# Patient Record
Sex: MALE | Race: WHITE | NOT HISPANIC OR LATINO | Employment: FULL TIME | URBAN - METROPOLITAN AREA
[De-identification: names, ages, dates, MRNs, and addresses within clinical notes are randomized per-mention and may not be internally consistent; named-entity substitution may affect disease eponyms.]

---

## 2017-01-17 ENCOUNTER — OFFICE VISIT (OUTPATIENT)
Dept: ENDOCRINOLOGY | Facility: CLINIC | Age: 37
End: 2017-01-17

## 2017-01-17 VITALS
SYSTOLIC BLOOD PRESSURE: 154 MMHG | HEIGHT: 76 IN | DIASTOLIC BLOOD PRESSURE: 79 MMHG | HEART RATE: 73 BPM | WEIGHT: 188 LBS | BODY MASS INDEX: 22.89 KG/M2

## 2017-01-17 DIAGNOSIS — E10.9 TYPE 1 DIABETES MELLITUS WITHOUT COMPLICATION (H): Primary | ICD-10-CM

## 2017-01-17 DIAGNOSIS — E10.9 TYPE 1 DIABETES MELLITUS WITHOUT COMPLICATION (H): ICD-10-CM

## 2017-01-17 LAB — HBA1C MFR BLD: 8 % (ref 4.3–6)

## 2017-01-17 ASSESSMENT — PAIN SCALES - GENERAL: PAINLEVEL: NO PAIN (0)

## 2017-01-17 NOTE — PATIENT INSTRUCTIONS
Reduce carb coverage on working days from 1 unit/6 g to 1 unit per 8 g.  Continue same correction dose of 1 unit/40 mg/dL above 130.  Continue Tresiba at 24 units      To expedite your medication refill(s), please contact your pharmacy and have them fax a refill request to: [phone].  *Please allow 3 business days for routine medication refills.  *Please allow 5 business days for controlled substance medication refills.  --------------------  For scheduling appointments (including lab work), please request an appointment through [facility], or call: [phone].    For questions for your provider or the endocrine nurse, please send a [facility] message.  For after-hours urgent issues, please dial [phone], and ask to speak with the Endocrinologist On-Call.  --------------------  Please Note: If you are active on [facility], all future test results will be sent by [facility] message only and will no longer be sent by mail. You may also receive communication directly from your physician.

## 2017-01-17 NOTE — Clinical Note
1/17/2017       RE: Agustín Gallegos  [address]   [place] [address]     Dear Colleague,    Thank you for referring your patient, Agustín Gallegos, to the [facility] ENDOCRINOLOGY at [facility]. Please see a copy of my visit note below.    Endocrinology Clinic Visit    Chief Complaint: RECHECK     Information obtained from:Patient    Subjective:         HPI: Christiano is a 36 year old year old male with history of type 1 diabetes who is seen in follow-up for the same.    Briefly, Christiano has had diabetes for 22 years, and was diagnosed at age 14.  He is originally from the [facility], was living in [place], and moved to [place] in July 2014. He was first seen by me in September 2014.     Agustín reported his A1c usually ranged between 7 and 7.5%, sometimes up to 8%, but BG has been hard to control since he moved to [facility].  He relates these to his irregular work schedule.  He works 5 days a week but in a variable schedule.  He applied for new jobs, including here at [employer], but has not been successful.    He is currently on Tresiba 24 units at bedtime (previously on Lantus 28 units) and Novolog 1 unit per 6 g with breakfast, lunch and dinner.   He is also on a correction sliding scale with 1 unit for every 40 mg/dl over 130 mg/dl during the day and 150 mg/dl during at bedtime.    He continues working at [employer].  He works 8 hours shift, 5 days a week, but his schedule is variable as discussed above, some days he works from 8:15 AM to 4:15 PM, other days from 4:15 PM to 12:15 AM.  During work hours, since he is quite active at work, he takes 1/10 g CHO.   He was running during summer, but is no longer doing that.    He is not feeling his best today.  His girlfriend is sick and he is afraid she has influenza.  He did get a influenza shot this season.  He denies fever, and respiratory or GI symptoms at this time.    Download from her meter shows that he is checking his blood  glucose an average of 3.5 times per day:  Average BG is 161 +/- 76 mg/dl  Lowest recorded blood glucose value was 43, highest is 393.  He continues having some hypoglycemic episodes, usually after meals, while he is at work.   A1c is 8.0% today, down from 8.4% previously.    He used a diagnostic CGMs as in the past, in April 2016, which was very helpful.      Agustín does not have chronic complications of diabetes.  Last eye exam was normal. Microalbuminuria was negative.  He was placed on losartan as a renal protective agent around 2008.  He had no history of microalbuminuria or hypertension and after a detailed discussion, we decided to discontinue Losartan in September 2014.  Blood pressure levels remained within the normal range.    He is also on vitamin D 1000 units per day.       No Known Allergies    Current Outpatient Prescriptions   Medication Sig Dispense Refill     blood glucose monitoring (CANDE CONTOUR NEXT) test strip Use to test blood sugar 8 times daily or as directed. 800 each 2     blood glucose monitoring (NO BRAND SPECIFIED) meter device kit Use to test blood sugar 5 times daily or as directed. 1 kit 0     insulin degludec (TRESIBA FLEXTOUCH) 200 UNIT/ML pen Inject 24 Units Subcutaneous daily 15 mL 3     NOVOLOG FLEXPEN 100 UNIT/ML soln Inject 30-40 units per day as carb coverage 1 unit per 7 g of CHO. 45 mL 3     cholecalciferol (VITAMIN D) 1000 UNIT tablet Take 1 tablet (1,000 Units) by mouth daily 90 tablet 3     blood glucose monitoring (CANDE MICROLET) lancets Use to test blood sugar 8 times daily or as directed. 6 Box 5     Review of Systems     11 point review system (Constitutional, HENT, Eyes, Respiratory, Cardiovascular, Gastrointestinal, Genitourinary, Musculoskeletal,Neurological, Psychiatric/Behavioral, Endocrine) is as detailed in the HPI or negative    Past Medical History   Diagnosis Date     Type 1 diabetes (H)      age of onset 14 years       No past surgical history on  "file.    Family History   Problem Relation Age of Onset     DIABETES Brother      CEREBROVASCULAR DISEASE Paternal Grandmother      Arthritis Mother      Arthritis Maternal Grandmother        History     Social History     Marital Status: Single     Spouse Name: N/A     Number of Children: N/A     Years of Education: N/A     Social History Main Topics     Smoking status: Never Smoker      Smokeless tobacco: Never Used     Alcohol Use: No     Drug Use: No     Sexual Activity:     Partners: Female     Other Topics Concern     None     Social History Narrative   Moved from [place] to [facility], currently working at [employer].  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  Has a girlfriend  Lives alone  No smoking. No alcohol, no illicit drugs    Family history:  Father: 71 yrs old, retired nephrologist in the [place] area, healthy  Mother: 69 yrs old, knee replacement, otherwise healthy  1 older brother, 43 yrs old, T1D since age 5, no complications; one older sister age 44 healthy.  No children    Objective:   /79 mmHg  Pulse 73  Ht 1.93 m (6' 4\")  Wt 85.276 kg (188 lb)  BMI 22.89 kg/m2  Constitutional: Appears well-developed and well-nourished. Active.   EYES: anicteric, normal extra-ocular movements, no lid lag or retraction   HEENT: Mouth/Throat: Mucous membrane is moist. Oropharynx is clear. No adenopathy.   Thyroid: Thyroid is firm, only upper poles are palpable. No discrete nodules are palpable.  Cardiovascular: RRR, S1, S2 normal. No m/g/r   Pulmonary/Chest: CTAB. No wheezing or rales   Abdominal: +BS. Non tender to palpation. No organomegaly present.  Neurological: Alert. Normal affect. CNII-XII intact. Muscle strength 5/5. Sensory is intact.  Extremities: No clubbing, cyanosis or inflammation. No tremor of the outstretched hands.   Skin: normal texture, color and temperature  Feet: No lesions or ulcers. Pedal pulse is palpable ++/+++.  Vibratory sensation and pinprick are " normal.  Lymphatic: no cervical lymphadenopathy.  Psychological: appropriate mood and affect     In House Labs:   ENDO DIABETES Latest Ref Rng 9/16/2016   CHOLESTEROL <200 mg/dL 146   LDL CHOLESTEROL, CALCULATED <100 mg/dL 96   HDL CHOLESTEROL >39 mg/dL 43   VLDL-CHOLESTEROL 0 - 30 mg/dL    NON HDL CHOLESTEROL <130 mg/dL 103   TRIGLYCERIDES <150 mg/dL 37   ALBUMIN URINE MG/L  13   ALBUMIN URINE MG/G CR 0 - 17 mg/g Cr 5.84   CREATININE 0.66 - 1.25 mg/dL 0.72   POTASSIUM 3.4 - 5.3 mmol/L 4.1   TSH 0.40 - 4.00 mU/L 0.97     ENDO DIABETES Latest Ref Rng 6/24/2015   MICROALBUMIN URINE  20   MICROALBUMIN MG/G CR 0 - 17 mg/g Cr 8.71   TSH 0.40 - 4.00 mU/L 0.78   T4 FREE 0.76 - 1.46 ng/dL 1.02   TRIIODOTHYRONINE(T3) 60 - 181 ng/dL 113     ENDO DIABETES Latest Ref Rng 3/5/2015   CREATININE 0.66 - 1.25 mg/dL 0.70   ALT 0 - 70 U/L 20   AST 0 - 45 U/L 16   WBC 4.0 - 11.0 10e9/L 4.9   RBC 4.4 - 5.9 10e12/L 5.00   HGB 13.3 - 17.7 g/dL 14.5   HCT 40.0 - 53.0 % 41.9   MCV 78 - 100 fl 84   MCH 26.5 - 33.0 pg 29.0   MCHC 31.5 - 36.5 g/dL 34.6   RDW 10.0 - 15.0 % 12.9    - 450 10e9/L 293     ENDO CALCIUM LABS-UMP Latest Ref Rng 12/4/2014   25 OH VIT D2  <5   25 OH VIT D3  18   25 OH VIT D TOTAL 30 - 75 ug/L <23  Season, race, dietary intake, and treatment affect the concentration of   25-hydroxy-Vitamin D. Values may decrease during winter months and increase   during summer months. Values less than 30 ug/L may indicate Vitamin D   deficiency. (L)     ENDO DIABETES Latest Ref Rng 12/4/2014 8/13/2014   HEMOGLOBIN A1C 4.3 - 6.0 %  7.9 (H)   HEMOGLOBIN A1C, POC 4.3 - 6.0 %     CHOLESTEROL <200 mg/dL  162   LDL CHOLESTEROL, CALCULATED 0 - 129 mg/dL  102   HDL CHOLESTEROL >40 mg/dL  49   VLDL-CHOLESTEROL 0 - 30 mg/dL  11   TRIGLYCERIDES 0 - 150 mg/dL  56   MICROALBUMIN URINE  6    MICROALBUMIN MG/G CR 0 - 17 mg/g Cr 5.36    TSH 0.40 - 4.00 mU/L 1.09    THYROGLOBULIN ANTIBODY <40 IU/mL <20    THYR PEROXIDASE BUFFY <35 IU/mL 26         Assessment/Treatment Plan:      Agustín Gallegos is a 36 year old year old male with type 1 diabetes for the past 22 years.    1. Type 1 Diabetes:  Diabetes control has somewhat improved and A1c is 8.0% today; goal is <7.0%.  We have again discussed in detail the short and long-term goals of diabetes management, as well as A1c goals.T he patient continues to report his working hours make it difficult for him to control his blood glucose. He understands an insulin pump would give him more flexibility, but he does not have the financial resources available at this time.  We have also discussed using a CGMS, and he will think about that.  He is doing well on Tresiba, but is having some post-prandial hypoglycemia.  Agustín will reduce his carb coverage from 1/6 g to 1/7 g during work hours, and if hypoglycemia persists he will decrease this further to 1/8 g.  We will not make other changes to his insulin regimen at this time.       2. Chronic diabetic complications: There is no evidence of chronic diabetic complications.  Creatinine levels are normal and microalbuminuria screening was negative. The patient is due for an eye exam.    3. Hypertension: Pt was on prophylactic Losartan in the past.  Losartan was discontinued on September 2014 and his BP levels were normal off medications. BP is slightly elevated today and on a previous visit a while back, and I asked our nurse staff to recheck those. We will continue to check his blood pressure regularly.  If hypertension is detected, we would recommend an ACE inhibitor (more likely ramipril) as apposed to an ARB.  We will continue to follow.    4. Flu (?): Patient is not at his best today and feels he is coming down with some illness. His girlfriend was sick, but was not tested for influenza.  We will obtain a swab for influenza A and B and start Tamiflu if positive. Indications, risks and benefits discussed with the patient. Patient has received a flu shot this  season.      5. Health maintenance: Thyroid function is normal. Lipid levels are normal.  Vitamin D levels were in the low-normal range, and the pt is now on vitamin D 1,000 units/day.   We should repeat vitamin D levels with next blood drawing.    I will contact him with his test results.     Return to clinic in 3 months or sooner if needed.    Test and/or medications prescribed today:  Orders Placed This Encounter   Procedures     Hemoglobin A1c POCT       Sharona Banks MD PhD    Division of Endocrinology and Diabetes    Addendum:  Patient was sent back and for in the [facility] (lab. Clinic, another clinic) and needed to leave so that he didn't have the test for influenza one.  I called him on 1/20 to check on how he was feeling and why labs were not done and he stated he could wait (asked to wait for 2 hours to have the swab done at the  clinic).  He is now feeling okay, but was a sick for the past 2 days. I advised him to seek care if symptoms (fever, cough, malaise) worsen.    Again, thank you for allowing me to participate in the care of your patient.      Sincerely,    Shruti Banks MD

## 2017-01-17 NOTE — PROGRESS NOTES
Endocrinology Clinic Visit    Chief Complaint: RECHECK     Information obtained from:Patient    Subjective:         HPI: Christiano is a 36 year old year old male with history of type 1 diabetes who is seen in follow-up for the same.    Briefly, Christiano has had diabetes for 22 years, and was diagnosed at age 14.  He is originally from the [facility], was living in [place], and moved to [place] in July 2014. He was first seen by me in September 2014.     Agustín reported his A1c usually ranged between 7 and 7.5%, sometimes up to 8%, but BG has been hard to control since he moved to [facility].  He relates these to his irregular work schedule.  He works 5 days a week but in a variable schedule.  He applied for new jobs, including here at [employer], but has not been successful.    He is currently on Tresiba 24 units at bedtime (previously on Lantus 28 units) and Novolog 1 unit per 6 g with breakfast, lunch and dinner.   He is also on a correction sliding scale with 1 unit for every 40 mg/dl over 130 mg/dl during the day and 150 mg/dl during at bedtime.    He continues working at [employer].  He works 8 hours shift, 5 days a week, but his schedule is variable as discussed above, some days he works from 8:15 AM to 4:15 PM, other days from 4:15 PM to 12:15 AM.  During work hours, since he is quite active at work, he takes 1/10 g CHO.   He was running during summer, but is no longer doing that.    He is not feeling his best today.  His girlfriend is sick and he is afraid she has influenza.  He did get a influenza shot this season.  He denies fever, and respiratory or GI symptoms at this time.    Download from her meter shows that he is checking his blood glucose an average of 3.5 times per day:  Average BG is 161 +/- 76 mg/dl  Lowest recorded blood glucose value was 43, highest is 393.  He continues having some hypoglycemic episodes, usually after meals, while he is at work.   A1c is 8.0% today, down from 8.4% previously.    He used a diagnostic  CGMs as in the past, in April 2016, which was very helpful.      Agustín does not have chronic complications of diabetes.  Last eye exam was normal. Microalbuminuria was negative.  He was placed on losartan as a renal protective agent around 2008.  He had no history of microalbuminuria or hypertension and after a detailed discussion, we decided to discontinue Losartan in September 2014.  Blood pressure levels remained within the normal range.    He is also on vitamin D 1000 units per day.       No Known Allergies    Current Outpatient Prescriptions   Medication Sig Dispense Refill     blood glucose monitoring (CANDE CONTOUR NEXT) test strip Use to test blood sugar 8 times daily or as directed. 800 each 2     blood glucose monitoring (NO BRAND SPECIFIED) meter device kit Use to test blood sugar 5 times daily or as directed. 1 kit 0     insulin degludec (TRESIBA FLEXTOUCH) 200 UNIT/ML pen Inject 24 Units Subcutaneous daily 15 mL 3     NOVOLOG FLEXPEN 100 UNIT/ML soln Inject 30-40 units per day as carb coverage 1 unit per 7 g of CHO. 45 mL 3     cholecalciferol (VITAMIN D) 1000 UNIT tablet Take 1 tablet (1,000 Units) by mouth daily 90 tablet 3     blood glucose monitoring (CANDE MICROLET) lancets Use to test blood sugar 8 times daily or as directed. 6 Box 5     Review of Systems     11 point review system (Constitutional, HENT, Eyes, Respiratory, Cardiovascular, Gastrointestinal, Genitourinary, Musculoskeletal,Neurological, Psychiatric/Behavioral, Endocrine) is as detailed in the HPI or negative    Past Medical History   Diagnosis Date     Type 1 diabetes (H)      age of onset 14 years       No past surgical history on file.    Family History   Problem Relation Age of Onset     DIABETES Brother      CEREBROVASCULAR DISEASE Paternal Grandmother      Arthritis Mother      Arthritis Maternal Grandmother        History     Social History     Marital Status: Single     Spouse Name: N/A     Number of Children: N/A     Years  "of Education: N/A     Social History Main Topics     Smoking status: Never Smoker      Smokeless tobacco: Never Used     Alcohol Use: No     Drug Use: No     Sexual Activity:     Partners: Female     Other Topics Concern     None     Social History Narrative   Moved from [place] to [facility], currently working at [employer].  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  Has a girlfriend  Lives alone  No smoking. No alcohol, no illicit drugs    Family history:  Father: 71 yrs old, retired nephrologist in the [place] area, healthy  Mother: 69 yrs old, knee replacement, otherwise healthy  1 older brother, 43 yrs old, T1D since age 5, no complications; one older sister age 44 healthy.  No children    Objective:   /79 mmHg  Pulse 73  Ht 1.93 m (6' 4\")  Wt 85.276 kg (188 lb)  BMI 22.89 kg/m2  Constitutional: Appears well-developed and well-nourished. Active.   EYES: anicteric, normal extra-ocular movements, no lid lag or retraction   HEENT: Mouth/Throat: Mucous membrane is moist. Oropharynx is clear. No adenopathy.   Thyroid: Thyroid is firm, only upper poles are palpable. No discrete nodules are palpable.  Cardiovascular: RRR, S1, S2 normal. No m/g/r   Pulmonary/Chest: CTAB. No wheezing or rales   Abdominal: +BS. Non tender to palpation. No organomegaly present.  Neurological: Alert. Normal affect. CNII-XII intact. Muscle strength 5/5. Sensory is intact.  Extremities: No clubbing, cyanosis or inflammation. No tremor of the outstretched hands.   Skin: normal texture, color and temperature  Feet: No lesions or ulcers. Pedal pulse is palpable ++/+++.  Vibratory sensation and pinprick are normal.  Lymphatic: no cervical lymphadenopathy.  Psychological: appropriate mood and affect     In House Labs:   ENDO DIABETES Latest Ref Rng 9/16/2016   CHOLESTEROL <200 mg/dL 146   LDL CHOLESTEROL, CALCULATED <100 mg/dL 96   HDL CHOLESTEROL >39 mg/dL 43   VLDL-CHOLESTEROL 0 - 30 mg/dL    NON HDL " CHOLESTEROL <130 mg/dL 103   TRIGLYCERIDES <150 mg/dL 37   ALBUMIN URINE MG/L  13   ALBUMIN URINE MG/G CR 0 - 17 mg/g Cr 5.84   CREATININE 0.66 - 1.25 mg/dL 0.72   POTASSIUM 3.4 - 5.3 mmol/L 4.1   TSH 0.40 - 4.00 mU/L 0.97     ENDO DIABETES Latest Ref Rng 6/24/2015   MICROALBUMIN URINE  20   MICROALBUMIN MG/G CR 0 - 17 mg/g Cr 8.71   TSH 0.40 - 4.00 mU/L 0.78   T4 FREE 0.76 - 1.46 ng/dL 1.02   TRIIODOTHYRONINE(T3) 60 - 181 ng/dL 113     ENDO DIABETES Latest Ref Rng 3/5/2015   CREATININE 0.66 - 1.25 mg/dL 0.70   ALT 0 - 70 U/L 20   AST 0 - 45 U/L 16   WBC 4.0 - 11.0 10e9/L 4.9   RBC 4.4 - 5.9 10e12/L 5.00   HGB 13.3 - 17.7 g/dL 14.5   HCT 40.0 - 53.0 % 41.9   MCV 78 - 100 fl 84   MCH 26.5 - 33.0 pg 29.0   MCHC 31.5 - 36.5 g/dL 34.6   RDW 10.0 - 15.0 % 12.9    - 450 10e9/L 293     ENDO CALCIUM LABS-UMP Latest Ref Rng 12/4/2014   25 OH VIT D2  <5   25 OH VIT D3  18   25 OH VIT D TOTAL 30 - 75 ug/L <23  Season, race, dietary intake, and treatment affect the concentration of   25-hydroxy-Vitamin D. Values may decrease during winter months and increase   during summer months. Values less than 30 ug/L may indicate Vitamin D   deficiency. (L)     ENDO DIABETES Latest Ref Rng 12/4/2014 8/13/2014   HEMOGLOBIN A1C 4.3 - 6.0 %  7.9 (H)   HEMOGLOBIN A1C, POC 4.3 - 6.0 %     CHOLESTEROL <200 mg/dL  162   LDL CHOLESTEROL, CALCULATED 0 - 129 mg/dL  102   HDL CHOLESTEROL >40 mg/dL  49   VLDL-CHOLESTEROL 0 - 30 mg/dL  11   TRIGLYCERIDES 0 - 150 mg/dL  56   MICROALBUMIN URINE  6    MICROALBUMIN MG/G CR 0 - 17 mg/g Cr 5.36    TSH 0.40 - 4.00 mU/L 1.09    THYROGLOBULIN ANTIBODY <40 IU/mL <20    THYR PEROXIDASE BUFFY <35 IU/mL 26        Assessment/Treatment Plan:      Agutsín Gallegos is a 36 year old year old male with type 1 diabetes for the past 22 years.    1. Type 1 Diabetes:  Diabetes control has somewhat improved and A1c is 8.0% today; goal is <7.0%.  We have again discussed in detail the short and long-term goals of  diabetes management, as well as A1c goals.T he patient continues to report his working hours make it difficult for him to control his blood glucose. He understands an insulin pump would give him more flexibility, but he does not have the financial resources available at this time.  We have also discussed using a CGMS, and he will think about that.  He is doing well on Tresiba, but is having some post-prandial hypoglycemia.  Agustín will reduce his carb coverage from 1/6 g to 1/7 g during work hours, and if hypoglycemia persists he will decrease this further to 1/8 g.  We will not make other changes to his insulin regimen at this time.       2. Chronic diabetic complications: There is no evidence of chronic diabetic complications.  Creatinine levels are normal and microalbuminuria screening was negative. The patient is due for an eye exam.    3. Hypertension: Pt was on prophylactic Losartan in the past.  Losartan was discontinued on September 2014 and his BP levels were normal off medications. BP is slightly elevated today and on a previous visit a while back, and I asked our nurse staff to recheck those. We will continue to check his blood pressure regularly.  If hypertension is detected, we would recommend an ACE inhibitor (more likely ramipril) as apposed to an ARB.  We will continue to follow.    4. Flu (?): Patient is not at his best today and feels he is coming down with some illness. His girlfriend was sick, but was not tested for influenza.  We will obtain a swab for influenza A and B and start Tamiflu if positive. Indications, risks and benefits discussed with the patient. Patient has received a flu shot this season.      5. Health maintenance: Thyroid function is normal. Lipid levels are normal.  Vitamin D levels were in the low-normal range, and the pt is now on vitamin D 1,000 units/day.   We should repeat vitamin D levels with next blood drawing.    I will contact him with his test results.     Return  to clinic in 3 months or sooner if needed.    Test and/or medications prescribed today:  Orders Placed This Encounter   Procedures     Hemoglobin A1c POCT       Sharona Banks MD PhD    Division of Endocrinology and Diabetes    Addendum:  Patient was sent back and for in the [facility] (lab. Clinic, another clinic) and needed to leave so that he didn't have the test for influenza one.  I called him on 1/20 to check on how he was feeling and why labs were not done and he stated he could wait (asked to wait for 2 hours to have the swab done at the  clinic).  He is now feeling okay, but was a sick for the past 2 days. I advised him to seek care if symptoms (fever, cough, malaise) worsen.

## 2017-01-17 NOTE — MR AVS SNAPSHOT
After Visit Summary   1/17/2017    Agustín Gallegos    MRN: [id number]           Patient Information     Date Of Birth          1980        Visit Information        Provider Department      1/17/2017 11:30 AM Shruti Banks MD [facility]        Care Instructions    Reduce carb coverage on working days from 1 unit/6 g to 1 unit per 8 g.  Continue same correction dose of 1 unit/40 mg/dL above 130.  Continue Tresiba at 24 units      To expedite your medication refill(s), please contact your pharmacy and have them fax a refill request to: [phone].  *Please allow 3 business days for routine medication refills.  *Please allow 5 business days for controlled substance medication refills.  --------------------  For scheduling appointments (including lab work), please request an appointment through [facility], or call: [phone].    For questions for your provider or the endocrine nurse, please send a [employer] message.  For after-hours urgent issues, please dial [phone], and ask to speak with the Endocrinologist On-Call.  --------------------  Please Note: If you are active on [employer], all future test results will be sent by [employer] message only and will no longer be sent by mail. You may also receive communication directly from your physician.              Follow-ups after your visit        Follow-up notes from your care team     Return in about 3 months (around 4/17/2017).      Your next 10 appointments already scheduled     May 02, 2017  8:30 AM   (Arrive by 8:15 AM)   RETURN DIABETES with MD CARA Rosa [facility] ([facility])    [address] [place] [address] [phone]              Who to contact     Please call your clinic at [phone] to:    Ask questions about your health    Make or cancel appointments    Discuss your medicines    Learn about your test  "results    Speak to your doctor   If you have compliments or concerns about an experience at your clinic, or if you wish to file a complaint, please contact [facility] Patient Relations at [phone] or email us at [email or web address]         Additional Information About Your Visit        Goodmail System[employer]art Information     [employer] gives you secure access to your electronic health record. If you see a primary care provider, you can also send messages to your care team and make appointments. If you have questions, please call your primary care clinic.  If you do not have a primary care provider, please call [phone] and they will assist you.      [facility] is an electronic gateway that provides easy, online access to your medical records. With [facility], you can request a clinic appointment, read your test results, renew a prescription or communicate with your care team.     To access your existing account, please contact your [facility] or call [phone] for assistance.        Care EveryWhere ID     This is your Care EveryWhere ID. This could be used by other organizations to access your [facility] medical records  [id number]        Your Vitals Were     Pulse Height BMI (Body Mass Index)             73 1.93 m (6' 4\") 22.89 kg/m2          Blood Pressure from Last 3 Encounters:   01/17/17 154/79   09/13/16 133/79   05/25/16 145/81    Weight from Last 3 Encounters:   01/17/17 85.276 kg (188 lb)   09/13/16 82.555 kg (182 lb)   05/25/16 81.647 kg (180 lb)              Today, you had the following     No orders found for display       Primary Care Provider    Physician No Ref-Primary       No address on file        Thank you!     Thank you for choosing [facility]  for your care. Our goal is always to provide you with excellent care. Hearing back from our patients is one way we can continue to improve our services. Please take a few minutes to " complete the written survey that you may receive in the mail after your visit with us. Thank you!             Your Updated Medication List - Protect others around you: Learn how to safely use, store and throw away your medicines at www.disposemymeds.org.          This list is accurate as of: 1/17/17 12:01 PM.  Always use your most recent med list.                   Brand Name Dispense Instructions for use    blood glucose monitoring lancets     6 Box    Use to test blood sugar 8 times daily or as directed.       blood glucose monitoring meter device kit    no brand specified    1 kit    Use to test blood sugar 5 times daily or as directed.       blood glucose monitoring test strip    CANDE CONTOUR NEXT    800 each    Use to test blood sugar 8 times daily or as directed.       cholecalciferol 1000 UNIT tablet    vitamin D    90 tablet    Take 1 tablet (1,000 Units) by mouth daily       insulin degludec 200 UNIT/ML pen    TRESIBA FLEXTOUCH    15 mL    Inject 24 Units Subcutaneous daily       NovoLOG FLEXPEN 100 UNIT/ML injection   Generic drug:  insulin aspart     45 mL    Inject 30-40 units per day as carb coverage 1 unit per 7 g of CHO.

## 2017-01-23 ENCOUNTER — TELEPHONE (OUTPATIENT)
Dept: ENDOCRINOLOGY | Facility: CLINIC | Age: 37
End: 2017-01-23

## 2017-01-23 NOTE — TELEPHONE ENCOUNTER
----- Message from Shruti Banks MD sent at 1/20/2017  3:36 PM CST -----  Regarding: RE: illness  Spoke to him.  His girlfriend was also NOT tested, but they thought it was influenza.  I know he got the flu shot, so he is somewhat protected from more serious complications.  He is now getting better, so the clinical course will likely not be attenuated by using Tamiflu this late.  Also recommended him to go to urgent care if he starting feeling worse (higher fever, increase cough, etc.) as it could be pneumonia.    Sharona Bolaños    ----- Message -----     From: Ana Helm RN     Sent: 1/20/2017  12:04 PM       To: Shruti Banks MD  Subject: RE: illness                                      No he was not tested  ----- Message -----     From: Shruti Banks MD     Sent: 1/20/2017  11:50 AM       To: Ana Helm RN  Subject: RE: illness                                      Best if in the first 24 max 48 h.  Not sure if inefective after, would need to ask someone in ID I guess. He is high risk for complications due to diabetes. Was he tested elsewhere?    ----- Message -----     From: Ana Helm RN     Sent: 1/20/2017  10:54 AM       To: Shruti Bansk MD  Subject: illness                                          There was confusion re: getting the test and he needed to leave for work so didn't get it done. He is ill, onset Tuesday 1/17. Blood sugars have been controlled, he is eating and drinking. Symptoms are cough, low grade fever today 99.6 general URI symptoms. He is starting to feel better. Too late for Tamiflu?  ----- Message -----     From: Ana Helm RN     Sent: 1/20/2017  10:07 AM       To: Ana Helm RN        ----- Message -----     From: Shruti Banks MD     Sent: 1/20/2017   8:39 AM       To: Jolly Leo RN, Ivett Rubin MA    I asked Ivett and she told me they  didn't do it in clinic, that is why he was sent to the lab.  When I was leaving, I notice he was coming back to the 3rd floor (saw him in the elevator). I am copying Ivett so she can clarify.  I actually called the lab because I didn't know how to order it properly. Can you please check how he is.  His girl-friend has flue and he probably should be on Tamiflu.  Thanks again,  Sharona    ----- Message -----     From: Jolly Leo RN     Sent: 1/18/2017   1:01 PM       To: Shruti Banks MD    I see it still as future. He didn't get one done . Where was he to get this at?  IF nasal swabs the lab does not do them they are  Done in clinic .  ----- Message -----     From: Shruti Banks MD     Sent: 1/18/2017  12:48 PM       To: Jolly Leo RN    I cannot find the result for influenza test he did yesterday. Can you please help? Thanks,   Sharona

## 2017-01-25 DIAGNOSIS — E10.9 DIABETES MELLITUS TYPE 1 (H): Primary | ICD-10-CM

## 2017-01-25 RX ORDER — INSULIN ASPART 100 [IU]/ML
INJECTION, SOLUTION INTRAVENOUS; SUBCUTANEOUS
Qty: 45 ML | Refills: 3 | Status: SHIPPED | OUTPATIENT
Start: 2017-01-25 | End: 2018-02-02

## 2017-03-01 ENCOUNTER — OFFICE VISIT (OUTPATIENT)
Dept: OPHTHALMOLOGY | Facility: CLINIC | Age: 37
End: 2017-03-01
Attending: OPHTHALMOLOGY
Payer: COMMERCIAL

## 2017-03-01 DIAGNOSIS — H52.203 MYOPIC ASTIGMATISM OF BOTH EYES: ICD-10-CM

## 2017-03-01 DIAGNOSIS — H52.13 MYOPIC ASTIGMATISM OF BOTH EYES: ICD-10-CM

## 2017-03-01 DIAGNOSIS — E10.9 TYPE 1 DIABETES MELLITUS WITHOUT RETINOPATHY (H): Primary | ICD-10-CM

## 2017-03-01 PROCEDURE — 99212 OFFICE O/P EST SF 10 MIN: CPT | Mod: ZF

## 2017-03-01 PROCEDURE — 92015 DETERMINE REFRACTIVE STATE: CPT | Mod: ZF

## 2017-03-01 PROCEDURE — 99212 OFFICE O/P EST SF 10 MIN: CPT

## 2017-03-01 ASSESSMENT — REFRACTION_MANIFEST
OS_SPHERE: -8.50
OD_CYLINDER: +0.50
OS_CYLINDER: +0.50
OD_SPHERE: -7.75
OS_AXIS: 095
OD_AXIS: 085

## 2017-03-01 ASSESSMENT — CONF VISUAL FIELD
METHOD: COUNTING FINGERS
OD_NORMAL: 1
OS_NORMAL: 1

## 2017-03-01 ASSESSMENT — REFRACTION_WEARINGRX
OD_AXIS: 083
OS_CYLINDER: +0.75
OS_AXIS: 105
OD_SPHERE: -7.50
OS_SPHERE: -7.75
OD_CYLINDER: +0.75

## 2017-03-01 ASSESSMENT — VISUAL ACUITY
OD_CC: 20/20
OD_CC+: -2
CORRECTION_TYPE: GLASSES
OS_PH_CC: 20/20
METHOD: SNELLEN - LINEAR
OS_CC: 20/30

## 2017-03-01 ASSESSMENT — CUP TO DISC RATIO
OS_RATIO: 0.3
OD_RATIO: 0.3

## 2017-03-01 ASSESSMENT — TONOMETRY
OD_IOP_MMHG: 20
OS_IOP_MMHG: 18
IOP_METHOD: TONOPEN

## 2017-03-01 ASSESSMENT — SLIT LAMP EXAM - LIDS
COMMENTS: NORMAL
COMMENTS: NORMAL

## 2017-03-01 ASSESSMENT — EXTERNAL EXAM - LEFT EYE: OS_EXAM: NORMAL

## 2017-03-01 ASSESSMENT — EXTERNAL EXAM - RIGHT EYE: OD_EXAM: NORMAL

## 2017-03-01 NOTE — MR AVS SNAPSHOT
After Visit Summary   3/1/2017    Agustín Gallegos    MRN: [id number]           Patient Information     Date Of Birth          1980        Visit Information        Provider Department      3/1/2017 10:00 AM Patricia Talamantes MD [facility]        Today's Diagnoses     Type 1 diabetes mellitus without retinopathy (H)    -  1    Myopic astigmatism of both eyes           Follow-ups after your visit        Follow-up notes from your care team     Return in about 1 year (around 3/1/2018).      Your next 10 appointments already scheduled     May 02, 2017  8:30 AM CDT   (Arrive by 8:15 AM)   RETURN DIABETES with Shruti Banks MD   [facility] ([facility])    [address] [address] [phone]              Who to contact     Please call your clinic at [phone] to:    Ask questions about your health    Make or cancel appointments    Discuss your medicines    Learn about your test results    Speak to your doctor   If you have compliments or concerns about an experience at your clinic, or if you wish to file a complaint, please contact [facility] Patient Relations at [phone] or email us at [email or web address]         Additional Information About Your Visit        MyChart Information     [employer] gives you secure access to your electronic health record. If you see a primary care provider, you can also send messages to your care team and make appointments. If you have questions, please call your primary care clinic.  If you do not have a primary care provider, please call [phone] and they will assist you.      [employer] is an electronic gateway that provides easy, online access to your medical records. With [facility] [employer], you can request a clinic appointment, read your test results, renew a prescription or communicate with your care team.     To access your existing account,  please contact your [facility] or call [phone] for assistance.        Care EveryWhere ID     This is your Care EveryWhere ID. This could be used by other organizations to access your [facility] medical records  [id number]         Blood Pressure from Last 3 Encounters:   01/17/17 154/79   09/13/16 133/79   05/25/16 145/81    Weight from Last 3 Encounters:   01/17/17 85.3 kg (188 lb)   09/13/16 82.6 kg (182 lb)   05/25/16 81.6 kg (180 lb)              Today, you had the following     No orders found for display       Primary Care Provider    Physician No Ref-Primary       No address on file        Thank you!     Thank you for choosing E[facility] CLINIC  for your care. Our goal is always to provide you with excellent care. Hearing back from our patients is one way we can continue to improve our services. Please take a few minutes to complete the written survey that you may receive in the mail after your visit with us. Thank you!             Your Updated Medication List - Protect others around you: Learn how to safely use, store and throw away your medicines at www.disposemymeds.org.          This list is accurate as of: 3/1/17 11:17 AM.  Always use your most recent med list.                   Brand Name Dispense Instructions for use    blood glucose monitoring lancets     6 Box    Use to test blood sugar 8 times daily or as directed.       blood glucose monitoring meter device kit    no brand specified    1 kit    Use to test blood sugar 5 times daily or as directed.       blood glucose monitoring test strip    CANDE CONTOUR NEXT    800 each    Use to test blood sugar 8 times daily or as directed.       cholecalciferol 1000 UNIT tablet    vitamin D    90 tablet    Take 1 tablet (1,000 Units) by mouth daily       insulin degludec 200 UNIT/ML pen    TRESIBA FLEXTOUCH    15 mL    Inject 24 Units Subcutaneous daily       NovoLOG FLEXPEN 100 UNIT/ML injection   Generic drug:  insulin aspart      45 mL    Inject 30-40 units per day as carb coverage 1 unit per 7 g of CHO.

## 2017-03-01 NOTE — NURSING NOTE
Chief Complaints and History of Present Illnesses   Patient presents with     Yearly Exam     DM exam     HPI    Affected eye(s):  Both   Symptoms:     No floaters   No flashes   No glare   No halos      Duration:  1 year      Do you have eye pain now?:  No      Comments:  Diabetic exam  Have issues when reading at times, sometimes double w RE when in contacts.  Blood [id number] today  A1C 7.8, taken 1-  Barbara Schmitt COA 10:46 AM March 1, 2017

## 2017-03-01 NOTE — PROGRESS NOTES
HPI  Agustín Gallegos is a 35 year old male here for yearly diabetic eye exam. His vision is good in both eyes with his current glasses. He notes intermittent blurring with reading. He denies eye pain, redness, or discharge. No flashes/floaters.    POH: No history of eye surgery or trauma  PMH: Type 1 diabetes  FH: No FH of AMD or glc  SH: Non-smoker    Assessment & Plan      (E10.9) Type 1 diabetes mellitus without retinopathy (HCC)  (primary encounter diagnosis)  Comment: Diagnosed at age 13. Last A1c 8.0 1/2017. No diabetic retinopathy.  Plan: Discussed the importance of tight blood glucose control in the prevention of diabetic retinopathy. Recommend yearly dilated eye exam.    (H52.203) Myopic astigmatism of both eyes  Comment: Stable good vision with refraction  Plan: Given updated glasses Rx. Follows with Dr. Nice for contact lenses     -----------------------------------------------------------------------------------    Patient disposition:   Return in about 1 year (around 3/1/2018). or sooner as needed.    Teaching statement:  I have confirmed the content of the chief complaint, history of present illness, review of systems, and past medical/surgical history sections and edited the information as needed.    I have interviewed and examined the patient and confirm the pertinent findings.  I have discussed the case with the resident/fellow and agree with the findings and plan as documented.    Patricia Talamantes MD  Comprehensive Ophthalmology & Ocular Pathology  Department of Ophthalmology and Visual Neurosciences  [email or web address]  Pager [phone]

## 2017-03-01 NOTE — NURSING NOTE
Chief Complaints and History of Present Illnesses   Patient presents with     Yearly Exam     DM exam     HPI    Affected eye(s):  Both   Symptoms:     No floaters   No flashes   No glare   No halos      Duration:  1 year      Do you have eye pain now?:  No      Comments:  Diabetic exam  No problems  Blood [id number] today  A1C 7.8, taken 1-  Barbara COLLADO 10:46 AM March 1, 2017

## 2017-04-25 DIAGNOSIS — E10.9 TYPE 1 DIABETES MELLITUS WITHOUT COMPLICATION (H): ICD-10-CM

## 2017-05-04 DIAGNOSIS — E10.9 DIABETES MELLITUS TYPE 1 (H): Primary | ICD-10-CM

## 2017-08-22 ENCOUNTER — OFFICE VISIT (OUTPATIENT)
Dept: ENDOCRINOLOGY | Facility: CLINIC | Age: 37
End: 2017-08-22

## 2017-08-22 VITALS
WEIGHT: 187.6 LBS | HEART RATE: 74 BPM | SYSTOLIC BLOOD PRESSURE: 138 MMHG | DIASTOLIC BLOOD PRESSURE: 85 MMHG | HEIGHT: 76 IN | BODY MASS INDEX: 22.84 KG/M2

## 2017-08-22 DIAGNOSIS — E10.9 TYPE 1 DIABETES MELLITUS WITHOUT COMPLICATION (H): Primary | ICD-10-CM

## 2017-08-22 DIAGNOSIS — E10.9 TYPE 1 DIABETES MELLITUS WITHOUT COMPLICATION (H): ICD-10-CM

## 2017-08-22 DIAGNOSIS — Z00.00 HEALTH CARE MAINTENANCE: ICD-10-CM

## 2017-08-22 LAB
CREAT SERPL-MCNC: 0.69 MG/DL (ref 0.66–1.25)
CREAT UR-MCNC: 43 MG/DL
GFR SERPL CREATININE-BSD FRML MDRD: >90 ML/MIN/1.7M2
HBA1C MFR BLD: 8.3 % (ref 4.3–6)
HGB BLD-MCNC: 14.8 G/DL (ref 13.3–17.7)
MICROALBUMIN UR-MCNC: <5 MG/L
MICROALBUMIN/CREAT UR: NORMAL MG/G CR (ref 0–17)

## 2017-08-22 ASSESSMENT — PAIN SCALES - GENERAL: PAINLEVEL: NO PAIN (0)

## 2017-08-22 NOTE — PROGRESS NOTES
ATTENDING NOTE    I have seen and examined the patient, reviewed and edited the fellow's note, and agree with the plan of care.    Sharona Banks MD PhD    Division of Endocrinology and Diabetes

## 2017-08-22 NOTE — PATIENT INSTRUCTIONS
Increase Treseba 10% every 3 days if morning glucose if consistence high (>150)  Novolog at home is 1 unit per 6 grams and at work 1 unit per 8 grams  Meet with Rosa in 3- 4 weeks about CGM and insulin dose  Labs today

## 2017-08-22 NOTE — LETTER
Patient:  Agustín Gallegos  :   1980  MRN:     [id number]        Mr.Theodore JEANETTE Gallegos  [address] [place] [address]        2017    Dear ,    We are writing to inform you of your test results.      Resulted Orders   Albumin Random Urine Quantitative   Result Value Ref Range    Creatinine Urine 43 mg/dL    Albumin Urine mg/L <5 mg/L    Albumin Urine mg/g Cr Unable to calculate due to low value 0 - 17 mg/g Cr   Hemoglobin A1c POCT   Result Value Ref Range    Hemoglobin A1C 8.3 (A) 4.3 - 6.0 %       The results above show normal amounts of protein/albumin in your urine, what is always good to see.  It was a pleasure to see you at your recent visit.  Please let me know if you have any questions or concerns.  Sincerely,    Sharona Banks MD PhD    Division of Endocrinology and Diabetes

## 2017-08-22 NOTE — LETTER
8/22/2017       RE: Agustín Gallegos  [address]  [facility] [address]     Dear Colleague,    Thank you for referring your patient, Agustín Gallegos, to the [facility] ENDOCRINOLOGY at [facility]. Please see a copy of my visit note below.    ATTENDING NOTE    I have seen and examined the patient, reviewed and edited the fellow's note, and agree with the plan of care.    Sharona Banks MD PhD    Division of Endocrinology and Diabetes      Endocrinology Clinic Visit    Chief Complaint: RECHECK (return diabetes )     Information obtained from:Patient    Subjective:         HPI: Christiano is a 37 year old year old male with history of type 1 diabetes who is seen in follow-up for the same.    Briefly, Christiano has had diabetes for 22 years, and was diagnosed at age 14.  He is originally from the [facility], was living in [place], and moved to [place] in July 2014. He was first seen by Dr. Banks September 2014 and last seen 1/17/17.    Agustín reported his A1c usually ranged between 7 and 7.5%, sometimes up to 8%, but BG has been hard to control since [place] to [place].  He relates these to his irregular work schedule.  He works 5 days a week but in a variable schedule.  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 He is also on a correction sliding scale with 1 unit for every 30 mg/dl over 130 mg/dl.     Since last visit, he has less frequent lows, now 1-2 times per week which he attributed that to over-estimate his carbohydrate. Despite over coverage, sometimes he hesitates to give himself the full amount insulin he calculates for meal because he scared to be in hypoglycemia. He started to feel low  when glucose 70s. He drinks orange juices, recheck 15 mins, glucose increase to 140s.    Download from meter July 23-August 22 shows that he is checking his blood glucose an average of 3.9 times per day:  Average BG is 164, SD 77 mg/dl  Lowest recorded blood glucose value was 43, highest is 393.  He continues having some hypoglycemic episodes, usually after meals  A1c is 8.3% today, up from 8.0% previously.    He used a diagnostic CGMs as in the past, in April 2016, which was very helpful.  He is interested in trying again when he change his job      Agustín does not have chronic complications of diabetes.  Last eye exam was normal 3/2017. Microalbuminuria was negative.  He was placed on losartan as a renal protective agent around 2008.  He had no history of microalbuminuria or hypertension and after a detailed discussion, we decided to discontinue Losartan in September 2014.  Blood pressure levels remained within the normal range.    He is also on vitamin D 1000 units per day.       No Known Allergies    Current Outpatient Prescriptions   Medication Sig Dispense Refill     insulin pen needle (B-D U/F) 31G X 8 MM Use use 4 daily pen needles daily (or pen size pt has been using) 400 each 3     insulin degludec (TRESIBA FLEXTOUCH) 200 UNIT/ML pen Inject 24 Units Subcutaneous daily 15 mL 3     NOVOLOG FLEXPEN 100 UNIT/ML soln Inject 30-40 units per day as carb coverage 1 unit per 7 g of CHO. 45 mL 3     blood glucose monitoring (CANDE CONTOUR NEXT) test strip Use to test blood sugar 8 times daily or as directed. 800 each 2     blood glucose monitoring (NO BRAND SPECIFIED) meter device kit Use to test blood sugar 5 times daily or as directed. 1 kit 0     cholecalciferol (VITAMIN D) 1000 UNIT tablet Take 1 tablet (1,000 Units) by mouth daily 90 tablet 3     blood glucose monitoring (CANDE MICROLET) lancets Use to test blood sugar 8 times daily or as directed. 6 Box 5     Review of Systems     11 point review system  "(Constitutional, HENT, Eyes, Respiratory, Cardiovascular, Gastrointestinal, Genitourinary, Musculoskeletal,Neurological, Psychiatric/Behavioral, Endocrine) is as detailed in the HPI or negative    Past Medical History:   Diagnosis Date     Type 1 diabetes (H)     age of onset 14 years       No past surgical history on file.    Family History   Problem Relation Age of Onset     DIABETES Brother      CEREBROVASCULAR DISEASE Paternal Grandmother      Arthritis Mother      Arthritis Maternal Grandmother        History     Social History     Marital Status: Single     Spouse Name: N/A     Number of Children: N/A     Years of Education: N/A     Social History Main Topics     Smoking status: Never Smoker      Smokeless tobacco: Never Used     Alcohol Use: No     Drug Use: No     Sexual Activity:     Partners: Female     Other Topics Concern     None     Social History Narrative   Moved from [place] to [facility], currently working at [employer].  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  Has a girlfriend  Lives alone  No smoking. No alcohol, no illicit drugs    Family history:  Father: 71 yrs old, retired nephrologist in the [place] area, healthy  Mother: 69 yrs old, knee replacement, otherwise healthy  1 older brother, 43 yrs old, T1D since age 5, no complications; one older sister age 44 healthy.  No children    Objective:   /85  Pulse 74  Ht 1.93 m (6' 4\")  Wt 85.1 kg (187 lb 9.6 oz)  BMI 22.84 kg/m2  Constitutional: Appears well-developed and well-nourished. Active.   EYES: anicteric, normal extra-ocular movements, no lid lag or retraction   HEENT: Mouth/Throat: Mucous membrane is moist. Oropharynx is clear. No adenopathy.   Thyroid: Thyroid is firm, only upper poles are palpable. No discrete nodules are palpable.  Cardiovascular: RRR, S1, S2 normal. No m/g/r   Pulmonary/Chest: CTAB. No wheezing or rales   Abdominal: +BS. Non tender to palpation. No organomegaly " present.  Neurological: Alert. Normal affect. CN grossly intact, sensory is intact. Equally move.   Extremities: No clubbing, cyanosis or inflammation. No tremor of the outstretched hands.   Skin: normal texture, color and temperature  Feet: No lesions or ulcers. Monofilament normal both feet  Lymphatic: no cervical lymphadenopathy.  Psychological: appropriate mood and affect     In House Labs:   A1C 08/22/17 8.3%    ENDO DIABETES Latest Ref Rng & Units 9/16/2016 1/17/2017   HEMOGLOBIN A1C 4.3 - 6.0 %     HEMOGLOBIN A1C, POC 4.3 - 6.0 %  8.0 (A)   HGB 13.3 - 17.7 g/dL     CHOLESTEROL <200 mg/dL 146    LDL CHOLESTEROL, CALCULATED <100 mg/dL 96    HDL CHOLESTEROL >39 mg/dL 43    VLDL-CHOLESTEROL 0 - 30 mg/dL     NON HDL CHOLESTEROL <130 mg/dL 103    TRIGLYCERIDES <150 mg/dL 37    ALBUMIN URINE MG/L mg/L 13    ALBUMIN URINE MG/G CR 0 - 17 mg/g Cr 5.84    CREATININE 0.66 - 1.25 mg/dL 0.72    POTASSIUM 3.4 - 5.3 mmol/L 4.1    ALT 0 - 70 U/L     AST 0 - 45 U/L     WBC 4.0 - 11.0 10e9/L     RBC 4.4 - 5.9 10e12/L     HGB 13.3 - 17.7 g/dL     HCT 40.0 - 53.0 %     MCV 78 - 100 fl     MCH 26.5 - 33.0 pg     MCHC 31.5 - 36.5 g/dL     RDW 10.0 - 15.0 %      - 450 10e9/L       Lab Results   Component Value Date    A1C 7.9 (H) 08/13/2014    HEMOGLOBINA1 8.0 (A) 01/17/2017    HEMOGLOBINA1 7.5 (A) 05/25/2016    HEMOGLOBINA1 8.6 (A) 01/14/2016    HEMOGLOBINA1 8.9 (A) 06/24/2015    HEMOGLOBINA1 8.1 (A) 03/10/2015     Hemoglobin   Date Value Ref Range Status   08/22/2017 14.8 13.3 - 17.7 g/dL Final   03/05/2015 14.5 13.3 - 17.7 g/dL Final       Assessment/Treatment Plan:      Agustín Gallegos is a 36 year old year old male with type 1 diabetes for the past 22 years.    1. Type 1 Diabetes:  Diabetes control has somewhat worse and A1c 8.0-->8.3% today; goal is <7.0%.  We have again discussed in detail the short and long-term goals of diabetes management, as well as A1c goals. The patient reported his working hours make it  difficult for him to control his blood glucose and also the concern for hypoglycemia (over and under coverage for carbohydrate). He will start new job next week which will be in regular hour and help with DM. He may have different coverage for insulin pump which he is interested in. We have also discussed using a CGMS which might help with his concern for hypoglycemia, and he will meet with Rosa in 3- 4weeks.   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 Chronic diabetic complications: There is no evidence of chronic diabetic complications.  Creatinine levels and microalbuminuria screening were WNL 9/2016. The patient is update for an eye exam.  -- Cr and micro-albumin today    3. Hypertension: Pt was on prophylactic Losartan in the past.  Losartan was discontinued on September 2014 and his BP levels were normal off medications. BP is < 140/90 today. We will continue to check his blood pressure regularly.  If hypertension is detected, we would recommend an ACE inhibitor (more likely ramipril) as apposed to an ARB.  We will continue to follow.    4. Health maintenance: Thyroid function is normal. Lipid levels are normal.  Vitamin D levels were in the low-normal range, and the pt is now on vitamin D 1,000 units/day.   We will repeat vitamin D levels today.    We will contact him with his test results.     Return to clinic in 3 months or sooner if needed.    Test and/or medications prescribed today:  Orders Placed This Encounter   Procedures     Albumin Random Urine Quantitative     Creatinine     Hemoglobin     25 Hydroxyvitamin D2 and D3     DIABETES EDUCATOR REFERRAL     Pt seen and discussed with Dr. Banks.    Cirilo Forman  MD  Endocrine Fellow  [phone]

## 2017-08-22 NOTE — PROGRESS NOTES
Endocrinology Clinic Visit    Chief Complaint: RECHECK (return diabetes )     Information obtained from:Patient    Subjective:         HPI: Christiano is a 37 year old year old male with history of type 1 diabetes who is seen in follow-up for the same.    Briefly, Christiano has had diabetes for 22 years, and was diagnosed at age 14.  He is originally from the [facility], was living in [place], and moved to [place] in July 2014. He was first seen by Dr. Banks September 2014 and last seen 1/17/17.    Agustín reported his A1c usually ranged between 7 and 7.5%, sometimes up to 8%, but BG has been hard to control since he moved to [facility].  He relates these to his irregular work schedule.  He works 5 days a week but in a variable schedule.  He will start new job in [facility] [employer] [facility] next week. 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 He is also on a correction sliding scale with 1 unit for every 30 mg/dl over 130 mg/dl.     Since last visit, he has less frequent lows, now 1-2 times per week which he attributed that to over-estimate his carbohydrate. Despite over coverage, sometimes he hesitates to give himself the full amount insulin he calculates for meal because he scared to be in hypoglycemia. He started to feel low when glucose 70s. He drinks orange juices, recheck 15 mins, glucose increase to 140s.    Download from meter July 23-August 22 shows that he is checking his blood glucose an average of 3.9 times per day:  Average BG is 164, SD 77 mg/dl  Lowest recorded blood glucose value was 43, highest is 393.  He continues having some hypoglycemic episodes, usually after meals  A1c is 8.3% today, up from 8.0% previously.    He used a diagnostic CGMs as in the past, in April 2016, which was very helpful.  He is  interested in trying again when he change his job      Agustín does not have chronic complications of diabetes.  Last eye exam was normal 3/2017. Microalbuminuria was negative.  He was placed on losartan as a renal protective agent around 2008.  He had no history of microalbuminuria or hypertension and after a detailed discussion, we decided to discontinue Losartan in September 2014.  Blood pressure levels remained within the normal range.    He is also on vitamin D 1000 units per day.       No Known Allergies    Current Outpatient Prescriptions   Medication Sig Dispense Refill     insulin pen needle (B-D U/F) 31G X 8 MM Use use 4 daily pen needles daily (or pen size pt has been using) 400 each 3     insulin degludec (TRESIBA FLEXTOUCH) 200 UNIT/ML pen Inject 24 Units Subcutaneous daily 15 mL 3     NOVOLOG FLEXPEN 100 UNIT/ML soln Inject 30-40 units per day as carb coverage 1 unit per 7 g of CHO. 45 mL 3     blood glucose monitoring (CANDE CONTOUR NEXT) test strip Use to test blood sugar 8 times daily or as directed. 800 each 2     blood glucose monitoring (NO BRAND SPECIFIED) meter device kit Use to test blood sugar 5 times daily or as directed. 1 kit 0     cholecalciferol (VITAMIN D) 1000 UNIT tablet Take 1 tablet (1,000 Units) by mouth daily 90 tablet 3     blood glucose monitoring (CANDE MICROLET) lancets Use to test blood sugar 8 times daily or as directed. 6 Box 5     Review of Systems     11 point review system (Constitutional, HENT, Eyes, Respiratory, Cardiovascular, Gastrointestinal, Genitourinary, Musculoskeletal,Neurological, Psychiatric/Behavioral, Endocrine) is as detailed in the HPI or negative    Past Medical History:   Diagnosis Date     Type 1 diabetes (H)     age of onset 14 years       No past surgical history on file.    Family History   Problem Relation Age of Onset     DIABETES Brother      CEREBROVASCULAR DISEASE Paternal Grandmother      Arthritis Mother      Arthritis Maternal Grandmother   "      History     Social History     Marital Status: Single     Spouse Name: N/A     Number of Children: N/A     Years of Education: N/A     Social History Main Topics     Smoking status: Never Smoker      Smokeless tobacco: Never Used     Alcohol Use: No     Drug Use: No     Sexual Activity:     Partners: Female     Other Topics Concern     None     Social History Narrative   Moved from [place] to [facility], currently working at "[employer]  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  Has a girlfriend  Lives alone  No smoking. No alcohol, no illicit drugs    Family history:  Father: 71 yrs old, retired nephrologist in the [place] area, healthy  Mother: 69 yrs old, knee replacement, otherwise healthy  1 older brother, 43 yrs old, T1D since age 5, no complications; one older sister age 44 healthy.  No children    Objective:   /85  Pulse 74  Ht 1.93 m (6' 4\")  Wt 85.1 kg (187 lb 9.6 oz)  BMI 22.84 kg/m2  Constitutional: Appears well-developed and well-nourished. Active.   EYES: anicteric, normal extra-ocular movements, no lid lag or retraction   HEENT: Mouth/Throat: Mucous membrane is moist. Oropharynx is clear. No adenopathy.   Thyroid: Thyroid is firm, only upper poles are palpable. No discrete nodules are palpable.  Cardiovascular: RRR, S1, S2 normal. No m/g/r   Pulmonary/Chest: CTAB. No wheezing or rales   Abdominal: +BS. Non tender to palpation. No organomegaly present.  Neurological: Alert. Normal affect. CN grossly intact, sensory is intact. Equally move.   Extremities: No clubbing, cyanosis or inflammation. No tremor of the outstretched hands.   Skin: normal texture, color and temperature  Feet: No lesions or ulcers. Monofilament normal both feet  Lymphatic: no cervical lymphadenopathy.  Psychological: appropriate mood and affect     In House Labs:   A1C 08/22/17 8.3%    ENDO DIABETES Latest Ref Rng & Units 9/16/2016 1/17/2017   HEMOGLOBIN A1C 4.3 - 6.0 %     HEMOGLOBIN " A1C, POC 4.3 - 6.0 %  8.0 (A)   HGB 13.3 - 17.7 g/dL     CHOLESTEROL <200 mg/dL 146    LDL CHOLESTEROL, CALCULATED <100 mg/dL 96    HDL CHOLESTEROL >39 mg/dL 43    VLDL-CHOLESTEROL 0 - 30 mg/dL     NON HDL CHOLESTEROL <130 mg/dL 103    TRIGLYCERIDES <150 mg/dL 37    ALBUMIN URINE MG/L mg/L 13    ALBUMIN URINE MG/G CR 0 - 17 mg/g Cr 5.84    CREATININE 0.66 - 1.25 mg/dL 0.72    POTASSIUM 3.4 - 5.3 mmol/L 4.1    ALT 0 - 70 U/L     AST 0 - 45 U/L     WBC 4.0 - 11.0 10e9/L     RBC 4.4 - 5.9 10e12/L     HGB 13.3 - 17.7 g/dL     HCT 40.0 - 53.0 %     MCV 78 - 100 fl     MCH 26.5 - 33.0 pg     MCHC 31.5 - 36.5 g/dL     RDW 10.0 - 15.0 %      - 450 10e9/L       Lab Results   Component Value Date    A1C 7.9 (H) 08/13/2014    HEMOGLOBINA1 8.0 (A) 01/17/2017    HEMOGLOBINA1 7.5 (A) 05/25/2016    HEMOGLOBINA1 8.6 (A) 01/14/2016    HEMOGLOBINA1 8.9 (A) 06/24/2015    HEMOGLOBINA1 8.1 (A) 03/10/2015     Hemoglobin   Date Value Ref Range Status   08/22/2017 14.8 13.3 - 17.7 g/dL Final   03/05/2015 14.5 13.3 - 17.7 g/dL Final   ]        Assessment/Treatment Plan:      Agustín Gallegos is a 36 year old year old male with type 1 diabetes for the past 22 years.    1. Type 1 Diabetes:  Diabetes control has somewhat worse and A1c 8.0-->8.3% today; goal is <7.0%.  We have again discussed in detail the short and long-term goals of diabetes management, as well as A1c goals. The patient reported his working hours make it difficult for him to control his blood glucose and also the concern for hypoglycemia (over and under coverage for carbohydrate). He will start new job next week which will be in regular hour and help with DM. He may have different coverage for insulin pump which he is interested in. We have also discussed using a CGMS which might help with his concern for hypoglycemia, and he will meet with Rosa in 3- 4weeks.   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 Chronic diabetic complications: There is no evidence of chronic diabetic complications.  Creatinine levels and microalbuminuria screening were WNL 9/2016. The patient is update for an eye exam.  -- Cr and micro-albumin today    3. Hypertension: Pt was on prophylactic Losartan in the past.  Losartan was discontinued on September 2014 and his BP levels were normal off medications. BP is < 140/90 today. We will continue to check his blood pressure regularly.  If hypertension is detected, we would recommend an ACE inhibitor (more likely ramipril) as apposed to an ARB.  We will continue to follow.    4. Health maintenance: Thyroid function is normal. Lipid levels are normal.  Vitamin D levels were in the low-normal range, and the pt is now on vitamin D 1,000 units/day.   We will repeat vitamin D levels today.    We will contact him with his test results.     Return to clinic in 3 months or sooner if needed.    Test and/or medications prescribed today:  Orders Placed This Encounter   Procedures     Albumin Random Urine Quantitative     Creatinine     Hemoglobin     25 Hydroxyvitamin D2 and D3     DIABETES EDUCATOR REFERRAL       Pt seen and discussed with Dr. Banks.    Cirilo Forman MD  Endocrine Fellow  [phone]

## 2017-08-22 NOTE — MR AVS SNAPSHOT
After Visit Summary   8/22/2017    Agustín Gallegos    MRN: [id number]           Patient Information     Date Of Birth          1980        Visit Information        Provider Department      8/22/2017 11:00 AM Shruti Banks MD  [facility]        Today's Diagnoses     Type 1 diabetes mellitus without complication (H)    -  1      Care Instructions    Increase Treseba 10% every 3 days if morning glucose if consistence high (>150)  Novolog at home is 1 unit per 6 grams and at work 1 unit per 8 grams  Meet with Rosa in 3- 4 weeks about CGM and insulin dose  Labs today          Follow-ups after your visit        Additional Services     DIABETES EDUCATOR REFERRAL       DIABETES SELF MANAGEMENT TRAINING (DSMT)      Your provider has referred you to Diabetes Education: FMG: Diabetes Education - [facility] [phone]   [email or web address]    Type of training and number of hours: Previous Diagnosis: Follow-up DSMT - 2 hours.    Medicare covers: 10 hours of initial DSMT in 12 month period from the time of first visit, plus 2 hours of follow-up DSMT annually, and additional hours as requested for insulin training.    Diabetes Type: Type 1             Diabetes Co-Morbidities: none               A1C Goal:  <7.0       A1C is: Lab Results       Component                Value               Date                       A1C                      7.9                 08/13/2014              If an urgent visit is needed or A1C is above 12, Care Team to call the Diabetes Education Team at [phone] or send an In Basket message to the Diabetes Education Pool (P DIAB ED-PATIENT CARE).    Diabetes Education Topics: Continuous Glucose Monitor: Personal CGM and adjustment on insulin if needed  Special Educational Needs Requiring Individual DSMT: None       MEDICAL NUTRITION THERAPY (MNT) for Diabetes    Medical Nutrition Therapy  with a Registered Dietitian can be provided in coordination with Diabetes Self-Management Training to assist in achieving optimal diabetes management.    MNT Type and Hours: Do not initiate MNT at this time.                       Medicare will cover: 3 hours initial MNT in 12 month period after first visit, plus 2 hours of follow-up MNT annually    Please be aware that coverage of these services is subject to the terms and limitations of your health insurance plan.  Call member services at your health plan to determine Diabetes Self-Management Training benefits and ask which blood glucose monitor brands are covered by your plan.      Please bring the following with you to your appointment:    (1)  List of current medications   (2)  List of Blood Glucose Monitor brands that are covered by your insurance plan  (3)  Blood Glucose Monitor and log book  (4)   Food records for the 3 days prior to your visit    The Certified Diabetes Educator may make diabetes medication adjustments per the CDE Protocol and Collaborative Practice Agreement.                  Follow-up notes from your care team     Return in about 3 months (around 11/22/2017).      Your next 10 appointments already scheduled     Aug 22, 2017 12:30 PM CDT   LAB with  LAB   [facility] ([facility])    [address] [place] [id number] [phone]           Patient must bring picture ID. Patient should be prepared to give a urine specimen  Please do not eat 10-12 hours before your appointment if you are coming in fasting for labs on lipids, cholesterol, or glucose (sugar). Pregnant women should follow their Care Team instructions. Water with medications is okay. Do not drink coffee or other fluids. If you have concerns about taking  your medications, please ask at office or if scheduling via [place], send a message by clicking on Secure Messaging, Message Your Care Team.            Jan 09, 2018  9:00 AM  "CST   (Arrive by 8:45 AM)   RETURN DIABETES with Shruti Banks MD   [facility] ([facility] [place])    [address]  [address] [facility] [address] [phone]              Future tests that were ordered for you today     Open Future Orders        Priority Expected Expires Ordered    Creatinine Routine  8/22/2018 8/22/2017    Hemoglobin Routine  8/22/2018 8/22/2017            Who to contact     Please call your clinic at [phone] to:    Ask questions about your health    Make or cancel appointments    Discuss your medicines    Learn about your test results    Speak to your doctor   If you have compliments or concerns about an experience at your clinic, or if you wish to file a complaint, please contact [facility] Patient Relations at [phone] or email us at [email or web address]         Additional Information About Your Visit        V[employer] Information     [employer] gives you secure access to your electronic health record. If you see a primary care provider, you can also send messages to your care team and make appointments. If you have questions, please call your primary care clinic.  If you do not have a primary care provider, please call [phone] and they will assist you.      [employer] is an electronic gateway that provides easy, online access to your medical records. With [employer], you can request a clinic appointment, read your test results, renew a prescription or communicate with your care team.     To access your existing account, please contact your [facility] or call [phone] for assistance.        Care EveryWhere ID     This is your Care EveryWhere ID. This could be used by other organizations to access your [place] medical records  [id number]        Your Vitals Were     Pulse Height BMI (Body Mass Index)             74 1.93 m (6' 4\") 22.84 kg/m2          Blood " Pressure from Last 3 Encounters:   08/22/17 138/85   01/17/17 154/79   09/13/16 133/79    Weight from Last 3 Encounters:   08/22/17 85.1 kg (187 lb 9.6 oz)   01/17/17 85.3 kg (188 lb)   09/13/16 82.6 kg (182 lb)              We Performed the Following     Albumin Random Urine Quantitative     DIABETES EDUCATOR REFERRAL        Primary Care Provider    Physician No Ref-Primary       No address on file        Equal Access to Services     JOSÉ MIGUEL LOYOLA : Hadii aad ku hadasho Soomaali, waaxda luqadaha, qaybta kaalmada adeegyada, waxay idiin haysophian adedeion alainateresa galloway . So [facility] [phone].    ATENCIÓN: Si habla español, tiene a kelly disposición servicios gratuitos de asistencia lingüística. Llame al [phone].    We comply with applicable federal civil rights laws and [place] laws. We do not discriminate on the basis of race, color, national origin, age, disability sex, sexual orientation or gender identity.            Thank you!     Thank you for choosing [facility] ENDOCRINOLOGY  for your care. Our goal is always to provide you with excellent care. Hearing back from our patients is one way we can continue to improve our services. Please take a few minutes to complete the written survey that you may receive in the mail after your visit with us. Thank you!             Your Updated Medication List - Protect others around you: Learn how to safely use, store and throw away your medicines at www.disposemymeds.org.          This list is accurate as of: 8/22/17 12:20 PM.  Always use your most recent med list.                   Brand Name Dispense Instructions for use Diagnosis    blood glucose monitoring lancets     6 Box    Use to test blood sugar 8 times daily or as directed.    Diabetes mellitus type 1 (H)       blood glucose monitoring meter device kit    no brand specified    1 kit    Use to test blood sugar 5 times daily or as directed.    Type 1 diabetes mellitus without complication (H)       blood glucose  monitoring test strip    CANDE CONTOUR NEXT    800 each    Use to test blood sugar 8 times daily or as directed.    Diabetes mellitus type 1 (H)       cholecalciferol 1000 UNIT tablet    vitamin D    90 tablet    Take 1 tablet (1,000 Units) by mouth daily    Diabetes mellitus type 1 (H), Unspecified vitamin D deficiency       insulin degludec 200 UNIT/ML pen    TRESIBA FLEXTOUCH    15 mL    Inject 24 Units Subcutaneous daily    Type 1 diabetes mellitus without complication (H)       insulin pen needle 31G X 8 MM    B-D U/F    400 each    Use use 4 daily pen needles daily (or pen size pt has been using)    Diabetes mellitus type 1 (H)       NovoLOG FLEXPEN 100 UNIT/ML injection   Generic drug:  insulin aspart     45 mL    Inject 30-40 units per day as carb coverage 1 unit per 7 g of CHO.    Diabetes mellitus type 1 (H)

## 2017-08-28 LAB
DEPRECATED CALCIDIOL+CALCIFEROL SERPL-MC: <34 UG/L (ref 20–75)
VITAMIN D2 SERPL-MCNC: <5 UG/L
VITAMIN D3 SERPL-MCNC: 29 UG/L

## 2018-01-04 ENCOUNTER — OFFICE VISIT (OUTPATIENT)
Dept: EDUCATION SERVICES | Facility: CLINIC | Age: 38
End: 2018-01-04
Payer: COMMERCIAL

## 2018-01-04 DIAGNOSIS — E10.65 TYPE 1 DIABETES MELLITUS WITH HYPERGLYCEMIA (H): Primary | ICD-10-CM

## 2018-01-04 NOTE — MR AVS SNAPSHOT
After Visit Summary   1/4/2018    Agustín Gallegos    MRN: [id number]           Patient Information     Date Of Birth          1980        Visit Information        Provider Department      1/4/2018 5:30 PM Yoselin Salgado RN [facility] Diabetes        Care Instructions    1.  Try to take your Novolog right before you eat.  See how the post meals look when you do that.    2.  Try to do some problem-solving surrounding some of your meals.    3.  Keep rotating your sites and I will look into the PT question.    Yoselin Salgado RN,CDE  [facility]  [address] [place] [address]  Phone: [phone]  [email or web address]              Follow-ups after your visit        Your next 10 appointments already scheduled     Jan 09, 2018  9:00 AM CST   (Arrive by 8:45 AM)   RETURN DIABETES with Shruti Banks MD   [facility] Endocrinology ([facility] [place])    [address] [place] [address] [phone]              Who to contact     Please call your clinic at [phone] to:    Ask questions about your health    Make or cancel appointments    Discuss your medicines    Learn about your test results    Speak to your doctor   If you have compliments or concerns about an experience at your clinic, or if you wish to file a complaint, please contact [facility] Phys[facility] Patient Relations at [phone] or email us at [email or web address]         Additional Information About Your Visit        [employer] Information     [employer] gives you secure access to your electronic health record. If you see a primary care provider, you can also send messages to your care team and make appointments. If you have questions, please call your primary care clinic.  If you do not have a primary care provider, please call [phone] and they will assist you.      [employer] is an electronic gateway that provides easy, online access  to your medical records. With [employer], you can request a clinic appointment, read your test results, renew a prescription or communicate with your care team.     To access your existing account, please contact your [facility] or call [phone] for assistance.        Care EveryWhere ID     This is your Care EveryWhere ID. This could be used by other organizations to access your [place] medical records  [id number]         Blood Pressure from Last 3 Encounters:   08/22/17 138/85   01/17/17 154/79   09/13/16 133/79    Weight from Last 3 Encounters:   08/22/17 85.1 kg (187 lb 9.6 oz)   01/17/17 85.3 kg (188 lb)   09/13/16 82.6 kg (182 lb)              Today, you had the following     No orders found for display       Primary Care Provider Fax #    Physician No Ref-Primary [phone]       No address on file        Equal Access to Services     [facility] : Hadii loren Gregory, waaxda merlene, qaybta kaalmada leonila, yana galloway . So [facility] [phone].    ATENCIÓN: Si habla español, tiene a kelly disposición servicios gratuitos de asistencia lingüística. Ofeliaame al [phone].    We comply with applicable federal civil rights laws and [place] laws. We do not discriminate on the basis of race, color, national origin, age, disability, sex, sexual orientation, or gender identity.            Thank you!     Thank you for choosing [facility] DIABETES  for your care. Our goal is always to provide you with excellent care. Hearing back from our patients is one way we can continue to improve our services. Please take a few minutes to complete the written survey that you may receive in the mail after your visit with us. Thank you!             Your Updated Medication List - Protect others around you: Learn how to safely use, store and throw away your medicines at www.disposemymeds.org.          This list is accurate as of: 1/4/18  6:19 PM.  Always use  your most recent med list.                   Brand Name Dispense Instructions for use Diagnosis    blood glucose monitoring lancets     6 Box    Use to test blood sugar 8 times daily or as directed.    Diabetes mellitus type 1 (H)       blood glucose monitoring meter device kit    no brand specified    1 kit    Use to test blood sugar 5 times daily or as directed.    Type 1 diabetes mellitus without complication (H)       blood glucose monitoring test strip    CANDE CONTOUR NEXT    800 each    Use to test blood sugar 8 times daily or as directed.    Diabetes mellitus type 1 (H)       cholecalciferol 1000 UNIT tablet    vitamin D3    90 tablet    Take 1 tablet (1,000 Units) by mouth daily    Diabetes mellitus type 1 (H), Unspecified vitamin D deficiency       insulin degludec 200 UNIT/ML pen    TRESIBA FLEXTOUCH    15 mL    Inject 24 Units Subcutaneous daily    Type 1 diabetes mellitus without complication (H)       insulin pen needle 31G X 8 MM    B-D U/F    400 each    Use use 4 daily pen needles daily (or pen size pt has been using)    Diabetes mellitus type 1 (H)       NovoLOG FLEXPEN 100 UNIT/ML injection   Generic drug:  insulin aspart     45 mL    Inject 30-40 units per day as carb coverage 1 unit per 7 g of CHO.    Diabetes mellitus type 1 (H)

## 2018-01-04 NOTE — PROGRESS NOTES
DIABETES SELF MANAGEMENT EDUCATION: Previous Diagnosis/Individual Diabetes Review    Agustín Gallegos presents today for education related to Type 1 diabetes.    She is accompanied by self  Referring Provider: Shruti Banks MD    DIABETES RELATED CONCERNS/COMMENTS:   Drops low after meals at times, over treats lows, morning numbers have crept up  Patient's emotional response to diabetes: expresses readiness to learn and concern for health and well-being    Past Diabetes Education: Yes  Support system: fiance/ family  Living Situation: lives with spouse/significant other  Occupation: works at law firm in [place]    ASSESSMENT:  Patient Problem List and Medication List reviewed for relevant medical history, current medical status, and diabetes risk factors.    Current Diabetes Management per Patient:  Diabetes medications: YES, Injectable Medications: NovoLog Insulin 1 per 8 grams and Tresiba Insulin 24 units at bedtime, Problems taking diabetes medications regularly? No , Side effects? Yes   At risk for hypoglycemia? Yes , Symptoms: Shaky and Sweating and Frequency: 3-4 times a week  BG meter: Unknown meter  Patient glucose self monitoring as follows: four times daily, six times daily.   BG values are: in and out of goal  Patient's most recent A1C 8.3% on 8/22/17    Nutrition:  Patient eats 3 meals per day and counts carbohydrates in grams  Beverages: Water 10/day  Cultural/Episcopalian diet restrictions: No   He weighs his food.    Restaurant foods are harder    Breakfast: (7-730a)  Raisin Bran, milk (60 grams)  Novolog right before the meal  Snack: skips unless low  Lunch:(12:15 and 12:45) leftovers, lean cuisine or bag salad, water or Apple  Novolog before or during the meal  Snack: skips unless low  Dinner: (6:30-7p) stir-pittman or pot roast or chicken with roasted veggies, salads, water or la croix Novolog during or after the meal  Snack: cookies--he is going to stop taking this bedtime snack.  He  stopped over the holiday and his morning blood sugars look better      Physical Activity:    Exercise 6:30 am 20-30 minutes cardio/core/free weights    Diabetes Complications:  Chronic Complications: none    Diabetes knowledge and skills assessment:     Patient is knowledgeable in diabetes management concepts related to: Healthy Eating, Being Active, Monitoring, Taking Medication and Problem Solving  Barriers to Learning Assessment: No Barriers identified    Based on learning assessment above, most appropriate setting for further diabetes education would be: Individual setting.    Education provided today on:  We discussed insulin dosing at length including timing, action, absorption, side rotation. 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  He has a site rotation schedule he now follows to avoid more of that.       We also covered sensors currently on the market.  Christiano would like to try to manage his diabetes without wearing something.     Pt verbalized understanding of concepts discussed and recommendations provided today.         PLAN:  See patient instructions    See Patient Instructions for co-developed, patient-stated behavior change goals.  AVS printed and provided to patient today.    FOLLOW-UP:  Chart routed to referring provider.    Time Spent: 60 minutes  Encounter Type: Individual    Any diabetes medication dose changes were made via the CDE Protocol and Collaborative Practice Agreement with  Physicians.

## 2018-01-05 NOTE — PATIENT INSTRUCTIONS
1.  Try to take your Novolog right before you eat.  See how the post meals look when you do that.    2.  Try to do some problem-solving surrounding some of your meals.    3.  Keep rotating your sites and I will look into the PT question and let you know.    Yoselin Salgado RN,CDE  [address] [address]  Phone: [phone]  [email or web address]

## 2018-01-09 ENCOUNTER — OFFICE VISIT (OUTPATIENT)
Dept: ENDOCRINOLOGY | Facility: CLINIC | Age: 38
End: 2018-01-09
Payer: COMMERCIAL

## 2018-01-09 VITALS
HEIGHT: 76 IN | SYSTOLIC BLOOD PRESSURE: 125 MMHG | DIASTOLIC BLOOD PRESSURE: 86 MMHG | BODY MASS INDEX: 24.05 KG/M2 | WEIGHT: 197.5 LBS | HEART RATE: 90 BPM

## 2018-01-09 DIAGNOSIS — E10.9 TYPE 1 DIABETES MELLITUS WITHOUT COMPLICATION (H): Primary | ICD-10-CM

## 2018-01-09 LAB — HBA1C MFR BLD: 7.9 % (ref 4.3–6)

## 2018-01-09 ASSESSMENT — PAIN SCALES - GENERAL: PAINLEVEL: NO PAIN (0)

## 2018-01-09 NOTE — NURSING NOTE
Chief Complaint   Patient presents with     RECHECK     F/U TYPE I DM     Ivett Rubin, CMA  Endocrinology & Diabetes 3G      Capillary Fingerstick performed for an Hemoglobin A1C test

## 2018-01-09 NOTE — PROGRESS NOTES
Endocrinology Clinic Visit    Chief Complaint: RECHECK (F/U TYPE I DM)     Information obtained from:Patient    Subjective:         HPI: Christiano is a 37 year old year old male with history of type 1 diabetes who is seen in follow-up for the same.    Briefly, Christiano was diagnosed at age 14.  He is originally from the [facility], was/ living in [place], and moved to [place] in July 2014. He was first seen by Dr. Banks September 2014 and last seen 8/22/17.    Agustín reported his A1c usually ranged between 7 and 7.5%, sometimes up to 8%, but BG has been hard to control since he moved to [facility].  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  With his new work, he is less active . He tried to walk a couple miles a day and exercise half an hour in the morning with yoga and cardio type exercise.     He is currently on Tresiba 26 units at bedtime and Novolog 1 unit per 6 g with breakfast, 1 unit per 7 g with lunch, dinner and snacks.  He has been having high fasting glucose in the morning, and lows prior to meals.  He always feels lows and treats himself with cookies.  He usually takes insulin right at the beginning of meals and has a habits of snacking at night.  He does not feel hungry or afraid of lows. Patient was seen by our diabetes educator January 4, and has been using NovoLog 10-15 minutes before meals and also stop taking snacks at night.  Since stopped taking snacks at night, his fasting glucose is improved.  Pre-meals hypoglycemia is improved too.    Download from meter December 10 to January 9 shows that he is checking his blood glucose an average of 4.5 times per day:  Average BG is 166, SD 75  mg/dl  Lowest recorded blood glucose value was 41, highest is  452  Since stopped taking snacks at night and change insulin as administration 10-15 minutes before meals: fasting glucose 132 and 108 and Lowest day time glucose was 61   A1c is 7.9%  Today,  down from 8.3% previously.    He is not interested in CGM right now    Agustín does not have chronic complications of diabetes.  Last eye exam was normal 3/2017. Microalbuminuria was negative August 2017.  He was placed on losartan as a renal protective agent around 2008.  He had no history of microalbuminuria or hypertension and after a detailed discussion, we decided to discontinue Losartan in September 2014.  Blood pressure levels remained within the normal range.    He is also on vitamin D 1000 units per day.    He is happy with his new job also got engaged.  No major health issues since last visit.  Last night he had a very small bleb at the right foot which he used a needle to break down the bleb.  No chest pain/ shortness of breath.  No severe hypoglycemia or loss of consciousness.  He plan to see ophthalmology in March.     No Known Allergies    Current Outpatient Prescriptions   Medication Sig Dispense Refill     insulin pen needle (B-D U/F) 31G X 8 MM Use use 4 daily pen needles daily (or pen size pt has been using) 400 each 3     insulin degludec (TRESIBA FLEXTOUCH) 200 UNIT/ML pen Inject 24 Units Subcutaneous daily 15 mL 3     NOVOLOG FLEXPEN 100 UNIT/ML soln Inject 30-40 units per day as carb coverage 1 unit per 7 g of CHO. 45 mL 3     blood glucose monitoring (CANDE CONTOUR NEXT) test strip Use to test blood sugar 8 times daily or as directed. 800 each 2     blood glucose monitoring (NO BRAND SPECIFIED) meter device kit Use to test blood sugar 5 times daily or as directed. 1 kit 0     cholecalciferol (VITAMIN D) 1000 UNIT tablet Take 1 tablet (1,000 Units) by mouth daily 90 tablet 3     blood glucose monitoring (CANDE MICROLET) lancets Use to test blood sugar 8 times daily or as directed. 6 Box 5     Review of Systems     11 point review system (Constitutional, HENT, Eyes, Respiratory, Cardiovascular, Gastrointestinal, Genitourinary, Musculoskeletal,Neurological, Psychiatric/Behavioral, Endocrine) is  "as detailed in the HPI or negative  Answers for HPI/ROS submitted by the patient on 1/2/2018   General Symptoms: No  Skin Symptoms: No  HENT Symptoms: No  EYE SYMPTOMS: No  HEART SYMPTOMS: No  LUNG SYMPTOMS: No  INTESTINAL SYMPTOMS: No  URINARY SYMPTOMS: No  REPRODUCTIVE SYMPTOMS: No  SKELETAL SYMPTOMS: No  BLOOD SYMPTOMS: No  NERVOUS SYSTEM SYMPTOMS: No  MENTAL HEALTH SYMPTOMS: No      Past Medical History:   Diagnosis Date     Type 1 diabetes (H)     age of onset 14 years       No past surgical history on file.    Family History   Problem Relation Age of Onset     DIABETES Brother      CEREBROVASCULAR DISEASE Paternal Grandmother      Arthritis Mother      Arthritis Maternal Grandmother        History     Social History     Marital Status: Single     Spouse Name: N/A     Number of Children: N/A     Years of Education: N/A     Social History Main Topics     Smoking status: Never Smoker      Smokeless tobacco: Never Used     Alcohol Use: No     Drug Use: No     Sexual Activity:     Partners: Female     Other Topics Concern     None     Social History Narrative     Has degrees in cultural studies and film studies, 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  Engaged  No smoking. No alcohol, no illicit drugs    Family history:  Father: 71 yrs old, retired nephrologist in the [place] area, healthy  Mother: 69 yrs old, knee replacement, otherwise healthy  1 older brother, 43 yrs old, T1D since age 5, no complications; one older sister age 44 healthy.  No children    Objective:   /86  Pulse 90  Ht 1.93 m (6' 4\")  Wt 89.6 kg (197 lb 8 oz)  BMI 24.04 kg/m2  Constitutional: Appears well-developed and well-nourished. Active.   EYES: anicteric, normal extra-ocular movements, no lid lag or retraction   HEENT: Mouth/Throat: Mucous membrane is moist.  .  Pulmonary/Chest: Normal breathing  Neurological: Alert. Normal affect. CN grossly intact, sensory is intact. Equally move.   Extremities: No clubbing, cyanosis or " inflammation  Skin: normal texture, color and temperature  Feet: 3 mm old laceration plantar surface of right foot. Monofilament normal both feet  Psychological: appropriate mood and affect     In House Labs:   A1C 1/9/2018 7.9%      Lab Results   Component Value Date    A1C 7.9 (H) 08/13/2014    HEMOGLOBINA1 8.3 (A) 08/22/2017    HEMOGLOBINA1 8.0 (A) 01/17/2017    HEMOGLOBINA1 7.5 (A) 05/25/2016    HEMOGLOBINA1 8.6 (A) 01/14/2016    HEMOGLOBINA1 8.9 (A) 06/24/2015     Hemoglobin   Date Value Ref Range Status   08/22/2017 14.8 13.3 - 17.7 g/dL Final   03/05/2015 14.5 13.3 - 17.7 g/dL Final   ]        Assessment/Treatment Plan:      Agustín Gallegos is a 37 year old year old male with type 1 diabetes for the past 22 years.    1. Type 1 Diabetes:  Diabetes control is improved and A1c 8.3% -->7.9% today; goal is <7.0%.  Patient has some mild symptomatic hypoglycemia. We have again discussed in detail the short and long-term goals of diabetes management, as well as A1c goals.  Fasting hyperglycemia has improved after stopped eating snacks at night and pre-meal hypoglycemia has improved with taking fast-acting insulin 10-15 minutes before meals  -- Continue Tresiba at 26 units at bedtime  -- Cont Novolog 1 unit per 7 grams for breakfast and 1 unit per 6 g for lunch, dinner, snacks  -- He will send a glucose readings to Dr. Banks via [facility] in 2-3 weeks.  We may need to decrease his mealtime insulin if he continued to have hypoglycemia    2. Chronic diabetic complications: There is no evidence of chronic diabetic complications.  Creatinine levels and microalbuminuria screening were negative. The patient is update on his eye exam.  -- Will repeat Cr and microalbuminuria     3. Hypertension: Pt was on prophylactic Losartan in the past.  Losartan was discontinued on September 2014 and his BP levels were normal off medications. BP is normal today. We will continue to check his blood pressure regularly.  If hypertension  is detected, we would recommend an ACE inhibitor (more likely ramipril) as apposed to an ARB.  We will continue to follow.    4. Health maintenance: Thyroid function, Lipid levels and vitamin D levels WNL  -- Increase vitamin D from 1000->2000 units daily during winter time  -- Will obtain a fasting lipid profile      Return to clinic in 3 months or sooner if needed.    Test and/or medications prescribed today:  Orders Placed This Encounter   Procedures     Lipid panel reflex to direct LDL Fasting     Albumin Random Urine Quantitative with Creat Ratio     TSH       Pt seen and discussed with Dr. Banks.    Cirilo Forman MD  Endocrine Fellow  [phone]    ATTENDING NOTE    I have seen and examined the patient, reviewed and edited the fellow's note, and agree with the plan of care.    Sharona Banks MD PhD    Division of Endocrinology and Diabetes

## 2018-01-09 NOTE — LETTER
1/9/2018       RE: Agustín Gallegos  [address]  [facility] [address]     Dear Colleague,    Thank you for referring your patient, Agustín Gallegos, to the [facility] ENDOCRINOLOGY at [facility]. Please see a copy of my visit note below.    Endocrinology Clinic Visit    Chief Complaint: RECHECK (F/U TYPE I DM)     Information obtained from:Patient    Subjective:         HPI: Christiano is a 37 year old year old male with history of type 1 diabetes who is seen in follow-up for the same.    Briefly, Christiano was diagnosed at age 14.  He is originally from the [facility], was/ living in [place], and moved to [facility] in July 2014. He was first seen by Dr. Banks September 2014 and last seen 8/22/17.    Agustín reported his A1c usually ranged between 7 and 7.5%, sometimes up to 8%, but BG has been hard to control since he moved to [facility].  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  With his new work, he is less active . He tried to walk a couple miles a day and exercise half an hour in the morning with yoga and cardio type exercise.     He is currently on Tresiba 26 units at bedtime and Novolog 1 unit per 6 g with breakfast, 1 unit per 7 g with lunch, dinner and snacks.  He has been having high fasting glucose in the morning, and lows prior to meals.  He always feels lows and treats himself with cookies.  He usually takes insulin right at the beginning of meals and has a habits of snacking at night.  He does not feel hungry or afraid of lows. Patient was seen by our diabetes educator January 4, and has been using NovoLog 10-15 minutes before meals and also stop taking snacks at night.  Since stopped taking snacks at night, his fasting glucose is improved.  Pre-meals hypoglycemia is improved too.    Download from meter December 10 to January 9 shows that he is checking his blood glucose an average of 4.5 times per  day:  Average BG is 166, SD 75  mg/dl  Lowest recorded blood glucose value was 41, highest is  452  Since stopped taking snacks at night and change insulin as administration 10-15 minutes before meals: fasting glucose 132 and 108 and Lowest day time glucose was 61   A1c is 7.9%  Today, down from 8.3% previously.    He is not interested in CGM right now    Agustín does not have chronic complications of diabetes.  Last eye exam was normal 3/2017. Microalbuminuria was negative August 2017.  He was placed on losartan as a renal protective agent around 2008.  He had no history of microalbuminuria or hypertension and after a detailed discussion, we decided to discontinue Losartan in September 2014.  Blood pressure levels remained within the normal range.    He is also on vitamin D 1000 units per day.    He is happy with his new job also got engaged.  No major health issues since last visit.  Last night he had a very small bleb at the right foot which he used a needle to break down the bleb.  No chest pain/ shortness of breath.  No severe hypoglycemia or loss of consciousness.  He plan to see ophthalmology in March.     No Known Allergies    Current Outpatient Prescriptions   Medication Sig Dispense Refill     insulin pen needle (B-D U/F) 31G X 8 MM Use use 4 daily pen needles daily (or pen size pt has been using) 400 each 3     insulin degludec (TRESIBA FLEXTOUCH) 200 UNIT/ML pen Inject 24 Units Subcutaneous daily 15 mL 3     NOVOLOG FLEXPEN 100 UNIT/ML soln Inject 30-40 units per day as carb coverage 1 unit per 7 g of CHO. 45 mL 3     blood glucose monitoring (CANDE CONTOUR NEXT) test strip Use to test blood sugar 8 times daily or as directed. 800 each 2     blood glucose monitoring (NO BRAND SPECIFIED) meter device kit Use to test blood sugar 5 times daily or as directed. 1 kit 0     cholecalciferol (VITAMIN D) 1000 UNIT tablet Take 1 tablet (1,000 Units) by mouth daily 90 tablet 3     blood glucose monitoring ([employer]  "MICROLET) lancets Use to test blood sugar 8 times daily or as directed. 6 Box 5     Review of Systems     11 point review system (Constitutional, HENT, Eyes, Respiratory, Cardiovascular, Gastrointestinal, Genitourinary, Musculoskeletal,Neurological, Psychiatric/Behavioral, Endocrine) is as detailed in the HPI or negative      Past Medical History:   Diagnosis Date     Type 1 diabetes (H)     age of onset 14 years       No past surgical history on file.    Family History   Problem Relation Age of Onset     DIABETES Brother      CEREBROVASCULAR DISEASE Paternal Grandmother      Arthritis Mother      Arthritis Maternal Grandmother        History     Social History     Marital Status: Single     Spouse Name: N/A     Number of Children: N/A     Years of Education: N/A     Social History Main Topics     Smoking status: Never Smoker      Smokeless tobacco: Never Used     Alcohol Use: No     Drug Use: No     Sexual Activity:     Partners: Female     Other Topics Concern     None     Social History Narrative     Has degrees in cultural studies and film studies, 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 No alcohol, no illicit drugs    Family history:  Father: 71 yrs old, retired nephrologist in the [place] area, healthy  Mother: 69 yrs old, knee replacement, otherwise healthy  1 older brother, 43 yrs old, T1D since age 5, no complications; one older sister age 44 healthy.  No children    Objective:   /86  Pulse 90  Ht 1.93 m (6' 4\")  Wt 89.6 kg (197 lb 8 oz)  BMI 24.04 kg/m2  Constitutional: Appears well-developed and well-nourished. Active.   EYES: anicteric, normal extra-ocular movements, no lid lag or retraction   HEENT: Mouth/Throat: Mucous membrane is moist.  .  Pulmonary/Chest: Normal breathing  Neurological: Alert. Normal affect. CN grossly intact, sensory is intact. Equally move.   Extremities: No clubbing, cyanosis or inflammation  Skin: normal texture, color and temperature  Feet: 3 mm old " laceration plantar surface of right foot. Monofilament normal both feet  Psychological: appropriate mood and affect     In House Labs:   A1C 1/9/2018 7.9%      Lab Results   Component Value Date    A1C 7.9 (H) 08/13/2014    HEMOGLOBINA1 8.3 (A) 08/22/2017    HEMOGLOBINA1 8.0 (A) 01/17/2017    HEMOGLOBINA1 7.5 (A) 05/25/2016    HEMOGLOBINA1 8.6 (A) 01/14/2016    HEMOGLOBINA1 8.9 (A) 06/24/2015     Hemoglobin   Date Value Ref Range Status   08/22/2017 14.8 13.3 - 17.7 g/dL Final   03/05/2015 14.5 13.3 - 17.7 g/dL Final   ]        Assessment/Treatment Plan:      Agustín Gallegos is a 37 year old year old male with type 1 diabetes for the past 22 years.    1. Type 1 Diabetes:  Diabetes control is improved and A1c 8.3% -->7.9% today; goal is <7.0%.  Patient has some mild symptomatic hypoglycemia. We have again discussed in detail the short and long-term goals of diabetes management, as well as A1c goals.  Fasting hyperglycemia has improved after stopped eating snacks at night and pre-meal hypoglycemia has improved with taking fast-acting insulin 10-15 minutes before meals  -- Continue Tresiba at 26 units at bedtime  -- Cont Novolog 1 unit per 7 grams for breakfast and 1 unit per 6 g for lunch, dinner, snacks  -- He will send a glucose readings to Dr. Banks via [employer] [employer] in 2-3 weeks.  We may need to decrease his mealtime insulin if he continued to have hypoglycemia    2. Chronic diabetic complications: There is no evidence of chronic diabetic complications.  Creatinine levels and microalbuminuria screening were negative. The patient is update on his eye exam.  -- Will repeat Cr and microalbuminuria     3. Hypertension: Pt was on prophylactic Losartan in the past.  Losartan was discontinued on September 2014 and his BP levels were normal off medications. BP is normal today. We will continue to check his blood pressure regularly.  If hypertension is detected, we would recommend an ACE inhibitor (more likely ramipril)  as apposed to an ARB.  We will continue to follow.    4. Health maintenance: Thyroid function, Lipid levels and vitamin D levels WNL  -- Increase vitamin D from 1000->2000 units daily during winter time  -- Will obtain a fasting lipid profile      Return to clinic in 3 months or sooner if needed.    Test and/or medications prescribed today:  Orders Placed This Encounter   Procedures     Lipid panel reflex to direct LDL Fasting     Albumin Random Urine Quantitative with Creat Ratio     TSH       Pt seen and discussed with Dr. Banks.    Cirilo Forman MD  Endocrine Fellow  [phone]    ATTENDING NOTE    I have seen and examined the patient, reviewed and edited the fellow's note, and agree with the plan of care.    Sharona Banks MD PhD    Division of Endocrinology and Diabetes

## 2018-01-09 NOTE — PATIENT INSTRUCTIONS
Please send glucose result (before and 1 hour after meal) in couple weeks.  Get labs (8 hours fasting) at your convenience  Increase vitamin D to 2000 U per day during winter time  If you have a small blister on your foot, do not break the skin since it will increase risk of infection.

## 2018-01-09 NOTE — MR AVS SNAPSHOT
After Visit Summary   1/9/2018    Agustín Gallegos    MRN: [id number]           Patient Information     Date Of Birth          1980        Visit Information        Provider Department      1/9/2018 9:00 AM Shruti Banks MD [facility]        Today's Diagnoses     Type 1 diabetes mellitus without complication (H)    -  1      Care Instructions    Please send glucose result (before and 1 hour after meal) in couple weeks.  Get labs (8 hours fasting) at your convenience  Increase vitamin D to 2000 U per day during winter time  If you have a small blister on your foot, do not break the skin since it will increase risk of infection.           Follow-ups after your visit        Follow-up notes from your care team     Return in about 3 months (around 4/9/2018).      Your next 10 appointments already scheduled     Jan 23, 2018  7:00 AM CST   LAB with  LAB   [facility] ([facility])    [address] [place] [id number] [phone]           Please do not eat 10-12 hours before your appointment if you are coming in fasting for labs on lipids, cholesterol, or glucose (sugar). This does not apply to pregnant women. Water, hot tea and black coffee (with nothing added) are okay. Do not drink other fluids, diet soda or chew gum.            Apr 24, 2018  9:30 AM CDT   (Arrive by 9:15 AM)   RETURN DIABETES with Shruti Banks MD   [facility] Endocrinology ([facility])    [address] [place] [id number] [phone]              Future tests that were ordered for you today     Open Future Orders        Priority Expected Expires Ordered    TSH Routine  1/9/2019 1/9/2018            Who to contact     Please call your clinic at [phone] to:    Ask questions about your health    Make or cancel appointments    Discuss your medicines    Learn about your test  "results    Speak to your doctor   If you have compliments or concerns about an experience at your clinic, or if you wish to file a complaint, please contact [facility] Patient Relations at [phone] or email us at [email or web address]         Additional Information About Your Visit        [employer] Information     [employer] gives you secure access to your electronic health record. If you see a primary care provider, you can also send messages to your care team and make appointments. If you have questions, please call your primary care clinic.  If you do not have a primary care provider, please call [phone] and they will assist you.      [employer] is an electronic gateway that provides easy, online access to your medical records. With [employer], you can request a clinic appointment, read your test results, renew a prescription or communicate with your care team.     To access your existing account, please contact your [facility] Clinic or call [phone] for assistance.        Care EveryWhere ID     This is your Care EveryWhere ID. This could be used by other organizations to access your [facility] medical records  [id number]        Your Vitals Were     Pulse Height BMI (Body Mass Index)             90 1.93 m (6' 4\") 24.04 kg/m2          Blood Pressure from Last 3 Encounters:   01/09/18 125/86   08/22/17 138/85   01/17/17 154/79    Weight from Last 3 Encounters:   01/09/18 89.6 kg (197 lb 8 oz)   08/22/17 85.1 kg (187 lb 9.6 oz)   01/17/17 85.3 kg (188 lb)              We Performed the Following     Albumin Random Urine Quantitative with Creat Ratio     Lipid panel reflex to direct LDL Fasting        Primary Care Provider Office Phone # Fax #    Shruti Banks [phone]       [address] [place] [address]        Equal Access to Services     JOSÉ MIGUEL LOYOLA AH: betty Barahona, " pool rocha rosiyana patterson ah. So [facility] [phone].    ATENCIÓN: Si boom kelly, tiene a kelly disposición servicios gratuitos de asistencia lingüística. Ba al [phone].    We comply with applicable federal civil rights laws and [place] laws. We do not discriminate on the basis of race, color, national origin, age, disability, sex, sexual orientation, or gender identity.            Thank you!     Thank you for choosing [facility] ENDOCRINOLOGY  for your care. Our goal is always to provide you with excellent care. Hearing back from our patients is one way we can continue to improve our services. Please take a few minutes to complete the written survey that you may receive in the mail after your visit with us. Thank you!             Your Updated Medication List - Protect others around you: Learn how to safely use, store and throw away your medicines at www.disposemymeds.org.          This list is accurate as of: 1/9/18  9:34 AM.  Always use your most recent med list.                   Brand Name Dispense Instructions for use Diagnosis    blood glucose monitoring lancets     6 Box    Use to test blood sugar 8 times daily or as directed.    Diabetes mellitus type 1 (H)       blood glucose monitoring meter device kit    no brand specified    1 kit    Use to test blood sugar 5 times daily or as directed.    Type 1 diabetes mellitus without complication (H)       blood glucose monitoring test strip    CANDE CONTOUR NEXT    800 each    Use to test blood sugar 8 times daily or as directed.    Diabetes mellitus type 1 (H)       cholecalciferol 1000 UNIT tablet    vitamin D3    90 tablet    Take 1 tablet (1,000 Units) by mouth daily    Diabetes mellitus type 1 (H), Unspecified vitamin D deficiency       insulin degludec 200 UNIT/ML pen    TRESIBA FLEXTOUCH    15 mL    Inject 24 Units Subcutaneous daily    Type 1 diabetes mellitus without complication (H)       insulin pen needle  31G X 8 MM    B-D U/F    400 each    Use use 4 daily pen needles daily (or pen size pt has been using)    Diabetes mellitus type 1 (H)       NovoLOG FLEXPEN 100 UNIT/ML injection   Generic drug:  insulin aspart     45 mL    Inject 30-40 units per day as carb coverage 1 unit per 7 g of CHO.    Diabetes mellitus type 1 (H)

## 2018-01-26 DIAGNOSIS — E10.9 DIABETES MELLITUS TYPE 1 (H): ICD-10-CM

## 2018-01-30 DIAGNOSIS — E10.9 TYPE 1 DIABETES MELLITUS WITHOUT COMPLICATION (H): ICD-10-CM

## 2018-01-30 LAB
CHOLEST SERPL-MCNC: 149 MG/DL
CREAT UR-MCNC: 205 MG/DL
HDLC SERPL-MCNC: 44 MG/DL
LDLC SERPL CALC-MCNC: 93 MG/DL
MICROALBUMIN UR-MCNC: <5 MG/L
MICROALBUMIN/CREAT UR: NORMAL MG/G CR (ref 0–17)
NONHDLC SERPL-MCNC: 105 MG/DL
TRIGL SERPL-MCNC: 60 MG/DL
TSH SERPL DL<=0.005 MIU/L-ACNC: 2.46 MU/L (ref 0.4–4)

## 2018-02-02 DIAGNOSIS — E10.9 DIABETES MELLITUS TYPE 1 (H): ICD-10-CM

## 2018-02-13 DIAGNOSIS — Z20.828 EXPOSURE TO INFLUENZA: ICD-10-CM

## 2018-02-13 DIAGNOSIS — Z20.828 EXPOSURE TO INFLUENZA: Primary | ICD-10-CM

## 2018-02-13 RX ORDER — OSELTAMIVIR PHOSPHATE 75 MG/1
75 CAPSULE ORAL DAILY
Qty: 10 CAPSULE | Refills: 0 | Status: SHIPPED | OUTPATIENT
Start: 2018-02-13 | End: 2018-02-13

## 2018-02-15 RX ORDER — OSELTAMIVIR PHOSPHATE 75 MG/1
75 CAPSULE ORAL DAILY
Qty: 10 CAPSULE | Refills: 0 | Status: SHIPPED | OUTPATIENT
Start: 2018-02-15 | End: 2018-04-24

## 2018-04-24 ENCOUNTER — OFFICE VISIT (OUTPATIENT)
Dept: EDUCATION SERVICES | Facility: CLINIC | Age: 38
End: 2018-04-24
Payer: COMMERCIAL

## 2018-04-24 ENCOUNTER — OFFICE VISIT (OUTPATIENT)
Dept: ENDOCRINOLOGY | Facility: CLINIC | Age: 38
End: 2018-04-24
Payer: COMMERCIAL

## 2018-04-24 VITALS
BODY MASS INDEX: 24 KG/M2 | SYSTOLIC BLOOD PRESSURE: 121 MMHG | DIASTOLIC BLOOD PRESSURE: 72 MMHG | HEART RATE: 71 BPM | WEIGHT: 197.2 LBS

## 2018-04-24 DIAGNOSIS — E10.65 TYPE 1 DIABETES MELLITUS WITH HYPERGLYCEMIA (H): Primary | ICD-10-CM

## 2018-04-24 DIAGNOSIS — E10.9 TYPE 1 DIABETES MELLITUS WITHOUT COMPLICATION (H): Primary | ICD-10-CM

## 2018-04-24 LAB — HBA1C MFR BLD: 7.8 % (ref 4.3–6)

## 2018-04-24 ASSESSMENT — PAIN SCALES - GENERAL: PAINLEVEL: NO PAIN (0)

## 2018-04-24 NOTE — LETTER
4/24/2018       RE: Agustín Gallegos  [address]  [facility] [address]     Dear Colleague,    Thank you for referring your patient, Agustín Gallegos, to the [facility] ENDOCRINOLOGY at [facility]. Please see a copy of my visit note below.    Endocrinology Clinic Visit  4/24/2018  Agustín Gallegos     Chief Complaint: RECHECK (DIABETES  TYPE 1)       HPI: Christiano is a 37 year old year old male with history of type 1 diabetes who is seen in follow-up for the same.    Briefly, Christiano was diagnosed at age 14.  He is originally from the [facility], was/ living in [place], and moved to [place] in July 2014. He was first seen by Dr. Banks September 2014 and last seen 8/22/17.    Agustín reported his A1c usually ranged between 7 and 7.5%, sometimes up to 8%, but BG has been hard to control since he moved to [facility].  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 He does take a walk every day around 3pm, roughly for a half an hour. About 3x/week, he will do exercises in the morning.     He is currently on Tresiba 26 units at bedtime and Novolog 1 unit per 6g. He had noticed that his AM glucoses had been running high, so he started taking an extra unit of short acting insulin with his night time snack (i.e. If it required 4U, he would take 5U) and he noticed subsequent improvement in his AM checks. Otherwise, he has also noticed that he has trouble with his glucoses after dinner; He has thought about changing his carb coverage to 1:5, but has waited on that until follow up today.    He denies any substantial lows, though has had a few in the 60s. He always feels them and the recover with treatment.     Download from meter March 25th through April 24th shows that he is checking his blood glucose an average of 5.5x/day.   Average AM reading 212, std dev  67; midday 211 std dev42, evening 147, std dev 43. Overall avg 166 with std dev 73. Minmum 51, maximum 580.     A1c is 7.8%  Today, down from 7.9% in January 2018.     He is not interested in CGM right now    Agustín does not have chronic complications of diabetes.  Last eye exam was normal 3/2017. He plans to schedule one for this year soon.  Microalbuminuria was negative August 2017.  He was placed on losartan as a renal protective agent around 2008.  He had no history of microalbuminuria or hypertension and after a detailed discussion, we decided to discontinue Losartan in September 2014.  Blood pressure levels remained within the normal range.    He is also on vitamin D 1000 units per day.    He is happy with his new job, now is living with his fiance. Notes that his diet has improved since moving in with her as she does the cooking.  No major health issues since last visit.      No Known Allergies    Current Outpatient Prescriptions   Medication Sig Dispense Refill     blood glucose monitoring (CANDE CONTOUR NEXT) test strip USE TO TEST BLOOD SUGAR 8 TIMES DAILY OR AS DIRECTED *3 MONTH SUPPLY 800 each 3     blood glucose monitoring (CANDE MICROLET) lancets Use to test blood sugar 8 times daily or as directed. 6 Box 5     blood glucose monitoring (NO BRAND SPECIFIED) meter device kit Use to test blood sugar 5 times daily or as directed. 1 kit 0     cholecalciferol (VITAMIN D) 1000 UNIT tablet Take 1 tablet (1,000 Units) by mouth daily 90 tablet 3     insulin aspart (NOVOLOG FLEXPEN) 100 UNIT/ML injection Inject 1 unit per 7 grams for breakfast and 1 unit per 6 g for lunch, dinner, snacks. Approx 40 units daily. 45 mL 3     insulin degludec (TRESIBA FLEXTOUCH) 200 UNIT/ML pen Inject 24 Units Subcutaneous daily 15 mL 3     insulin pen needle (B-D U/F) 31G X 8 MM Use use 4 daily pen needles daily (or pen size pt has been using) 400 each 3     Review of Systems   11 point ROS is negative including headaches, vision  changes, fevers, chills, nausea, vomiting, weight changes, heat/cold intolerance, difficulty swallowing, shortness of breath, chest pain, abdominal pain, diarrhea, constipation, leg swelling, rashes, dysuria, hematuria, hematochezia     Past Medical History:   Diagnosis Date     Type 1 diabetes (H)     age of onset 14 years       No past surgical history on file.    Family History   Problem Relation Age of Onset     DIABETES Brother      CEREBROVASCULAR DISEASE Paternal Grandmother      Arthritis Mother      Arthritis Maternal Grandmother        History     Social History     Marital Status: Single     Spouse Name: N/A     Number of Children: N/A     Years of Education: N/A     Social History Main Topics     Smoking status: Never Smoker      Smokeless tobacco: Never Used     Alcohol Use: No     Drug Use: No     Sexual Activity:     Partners: Female     Other Topics Concern     None     Social History Narrative     Has degrees in cultural studies and film studies, 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  Engaged  No smoking. No alcohol, no illicit drugs    Family history:  Father: 71 yrs old, retired nephrologist in the [place] area, healthy  Mother: 69 yrs old, knee replacement, otherwise healthy  1 older brother, 43 yrs old, T1D since age 5, no complications; one older sister age 44 healthy.  No children    Objective:   /72  Pulse 71  Wt 89.4 kg (197 lb 3.2 oz)  BMI 24 kg/m2  Constitutional: Appears well-developed and well-nourished. NAD  EYES: anicteric, normal extra-ocular movements, no lid lag or retraction   HEENT: Mouth/Throat: Mucous membrane is moist.  .  Pulmonary/Chest: Normal breathing on room air.   Neurological: Alert. Normal affect. No obvious deficits on limited exam. Gait normal   Extremities: No clubbing, cyanosis or inflammation  Skin: normal texture, color and temperature  Psychological: appropriate mood and affect     Labs/Data:  Data reviewed  A1C 4/24/2018 7.8            1/9/2018   7.9    8/22/2017 Creatinine 0.69, Hgb 14.8     1/30/2018   HDL 44, LDL 93, TG 60   TSH 2.46       Assessment/Treatment Plan:      Agustín Gallegos is a 37 year old year old male with type 1 diabetes for the past 22 years.    1. Type 1 Diabetes:  Diabetes control is improved and A1c 7.8% (down from 7.9%) today; goal is <7.0%. Having some issues with elevated sugars after dinner.   -- Continue Tresiba at 26 units at bedtime  -- Change carb coverage to 1U: 5g for evening meal; continue prior carb coverage (1U:6g) for all othe rmeals   -- Dexcom diagnostic sensor today - He will send a glucose readings to Dr. Banks via [employer] in 2-3 weeks.       2. Chronic diabetic complications: There is no evidence of chronic diabetic complications.  Creatinine levels and microalbuminuria screening were negative. Patient plants to schedule eye exam soon.     3. Hypertension: Pt was on prophylactic Losartan in the past.  Losartan was discontinued on September 2014 and his BP levels were normal off medications. BP is normal today. We will continue to check his blood pressure regularly.  If hypertension is detected, we would recommend an ACE inhibitor (more likely ramipril) as apposed to an ARB.  We will continue to follow.    4. Health maintenance: Continue vitamin D    Orders Placed This Encounter   Procedures     Hemoglobin A1c POCT         Return to clinic in 3 months or sooner if needed.      Pt seen and discussed with Dr. Banks.    Merissa Crockett MD  Internal Medicine PGY2       ATTENDING NOTE  I have seen and examined the patient, reviewed and edited the resident's note, and agree with the plan of care.    Sharona Banks MD PhD    Division of Endocrinology and Diabetes

## 2018-04-24 NOTE — PROGRESS NOTES
Endocrinology Clinic Visit  4/24/2018  Agustín Gallegos     Chief Complaint: RECHECK (DIABETES  TYPE 1)       HPI: Christiano is a 37 year old year old male with history of type 1 diabetes who is seen in follow-up for the same.    Briefly, Christiano was diagnosed at age 14.  He is originally from the [facility], was/ living in [place], and moved to [place] in July 2014. He was first seen by Dr. Banks September 2014 and last seen 8/22/17.    Agustín reported his A1c usually ranged between 7 and 7.5%, sometimes up to 8%, but BG has been hard to control since he moved to [facility].  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 He continues to work there, noting It is less active than his prior job. He does take a walk every day around 3pm, roughly for a half an hour. About 3x/week, he will do exercises in the morning.     He is currently on Tresiba 26 units at bedtime and Novolog 1 unit per 6g. He had noticed that his AM glucoses had been running high, so he started taking an extra unit of short acting insulin with his night time snack (i.e. If it required 4U, he would take 5U) and he noticed subsequent improvement in his AM checks. Otherwise, he has also noticed that he has trouble with his glucoses after dinner; He has thought about changing his carb coverage to 1:5, but has waited on that until follow up today.    He denies any substantial lows, though has had a few in the 60s. He always feels them and the recover with treatment.     Download from meter March 25th through April 24th shows that he is checking his blood glucose an average of 5.5x/day.   Average AM reading 212, std dev 67; midday 211 std dev42, evening 147, std dev 43. Overall avg 166 with std dev 73. Minmum 51, maximum 580.     A1c is 7.8%  Today, down from 7.9% in January 2018.     He is not interested in CGM right now    Agustín does not have chronic complications of diabetes.  Last eye exam was  normal 3/2017. He plans to schedule one for this year soon.  Microalbuminuria was negative August 2017.  He was placed on losartan as a renal protective agent around 2008.  He had no history of microalbuminuria or hypertension and after a detailed discussion, we decided to discontinue Losartan in September 2014.  Blood pressure levels remained within the normal range.    He is also on vitamin D 1000 units per day.    He is happy with his new job, now is living with his fiance. Notes that his diet has improved since moving in with her as she does the cooking.  No major health issues since last visit.      No Known Allergies    Current Outpatient Prescriptions   Medication Sig Dispense Refill     blood glucose monitoring (CANDE CONTOUR NEXT) test strip USE TO TEST BLOOD SUGAR 8 TIMES DAILY OR AS DIRECTED *3 MONTH SUPPLY 800 each 3     blood glucose monitoring (CANDE MICROLET) lancets Use to test blood sugar 8 times daily or as directed. 6 Box 5     blood glucose monitoring (NO BRAND SPECIFIED) meter device kit Use to test blood sugar 5 times daily or as directed. 1 kit 0     cholecalciferol (VITAMIN D) 1000 UNIT tablet Take 1 tablet (1,000 Units) by mouth daily 90 tablet 3     insulin aspart (NOVOLOG FLEXPEN) 100 UNIT/ML injection Inject 1 unit per 7 grams for breakfast and 1 unit per 6 g for lunch, dinner, snacks. Approx 40 units daily. 45 mL 3     insulin degludec (TRESIBA FLEXTOUCH) 200 UNIT/ML pen Inject 24 Units Subcutaneous daily 15 mL 3     insulin pen needle (B-D U/F) 31G X 8 MM Use use 4 daily pen needles daily (or pen size pt has been using) 400 each 3     Review of Systems   11 point ROS is negative including headaches, vision changes, fevers, chills, nausea, vomiting, weight changes, heat/cold intolerance, difficulty swallowing, shortness of breath, chest pain, abdominal pain, diarrhea, constipation, leg swelling, rashes, dysuria, hematuria, hematochezia     Past Medical History:   Diagnosis Date     Type  1 diabetes (H)     age of onset 14 years       No past surgical history on file.    Family History   Problem Relation Age of Onset     DIABETES Brother      CEREBROVASCULAR DISEASE Paternal Grandmother      Arthritis Mother      Arthritis Maternal Grandmother        History     Social History     Marital Status: Single     Spouse Name: N/A     Number of Children: N/A     Years of Education: N/A     Social History Main Topics     Smoking status: Never Smoker      Smokeless tobacco: Never Used     Alcohol Use: No     Drug Use: No     Sexual Activity:     Partners: Female     Other Topics Concern     None     Social History Narrative     Has degrees in cultural studies and film studies, 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 No alcohol, no illicit drugs    Family history:  Father: 71 yrs old, retired nephrologist in the [place] area, healthy  Mother: 69 yrs old, knee replacement, otherwise healthy  1 older brother, 43 yrs old, T1D since age 5, no complications; one older sister age 44 healthy.  No children    Objective:   /72  Pulse 71  Wt 89.4 kg (197 lb 3.2 oz)  BMI 24 kg/m2  Constitutional: Appears well-developed and well-nourished. NAD  EYES: anicteric, normal extra-ocular movements, no lid lag or retraction   HEENT: Mouth/Throat: Mucous membrane is moist.  .  Pulmonary/Chest: Normal breathing on room air.   Neurological: Alert. Normal affect. No obvious deficits on limited exam. Gait normal   Extremities: No clubbing, cyanosis or inflammation  Skin: normal texture, color and temperature  Psychological: appropriate mood and affect     Labs/Data:  Data reviewed  A1C 4/24/2018 7.8            1/9/2018  7.9    8/22/2017 Creatinine 0.69, Hgb 14.8     1/30/2018   HDL 44, LDL 93, TG 60   TSH 2.46       Assessment/Treatment Plan:      Agustín Gallegos is a 37 year old year old male with type 1 diabetes for the past 22 years.    1. Type 1 Diabetes:  Diabetes control is improved and A1c 7.8% (down  from 7.9%) today; goal is <7.0%. Having some issues with elevated sugars after dinner.   -- Continue Tresiba at 26 units at bedtime  -- Change carb coverage to 1U: 5g for evening meal; continue prior carb coverage (1U:6g) for all othe rmeals   -- Dexcom diagnostic sensor today - He will send a glucose readings to Dr. Banks via [employer] in 2-3 weeks.       2. Chronic diabetic complications: There is no evidence of chronic diabetic complications.  Creatinine levels and microalbuminuria screening were negative. Patient plants to schedule eye exam soon.     3. Hypertension: Pt was on prophylactic Losartan in the past.  Losartan was discontinued on September 2014 and his BP levels were normal off medications. BP is normal today. We will continue to check his blood pressure regularly.  If hypertension is detected, we would recommend an ACE inhibitor (more likely ramipril) as apposed to an ARB.  We will continue to follow.    4. Health maintenance: Continue vitamin D    Orders Placed This Encounter   Procedures     Hemoglobin A1c POCT         Return to clinic in 3 months or sooner if needed.      Pt seen and discussed with Dr. Banks.    Merissa Crockett MD  Internal Medicine PGY2       ATTENDING NOTE  I have seen and examined the patient, reviewed and edited the resident's note, and agree with the plan of care.    Sharona Banks MD PhD    Division of Endocrinology and Diabetes

## 2018-04-24 NOTE — PROGRESS NOTES
Diabetes Self Management Training: Professional Continuous Glucose Monitor Insertion    SUBJECTIVE:   Agustín Gallegos is accompanied by self    Patient concerns: getting his blood sugar down    OBJECTIVE:    Lab Results:  Lab Results   Component Value Date    A1C 7.9 08/13/2014      No results found for: GLC  Lab Results   Component Value Date    HDL 44 01/30/2018       Vitals:      ASSESSMENT:  DexCom sensor started today. (Lot # [id number],  Serial # [id number], Expiration date 1/4/2019) Sensor was inserted with no resistance or bleeding at insertion site.    Pt will plan to wear the sensor through  5/1/2017.    WRITTEN AND VERBAL INFORMATION GIVEN TO SUPPORT UNDERSTANDING OF:  DexCom CGM: Charge  for only 1-3 hours at most when low battery indicator appears. Sensor insertion, intention of monitoring for 7 days and avoiding swimming more than 30 minutes. SMBG at least 2 times after 2-3 hours wearing initially, then at least 1 time every 12 hours, enter events of food intake, medications/insulin, exercise, hypoglycemia. Understanding of program screens, alarms, use of buttons, graphs available for viewing, sample sensor reports, trouble shooting, emergency contact, removal of sensor, stop sensor when prompted, need to leave sensor unit and data collection sheets at clinic at designated time/date.      Patient verbalizes understanding of how to remove sensor and all instructions provided.     Educational and other materials:  Food/exercise/medication log sheets  Contact information  Return Check List    PLAN:  Pt was given instructions for tracking BG, medications, food intake and activity.    See Patient Instructions, AVS printed and provided to patient today.    Follow-up:    Patient to return all items associated with professional Continuous Glucose Monitor System to the   [facility] by 5/1/2018.   Time Spent: 60 minutes  Encounter Type: Individual

## 2018-04-24 NOTE — MR AVS SNAPSHOT
After Visit Summary   4/24/2018    Agustín Gallegos    MRN: [id number]           Patient Information     Date Of Birth          1980        Visit Information        Provider Department      4/24/2018 9:30 AM CARA Banks MD [facility]        Today's Diagnoses     Type 1 diabetes mellitus without complication (H)    -  1       Follow-ups after your visit        Follow-up notes from your care team     Return in about 3 months (around 7/24/2018).      Your next 10 appointments already scheduled     Apr 24, 2018 10:00 PM CDT   (Arrive by 9:45 PM)   Office Visit with Yoselin Salgado RN   [facility] ([facility])    [address] [address] [phone]           Bring a current list of meds and any records pertaining to this visit. For Physicals, please bring immunization records and any forms needing to be filled out. Please arrive 10 minutes early to complete paperwork.              Who to contact     Please call your clinic at [phone] to:    Ask questions about your health    Make or cancel appointments    Discuss your medicines    Learn about your test results    Speak to your doctor            Additional Information About Your Visit        [employer] Information     [employer] gives you secure access to your electronic health record. If you see a primary care provider, you can also send messages to your care team and make appointments. If you have questions, please call your primary care clinic.  If you do not have a primary care provider, please call [phone] and they will assist you.      [employer] is an electronic gateway that provides easy, online access to your medical records. With [employer], you can request a clinic appointment, read your test results, renew a prescription or communicate with your care team.     To access your existing account, please contact your [facility] or call [phone] for assistance.        Care EveryWhere ID     This is your Care EveryWhere ID. This could be used by other organizations to access your [facility] medical records  [id number]        Your Vitals Were     Pulse BMI (Body Mass Index)                71 24 kg/m2           Blood Pressure from Last 3 Encounters:   04/24/18 121/72   01/09/18 125/86   08/22/17 138/85    Weight from Last 3 Encounters:   04/24/18 89.4 kg (197 lb 3.2 oz)   01/09/18 89.6 kg (197 lb 8 oz)   08/22/17 85.1 kg (187 lb 9.6 oz)              We Performed the Following     Hemoglobin A1c POCT          Today's Medication Changes          These changes are accurate as of 4/24/18  4:58 PM.  If you have any questions, ask your nurse or doctor.               Stop taking these medicines if you haven't already. Please contact your care team if you have questions.     oseltamivir 75 MG capsule   Commonly known as:  TAMIFLU   Stopped by:  CARA Banks MD                    Primary Care Provider Office Phone # Fax #    CARA Banks [phone]       [address] [place] [address]        Equal Access to Services     JOSÉ MIGUEL LOYOLA AH: Hadii loren hawko Sopelon, waaxda luqadaha, qaybta kaalmada adeegyada, yana suarez. So [facility] [phone].    ATENCIÓN: Si habla español, tiene a kelly disposición servicios gratuitos de asistencia lingüística. Llame al [phone].    We comply with applicable federal civil rights laws and [place] laws. We do not discriminate on the basis of race, color, national origin, age, disability, sex, sexual orientation, or gender identity.            Thank you!     Thank you for choosing [facility]  for your care. Our goal is always to provide you with excellent care. Hearing back from our patients is one way we can continue to improve our services. Please take a few minutes to complete the written  survey that you may receive in the mail after your visit with us. Thank you!             Your Updated Medication List - Protect others around you: Learn how to safely use, store and throw away your medicines at www.disposemymeds.org.          This list is accurate as of 4/24/18  4:58 PM.  Always use your most recent med list.                   Brand Name Dispense Instructions for use Diagnosis    blood glucose monitoring lancets     6 Box    Use to test blood sugar 8 times daily or as directed.    Diabetes mellitus type 1 (H)       blood glucose monitoring meter device kit    no brand specified    1 kit    Use to test blood sugar 5 times daily or as directed.    Type 1 diabetes mellitus without complication (H)       blood glucose monitoring test strip    CANDE CONTOUR NEXT    800 each    USE TO TEST BLOOD SUGAR 8 TIMES DAILY OR AS DIRECTED *3 MONTH SUPPLY    Diabetes mellitus type 1 (H)       cholecalciferol 1000 UNIT tablet    vitamin D3    90 tablet    Take 1 tablet (1,000 Units) by mouth daily    Diabetes mellitus type 1 (H), Unspecified vitamin D deficiency       insulin aspart 100 UNIT/ML injection    NovoLOG FLEXPEN    45 mL    Inject 1 unit per 7 grams for breakfast and 1 unit per 6 g for lunch, dinner, snacks. Approx 40 units daily.    Diabetes mellitus type 1 (H)       insulin degludec 200 UNIT/ML pen    TRESIBA FLEXTOUCH    15 mL    Inject 24 Units Subcutaneous daily    Type 1 diabetes mellitus without complication (H)       insulin pen needle 31G X 8 MM    B-D U/F    400 each    Use use 4 daily pen needles daily (or pen size pt has been using)    Diabetes mellitus type 1 (H)

## 2018-04-24 NOTE — NURSING NOTE
"Chief Complaint   Patient presents with     RECHECK     DIABETES  TYPE 1       Initial /72  Pulse 71  Wt 89.4 kg (197 lb 3.2 oz)  BMI 24 kg/m2 Estimated body mass index is 24 kg/(m^2) as calculated from the following:    Height as of 1/9/18: 1.93 m (6' 4\").    Weight as of this encounter: 89.4 kg (197 lb 3.2 oz).  Medication Reconciliation: complete    "

## 2018-04-24 NOTE — MR AVS SNAPSHOT
"              After Visit Summary   4/24/2018    Agustín Gallegos    MRN: [id number]           Patient Information     Date Of Birth          1980        Visit Information        Provider Department      4/24/2018 10:00 PM Yoselin Salgado RN [facility]        Today's Diagnoses     Type 1 diabetes mellitus with hyperglycemia (H)    -  1      Care Instructions    1. After 2 hours, you will be prompted to enter 2 blood sugar readings to calibrate the . Take two different samples (from different fingers). Wash your hands well before calibrating.    2. Check blood sugar once at least every 12 hours and enter it into the Dexcom  to calibrate. Checking blood sugar before meals and/or at bedtime is recommended. Blood sugar levels must be between  for the  to accept the calibration.    3. Record what you eat at meals and snacks with portion eaten. Record amount of carbohydrate grams in the Dexcom  when you eat to help track response to food intake. Record exercise type and time. Record medications you take and the time they are taken.     4. When monitor reads that \"Sensor needs to be Changed,\" go into menu function and hit \"STOP\" (NOT shutdown)    5. If battery power is low (will see indicator on monitor) and need to recharge the monitor, only charge for 1-3 hours.  DO NOT charge for more than 3 hours since it can damage monitor!    6. Avoid taking Tylenol while wearing the Dexcom, as it can cause inaccurate glucose readings.     7. Return all equipment and any food/exercise/medication logs to the [facility] on 5/1/18 (see checklist).    Yoselin Salgado RN,LUIZA MARROQUIN [address] [address]  Phone: [phone]  [email or web address]                Follow-ups after your visit        Your next 10 appointments already scheduled     Apr 24, 2018 10:00 PM CDT   (Arrive by 9:45 PM)   Office Visit with Yoselin Salgado RN   [facility] " Diabetes ([facility])    [address] [facility]  3rd Floor  [facility] [address] [phone]           Bring a current list of meds and any records pertaining to this visit. For Physicals, please bring immunization records and any forms needing to be filled out. Please arrive 10 minutes early to complete paperwork.              Who to contact     Please call your clinic at [phone] to:    Ask questions about your health    Make or cancel appointments    Discuss your medicines    Learn about your test results    Speak to your doctor            Additional Information About Your Visit        [employer] Information     [employer] gives you secure access to your electronic health record. If you see a primary care provider, you can also send messages to your care team and make appointments. If you have questions, please call your primary care clinic.  If you do not have a primary care provider, please call [phone] and they will assist you.      [employer] is an electronic gateway that provides easy, online access to your medical records. With [employer], you can request a clinic appointment, read your test results, renew a prescription or communicate with your care team.     To access your existing account, please contact your [facility] or call [phone] for assistance.        Care EveryWhere ID     This is your Care EveryWhere ID. This could be used by other organizations to access your [facility] medical records  [id number]         Blood Pressure from Last 3 Encounters:   04/24/18 121/72   01/09/18 125/86   08/22/17 138/85    Weight from Last 3 Encounters:   04/24/18 89.4 kg (197 lb 3.2 oz)   01/09/18 89.6 kg (197 lb 8 oz)   08/22/17 85.1 kg (187 lb 9.6 oz)              We Performed the Following     C CONT GLUC MONITOR, 72 HRS OR MORE, PATIENT PROVIDED EQUIPTMENT          Today's Medication Changes          These changes are accurate as of 4/24/18  5:22 PM.   If you have any questions, ask your nurse or doctor.               Stop taking these medicines if you haven't already. Please contact your care team if you have questions.     oseltamivir 75 MG capsule   Commonly known as:  TAMIFLU   Stopped by:  CARA Banks MD                    Primary Care Provider Office Phone # Fax #    CARA Banks [phone]       [address] [place] [address]        Equal Access to Services     [facility]: Hadii aad ku hadasho Soomaali, waaxda luqadaha, qaybta kaalmada adeegyada, waxay idiin hayaan adeeg kharash la'aan . So [facility] [phone].    ATENCIÓN: Si habla español, tiene a kelly disposición servicios gratuitos de asistencia lingüística. [facility] [phone].    We comply with applicable federal civil rights laws and [place] laws. We do not discriminate on the basis of race, color, national origin, age, disability, sex, sexual orientation, or gender identity.            Thank you!     Thank you for choosing [facility]ETES  for your care. Our goal is always to provide you with excellent care. Hearing back from our patients is one way we can continue to improve our services. Please take a few minutes to complete the written survey that you may receive in the mail after your visit with us. Thank you!             Your Updated Medication List - Protect others around you: Learn how to safely use, store and throw away your medicines at www.disposemymeds.org.          This list is accurate as of 4/24/18  5:22 PM.  Always use your most recent med list.                   Brand Name Dispense Instructions for use Diagnosis    blood glucose monitoring lancets     6 Box    Use to test blood sugar 8 times daily or as directed.    Diabetes mellitus type 1 (H)       blood glucose monitoring meter device kit    no brand specified    1 kit    Use to test blood sugar 5 times daily or as directed.    Type 1 diabetes mellitus without  complication (H)       blood glucose monitoring test strip    CANDE CONTOUR NEXT    800 each    USE TO TEST BLOOD SUGAR 8 TIMES DAILY OR AS DIRECTED *3 MONTH SUPPLY    Diabetes mellitus type 1 (H)       cholecalciferol 1000 UNIT tablet    vitamin D3    90 tablet    Take 1 tablet (1,000 Units) by mouth daily    Diabetes mellitus type 1 (H), Unspecified vitamin D deficiency       insulin aspart 100 UNIT/ML injection    NovoLOG FLEXPEN    45 mL    Inject 1 unit per 7 grams for breakfast and 1 unit per 6 g for lunch, dinner, snacks. Approx 40 units daily.    Diabetes mellitus type 1 (H)       insulin degludec 200 UNIT/ML pen    TRESIBA FLEXTOUCH    15 mL    Inject 24 Units Subcutaneous daily    Type 1 diabetes mellitus without complication (H)       insulin pen needle 31G X 8 MM    B-D U/F    400 each    Use use 4 daily pen needles daily (or pen size pt has been using)    Diabetes mellitus type 1 (H)

## 2018-04-24 NOTE — PATIENT INSTRUCTIONS
"1. After 2 hours, you will be prompted to enter 2 blood sugar readings to calibrate the . Take two different samples (from different fingers). Wash your hands well before calibrating.    2. Check blood sugar once at least every 12 hours and enter it into the Dexcom  to calibrate. Checking blood sugar before meals and/or at bedtime is recommended. Blood sugar levels must be between  for the  to accept the calibration.    3. Record what you eat at meals and snacks with portion eaten. Record amount of carbohydrate grams in the Dexcom  when you eat to help track response to food intake. Record exercise type and time. Record medications you take and the time they are taken.     4. When monitor reads that \"Sensor needs to be Changed,\" go into menu function and hit \"STOP\" (NOT shutdown)    5. If battery power is low (will see indicator on monitor) and need to recharge the monitor, only charge for 1-3 hours.  DO NOT charge for more than 3 hours since it can damage monitor!    6. Avoid taking Tylenol while wearing the Dexcom, as it can cause inaccurate glucose readings.     7. Return all equipment and any food/exercise/medication logs to the [facility] on 5/1/18 (see checklist).    Yoselin Salgado RN,CDE  [place] [place] [address]  Phone: [phone]  [email or web address]        "

## 2018-05-01 ENCOUNTER — TELEPHONE (OUTPATIENT)
Dept: ENDOCRINOLOGY | Facility: CLINIC | Age: 38
End: 2018-05-01

## 2018-05-01 NOTE — TELEPHONE ENCOUNTER
DMV form was  signed and faxed to the DMV X2 today and the original mailed to the patients home address. .

## 2018-05-02 DIAGNOSIS — E10.9 TYPE 1 DIABETES MELLITUS WITHOUT COMPLICATION (H): ICD-10-CM

## 2018-05-10 ENCOUNTER — TELEPHONE (OUTPATIENT)
Dept: EDUCATION SERVICES | Facility: CLINIC | Age: 38
End: 2018-05-10

## 2018-05-11 ENCOUNTER — MYC MEDICAL ADVICE (OUTPATIENT)
Dept: EDUCATION SERVICES | Facility: CLINIC | Age: 38
End: 2018-05-11

## 2018-05-11 NOTE — TELEPHONE ENCOUNTER
Email sent to Christiano prior to our phone call about his DexCom report.    Hi Christiano,    Here are your DexCom reports.  You are doing a nice job of managing your diabetes!  (This is no small undertaking, as you know!)      The trends the DexCom identifies are :  Highs between 11:05 pm and 5:40 am and highs between 8:20 am and 9:15 am.  It makes sense to start there to tweak things.        It looks like carb amounts are about the same at breakfast Monday through Friday.  One day you bolus for it and you are high afterward, one day you drop too low 2 hours later and one day when you start high and add correction you drop too low.  Have you ever tried to change up the timing of the bolus so that the insulin gets in about 15 minutes before you start eating?  You could not do this when you are low but you could try it if you start out close to target.        The overnights are more consistently high.  I don't think it is dinner that is causing your highs (except Sunday, April 29.) (It would be interesting to know what food that was!) I think it is the bedtime snack that causes the high overnights.  I think you were working on changing that when we talked back in January.  Although it looks like you changed it up on Monday April, 30 because instead of 40-60 grams at bedtime you took 10 and you remained high all night but I think you did not cover the 10 grams with Novolog in that case.      Anyway, there are my thoughts while studying it.      We'll talk soon!    Yoselin    Discussed DexCom reports with Christiano:  We discussed all of the above.  He has been taking a bedtime snack so that he can correct at bedtime when he is high.  We talked about correcting at bedtime only if >200 and using only half of his normal correction.

## 2018-05-16 ENCOUNTER — OFFICE VISIT (OUTPATIENT)
Dept: OPHTHALMOLOGY | Facility: CLINIC | Age: 38
End: 2018-05-16
Attending: OPHTHALMOLOGY
Payer: COMMERCIAL

## 2018-05-16 DIAGNOSIS — E10.9 TYPE 1 DIABETES MELLITUS WITHOUT RETINOPATHY (H): Primary | ICD-10-CM

## 2018-05-16 DIAGNOSIS — H52.203 MYOPIC ASTIGMATISM OF BOTH EYES: ICD-10-CM

## 2018-05-16 DIAGNOSIS — H52.13 MYOPIC ASTIGMATISM OF BOTH EYES: ICD-10-CM

## 2018-05-16 PROCEDURE — G0463 HOSPITAL OUTPT CLINIC VISIT: HCPCS | Mod: ZF

## 2018-05-16 ASSESSMENT — EXTERNAL EXAM - RIGHT EYE: OD_EXAM: NORMAL

## 2018-05-16 ASSESSMENT — CONF VISUAL FIELD
OS_NORMAL: 1
OD_NORMAL: 1
METHOD: COUNTING FINGERS

## 2018-05-16 ASSESSMENT — VISUAL ACUITY
OD_CC: 20/25
METHOD: SNELLEN - LINEAR
METHOD_MR: DECLINES MR TODAY
OD_CC: 20/20
CORRECTION_TYPE: GLASSES
OS_CC: 20/20
CORRECTION_TYPE: GLASSES
METHOD: SNELLEN - LINEAR
OS_CC: 20/20

## 2018-05-16 ASSESSMENT — TONOMETRY
IOP_METHOD: TONOPEN
OS_IOP_MMHG: 18
OD_IOP_MMHG: 18

## 2018-05-16 ASSESSMENT — REFRACTION_WEARINGRX
OS_CYLINDER: +0.75
OD_SPHERE: -7.50
OD_CYLINDER: +0.75
OS_SPHERE: -7.75
OD_AXIS: 083
OS_AXIS: 105

## 2018-05-16 ASSESSMENT — EXTERNAL EXAM - LEFT EYE: OS_EXAM: NORMAL

## 2018-05-16 ASSESSMENT — SLIT LAMP EXAM - LIDS
COMMENTS: NORMAL
COMMENTS: NORMAL

## 2018-05-16 ASSESSMENT — CUP TO DISC RATIO
OS_RATIO: 0.3
OD_RATIO: 0.3

## 2018-05-16 NOTE — NURSING NOTE
Chief Complaints and History of Present Illnesses   Patient presents with     Eye Exam For Diabetes     HPI    Affected eye(s):  Both   Symptoms:        Frequency:  Constant       Do you have eye pain now?:  No      Comments:  States va is the same since last visit  No red watery or dry   No F&F   BS  152 this am  Lab Results       Component                Value               Date           A1C                      7.8                 04/24/2018                   A1C                      7.9                 08/13/2014            Jacklyn Magaña COT 7:36 AM May 16, 2018

## 2018-05-16 NOTE — MR AVS SNAPSHOT
After Visit Summary   5/16/2018    Agustín Gallegos    MRN: [id number]           Patient Information     Date Of Birth          1980        Visit Information        Provider Department      5/16/2018 7:30 AM Patricia Talamantes MD [facility]        Today's Diagnoses     Type 1 diabetes mellitus without retinopathy (H)    -  1    Myopic astigmatism of both eyes           Follow-ups after your visit        Follow-up notes from your care team     Return in about 1 year (around 5/16/2019) for Annual Visit.      Your next 10 appointments already scheduled     Aug 06, 2018  8:00 AM CDT   (Arrive by 7:45 AM)   RETURN DIABETES with Shena Iniguez PA-C   [facility] ([facility])    [address] [place] [address] [phone]            Oct 30, 2018  9:00 AM CDT   (Arrive by 8:45 AM)   RETURN DIABETES with CARA Banks MD   [facility])    [address] [place] [address] [phone]              Who to contact     Please call your clinic at [phone] to:    Ask questions about your health    Make or cancel appointments    Discuss your medicines    Learn about your test results    Speak to your doctor            Additional Information About Your Visit        [employer] Information     [employer] gives you secure access to your electronic health record. If you see a primary care provider, you can also send messages to your care team and make appointments. If you have questions, please call your primary care clinic.  If you do not have a primary care provider, please call [phone] and they will assist you.      [employer] is an electronic gateway that provides easy, online access to your medical records. With [employer], you can request a clinic appointment, read your test results, renew a prescription or communicate with your care team.     To access your  existing account, please contact your [facility] or call [phone] for assistance.        Care EveryWhere ID     This is your Care EveryWhere ID. This could be used by other organizations to access your [place] medical records  [id number]         Blood Pressure from Last 3 Encounters:   04/24/18 121/72   01/09/18 125/86   08/22/17 138/85    Weight from Last 3 Encounters:   04/24/18 89.4 kg (197 lb 3.2 oz)   01/09/18 89.6 kg (197 lb 8 oz)   08/22/17 85.1 kg (187 lb 9.6 oz)              Today, you had the following     No orders found for display       Primary Care Provider Office Phone # Fax #    M Sharona Banks [phone]       [address] [place] [address]        Equal Access to Services     [facility]: Hadii loren hogue hadasho Sopelon, waaxda luqadaha, qaybta kaalmada adedeionyada, yana galloway . So [facility] [phone].    ATENCIÓN: Si habla español, tiene a kelly disposición servicios gratuitos de asistencia lingüística. Ba al [phone].    We comply with applicable federal civil rights laws and [place] laws. We do not discriminate on the basis of race, color, national origin, age, disability, sex, sexual orientation, or gender identity.            Thank you!     Thank you for choosing [facility]  for your care. Our goal is always to provide you with excellent care. Hearing back from our patients is one way we can continue to improve our services. Please take a few minutes to complete the written survey that you may receive in the mail after your visit with us. Thank you!             Your Updated Medication List - Protect others around you: Learn how to safely use, store and throw away your medicines at www.disposemymeds.org.          This list is accurate as of 5/16/18  9:22 AM.  Always use your most recent med list.                   Brand Name Dispense Instructions for use Diagnosis    blood glucose  monitoring lancets     6 Box    Use to test blood sugar 8 times daily or as directed.    Diabetes mellitus type 1 (H)       blood glucose monitoring meter device kit    no brand specified    1 kit    Use to test blood sugar 5 times daily or as directed.    Type 1 diabetes mellitus without complication (H)       blood glucose monitoring test strip    CANDE CONTOUR NEXT    800 each    USE TO TEST BLOOD SUGAR 8 TIMES DAILY OR AS DIRECTED *3 MONTH SUPPLY    Diabetes mellitus type 1 (H)       cholecalciferol 1000 UNIT tablet    vitamin D3    90 tablet    Take 1 tablet (1,000 Units) by mouth daily    Diabetes mellitus type 1 (H), Unspecified vitamin D deficiency       insulin aspart 100 UNIT/ML injection    NovoLOG FLEXPEN    45 mL    Inject 1 unit per 7 grams for breakfast and 1 unit per 6 g for lunch, dinner, snacks. Approx 40 units daily.    Diabetes mellitus type 1 (H)       insulin degludec 200 UNIT/ML pen    TRESIBA FLEXTOUCH    36 mL    Inject 26 Units Subcutaneous daily    Type 1 diabetes mellitus without complication (H)       insulin pen needle 31G X 8 MM    B-D U/F    400 each    Use use 4 daily pen needles daily (or pen size pt has been using)    Diabetes mellitus type 1 (H)

## 2018-05-16 NOTE — PROGRESS NOTES
HPI  Agustín Gallegos is a 35 year old male here for yearly diabetic eye exam. His vision is good in both eyes with his current glasses.He denies eye pain, redness, or discharge. No flashes/floaters.    POH: No history of eye surgery or trauma  PMH: Type 1 diabetes  FH: No FH of AMD or glc  SH: Non-smoker    Assessment & Plan      (E10.9) Type 1 diabetes mellitus without retinopathy (HCC)  (primary encounter diagnosis)  Comment: Diagnosed at age 13. Last A1c 7.8 4/2018. No diabetic retinopathy.  Plan: Discussed the importance of tight blood glucose control in the prevention of diabetic retinopathy. Recommend yearly dilated eye exam.    (H52.203) Myopic astigmatism of both eyes  Comment: Stable good vision with refraction  Plan:  Follows with Dr. Nice for contact lenses.  Hold MR in the setting of excellent vision today. Discussed signs/sx of RT/RD and the patient knows to call immediately if they develop these symptoms.         -----------------------------------------------------------------------------------    Patient disposition:   Return in about 1 year (around 5/16/2019) for Annual Visit. or sooner as needed.    Anastacio Beltrán M.D.  PGY-2, Ophthalmology     Teaching statement:  Complete documentation of historical and exam elements from today's encounter can be found in the full encounter summary report (not reduplicated in this progress note). I personally obtained the chief complaint(s) and history of present illness.  I confirmed and edited as necessary the review of systems, past medical/surgical history, family history, social history, and examination findings as documented by others; and I examined the patient myself. I personally reviewed the relevant tests, images, and reports as documented above.     I formulated and edited as necessary the assessment and plan and discussed the findings and management plan with the patient and family.    Patricia Talamantes MD  Comprehensive Ophthalmology &  Ocular Pathology  Department of Ophthalmology and Visual Neurosciences  [email or web address]  Pager [phone]

## 2018-05-29 ENCOUNTER — TELEPHONE (OUTPATIENT)
Dept: ENDOCRINOLOGY | Facility: CLINIC | Age: 38
End: 2018-05-29

## 2018-08-06 ENCOUNTER — OFFICE VISIT (OUTPATIENT)
Dept: ENDOCRINOLOGY | Facility: CLINIC | Age: 38
End: 2018-08-06
Payer: COMMERCIAL

## 2018-08-06 VITALS
DIASTOLIC BLOOD PRESSURE: 68 MMHG | HEART RATE: 76 BPM | SYSTOLIC BLOOD PRESSURE: 102 MMHG | WEIGHT: 198.5 LBS | BODY MASS INDEX: 24.16 KG/M2

## 2018-08-06 DIAGNOSIS — E10.9 WELL CONTROLLED TYPE 1 DIABETES MELLITUS (H): Primary | ICD-10-CM

## 2018-08-06 LAB — HBA1C MFR BLD: 7.5 % (ref 4.3–6)

## 2018-08-06 ASSESSMENT — PAIN SCALES - GENERAL: PAINLEVEL: NO PAIN (0)

## 2018-08-06 NOTE — MR AVS SNAPSHOT
After Visit Summary   8/6/2018    Agustín Gallegos    MRN: [id number]           Patient Information     Date Of Birth          1980        Visit Information        Provider Department      8/6/2018 8:00 AM Shena Iniguez PA-C [facility]        Today's Diagnoses     Diabetes mellitus type 1 (H)    -  1      Care Instructions    Try adding in extra walk after dinner to get your bedtime readings lower.     If you are still running high at bedtime, consider 1/4g.  If you do so, may need to reduce Tresiba by 1-2 units.      If you have concerns, feel free to send a [employer] message or call [phone].                Follow-ups after your visit        Your next 10 appointments already scheduled     Oct 30, 2018  9:00 AM CDT   (Arrive by 8:45 AM)   RETURN DIABETES with MD CAR[facility] ([facility])    [address] [place] [id number] [phone]              Who to contact     Please call your clinic at [phone] to:    Ask questions about your health    Make or cancel appointments    Discuss your medicines    Learn about your test results    Speak to your doctor            Additional Information About Your Visit        afterBOT Information     afterBOT gives you secure access to your electronic health record. If you see a primary care provider, you can also send messages to your care team and make appointments. If you have questions, please call your primary care clinic.  If you do not have a primary care provider, please call [phone] and they will assist you.      afterBOT is an electronic gateway that provides easy, online access to your medical records. With afterBOT, you can request a clinic appointment, read your test results, renew a prescription or communicate with your care team.     To access your existing account, please contact your [facility] or call  [phone] for assistance.        Care EveryWhere ID     This is your Care EveryWhere ID. This could be used by other organizations to access your [place] medical records  [id number]        Your Vitals Were     Pulse BMI (Body Mass Index)                76 24.16 kg/m2           Blood Pressure from Last 3 Encounters:   08/06/18 140/89   04/24/18 121/72   01/09/18 125/86    Weight from Last 3 Encounters:   08/06/18 90 kg (198 lb 8 oz)   04/24/18 89.4 kg (197 lb 3.2 oz)   01/09/18 89.6 kg (197 lb 8 oz)              We Performed the Following     Hemoglobin A1c POCT        Primary Care Provider Office Phone # Fax #    M Sharona Banks [phone]       [address] [place] [id number]        Equal Access to Services     [facility] : Hadii aad ku hadasho Sopelon, waaxda luqadaha, qaybta kaalmada adedeionyada, yana galloway . So [facility] [phone].    ATENCIÓN: Si habla español, tiene a kelly disposición servicios gratuitos de asistencia lingüística. Ofeliaame al [phone].    We comply with applicable federal civil rights laws and [place] laws. We do not discriminate on the basis of race, color, national origin, age, disability, sex, sexual orientation, or gender identity.            Thank you!     Thank you for choosing [facility]  for your care. Our goal is always to provide you with excellent care. Hearing back from our patients is one way we can continue to improve our services. Please take a few minutes to complete the written survey that you may receive in the mail after your visit with us. Thank you!             Your Updated Medication List - Protect others around you: Learn how to safely use, store and throw away your medicines at www.disposemymeds.org.          This list is accurate as of 8/6/18  8:53 AM.  Always use your most recent med list.                   Brand Name Dispense Instructions for use Diagnosis    blood glucose  monitoring lancets     6 Box    Use to test blood sugar 8 times daily or as directed.    Diabetes mellitus type 1 (H)       blood glucose monitoring meter device kit    no brand specified    1 kit    Use to test blood sugar 5 times daily or as directed.    Type 1 diabetes mellitus without complication (H)       blood glucose monitoring test strip    CANDE CONTOUR NEXT    800 each    USE TO TEST BLOOD SUGAR 8 TIMES DAILY OR AS DIRECTED *3 MONTH SUPPLY    Diabetes mellitus type 1 (H)       cholecalciferol 1000 UNIT tablet    vitamin D3    90 tablet    Take 1 tablet (1,000 Units) by mouth daily    Diabetes mellitus type 1 (H), Unspecified vitamin D deficiency       insulin aspart 100 UNIT/ML injection    NovoLOG FLEXPEN    45 mL    Inject 1 unit per 7 grams for breakfast and 1 unit per 6 g for lunch, dinner, snacks. Approx 40 units daily.    Diabetes mellitus type 1 (H)       insulin degludec 200 UNIT/ML pen    TRESIBA FLEXTOUCH    36 mL    Inject 26 Units Subcutaneous daily    Type 1 diabetes mellitus without complication (H)       insulin pen needle 31G X 8 MM    B-D U/F    400 each    Use use 4 daily pen needles daily (or pen size pt has been using)    Diabetes mellitus type 1 (H)

## 2018-08-06 NOTE — PATIENT INSTRUCTIONS
Try adding in extra walk after dinner to get your bedtime readings lower.     If you are still running high at bedtime, consider 1/4g.  If you do so, may need to reduce Tresiba by 1-2 units.      If you have concerns, feel free to send a [employer] message or call [phone].

## 2018-08-06 NOTE — LETTER
8/6/2018       RE: Agustín Gallegos  [address]  [facility] [address]     Dear Colleague,    Thank you for referring your patient, Agustín Gallegos, to the [facility] END[facility] at [facility]. Please see a copy of my visit note below.    HPI:   Christiano is a 37 yo man here for follow up of type 1 diabetes, which he has had since age 14.  He reports that overall things are going well.  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  He usually eats low carb at home, [place] carb.  He lives with his fiancee and will be getting  in October.  They have been busy making wedding plans.      Novolog dosing:   Breakfast- cereal.  1/8g  Lunch- sometimes leftovers (low carb), [place] 1/6g  Dinner- usually low carb. 1/5g.   Correction: 1/30 over 130 mg/dL.     He takes Tresiba 26 units daily.  He has minimal hypoglycemia in the 60's on occasion after activity. He is testing his blood sugars 6 times a day with an overall average of 188 mg/dL.     Sensor review:   Patient wore a Dexcom sensor for 30 days.   Overall average glucose: 158 mg/dL  Undetected hypoglycemia: No  Nocturnal hypoglycemia: No  Patterns: high at bedtime.  High in the AM if high at HS.  Relatively flat overnight.     He has been feeling well and in his usual state of health.  He has no other concerns today.     ROS  GENERAL: no weight loss, weight gain, fevers, chills, malaise, night sweats.   HEENT: no dysphagia, diplopia, neck pain or tenderness, dry/scratchy eyes, URI, cough, sinus drainage, tinnitus, sinus pressure  CV: no chest pain, pressure, palpitations, skipped beats, LOC  LUNGS: no SOB, WILDE, cough, sputum production, wheezing   GI: no diarrhea, constipation, abdominal pain  EXTREMITIES: no rashes, ulcers, edema  NEUROLOGY: no changes in vision, tingling or numbness in hands  or feet.   MSK: no muscle aches or pains, weakness  PSYCH: mood stable    Past Medical History:   Diagnosis Date     Type 1 diabetes (H)     age of onset 14 years       History reviewed. No pertinent surgical history.    Family History   Problem Relation Age of Onset     Diabetes Brother      Cerebrovascular Disease Paternal Grandmother      Arthritis Mother      Arthritis Maternal Grandmother        Social History     Social History     Marital status: Single     Spouse name: N/A     Number of children: N/A     Years of education: N/A     Social History Main Topics     Smoking status: Never Smoker     Smokeless tobacco: Never Used     Alcohol use No     Drug use: No     Sexual activity: Yes     Partners: Female     Other Topics Concern     None     Social History Narrative       Current Outpatient Prescriptions   Medication     blood glucose monitoring (CANDE CONTOUR NEXT) test strip     blood glucose monitoring (CANDE MICROLET) lancets     blood glucose monitoring (NO BRAND SPECIFIED) meter device kit     cholecalciferol (VITAMIN D) 1000 UNIT tablet     insulin aspart (NOVOLOG FLEXPEN) 100 UNIT/ML injection     insulin degludec (TRESIBA FLEXTOUCH) 200 UNIT/ML pen     insulin pen needle (B-D U/F) 31G X 8 MM     No current facility-administered medications for this visit.         No Known Allergies    Physical Exam  /89  Pulse 76  Wt 90 kg (198 lb 8 oz)  BMI 24.16 kg/m2  GENERAL:  Alert and oriented X3, NAD, well dressed, answering questions appropriately, appears stated age.  EXTREMITIES: no edema, +pulses, no rashes, no lesions    RESULTS  Lab Results   Component Value Date    A1C 7.9 (H) 08/13/2014    HEMOGLOBINA1 7.5 (A) 08/06/2018    HEMOGLOBINA1 7.8 (A) 04/24/2018    HEMOGLOBINA1 7.9 (A) 01/09/2018    HEMOGLOBINA1 8.3 (A) 08/22/2017    HEMOGLOBINA1 8.0 (A) 01/17/2017       TSH   Date Value Ref Range Status   01/30/2018 2.46 0.40 - 4.00 mU/L Final   09/16/2016 0.97 0.40 - 4.00 mU/L Final   06/24/2015  0.78 0.40 - 4.00 mU/L Final   12/04/2014 1.09 0.40 - 4.00 mU/L Final     Comment:     Effective 7/30/2014, the reference range for this assay has changed to reflect   new instrumentation/methodology.       T4 Free   Date Value Ref Range Status   09/16/2016 1.01 0.76 - 1.46 ng/dL Final   06/24/2015 1.02 0.76 - 1.46 ng/dL Final       ALT   Date Value Ref Range Status   03/05/2015 20 0 - 70 U/L Final   ]    Recent Labs   Lab Test  01/30/18   0727  09/16/16   0823  08/13/14   0956   CHOL  149  146  162   HDL  44  43  49   LDL  93  96  102   TRIG  60  37  56   CHOLHDLRATIO   --    --   3.3       No results found for: NA   Lab Results   Component Value Date    POTASSIUM 4.1 09/16/2016     No results found for: CHLORIDE  No results found for: CELESTE  No results found for: CO2  No results found for: BUN  Lab Results   Component Value Date    CR 0.69 08/22/2017       GFR Estimate   Date Value Ref Range Status   08/22/2017 >90 >60 mL/min/1.7m2 Final     Comment:     Non  GFR Calc   09/16/2016 >90  Non  GFR Calc   >60 mL/min/1.7m2 Final   03/05/2015 >90  Non  GFR Calc   >60 mL/min/1.7m2 Final     GFR Estimate If Black   Date Value Ref Range Status   08/22/2017 >90 >60 mL/min/1.7m2 Final     Comment:      GFR Calc   09/16/2016 >90   GFR Calc   >60 mL/min/1.7m2 Final   03/05/2015 >90   GFR Calc   >60 mL/min/1.7m2 Final       Lab Results   Component Value Date    MICROL <5 01/30/2018     No results found for: MICROALBUMIN  No results found for: CPEPT, GADAB, ISCAB    No results found for: B12]    Most recent eye exam date: : 05/16/2018     Assessment/Plan:     1.  Type 1 diabetes- Doing better.  A1c is down to 7.5%, however he is climbing too much post-dinner.  He feels he may not be taking full dose of short acting insulin in the evening for fear of nocturnal hypoglycemia (Dexcom indicates that he is not going low overnight and I  reassured him of this).  For now, he will try adding in an extra walk after dinner and work on taking full dose of Novolog.  If he does start to drop later in the night, may need to reduce Tresiba by 1-2 units. He will stay in touch.     2.  Risk factors-     Retinopathy:  No.  Had eye exam within last year.   Nephropathy:  BP is borderline.  No microalbuminuria.  Creatinine stable.  On no meds. Will continue to watch.   Neuropathy: No.    Feet: OK, no ulcers.   Taking ASA: no  Lipids:  LDL at target.      3.  F/U in 3 months as planned with Dr. Banks.  I am also happy to see him as needed in the future.     I spent 35 minutes with this patient face to face and explained the conditions and plans (more than 50% of time was counseling/coordination of care, diabetes management, follow up plan for worsening hyper and hypoglycemia) . The patient understood and is satisfied with today's visit.        Again, thank you for allowing me to participate in the care of your patient.      Sincerely,    Shena Iniguez PA-C

## 2018-08-06 NOTE — PROGRESS NOTES
HPI:   Christiano is a 37 yo man here for follow up of type 1 diabetes, which he has had since age 14.  He reports that overall things are going well.  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  He usually eats low carb at home, [place].  He lives with his fiancee and will be getting  in October.  They have been busy making wedding plans.      Novolog dosing:   Breakfast- cereal.  1/8g  Lunch- sometimes leftovers (low carb), [place] 1/6g  Dinner- usually low carb. 1/5g.   Correction: 1/30 over 130 mg/dL.     He takes Tresiba 26 units daily.  He has minimal hypoglycemia in the 60's on occasion after activity. He is testing his blood sugars 6 times a day with an overall average of 188 mg/dL.     Sensor review:   Patient wore a Dexcom sensor for 30 days.   Overall average glucose: 158 mg/dL  Undetected hypoglycemia: No  Nocturnal hypoglycemia: No  Patterns: high at bedtime.  High in the AM if high at HS.  Relatively flat overnight.     He has been feeling well and in his usual state of health.  He has no other concerns today.     ROS  GENERAL: no weight loss, weight gain, fevers, chills, malaise, night sweats.   HEENT: no dysphagia, diplopia, neck pain or tenderness, dry/scratchy eyes, URI, cough, sinus drainage, tinnitus, sinus pressure  CV: no chest pain, pressure, palpitations, skipped beats, LOC  LUNGS: no SOB, WILDE, cough, sputum production, wheezing   GI: no diarrhea, constipation, abdominal pain  EXTREMITIES: no rashes, ulcers, edema  NEUROLOGY: no changes in vision, tingling or numbness in hands or feet.   MSK: no muscle aches or pains, weakness  PSYCH: mood stable    Past Medical History:   Diagnosis Date     Type 1 diabetes (H)     age of onset 14 years       History reviewed. No pertinent surgical history.    Family History   Problem Relation Age of Onset     Diabetes Brother       Cerebrovascular Disease Paternal Grandmother      Arthritis Mother      Arthritis Maternal Grandmother        Social History     Social History     Marital status: Single     Spouse name: N/A     Number of children: N/A     Years of education: N/A     Social History Main Topics     Smoking status: Never Smoker     Smokeless tobacco: Never Used     Alcohol use No     Drug use: No     Sexual activity: Yes     Partners: Female     Other Topics Concern     None     Social History Narrative       Current Outpatient Prescriptions   Medication     blood glucose monitoring (CANDE CONTOUR NEXT) test strip     blood glucose monitoring (CANDE MICROLET) lancets     blood glucose monitoring (NO BRAND SPECIFIED) meter device kit     cholecalciferol (VITAMIN D) 1000 UNIT tablet     insulin aspart (NOVOLOG FLEXPEN) 100 UNIT/ML injection     insulin degludec (TRESIBA FLEXTOUCH) 200 UNIT/ML pen     insulin pen needle (B-D U/F) 31G X 8 MM     No current facility-administered medications for this visit.         No Known Allergies    Physical Exam  /89  Pulse 76  Wt 90 kg (198 lb 8 oz)  BMI 24.16 kg/m2  GENERAL:  Alert and oriented X3, NAD, well dressed, answering questions appropriately, appears stated age.  EXTREMITIES: no edema, +pulses, no rashes, no lesions    RESULTS  Lab Results   Component Value Date    A1C 7.9 (H) 08/13/2014    HEMOGLOBINA1 7.5 (A) 08/06/2018    HEMOGLOBINA1 7.8 (A) 04/24/2018    HEMOGLOBINA1 7.9 (A) 01/09/2018    HEMOGLOBINA1 8.3 (A) 08/22/2017    HEMOGLOBINA1 8.0 (A) 01/17/2017       TSH   Date Value Ref Range Status   01/30/2018 2.46 0.40 - 4.00 mU/L Final   09/16/2016 0.97 0.40 - 4.00 mU/L Final   06/24/2015 0.78 0.40 - 4.00 mU/L Final   12/04/2014 1.09 0.40 - 4.00 mU/L Final     Comment:     Effective 7/30/2014, the reference range for this assay has changed to reflect   new instrumentation/methodology.       T4 Free   Date Value Ref Range Status   09/16/2016 1.01 0.76 - 1.46 ng/dL Final    06/24/2015 1.02 0.76 - 1.46 ng/dL Final       ALT   Date Value Ref Range Status   03/05/2015 20 0 - 70 U/L Final   ]    Recent Labs   Lab Test  01/30/18   0727  09/16/16   0823  08/13/14   0956   CHOL  149  146  162   HDL  44  43  49   LDL  93  96  102   TRIG  60  37  56   CHOLHDLRATIO   --    --   3.3       No results found for: NA   Lab Results   Component Value Date    POTASSIUM 4.1 09/16/2016     No results found for: CHLORIDE  No results found for: CELESTE  No results found for: CO2  No results found for: BUN  Lab Results   Component Value Date    CR 0.69 08/22/2017       GFR Estimate   Date Value Ref Range Status   08/22/2017 >90 >60 mL/min/1.7m2 Final     Comment:     Non  GFR Calc   09/16/2016 >90  Non  GFR Calc   >60 mL/min/1.7m2 Final   03/05/2015 >90  Non  GFR Calc   >60 mL/min/1.7m2 Final     GFR Estimate If Black   Date Value Ref Range Status   08/22/2017 >90 >60 mL/min/1.7m2 Final     Comment:      GFR Calc   09/16/2016 >90   GFR Calc   >60 mL/min/1.7m2 Final   03/05/2015 >90   GFR Calc   >60 mL/min/1.7m2 Final       Lab Results   Component Value Date    MICROL <5 01/30/2018     No results found for: MICROALBUMIN  No results found for: CPEPT, GADAB, ISCAB    No results found for: B12]    Most recent eye exam date: : 05/16/2018     Assessment/Plan:     1.  Type 1 diabetes- Doing better.  A1c is down to 7.5%, however he is climbing too much post-dinner.  He feels he may not be taking full dose of short acting insulin in the evening for fear of nocturnal hypoglycemia (Dexcom indicates that he is not going low overnight and I reassured him of this).  For now, he will try adding in an extra walk after dinner and work on taking full dose of Novolog.  If he does start to drop later in the night, may need to reduce Tresiba by 1-2 units. He will stay in touch.     2.  Risk factors-     Retinopathy:  No.  Had eye exam  within last year.   Nephropathy:  BP is borderline.  No microalbuminuria.  Creatinine stable.  On no meds. Will continue to watch.   Neuropathy: No.    Feet: OK, no ulcers.   Taking ASA: no  Lipids:  LDL at target.      3.  F/U in 3 months as planned with Dr. Banks.  I am also happy to see him as needed in the future.     I spent 35 minutes with this patient face to face and explained the conditions and plans (more than 50% of time was counseling/coordination of care, diabetes management, follow up plan for worsening hyper and hypoglycemia) . The patient understood and is satisfied with today's visit.      Shena Iniguez PA-C, MPAS   [facility]  Department of Medicine  Division of Endocrinology and Diabetes

## 2018-09-06 DIAGNOSIS — E10.9 DIABETES MELLITUS TYPE 1 (H): ICD-10-CM

## 2018-09-07 RX ORDER — PEN NEEDLE, DIABETIC 31 GX5/16"
NEEDLE, DISPOSABLE MISCELLANEOUS
Qty: 100 EACH | Refills: 11 | Status: SHIPPED | OUTPATIENT
Start: 2018-09-07 | End: 2019-02-04

## 2018-10-23 ASSESSMENT — ENCOUNTER SYMPTOMS
STIFFNESS: 0
NECK PAIN: 0
JOINT SWELLING: 0
ARTHRALGIAS: 1
MUSCLE WEAKNESS: 0
BACK PAIN: 0
MUSCLE CRAMPS: 0
MYALGIAS: 0

## 2018-10-30 ENCOUNTER — OFFICE VISIT (OUTPATIENT)
Dept: ENDOCRINOLOGY | Facility: CLINIC | Age: 38
End: 2018-10-30
Payer: COMMERCIAL

## 2018-10-30 VITALS
SYSTOLIC BLOOD PRESSURE: 122 MMHG | HEART RATE: 98 BPM | WEIGHT: 200.1 LBS | HEIGHT: 76 IN | DIASTOLIC BLOOD PRESSURE: 83 MMHG | BODY MASS INDEX: 24.37 KG/M2

## 2018-10-30 DIAGNOSIS — E10.9 TYPE 1 DIABETES MELLITUS WITHOUT COMPLICATION (H): Primary | ICD-10-CM

## 2018-10-30 ASSESSMENT — PAIN SCALES - GENERAL: PAINLEVEL: NO PAIN (0)

## 2018-10-30 NOTE — LETTER
10/30/2018       RE: Agustín Gallegos  [address]  [facility] [address]     Dear Colleague,    Thank you for referring your patient, Agustín Gallegos, to the [facility] ENDOCRINOLOGY at [facility]. Please see a copy of my visit note below.    Endocrinology Clinic Visit  10/30/2018  Agustín Gallegos     Chief Complaint: RECHECK (type 1 )       HPI: Christiano is a 38 year old year old male with history of type 1 diabetes who is seen in follow-up for the same.    Briefly, Christiano was diagnosed at age 14.  He is originally from the [facility], was/ living in [place], and moved to [place] in July 2014. He was first seen by Dr. Banks September 2014.    Agustín reported his A1c usually ranged between 7 and 7.5%, sometimes up to 8%, but BG has been hard to control since he moved to [facility].  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 He does take a walk every day around 3pm, roughly for a half an hour. He used to exercise every morning, but he has not been doing it for 4 months.  He just got  on October 6, 2018.      He is currently on Tresiba 26 units at bedtime and Novolog 1 unit per 6g of CHO for breakfast and lunch, 1 unit per 4 g of CHO for dinner. In the past 2-3 months it has been very busy he traveled a lot also ate out a lot.  He said it was very difficult to know exactly how much carbs that he is going to eat when he eats out and he thought he might give himself a little more to cover for that and then have low later after.  And he tends to over correct hypoglycemia and then has rebound hyperglycemia at bedtime. His overall trends blood sugar were good during the daytime and high at bedtime.  He will give himself a correction at bedtime and need as high as 300, but he will eat some snack with correction insulin, mainly because he is  worried that his blood sugar would be low during the middle of the night.    He has hypoglycemia 2 episodes at 2 AM after he gave himself a correction at bedtime. ~50s he felt the lows that woke him up. He had 2-3 episodes of hypoglycemia after dinner.     Download from meter   10/17/2018-10/30/2018  Avg BG checks 5.1/day  Avg 180 SD 76.  Average AM reading 238, std dev 60               midday 148 std dev 37               evening 152, std dev 33   Bedtime 262 std dev 64  Lowest recorded blood glucose value is 42, highest is 389.       A1c is 8.2%  Today.    He is not interested in CGM and insulin pump right now. His brother is on the pump, but did not do that well for diabetes control.     Agustín does not have chronic complications of diabetes.    Last eye exam was normal 5/2018.   Microalbuminuria was negative 1/2018.  He was placed on losartan as a renal protective agent around 2008.  He had no history of microalbuminuria or hypertension and after a detailed discussion, we decided to discontinue Losartan in September 2014.  Blood pressure levels remained within the normal range.    He is also on vitamin D 1000 units per day.       No Known Allergies    Current Outpatient Prescriptions   Medication Sig Dispense Refill     B-D U/F 31G X 8 MM insulin pen needle use four needles daily or as directed 100 each 11     blood glucose monitoring (CANDE CONTOUR NEXT) test strip USE TO TEST BLOOD SUGAR 8 TIMES DAILY OR AS DIRECTED *3 MONTH SUPPLY 800 each 3     blood glucose monitoring (CANDE MICROLET) lancets Use to test blood sugar 8 times daily or as directed. 6 Box 5     blood glucose monitoring (NO BRAND SPECIFIED) meter device kit Use to test blood sugar 5 times daily or as directed. 1 kit 0     cholecalciferol (VITAMIN D) 1000 UNIT tablet Take 1 tablet (1,000 Units) by mouth daily 90 tablet 3     insulin aspart (NOVOLOG FLEXPEN) 100 UNIT/ML injection Inject 1 unit per 7 grams for breakfast and 1 unit per 6 g for  "lunch, dinner, snacks. Approx 40 units daily. 45 mL 3     insulin degludec (TRESIBA FLEXTOUCH) 200 UNIT/ML pen Inject 26 Units Subcutaneous daily 36 mL 3     Review of Systems   11 point ROS is negative including headaches, vision changes, fevers, chills, nausea, vomiting, weight changes, heat/cold intolerance, difficulty swallowing, shortness of breath, chest pain, abdominal pain, diarrhea, constipation, leg swelling, rashes, dysuria, hematuria, hematochezia     Past Medical History:   Diagnosis Date     Type 1 diabetes (H)     age of onset 14 years       No past surgical history on file.    Family History   Problem Relation Age of Onset     Diabetes Brother      Cerebrovascular Disease Paternal Grandmother      Arthritis Mother      Arthritis Maternal Grandmother        History     Social History     Marital Status: Single     Spouse Name: N/A     Number of Children: N/A     Years of Education: N/A     Social History Main Topics     Smoking status: Never Smoker      Smokeless tobacco: Never Used     Alcohol Use: No     Drug Use: No     Sexual Activity:     Partners: Female     Other Topics Concern     None     Social History Narrative     Has degrees in cultural studies and film studies, 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  Recently   No smoking. No alcohol, no illicit drugs    Family history:  Father: 71 yrs old, retired nephrologist in the [place] area, healthy  Mother: 69 yrs old, knee replacement, otherwise healthy  1 older brother, 43 yrs old, T1D since age 5, no complications; one older sister age 44 healthy.  No children    Objective:   /83  Pulse 98  Ht 1.93 m (6' 4\")  Wt 90.8 kg (200 lb 1.6 oz)  BMI 24.36 kg/m2  Constitutional: Appears well-developed and well-nourished. NAD  EYES: anicteric, normal extra-ocular movements, no lid lag or retraction   HEENT: Mouth/Throat: Mucous membrane is moist.  .  Pulmonary/Chest: Normal breathing on room air.   Neurological: Alert. Normal affect. No obvious " deficits on limited exam. Gait normal   Extremities: No clubbing, cyanosis or inflammation  Skin: normal texture, color and temperature  Psychological: appropriate mood and affect     Labs/Data:  Data reviewed  A1C   10/30/2018 8.2  8/6/2018 7.5  4/24/2018 7.8   1/9/2018  7.9    1/30/2018 Urine microalb/cr unable to calculate, alb urine <5    1/30/2018   HDL 44, LDL 93, TG 60   TSH 2.46       Assessment/Treatment Plan:      Agustín Gallegos is a 38 year old male with type 1 diabetes for the past 24 years.    1. Type 1 Diabetes: Diabetes control has deteriorated and his A1c is 8.2% today; goal is <7.0%.   Blood sugar seems to be high at bedtime.  If you from his blood glucose data suggests that he tends to give himself more insulin for dinner, or miss calculate the carb that he is going to eat at dinner, mainly because he has been eating out a lot in the past 2-3 months.  His blood sugar tends to be low afterwards and then he would over correcting today hypoglycemia, leading to hyperglycemia at bedtime and needs for further insulin correction.  In the past 2- 3 mo has been eating out a lot due to the wedding season, and believes this will change now.  We will give him a correction scale, so that he knows how much to correct his BG, especially at bedtime.     -- Continue Tresiba at 26 units at bedtime  -- Change carb coverage   Novolog for breakfast and lunch, 1 unit: 6 g of CHO  Novolog for dinner -- if eat at home 1 unit : 4 g of CHO          -- if eat out, 1 unit : 3 g of CHO  Novolog correction scale for bedtime:   Give 1 unit for every 35 mg of blood sugar > 180.  181-215: give 1 unit  216-250: give 2 units  251-285: give 3 units  286-320: give 4 units  321-355: give 5 units  356-390: give 6 units  >390: give 8 units    Daytime correction can start next blood glucose levels above 140    2. Chronic diabetic complications: There is no evidence of chronic diabetic complications.  Creatinine levels and  microalbuminuria screening were negative.  We will check microalbuminuria and creatinine today    3. Hypertension: Pt was on prophylactic Losartan in the past.  Losartan was discontinued on September 2014 and his BP levels were normal off medications. BP is normal today.     4. Health maintenance: Continue vitamin D.  We will check TSH levels    Orders Placed This Encounter   Procedures     TSH with free T4 reflex     Basic metabolic panel     Albumin Random Urine Quantitative with Creat Ratio     Hemoglobin A1c       Return to clinic in 3 months or sooner if needed.      Pt seen and discussed with Dr. Banks.    Evelyn Reno MD  Endocrine fellow PGY-4    ATTENDING NOTE    I have seen and examined the patient, reviewed and edited the fellow's note, and agree with the plan of care.    Sharona Banks MD PhD    Division of Endocrinology and Diabetes

## 2018-10-30 NOTE — PROGRESS NOTES
Endocrinology Clinic Visit  10/30/2018  Agustín Gallegos     Chief Complaint: RECHECK (type 1 )       HPI: Christiano is a 38 year old year old male with history of type 1 diabetes who is seen in follow-up for the same.    Briefly, Christiano was diagnosed at age 14.  He is originally from the [facility], was/ living in [place], and moved to [place] in July 2014. He was first seen by Dr. Banks September 2014.    Agustín reported his A1c usually ranged between 7 and 7.5%, sometimes up to 8%, but BG has been hard to control since he moved to [facility].  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 He continues to work there, noting it is less active than his prior job. He does take a walk every day around 3pm, roughly for a half an hour. He used to exercise every morning, but he has not been doing it for 4 months.  He just got  on October 6, 2018.      He is currently on Tresiba 26 units at bedtime and Novolog 1 unit per 6g of CHO for breakfast and lunch, 1 unit per 4 g of CHO for dinner. In the past 2-3 months it has been very busy he traveled a lot also ate out a lot.  He said it was very difficult to know exactly how much carbs that he is going to eat when he eats out and he thought he might give himself a little more to cover for that and then have low later after.  And he tends to over correct hypoglycemia and then has rebound hyperglycemia at bedtime. His overall trends blood sugar were good during the daytime and high at bedtime.  He will give himself a correction at bedtime and need as high as 300, but he will eat some snack with correction insulin, mainly because he is worried that his blood sugar would be low during the middle of the night.    He has hypoglycemia 2 episodes at 2 AM after he gave himself a correction at bedtime. ~50s he felt the lows that woke him up. He had 2-3 episodes of hypoglycemia after dinner.     Download from meter    10/17/2018-10/30/2018  Avg BG checks 5.1/day  Avg 180 SD 76.  Average AM reading 238, std dev 60               midday 148 std dev 37               evening 152, std dev 33   Bedtime 262 std dev 64  Lowest recorded blood glucose value is 42, highest is 389.       A1c is 8.2%  Today.    He is not interested in CGM and insulin pump right now. His brother is on the pump, but did not do that well for diabetes control.     Agustín does not have chronic complications of diabetes.    Last eye exam was normal 5/2018.   Microalbuminuria was negative 1/2018.  He was placed on losartan as a renal protective agent around 2008.  He had no history of microalbuminuria or hypertension and after a detailed discussion, we decided to discontinue Losartan in September 2014.  Blood pressure levels remained within the normal range.    He is also on vitamin D 1000 units per day.       No Known Allergies    Current Outpatient Prescriptions   Medication Sig Dispense Refill     B-D U/F 31G X 8 MM insulin pen needle use four needles daily or as directed 100 each 11     blood glucose monitoring (CANDE CONTOUR NEXT) test strip USE TO TEST BLOOD SUGAR 8 TIMES DAILY OR AS DIRECTED *3 MONTH SUPPLY 800 each 3     blood glucose monitoring (CANDE MICROLET) lancets Use to test blood sugar 8 times daily or as directed. 6 Box 5     blood glucose monitoring (NO BRAND SPECIFIED) meter device kit Use to test blood sugar 5 times daily or as directed. 1 kit 0     cholecalciferol (VITAMIN D) 1000 UNIT tablet Take 1 tablet (1,000 Units) by mouth daily 90 tablet 3     insulin aspart (NOVOLOG FLEXPEN) 100 UNIT/ML injection Inject 1 unit per 7 grams for breakfast and 1 unit per 6 g for lunch, dinner, snacks. Approx 40 units daily. 45 mL 3     insulin degludec (TRESIBA FLEXTOUCH) 200 UNIT/ML pen Inject 26 Units Subcutaneous daily 36 mL 3     Review of Systems   11 point ROS is negative including headaches, vision changes, fevers, chills, nausea, vomiting, weight  "changes, heat/cold intolerance, difficulty swallowing, shortness of breath, chest pain, abdominal pain, diarrhea, constipation, leg swelling, rashes, dysuria, hematuria, hematochezia     Past Medical History:   Diagnosis Date     Type 1 diabetes (H)     age of onset 14 years       No past surgical history on file.    Family History   Problem Relation Age of Onset     Diabetes Brother      Cerebrovascular Disease Paternal Grandmother      Arthritis Mother      Arthritis Maternal Grandmother        History     Social History     Marital Status: Single     Spouse Name: N/A     Number of Children: N/A     Years of Education: N/A     Social History Main Topics     Smoking status: Never Smoker      Smokeless tobacco: Never Used     Alcohol Use: No     Drug Use: No     Sexual Activity:     Partners: Female     Other Topics Concern     None     Social History Narrative     Has degrees in cultural studies and film studies, 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  Recently   No smoking. No alcohol, no illicit drugs    Family history:  Father: 71 yrs old, retired nephrologist in the [facility], healthy  Mother: 69 yrs old, knee replacement, otherwise healthy  1 older brother, 43 yrs old, T1D since age 5, no complications; one older sister age 44 healthy.  No children    Objective:   /83  Pulse 98  Ht 1.93 m (6' 4\")  Wt 90.8 kg (200 lb 1.6 oz)  BMI 24.36 kg/m2  Constitutional: Appears well-developed and well-nourished. NAD  EYES: anicteric, normal extra-ocular movements, no lid lag or retraction   HEENT: Mouth/Throat: Mucous membrane is moist.  .  Pulmonary/Chest: Normal breathing on room air.   Neurological: Alert. Normal affect. No obvious deficits on limited exam. Gait normal   Extremities: No clubbing, cyanosis or inflammation  Skin: normal texture, color and temperature  Psychological: appropriate mood and affect     Labs/Data:  Data reviewed  A1C   10/30/2018 8.2  8/6/2018 7.5  4/24/2018 7.8   1/9/2018  " 7.9    1/30/2018 Urine microalb/cr unable to calculate, alb urine <5    1/30/2018   HDL 44, LDL 93, TG 60   TSH 2.46       Assessment/Treatment Plan:      Agustín Gallegos is a 38 year old male with type 1 diabetes for the past 24 years.    1. Type 1 Diabetes: Diabetes control has deteriorated and his A1c is 8.2% today; goal is <7.0%.   Blood sugar seems to be high at bedtime.  If you from his blood glucose data suggests that he tends to give himself more insulin for dinner, or miss calculate the carb that he is going to eat at dinner, mainly because he has been eating out a lot in the past 2-3 months.  His blood sugar tends to be low afterwards and then he would over correcting today hypoglycemia, leading to hyperglycemia at bedtime and needs for further insulin correction.  In the past 2- 3 mo has been eating out a lot due to the wedding season, and believes this will change now.  We will give him a correction scale, so that he knows how much to correct his BG, especially at bedtime.     -- Continue Tresiba at 26 units at bedtime  -- Change carb coverage   Novolog for breakfast and lunch, 1 unit: 6 g of CHO  Novolog for dinner -- if eat at home 1 unit : 4 g of CHO          -- if eat out, 1 unit : 3 g of CHO  Novolog correction scale for bedtime:   Give 1 unit for every 35 mg of blood sugar > 180.  181-215: give 1 unit  216-250: give 2 units  251-285: give 3 units  286-320: give 4 units  321-355: give 5 units  356-390: give 6 units  >390: give 8 units    Daytime correction can start next blood glucose levels above 140    2. Chronic diabetic complications: There is no evidence of chronic diabetic complications.  Creatinine levels and microalbuminuria screening were negative.  We will check microalbuminuria and creatinine today    3. Hypertension: Pt was on prophylactic Losartan in the past.  Losartan was discontinued on September 2014 and his BP levels were normal off medications. BP is normal today.     4. Health  maintenance: Continue vitamin D.  We will check TSH levels    Orders Placed This Encounter   Procedures     TSH with free T4 reflex     Basic metabolic panel     Albumin Random Urine Quantitative with Creat Ratio     Hemoglobin A1c       Return to clinic in 3 months or sooner if needed.      Pt seen and discussed with Dr. Banks.    Evelyn Reno MD  Endocrine fellow PGY-4    ATTENDING NOTE    I have seen and examined the patient, reviewed and edited the fellow's note, and agree with the plan of care.    Sharona Banks MD PhD    Division of Endocrinology and Diabetes

## 2018-10-30 NOTE — MR AVS SNAPSHOT
After Visit Summary   10/30/2018    Agustín Gallegos    MRN: [id number]           Patient Information     Date Of Birth          1980        Visit Information        Provider Department      10/30/2018 9:00 AM CARA Banks MD  [facility]        Today's Diagnoses     Type 1 diabetes mellitus without complication (H)    -  1      Care Instructions    Continue Tresiba 26 units daily  Novolog for breakfast and lunch, 1 units : 6 g of CHO  Novolog for dinner -- if eat at home 1 units : 4 g of CHO          -- if eat out, 1 units : 3 g of CHO  Novolog correction scale for bedtime:   Give 1 unit for every 35 mg of blood sugar > 180.  181-215: give 1 unit  216-250: give 2 units  251-285: give 3 units  286-320: give 4 units  321-355: give 5 units  356-390: give 6 units  >390: give 8 units          Follow-ups after your visit        Follow-up notes from your care team     Return in about 3 months (around 1/30/2019).      Your next 10 appointments already scheduled     Nov 01, 2018  5:20 PM CDT   (Arrive by 5:05 PM)   RETURN GENERAL with DEMETRIS Ramos [facility] Ophthalmology ([facility])    [address]  [address] [facility] [id number] [phone]            Feb 04, 2019  7:30 AM CST   (Arrive by 7:15 AM)   RETURN DIABETES with Shena Iniguez PA-C   [facility] Endocrinology ([facility])    [address] [place] [address]  [address] [facility] [id number] [phone]              Future tests that were ordered for you today     Open Future Orders        Priority Expected Expires Ordered    TSH with free T4 reflex Routine  10/30/2019 10/30/2018    Basic metabolic panel Routine  10/30/2019 10/30/2018    Hemoglobin A1c Routine  10/30/2019 10/30/2018            Who to contact     Please call your clinic at [phone] to:    Ask questions about your health    Make or cancel appointments    Discuss your  "medicines    Learn about your test results    Speak to your doctor            Additional Information About Your Visit        [employer] Information     [employer] gives you secure access to your electronic health record. If you see a primary care provider, you can also send messages to your care team and make appointments. If you have questions, please call your primary care clinic.  If you do not have a primary care provider, please call [phone] and they will assist you.      [employer] is an electronic gateway that provides easy, online access to your medical records. With [employer], you can request a clinic appointment, read your test results, renew a prescription or communicate with your care team.     To access your existing account, please contact your [facility] or call [phone] for assistance.        Care EveryWhere ID     This is your Care EveryWhere ID. This could be used by other organizations to access your [place] medical records  [id number]        Your Vitals Were     Pulse Height BMI (Body Mass Index)             98 1.93 m (6' 4\") 24.36 kg/m2          Blood Pressure from Last 3 Encounters:   10/30/18 122/83   08/06/18 102/68   04/24/18 121/72    Weight from Last 3 Encounters:   10/30/18 90.8 kg (200 lb 1.6 oz)   08/06/18 90 kg (198 lb 8 oz)   04/24/18 89.4 kg (197 lb 3.2 oz)              We Performed the Following     Albumin Random Urine Quantitative with Creat Ratio        Primary Care Provider Office Phone # Fax #    M Sharona Banks [phone]       [address] [place] [address]        Equal Access to Services     [facility]: Hadii aad ku hadasho Sopelon, waaxda luqadaha, qaybta kaalmada adeegamanda, yana galloway . So [facility] [phone].    ATENCIÓN: Si habla español, tiene a kelly disposición servicios gratuitos de asistencia lingüística. Llame al [phone].    We comply with applicable " federal civil rights laws and [place] laws. We do not discriminate on the basis of race, color, national origin, age, disability, sex, sexual orientation, or gender identity.            Thank you!     Thank you for choosing [facility] ENDOCRINOLOGY  for your care. Our goal is always to provide you with excellent care. Hearing back from our patients is one way we can continue to improve our services. Please take a few minutes to complete the written survey that you may receive in the mail after your visit with us. Thank you!             Your Updated Medication List - Protect others around you: Learn how to safely use, store and throw away your medicines at www.disposemymeds.org.          This list is accurate as of 10/30/18 10:21 AM.  Always use your most recent med list.                   Brand Name Dispense Instructions for use Diagnosis    B-D U/F 31G X 8 MM   Generic drug:  insulin pen needle     100 each    use four needles daily or as directed    Diabetes mellitus type 1 (H)       blood glucose monitoring lancets     6 Box    Use to test blood sugar 8 times daily or as directed.    Diabetes mellitus type 1 (H)       blood glucose monitoring meter device kit    no brand specified    1 kit    Use to test blood sugar 5 times daily or as directed.    Type 1 diabetes mellitus without complication (H)       blood glucose monitoring test strip    CANDE CONTOUR NEXT    800 each    USE TO TEST BLOOD SUGAR 8 TIMES DAILY OR AS DIRECTED *3 MONTH SUPPLY    Diabetes mellitus type 1 (H)       cholecalciferol 1000 UNIT tablet    vitamin D3    90 tablet    Take 1 tablet (1,000 Units) by mouth daily    Diabetes mellitus type 1 (H), Unspecified vitamin D deficiency       insulin aspart 100 UNIT/ML injection    NovoLOG FLEXPEN    45 mL    Inject 1 unit per 7 grams for breakfast and 1 unit per 6 g for lunch, dinner, snacks. Approx 40 units daily.    Diabetes mellitus type 1 (H)       insulin degludec 200 UNIT/ML pen    TRESIBA  FLEXTOUCH    36 mL    Inject 26 Units Subcutaneous daily    Type 1 diabetes mellitus without complication (H)

## 2018-10-30 NOTE — PATIENT INSTRUCTIONS
Continue Tresiba 26 units daily  Novolog for breakfast and lunch, 1 units : 6 g of CHO  Novolog for dinner -- if eat at home 1 units : 4 g of CHO          -- if eat out, 1 units : 3 g of CHO  Novolog correction scale for bedtime:   Give 1 unit for every 35 mg of blood sugar > 180.  181-215: give 1 unit  216-250: give 2 units  251-285: give 3 units  286-320: give 4 units  321-355: give 5 units  356-390: give 6 units  >390: give 8 units

## 2018-11-01 ENCOUNTER — OFFICE VISIT (OUTPATIENT)
Dept: OPHTHALMOLOGY | Facility: CLINIC | Age: 38
End: 2018-11-01
Payer: COMMERCIAL

## 2018-11-01 DIAGNOSIS — H52.203 MYOPIA OF BOTH EYES WITH ASTIGMATISM: Primary | ICD-10-CM

## 2018-11-01 DIAGNOSIS — H52.13 MYOPIA OF BOTH EYES WITH ASTIGMATISM: Primary | ICD-10-CM

## 2018-11-01 ASSESSMENT — VISUAL ACUITY
OD_CC: 20/20
OS_CC+: -
METHOD: SNELLEN - LINEAR
OS_CC: 20/20
CORRECTION_TYPE: GLASSES

## 2018-11-01 ASSESSMENT — REFRACTION_MANIFEST
OD_CYLINDER: +0.25
OS_SPHERE: -8.50
OD_SPHERE: -7.50
OS_CYLINDER: +0.75
OD_AXIS: 061
OS_AXIS: 095

## 2018-11-01 ASSESSMENT — REFRACTION_CURRENTRX
OD_SPHERE: -6.50
OS_SPHERE: -6.50
OS_BASECURVE: 8.4
OD_BRAND: AIR OPTIX NIGHT & DAY
OS_BRAND: AIR OPTIX NIGHT & DAY
OD_BASECURVE: 8.4
OD_DIAMETER: 13.8
OS_DIAMETER: 13.8

## 2018-11-01 ASSESSMENT — TONOMETRY
OD_IOP_MMHG: 21
OS_IOP_MMHG: 20
IOP_METHOD: ICARE

## 2018-11-01 ASSESSMENT — EXTERNAL EXAM - LEFT EYE: OS_EXAM: NORMAL

## 2018-11-01 ASSESSMENT — REFRACTION_WEARINGRX
OS_AXIS: 105
OD_AXIS: 083
OS_SPHERE: -7.75
OD_CYLINDER: +0.75
SPECS_TYPE: SVL
OS_CYLINDER: +0.75
OD_SPHERE: -7.50

## 2018-11-01 ASSESSMENT — SLIT LAMP EXAM - LIDS
COMMENTS: NORMAL
COMMENTS: NORMAL

## 2018-11-01 ASSESSMENT — EXTERNAL EXAM - RIGHT EYE: OD_EXAM: NORMAL

## 2018-11-01 NOTE — NURSING NOTE
Chief Complaints and History of Present Illnesses   Patient presents with     Follow Up For     CL     HPI    Affected eye(s):  Both   Symptoms:     No blurred vision      Frequency:  Constant       Do you have eye pain now?:  No      Comments:  CLs . Needing new CL Rx. Just saw Dr Talamantes for full exam in May. Liking CLs and vision is stable in them.    Patient denies eye pain or irritation. Does not use any eye drops.    Radha Garcia COT 5:19 PM 2018

## 2018-11-01 NOTE — MR AVS SNAPSHOT
After Visit Summary   11/1/2018    Agustín Gallegos    MRN: [id number]           Patient Information     Date Of Birth          1980        Visit Information        Provider Department      11/1/2018 5:20 PM Antonina Nice OD M [facility] Ophthalmology        Today's Diagnoses     Myopia of both eyes with astigmatism    -  1       Follow-ups after your visit        Your next 10 appointments already scheduled     Feb 04, 2019  7:30 AM CST   (Arrive by 7:15 AM)   RETURN DIABETES with PRERNA Austin [facility] Endocrinology ([facility])    [address] [place] [id number] [phone]              Who to contact     Please call your clinic at [phone] to:    Ask questions about your health    Make or cancel appointments    Discuss your medicines    Learn about your test results    Speak to your doctor            Additional Information About Your Visit        MyChart Information     [employer] gives you secure access to your electronic health record. If you see a primary care provider, you can also send messages to your care team and make appointments. If you have questions, please call your primary care clinic.  If you do not have a primary care provider, please call [phone] and they will assist you.      [employer] is an electronic gateway that provides easy, online access to your medical records. With [employer], you can request a clinic appointment, read your test results, renew a prescription or communicate with your care team.     To access your existing account, please contact your [facility] or call [phone] for assistance.        Care EveryWhere ID     This is your Care EveryWhere ID. This could be used by other organizations to access your [place] medical records  [id number]         Blood Pressure from Last 3 Encounters:   10/30/18 122/83   08/06/18 102/68   04/24/18 121/72    Weight from  Last 3 Encounters:   10/30/18 90.8 kg (200 lb 1.6 oz)   08/06/18 90 kg (198 lb 8 oz)   04/24/18 89.4 kg (197 lb 3.2 oz)              We Performed the Following     REFRACTION [[id number]]        Primary Care Provider Office Phone # Fax #    CARA Sharona Maria C Banks [phone]       [address] [place] [address]        Equal Access to Services     JOSÉ MIGUEL LOYOLA : Hadii aad ku hadasho Soomaali, waaxda luqadaha, qaybta kaalmada adeegyada, waxay idiin hayaan adeeg kharateresa laaaron suarez. So [facility] [phone].    ATENCIÓN: Si habla español, tiene a kelly disposición servicios gratuitos de asistencia lingüística. [facility] [phone].    We comply with applicable federal civil rights laws and [place] laws. We do not discriminate on the basis of race, color, national origin, age, disability, sex, sexual orientation, or gender identity.            Thank you!     Thank you for choosing [facility] OPHTHALMOLOGY  for your care. Our goal is always to provide you with excellent care. Hearing back from our patients is one way we can continue to improve our services. Please take a few minutes to complete the written survey that you may receive in the mail after your visit with us. Thank you!             Your Updated Medication List - Protect others around you: Learn how to safely use, store and throw away your medicines at www.disposemymeds.org.          This list is accurate as of 11/1/18 11:59 PM.  Always use your most recent med list.                   Brand Name Dispense Instructions for use Diagnosis    B-D U/F 31G X 8 MM   Generic drug:  insulin pen needle     100 each    use four needles daily or as directed    Diabetes mellitus type 1 (H)       blood glucose monitoring lancets     6 Box    Use to test blood sugar 8 times daily or as directed.    Diabetes mellitus type 1 (H)       blood glucose monitoring meter device kit    no brand specified    1 kit    Use to test blood sugar 5 times daily or as  directed.    Type 1 diabetes mellitus without complication (H)       blood glucose monitoring test strip    CANDE CONTOUR NEXT    800 each    USE TO TEST BLOOD SUGAR 8 TIMES DAILY OR AS DIRECTED *3 MONTH SUPPLY    Diabetes mellitus type 1 (H)       cholecalciferol 1000 UNIT tablet    vitamin D3    90 tablet    Take 1 tablet (1,000 Units) by mouth daily    Diabetes mellitus type 1 (H), Unspecified vitamin D deficiency       insulin aspart 100 UNIT/ML injection    NovoLOG FLEXPEN    45 mL    Inject 1 unit per 7 grams for breakfast and 1 unit per 6 g for lunch, dinner, snacks. Approx 40 units daily.    Diabetes mellitus type 1 (H)       insulin degludec 200 UNIT/ML pen    TRESIBA FLEXTOUCH    36 mL    Inject 26 Units Subcutaneous daily    Type 1 diabetes mellitus without complication (H)

## 2018-11-02 NOTE — PROGRESS NOTES
A/P  1.) Myopia/Astigmatism each eye  -Mild change in spec Rx, updated today  -Doing well with current CL's - continue  -Previous h/o not liking increased minus, adjusted down today    Monitor 2 years, gets comprehensive care with Dr. Talamantes    I have confirmed the patient's CC, HPI and reviewed Past Medical History, Past Surgical History, Social History, Family History, Problem List, Medication List and agree with Tech note.     Antonina Nice, OD FAAO

## 2019-02-04 ENCOUNTER — OFFICE VISIT (OUTPATIENT)
Dept: ENDOCRINOLOGY | Facility: CLINIC | Age: 39
End: 2019-02-04
Payer: COMMERCIAL

## 2019-02-04 VITALS
WEIGHT: 199.6 LBS | BODY MASS INDEX: 24.31 KG/M2 | SYSTOLIC BLOOD PRESSURE: 125 MMHG | HEIGHT: 76 IN | HEART RATE: 71 BPM | DIASTOLIC BLOOD PRESSURE: 80 MMHG

## 2019-02-04 DIAGNOSIS — E10.9 WELL CONTROLLED TYPE 1 DIABETES MELLITUS (H): Primary | ICD-10-CM

## 2019-02-04 DIAGNOSIS — E10.9 TYPE 1 DIABETES MELLITUS WITHOUT COMPLICATION (H): ICD-10-CM

## 2019-02-04 LAB — HBA1C MFR BLD: 7.7 % (ref 4.3–6)

## 2019-02-04 ASSESSMENT — MIFFLIN-ST. JEOR: SCORE: 1931.88

## 2019-02-04 ASSESSMENT — PAIN SCALES - GENERAL: PAINLEVEL: NO PAIN (0)

## 2019-02-04 NOTE — NURSING NOTE
Chief Complaint   Patient presents with     RECHECK     Type 1 Diabetes     Capillary puncture performed for Hemoglobin A1C test. Patient tolerated well.    Jennifer You MA

## 2019-02-04 NOTE — LETTER
2/4/2019       RE: Agustín Gallegos  [address]  [facility] [address]     Dear Colleague,    Thank you for referring your patient, Agustín Gallegos, to the [facility] ENDOCRINOLOGY at [facility]. Please see a copy of my visit note below.    HPI:   Christiano is a 37 yo man here for follow up of type 1 diabetes, which he has had since age 14.  He reports that overall things are going better.  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  He finds that sometimes he drops low with his walk, usually about 3 hours after eating his lunch. He recently got  and his wife is expecting their first baby in August.       Novolog dosing:   Breakfast- raisin bran- 1/7g  Lunch- sometimes leftovers (low carb), [place] [employer]. 1/6g  Dinner- usually low carb. 1/4g (1/3g with higher fat content).   Correction: 1/30 over 130 mg/dL.      He has tried taking it 15 minutes before he eats, but finds he doesn't do this often.    He takes Tresiba 26 units daily.  He has minimal hypoglycemia in the 50's-60's in the afternoon.  He is testing his blood sugars 6 times a day with an overall average of 141 mg/dL.     He has been feeling well and in his usual state of health.  His wife is pregnant with their first child and is due in August, 2019. He has no other concerns today.     ROS  GENERAL: no weight loss, weight gain, fevers, chills, malaise, night sweats.   HEENT: no dysphagia, diplopia, neck pain or tenderness, dry/scratchy eyes, URI, cough, sinus drainage, tinnitus, sinus pressure  CV: no chest pain, pressure, palpitations, skipped beats, LOC  LUNGS: no SOB, WILDE, cough, sputum production, wheezing   GI: no diarrhea, constipation, abdominal pain  EXTREMITIES: no rashes, ulcers, edema  NEUROLOGY: no changes in vision, tingling or numbness in hands or feet.   MSK: no muscle aches or pains, weakness  PSYCH: mood stable    Past  "Medical History:   Diagnosis Date     Type 1 diabetes (H)     age of onset 14 years       History reviewed. No pertinent surgical history.    Family History   Problem Relation Age of Onset     Diabetes Brother      Cerebrovascular Disease Paternal Grandmother      Arthritis Mother      Arthritis Maternal Grandmother        Social History     Social History     Marital status: Single     Spouse name: N/A     Number of children: N/A     Years of education: N/A     Social History Main Topics     Smoking status: Never Smoker     Smokeless tobacco: Never Used     Alcohol use No     Drug use: No     Sexual activity: Yes     Partners: Female     Other Topics Concern     None     Social History Narrative       Current Outpatient Medications   Medication     B-D U/F 31G X 8 MM insulin pen needle     blood glucose monitoring (CANDE CONTOUR NEXT) test strip     blood glucose monitoring (CANDE MICROLET) lancets     blood glucose monitoring (NO BRAND SPECIFIED) meter device kit     cholecalciferol (VITAMIN D) 1000 UNIT tablet     insulin aspart (NOVOLOG FLEXPEN) 100 UNIT/ML injection     insulin degludec (TRESIBA FLEXTOUCH) 200 UNIT/ML pen     No current facility-administered medications for this visit.         No Known Allergies    Physical Exam  /80   Pulse 71   Ht 1.93 m (6' 4\")   Wt 90.5 kg (199 lb 9.6 oz)   BMI 24.30 kg/m     GENERAL:  Alert and oriented X3, NAD, well dressed, answering questions appropriately, appears stated age.  EXTREMITIES: no edema, +pulses, no rashes, no lesions    RESULTS  Lab Results   Component Value Date    A1C 7.9 (H) 08/13/2014    HEMOGLOBINA1 7.7 (A) 02/04/2019    HEMOGLOBINA1 7.5 (A) 08/06/2018    HEMOGLOBINA1 7.8 (A) 04/24/2018    HEMOGLOBINA1 7.9 (A) 01/09/2018    HEMOGLOBINA1 8.3 (A) 08/22/2017       TSH   Date Value Ref Range Status   01/30/2018 2.46 0.40 - 4.00 mU/L Final   09/16/2016 0.97 0.40 - 4.00 mU/L Final   06/24/2015 0.78 0.40 - 4.00 mU/L Final   12/04/2014 1.09 0.40 - " 4.00 mU/L Final     Comment:     Effective 7/30/2014, the reference range for this assay has changed to reflect   new instrumentation/methodology.       T4 Free   Date Value Ref Range Status   09/16/2016 1.01 0.76 - 1.46 ng/dL Final   06/24/2015 1.02 0.76 - 1.46 ng/dL Final       ALT   Date Value Ref Range Status   03/05/2015 20 0 - 70 U/L Final   ]    Recent Labs   Lab Test 01/30/18  0727 09/16/16  0823 08/13/14  0956   CHOL 149 146 162   HDL 44 43 49   LDL 93 96 102   TRIG 60 37 56   CHOLHDLRATIO  --   --  3.3       No results found for: NA   Lab Results   Component Value Date    POTASSIUM 4.1 09/16/2016     No results found for: CHLORIDE  No results found for: CELESTE  No results found for: CO2  No results found for: BUN    Lab Results   Component Value Date    CR 0.69 08/22/2017       GFR Estimate   Date Value Ref Range Status   08/22/2017 >90 >60 mL/min/1.7m2 Final     Comment:     Non  GFR Calc   09/16/2016 >90  Non  GFR Calc   >60 mL/min/1.7m2 Final   03/05/2015 >90  Non  GFR Calc   >60 mL/min/1.7m2 Final     GFR Estimate If Black   Date Value Ref Range Status   08/22/2017 >90 >60 mL/min/1.7m2 Final     Comment:      GFR Calc   09/16/2016 >90   GFR Calc   >60 mL/min/1.7m2 Final   03/05/2015 >90   GFR Calc   >60 mL/min/1.7m2 Final       Lab Results   Component Value Date    MICROL <5 01/30/2018     No results found for: MICROALBUMIN  No results found for: CPEPT, GADAB, ISCAB    No results found for: B12]    Most recent eye exam date: : 05/16/2018     25 OH Vit D total   Date Value Ref Range Status   08/22/2017 <34 20 - 75 ug/L Final     Comment:     Season, race, dietary intake, and treatment affect the concentration of   25-hydroxy-Vitamin D. Values may decrease during winter months and increase   during summer months. Values 20-29 ug/L may indicate Vitamin D insufficiency   and values <20 ug/L may indicate Vitamin D  deficiency.  This test was developed and its performance characteristics determined by the   [facility],  Special Chemistry Laboratory. It has   not been cleared or approved by the FDA. The laboratory is regulated under   CLIA as qualified to perform high-complexity testing. This test is used for   clinical purposes. It should not be regarded as investigational or for   research.     12/04/2014 (L) 30 - 75 ug/L Final    <23  Season, race, dietary intake, and treatment affect the concentration of   25-hydroxy-Vitamin D. Values may decrease during winter months and increase   during summer months. Values less than 30 ug/L may indicate Vitamin D   deficiency.       No results found for: PTHI  No results found for: CELESTE  No results found for: CALCIUMIONIZ  No results found for: ALBUMIN    Assessment/Plan:     1.  Type 1 diabetes- Doing better.  A1c is down to 7.7% from 8.2%.  He is having some afternoon hypoglycemia with his walk, but he is not dropping at other times of the day.  Suggested taking a small snack before exercise, rather than cutting back his Tresiba for now.  Also encouraged him to take his Novolog 15 minutes before eating, whenever possible.    2.  Risk factors-     Retinopathy:  No.  Had eye exam within last year.   Nephropathy:  BP is good.  No microalbuminuria.  Creatinine stable.  On no meds. Will check MA, creatinine.   Neuropathy: No.    Feet: OK, no ulcers.   Taking ASA: no  Lipids:  LDL at target.  Will check fasting lipids.  Celiac screening: Will screen. Asymptomatic.    3.  F/U in 3 months as planned with Dr. Banks.  I am also happy to see him as needed in the future.     I spent 35 minutes with this patient face to face and explained the conditions and plans (more than 50% of time was counseling/coordination of care, diabetes management, follow up plan for worsening hyper and hypoglycemia) . The patient understood and is satisfied with today's visit.      Shena  PRERNA Iniguez, MPAS   [facility]  Department of Medicine  Division of Endocrinology and Diabetes

## 2019-02-04 NOTE — PROGRESS NOTES
HPI:   Christiano is a 37 yo man here for follow up of type 1 diabetes, which he has had since age 14.  He reports that overall things are going better.  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  He finds that sometimes he drops low with his walk, usually about 3 hours after eating his lunch. He recently got  and his wife is expecting their first baby in August.       Novolog dosing:   Breakfast- raisin bran- 1/7g  Lunch- sometimes leftovers (low carb), [place] [employer]. 1/6g  Dinner- usually low carb. 1/4g (1/3g with higher fat content).   Correction: 1/30 over 130 mg/dL.      He has tried taking it 15 minutes before he eats, but finds he doesn't do this often.    He takes Tresiba 26 units daily.  He has minimal hypoglycemia in the 50's-60's in the afternoon.  He is testing his blood sugars 6 times a day with an overall average of 141 mg/dL.     He has been feeling well and in his usual state of health.  His wife is pregnant with their first child and is due in August, 2019. He has no other concerns today.     ROS  GENERAL: no weight loss, weight gain, fevers, chills, malaise, night sweats.   HEENT: no dysphagia, diplopia, neck pain or tenderness, dry/scratchy eyes, URI, cough, sinus drainage, tinnitus, sinus pressure  CV: no chest pain, pressure, palpitations, skipped beats, LOC  LUNGS: no SOB, WILDE, cough, sputum production, wheezing   GI: no diarrhea, constipation, abdominal pain  EXTREMITIES: no rashes, ulcers, edema  NEUROLOGY: no changes in vision, tingling or numbness in hands or feet.   MSK: no muscle aches or pains, weakness  PSYCH: mood stable    Past Medical History:   Diagnosis Date     Type 1 diabetes (H)     age of onset 14 years       History reviewed. No pertinent surgical history.    Family History   Problem Relation Age of Onset     Diabetes Brother      Cerebrovascular Disease Paternal Grandmother      Arthritis Mother       "Arthritis Maternal Grandmother        Social History     Social History     Marital status: Single     Spouse name: N/A     Number of children: N/A     Years of education: N/A     Social History Main Topics     Smoking status: Never Smoker     Smokeless tobacco: Never Used     Alcohol use No     Drug use: No     Sexual activity: Yes     Partners: Female     Other Topics Concern     None     Social History Narrative       Current Outpatient Medications   Medication     B-D U/F 31G X 8 MM insulin pen needle     blood glucose monitoring (CANDE CONTOUR NEXT) test strip     blood glucose monitoring (CANDE MICROLET) lancets     blood glucose monitoring (NO BRAND SPECIFIED) meter device kit     cholecalciferol (VITAMIN D) 1000 UNIT tablet     insulin aspart (NOVOLOG FLEXPEN) 100 UNIT/ML injection     insulin degludec (TRESIBA FLEXTOUCH) 200 UNIT/ML pen     No current facility-administered medications for this visit.         No Known Allergies    Physical Exam  /80   Pulse 71   Ht 1.93 m (6' 4\")   Wt 90.5 kg (199 lb 9.6 oz)   BMI 24.30 kg/m    GENERAL:  Alert and oriented X3, NAD, well dressed, answering questions appropriately, appears stated age.  EXTREMITIES: no edema, +pulses, no rashes, no lesions    RESULTS  Lab Results   Component Value Date    A1C 7.9 (H) 08/13/2014    HEMOGLOBINA1 7.7 (A) 02/04/2019    HEMOGLOBINA1 7.5 (A) 08/06/2018    HEMOGLOBINA1 7.8 (A) 04/24/2018    HEMOGLOBINA1 7.9 (A) 01/09/2018    HEMOGLOBINA1 8.3 (A) 08/22/2017       TSH   Date Value Ref Range Status   01/30/2018 2.46 0.40 - 4.00 mU/L Final   09/16/2016 0.97 0.40 - 4.00 mU/L Final   06/24/2015 0.78 0.40 - 4.00 mU/L Final   12/04/2014 1.09 0.40 - 4.00 mU/L Final     Comment:     Effective 7/30/2014, the reference range for this assay has changed to reflect   new instrumentation/methodology.       T4 Free   Date Value Ref Range Status   09/16/2016 1.01 0.76 - 1.46 ng/dL Final   06/24/2015 1.02 0.76 - 1.46 ng/dL Final       ALT "   Date Value Ref Range Status   03/05/2015 20 0 - 70 U/L Final   ]    Recent Labs   Lab Test 01/30/18  0727 09/16/16  0823 08/13/14  0956   CHOL 149 146 162   HDL 44 43 49   LDL 93 96 102   TRIG 60 37 56   CHOLHDLRATIO  --   --  3.3       No results found for: NA   Lab Results   Component Value Date    POTASSIUM 4.1 09/16/2016     No results found for: CHLORIDE  No results found for: CELESTE  No results found for: CO2  No results found for: BUN    Lab Results   Component Value Date    CR 0.69 08/22/2017       GFR Estimate   Date Value Ref Range Status   08/22/2017 >90 >60 mL/min/1.7m2 Final     Comment:     Non  GFR Calc   09/16/2016 >90  Non  GFR Calc   >60 mL/min/1.7m2 Final   03/05/2015 >90  Non  GFR Calc   >60 mL/min/1.7m2 Final     GFR Estimate If Black   Date Value Ref Range Status   08/22/2017 >90 >60 mL/min/1.7m2 Final     Comment:      GFR Calc   09/16/2016 >90   GFR Calc   >60 mL/min/1.7m2 Final   03/05/2015 >90   GFR Calc   >60 mL/min/1.7m2 Final       Lab Results   Component Value Date    MICROL <5 01/30/2018     No results found for: MICROALBUMIN  No results found for: CPEPT, GADAB, ISCAB    No results found for: B12]    Most recent eye exam date: : 05/16/2018     25 OH Vit D total   Date Value Ref Range Status   08/22/2017 <34 20 - 75 ug/L Final     Comment:     Season, race, dietary intake, and treatment affect the concentration of   25-hydroxy-Vitamin D. Values may decrease during winter months and increase   during summer months. Values 20-29 ug/L may indicate Vitamin D insufficiency   and values <20 ug/L may indicate Vitamin D deficiency.  This test was developed and its performance characteristics determined by the   [facility],  Special Chemistry Laboratory. It has   not been cleared or approved by the FDA. The laboratory is regulated under   CLIA as qualified to perform  high-complexity testing. This test is used for   clinical purposes. It should not be regarded as investigational or for   research.     12/04/2014 (L) 30 - 75 ug/L Final    <23  Season, race, dietary intake, and treatment affect the concentration of   25-hydroxy-Vitamin D. Values may decrease during winter months and increase   during summer months. Values less than 30 ug/L may indicate Vitamin D   deficiency.       No results found for: PTHI  No results found for: CELESTE  No results found for: CALCIUMIONIZ  No results found for: ALBUMIN    Assessment/Plan:     1.  Type 1 diabetes- Doing better.  A1c is down to 7.7% from 8.2%.  He is having some afternoon hypoglycemia with his walk, but he is not dropping at other times of the day.  Suggested taking a small snack before exercise, rather than cutting back his Tresiba for now.  Also encouraged him to take his Novolog 15 minutes before eating, whenever possible.    2.  Risk factors-     Retinopathy:  No.  Had eye exam within last year.   Nephropathy:  BP is good.  No microalbuminuria.  Creatinine stable.  On no meds. Will check MA, creatinine.   Neuropathy: No.    Feet: OK, no ulcers.   Taking ASA: no  Lipids:  LDL at target.  Will check fasting lipids.  Celiac screening: Will screen. Asymptomatic.    3.  F/U in 3 months as planned with Dr. Banks.  I am also happy to see him as needed in the future.     I spent 35 minutes with this patient face to face and explained the conditions and plans (more than 50% of time was counseling/coordination of care, diabetes management, follow up plan for worsening hyper and hypoglycemia) . The patient understood and is satisfied with today's visit.      Shena Iniguez PA-C, MPAS   [facility]  Department of Medicine  Division of Endocrinology and Diabetes

## 2019-02-04 NOTE — PATIENT INSTRUCTIONS
Diabetes:m for dosing calculations.     Work on taking Novolog 15 minutes before eating.  You may need to cut back dose.     Resume taking Vitamin D    Stop by for fasting labs.

## 2019-04-06 DIAGNOSIS — E10.9 DIABETES MELLITUS TYPE 1 (H): ICD-10-CM

## 2019-05-13 DIAGNOSIS — E10.9 TYPE 1 DIABETES MELLITUS WITHOUT COMPLICATION (H): ICD-10-CM

## 2019-05-13 DIAGNOSIS — E10.9 WELL CONTROLLED TYPE 1 DIABETES MELLITUS (H): ICD-10-CM

## 2019-05-13 LAB
ANION GAP SERPL CALCULATED.3IONS-SCNC: 5 MMOL/L (ref 3–14)
BUN SERPL-MCNC: 12 MG/DL (ref 7–30)
CALCIUM SERPL-MCNC: 9.3 MG/DL (ref 8.5–10.1)
CHLORIDE SERPL-SCNC: 101 MMOL/L (ref 94–109)
CHOLEST SERPL-MCNC: 163 MG/DL
CO2 SERPL-SCNC: 31 MMOL/L (ref 20–32)
CREAT SERPL-MCNC: 0.8 MG/DL (ref 0.66–1.25)
CREAT UR-MCNC: 143 MG/DL
GFR SERPL CREATININE-BSD FRML MDRD: >90 ML/MIN/{1.73_M2}
GLUCOSE SERPL-MCNC: 230 MG/DL (ref 70–99)
HBA1C MFR BLD: 7.7 % (ref 0–5.6)
HDLC SERPL-MCNC: 44 MG/DL
LDLC SERPL CALC-MCNC: 107 MG/DL
MICROALBUMIN UR-MCNC: 5 MG/L
MICROALBUMIN/CREAT UR: 3.57 MG/G CR (ref 0–17)
NONHDLC SERPL-MCNC: 119 MG/DL
POTASSIUM SERPL-SCNC: 4.2 MMOL/L (ref 3.4–5.3)
SODIUM SERPL-SCNC: 137 MMOL/L (ref 133–144)
TRIGL SERPL-MCNC: 60 MG/DL
TSH SERPL DL<=0.005 MIU/L-ACNC: 2.14 MU/L (ref 0.4–4)

## 2019-05-14 LAB
TTG IGA SER-ACNC: 5 U/ML
TTG IGG SER-ACNC: 1 U/ML

## 2019-05-20 ENCOUNTER — OFFICE VISIT (OUTPATIENT)
Dept: ENDOCRINOLOGY | Facility: CLINIC | Age: 39
End: 2019-05-20
Payer: COMMERCIAL

## 2019-05-20 VITALS
DIASTOLIC BLOOD PRESSURE: 83 MMHG | HEART RATE: 76 BPM | BODY MASS INDEX: 24.83 KG/M2 | SYSTOLIC BLOOD PRESSURE: 137 MMHG | WEIGHT: 204 LBS

## 2019-05-20 DIAGNOSIS — E10.9 TYPE 1 DIABETES MELLITUS WITHOUT COMPLICATION (H): ICD-10-CM

## 2019-05-20 ASSESSMENT — PAIN SCALES - GENERAL: PAINLEVEL: NO PAIN (0)

## 2019-05-20 NOTE — LETTER
5/20/2019     RE: Agustín Gallegos  [address]  [facility] [address]     Dear Colleague,    Thank you for referring your patient, Agustín Gallegos, to the [facility] ENDOCRINOLOGY at [facility]. Please see a copy of my visit note below.    HPI:   Christiano is a 37 yo man here for follow up of type 1 diabetes, which he has had since age 14.  He reports that overall things are going well.  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  He finds that sometimes he drops low with his walk, usually about 3 hours after eating his lunch. He sometimes is frustrated because he has to eat to keep his sugars up.  He got  last year and his wife is expecting their first baby in August.       Novolog dosing:   Breakfast- raisin bran- 1/7g  Lunch- sometimes leftovers (low carb), [place] [employer]. 1/6g  Dinner- usually low carb. 1/4g (1/3g with higher fat content).   HS snack- 1/10g.   Correction: 1/30 over 130 mg/dL.      He has tried taking it 15 minutes before he eats, but finds he doesn't do this often.    He takes Tresiba 26 units daily.  He has minimal hypoglycemia in the 50's-60's in the afternoon.  He is testing his blood sugars 6 times a day with an overall average of 170 mg/dL this month.     He has tried a dexcom sensor in the past and found the information useful, but he did not like being attached to anything.  He has never been on a pump for this reason.  His brother is on a pump and he is not impressed with his brother's control.     Shredded wheat for breakfast, eating more convenience foods and less vegetables since his wife has been pregnant.  No processed meats. He has been feeling well and in his usual state of health.  He has no other concerns today.     ROS  GENERAL: no weight loss, weight gain, fevers, chills, malaise, night sweats.   HEENT: no dysphagia, diplopia, neck pain or tenderness,  dry/scratchy eyes, URI, cough, sinus drainage, tinnitus, sinus pressure  CV: no chest pain, pressure, palpitations, skipped beats, LOC  LUNGS: no SOB, WILDE, cough, sputum production, wheezing   GI: no diarrhea, constipation, abdominal pain  EXTREMITIES: no rashes, ulcers, edema  NEUROLOGY: no changes in vision, tingling or numbness in hands or feet.   MSK: no muscle aches or pains, weakness  PSYCH: mood stable    Past Medical History:   Diagnosis Date     Type 1 diabetes (H)     age of onset 14 years       No past surgical history on file.    Family History   Problem Relation Age of Onset     Diabetes Brother      Cerebrovascular Disease Paternal Grandmother      Arthritis Mother      Arthritis Maternal Grandmother        Social History     Social History     Marital status: Single     Spouse name: N/A     Number of children: N/A     Years of education: N/A     Social History Main Topics     Smoking status: Never Smoker     Smokeless tobacco: Never Used     Alcohol use No     Drug use: No     Sexual activity: Yes     Partners: Female     Other Topics Concern     None     Social History Narrative   Social History: Works for [employer]. .  Expecting first child in August, 2019.    Current Outpatient Medications   Medication     blood glucose monitoring (CANDE MICROLET) lancets     blood glucose monitoring (NO BRAND SPECIFIED) meter device kit     cholecalciferol (VITAMIN D) 1000 UNIT tablet     CONTOUR NEXT TEST test strip     insulin aspart (NOVOLOG FLEXPEN) 100 UNIT/ML pen     insulin degludec (TRESIBA FLEXTOUCH) 200 UNIT/ML pen     insulin pen needle (B-D U/F) 31G X 8 MM miscellaneous     No current facility-administered medications for this visit.         No Known Allergies    Physical Exam  /83   Pulse 76   Wt 92.5 kg (204 lb)   BMI 24.83 kg/m     GENERAL:  Alert and oriented X3, NAD, well dressed, answering questions appropriately, appears stated age.  EXTREMITIES: no edema, +pulses, no  rashes, no lesions    RESULTS  Lab Results   Component Value Date    A1C 7.7 (H) 05/13/2019    A1C 7.9 (H) 08/13/2014    HEMOGLOBINA1 7.7 (A) 02/04/2019    HEMOGLOBINA1 7.5 (A) 08/06/2018    HEMOGLOBINA1 7.8 (A) 04/24/2018    HEMOGLOBINA1 7.9 (A) 01/09/2018    HEMOGLOBINA1 8.3 (A) 08/22/2017       TSH   Date Value Ref Range Status   05/13/2019 2.14 0.40 - 4.00 mU/L Final   01/30/2018 2.46 0.40 - 4.00 mU/L Final   09/16/2016 0.97 0.40 - 4.00 mU/L Final   06/24/2015 0.78 0.40 - 4.00 mU/L Final   12/04/2014 1.09 0.40 - 4.00 mU/L Final     Comment:     Effective 7/30/2014, the reference range for this assay has changed to reflect   new instrumentation/methodology.       T4 Free   Date Value Ref Range Status   09/16/2016 1.01 0.76 - 1.46 ng/dL Final   06/24/2015 1.02 0.76 - 1.46 ng/dL Final       ALT   Date Value Ref Range Status   03/05/2015 20 0 - 70 U/L Final   ]    Recent Labs   Lab Test 05/13/19  0722 01/30/18  0727  08/13/14  0956   CHOL 163 149   < > 162   HDL 44 44   < > 49   * 93   < > 102   TRIG 60 60   < > 56   CHOLHDLRATIO  --   --   --  3.3    < > = values in this interval not displayed.       Lab Results   Component Value Date     05/13/2019      Lab Results   Component Value Date    POTASSIUM 4.2 05/13/2019     Lab Results   Component Value Date    CHLORIDE 101 05/13/2019     Lab Results   Component Value Date    CELESTE 9.3 05/13/2019     Lab Results   Component Value Date    CO2 31 05/13/2019     Lab Results   Component Value Date    BUN 12 05/13/2019     Lab Results   Component Value Date    CR 0.80 05/13/2019       GFR Estimate   Date Value Ref Range Status   05/13/2019 >90 >60 mL/min/[1.73_m2] Final     Comment:     Non  GFR Calc  Starting 12/18/2018, serum creatinine based estimated GFR (eGFR) will be   calculated using the Chronic Kidney Disease Epidemiology Collaboration   (CKD-EPI) equation.     08/22/2017 >90 >60 mL/min/1.7m2 Final     Comment:     Non   GFR Calc   09/16/2016 >90  Non  GFR Calc   >60 mL/min/1.7m2 Final     GFR Estimate If Black   Date Value Ref Range Status   05/13/2019 >90 >60 mL/min/[1.73_m2] Final     Comment:      GFR Calc  Starting 12/18/2018, serum creatinine based estimated GFR (eGFR) will be   calculated using the Chronic Kidney Disease Epidemiology Collaboration   (CKD-EPI) equation.     08/22/2017 >90 >60 mL/min/1.7m2 Final     Comment:      GFR Calc   09/16/2016 >90   GFR Calc   >60 mL/min/1.7m2 Final       Lab Results   Component Value Date    MICROL 5 05/13/2019     No results found for: MICROALBUMIN  No results found for: CPEPT, GADAB, ISCAB    No results found for: B12]    Most recent eye exam date: : Not Found     Assessment/Plan:     1.  Type 1 diabetes- Doing better.  A1c is down to 7.7% from 8.2%.  He is having some afternoon hypoglycemia with his walk and he does drift lower overnight.  He needs more insulin per carb to prevent hyperglycemia following breakfast.  He is not currently interested in a pump or sensor.  I did mention the jamila, which is smaller.   Will make the following changes (instructions given to patient):     Try reducing tresiba to 24 units.     Tighten your insulin:carb ratio to 1/6g at breakfast  Consider 1/9g at bedtime snack.     Look into the IN-pen.     Look into the jamila sensor.     Schedule eye exam.     2.  Risk factors-     Retinopathy:  No.  Due for eye exam.   Nephropathy:  BP is good.  No microalbuminuria.  Creatinine stable.  On no meds. No MA.  Neuropathy: No.    Feet: OK, no ulcers.   Taking ASA: no  Lipids:  LDL is slightly elevated.  Discussed heart healthy diet.  Suggested visit with dietitian.    Celiac screening: negative screen 5/19    3.  F/U in 3 months as planned with Dr. Banks.  I am also happy to see him as needed in the future.     I spent 35 minutes with this patient face to face and explained the conditions and  plans (more than 50% of time was counseling/coordination of care, diabetes management, follow up plan for worsening hyper and hypoglycemia) . The patient understood and is satisfied with today's visit.      Shena Iniguez PA-C, MPAS   [facility]  Department of Medicine  Division of Endocrinology and Diabetes

## 2019-05-20 NOTE — PATIENT INSTRUCTIONS
Try reducing tresiba to 24 units.     Tighten your insulin:carb ratio to 1/6g at breakfast  Consider 1/9g at bedtime snack.     Look into the IN-pen.     Look into the jmaila sensor.     Schedule eye exam.                                           Heart Healthy Diet and Lifestyle    - Eat your veggies and fruits -- and switch to whole grains. An abundance and variety of plant foods should make up the majority of your meals. Strive for seven to 10 servings a day of veggies and fruits. Add in beans and lentils.  Switch to whole-grain bread and cereal, and begin to eat more whole-grain rice and pasta products.    - Go nuts. Keep almonds, cashews, pistachios and walnuts on hand for a quick snack. Choose natural peanut butter or almond butter, rather than the kind with hydrogenated fat added. Try hummus as a dip or spread for bread.    - Pass on the butter. Try olive or canola oil as a healthy replacement for butter or margarine. Use it in cooking. Dip bread in flavored olive oil or lightly spread it on whole-grain bread for a tasty alternative to butter.     - Spice it up. Herbs and spices make food tasty and are also rich in health-promoting substances. Season your meals with herbs and spices rather than salt.    - Go fish. Eat fish twice a week. Fresh or water-packed tuna, salmon, trout, mackerel and herring are healthy choices. Grilled fish tastes good and requires little cleanup. Avoid fried fish, unless it's sauteed in a small amount of canola oil.    - Rein in the red meat. Substitute fish and poultry for red meat. When eaten, make sure it's lean and keep portions small (about the size of a deck of cards). Try to limit sausage, chang and other high-fat or processed meats.    - Avoid being sedentary.  Decrease the amount of time spent in daily sedentary behavior.  Prolonged sitting should be interrupted every 30 min for blood glucose benefits.     - Be active.  30 minutes of moderate-to-vigorous intensity aerobic  exercise at least 5 days a week or a total of 150 minutes spread over 3 days per week.  2-3 sessions/week of resistance exercise on nonconsecutive days. Flexibility training and balance training 2-3 times/week for older adults with diabetes.     Make an appointment with a dietitian for a personalized meal plan/nutrition review.

## 2019-05-20 NOTE — PROGRESS NOTES
HPI:   Christiano is a 39 yo man here for follow up of type 1 diabetes, which he has had since age 14.  He reports that overall things are going well.  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  He finds that sometimes he drops low with his walk, usually about 3 hours after eating his lunch. He sometimes is frustrated because he has to eat to keep his sugars up.  He got  last year and his wife is expecting their first baby in August.       Novolog dosing:   Breakfast- raisin bran- 1/7g  Lunch- sometimes leftovers (low carb), [place] [employer]. 1/6g  Dinner- usually low carb. 1/4g (1/3g with higher fat content).   HS snack- 1/10g.   Correction: 1/30 over 130 mg/dL.      He has tried taking it 15 minutes before he eats, but finds he doesn't do this often.    He takes Tresiba 26 units daily.  He has minimal hypoglycemia in the 50's-60's in the afternoon.  He is testing his blood sugars 6 times a day with an overall average of 170 mg/dL this month.     He has tried a dexcom sensor in the past and found the information useful, but he did not like being attached to anything.  He has never been on a pump for this reason.  His brother is on a pump and he is not impressed with his brother's control.     Shredded wheat for breakfast, eating more convenience foods and less vegetables since his wife has been pregnant.  No processed meats. He has been feeling well and in his usual state of health.  He has no other concerns today.     ROS  GENERAL: no weight loss, weight gain, fevers, chills, malaise, night sweats.   HEENT: no dysphagia, diplopia, neck pain or tenderness, dry/scratchy eyes, URI, cough, sinus drainage, tinnitus, sinus pressure  CV: no chest pain, pressure, palpitations, skipped beats, LOC  LUNGS: no SOB, WILDE, cough, sputum production, wheezing   GI: no diarrhea, constipation, abdominal pain  EXTREMITIES: no rashes, ulcers, edema  NEUROLOGY: no  changes in vision, tingling or numbness in hands or feet.   MSK: no muscle aches or pains, weakness  PSYCH: mood stable    Past Medical History:   Diagnosis Date     Type 1 diabetes (H)     age of onset 14 years       No past surgical history on file.    Family History   Problem Relation Age of Onset     Diabetes Brother      Cerebrovascular Disease Paternal Grandmother      Arthritis Mother      Arthritis Maternal Grandmother        Social History     Social History     Marital status: Single     Spouse name: N/A     Number of children: N/A     Years of education: N/A     Social History Main Topics     Smoking status: Never Smoker     Smokeless tobacco: Never Used     Alcohol use No     Drug use: No     Sexual activity: Yes     Partners: Female     Other Topics Concern     None     Social History Narrative   Social History: Works for [employer]. .  Expecting first child in August, 2019.    Current Outpatient Medications   Medication     blood glucose monitoring (KicksendET) lancets     blood glucose monitoring (NO BRAND SPECIFIED) meter device kit     cholecalciferol (VITAMIN D) 1000 UNIT tablet     CONTOUR NEXT TEST test strip     insulin aspart (NOVOLOG FLEXPEN) 100 UNIT/ML pen     insulin degludec (TRESIBA FLEXTOUCH) 200 UNIT/ML pen     insulin pen needle (B-D U/F) 31G X 8 MM miscellaneous     No current facility-administered medications for this visit.         No Known Allergies    Physical Exam  /83   Pulse 76   Wt 92.5 kg (204 lb)   BMI 24.83 kg/m    GENERAL:  Alert and oriented X3, NAD, well dressed, answering questions appropriately, appears stated age.  EXTREMITIES: no edema, +pulses, no rashes, no lesions    RESULTS  Lab Results   Component Value Date    A1C 7.7 (H) 05/13/2019    A1C 7.9 (H) 08/13/2014    HEMOGLOBINA1 7.7 (A) 02/04/2019    HEMOGLOBINA1 7.5 (A) 08/06/2018    HEMOGLOBINA1 7.8 (A) 04/24/2018    HEMOGLOBINA1 7.9 (A) 01/09/2018    HEMOGLOBINA1 8.3 (A) 08/22/2017        TSH   Date Value Ref Range Status   05/13/2019 2.14 0.40 - 4.00 mU/L Final   01/30/2018 2.46 0.40 - 4.00 mU/L Final   09/16/2016 0.97 0.40 - 4.00 mU/L Final   06/24/2015 0.78 0.40 - 4.00 mU/L Final   12/04/2014 1.09 0.40 - 4.00 mU/L Final     Comment:     Effective 7/30/2014, the reference range for this assay has changed to reflect   new instrumentation/methodology.       T4 Free   Date Value Ref Range Status   09/16/2016 1.01 0.76 - 1.46 ng/dL Final   06/24/2015 1.02 0.76 - 1.46 ng/dL Final       ALT   Date Value Ref Range Status   03/05/2015 20 0 - 70 U/L Final   ]    Recent Labs   Lab Test 05/13/19  0722 01/30/18  0727  08/13/14  0956   CHOL 163 149   < > 162   HDL 44 44   < > 49   * 93   < > 102   TRIG 60 60   < > 56   CHOLHDLRATIO  --   --   --  3.3    < > = values in this interval not displayed.       Lab Results   Component Value Date     05/13/2019      Lab Results   Component Value Date    POTASSIUM 4.2 05/13/2019     Lab Results   Component Value Date    CHLORIDE 101 05/13/2019     Lab Results   Component Value Date    CELESTE 9.3 05/13/2019     Lab Results   Component Value Date    CO2 31 05/13/2019     Lab Results   Component Value Date    BUN 12 05/13/2019     Lab Results   Component Value Date    CR 0.80 05/13/2019       GFR Estimate   Date Value Ref Range Status   05/13/2019 >90 >60 mL/min/[1.73_m2] Final     Comment:     Non  GFR Calc  Starting 12/18/2018, serum creatinine based estimated GFR (eGFR) will be   calculated using the Chronic Kidney Disease Epidemiology Collaboration   (CKD-EPI) equation.     08/22/2017 >90 >60 mL/min/1.7m2 Final     Comment:     Non  GFR Calc   09/16/2016 >90  Non  GFR Calc   >60 mL/min/1.7m2 Final     GFR Estimate If Black   Date Value Ref Range Status   05/13/2019 >90 >60 mL/min/[1.73_m2] Final     Comment:      GFR Calc  Starting 12/18/2018, serum creatinine based estimated GFR (eGFR) will  be   calculated using the Chronic Kidney Disease Epidemiology Collaboration   (CKD-EPI) equation.     08/22/2017 >90 >60 mL/min/1.7m2 Final     Comment:      GFR Calc   09/16/2016 >90   GFR Calc   >60 mL/min/1.7m2 Final       Lab Results   Component Value Date    MICROL 5 05/13/2019     No results found for: MICROALBUMIN  No results found for: CPEPT, GADAB, ISCAB    No results found for: B12]    Most recent eye exam date: : Not Found     Assessment/Plan:     1.  Type 1 diabetes- Doing better.  A1c is down to 7.7% from 8.2%.  He is having some afternoon hypoglycemia with his walk and he does drift lower overnight.  He needs more insulin per carb to prevent hyperglycemia following breakfast.  He is not currently interested in a pump or sensor.  I did mention the jamila, which is smaller.   Will make the following changes (instructions given to patient):     Try reducing tresiba to 24 units.     Tighten your insulin:carb ratio to 1/6g at breakfast  Consider 1/9g at bedtime snack.     Look into the IN-pen.     Look into the jamila sensor.     Schedule eye exam.     2.  Risk factors-     Retinopathy:  No.  Due for eye exam.   Nephropathy:  BP is good.  No microalbuminuria.  Creatinine stable.  On no meds. No MA.  Neuropathy: No.    Feet: OK, no ulcers.   Taking ASA: no  Lipids:  LDL is slightly elevated.  Discussed heart healthy diet.  Suggested visit with dietitian.    Celiac screening: negative screen 5/19    3.  F/U in 3 months as planned with Dr. Banks.  I am also happy to see him as needed in the future.     I spent 35 minutes with this patient face to face and explained the conditions and plans (more than 50% of time was counseling/coordination of care, diabetes management, follow up plan for worsening hyper and hypoglycemia) . The patient understood and is satisfied with today's visit.      Shena Iniguez PA-C, MPAS   [facility]  Department of Medicine  Division of  Endocrinology and Diabetes

## 2019-06-17 ENCOUNTER — OFFICE VISIT (OUTPATIENT)
Dept: OPHTHALMOLOGY | Facility: CLINIC | Age: 39
End: 2019-06-17
Attending: OPHTHALMOLOGY
Payer: COMMERCIAL

## 2019-06-17 DIAGNOSIS — H52.203 MYOPIC ASTIGMATISM OF BOTH EYES: ICD-10-CM

## 2019-06-17 DIAGNOSIS — H52.13 MYOPIC ASTIGMATISM OF BOTH EYES: ICD-10-CM

## 2019-06-17 DIAGNOSIS — E10.9 TYPE 1 DIABETES MELLITUS WITHOUT RETINOPATHY (H): Primary | ICD-10-CM

## 2019-06-17 PROCEDURE — G0463 HOSPITAL OUTPT CLINIC VISIT: HCPCS | Mod: 25,ZF

## 2019-06-17 PROCEDURE — 92015 DETERMINE REFRACTIVE STATE: CPT | Mod: ZF

## 2019-06-17 RX ORDER — CETIRIZINE HYDROCHLORIDE 10 MG/1
10 TABLET ORAL DAILY
COMMUNITY
End: 2019-08-26

## 2019-06-17 ASSESSMENT — REFRACTION_MANIFEST
OD_CYLINDER: +0.75
OS_AXIS: 105
OD_SPHERE: -7.50
OS_CYLINDER: +0.75
OD_AXIS: 083
OS_SPHERE: -8.00

## 2019-06-17 ASSESSMENT — REFRACTION_WEARINGRX
OS_AXIS: 105
OS_SPHERE: -7.75
SPECS_TYPE: SVL
OD_SPHERE: -7.50
OD_CYLINDER: +0.75
OS_CYLINDER: +0.75
OD_AXIS: 083

## 2019-06-17 ASSESSMENT — VISUAL ACUITY
OD_CC: 20/20
CORRECTION_TYPE: CONTACTS
OS_CC: 20/20
METHOD: SNELLEN - LINEAR

## 2019-06-17 ASSESSMENT — CUP TO DISC RATIO
OS_RATIO: 0.3
OD_RATIO: 0.3

## 2019-06-17 ASSESSMENT — EXTERNAL EXAM - RIGHT EYE: OD_EXAM: NORMAL

## 2019-06-17 ASSESSMENT — TONOMETRY
IOP_METHOD: TONOPEN
OS_IOP_MMHG: 15
OD_IOP_MMHG: 15

## 2019-06-17 ASSESSMENT — SLIT LAMP EXAM - LIDS
COMMENTS: NORMAL
COMMENTS: NORMAL

## 2019-06-17 ASSESSMENT — CONF VISUAL FIELD
OS_NORMAL: 1
OD_NORMAL: 1
METHOD: COUNTING FINGERS

## 2019-06-17 ASSESSMENT — EXTERNAL EXAM - LEFT EYE: OS_EXAM: NORMAL

## 2019-06-17 NOTE — PROGRESS NOTES
HPI  Agustín Gallegos is a 39 year old male here for yearly diabetic eye exam. His vision is good in both eyes with his current glasses. He denies eye pain, redness, or discharge. No flashes/floaters.    POH: No history of eye surgery or trauma  PMH: Type 1 diabetes  FH: No FH of AMD or glc  SH: Non-smoker    Lab Results   Component Value Date    A1C 7.7 05/13/2019    A1C 7.9 08/13/2014       Assessment & Plan      (E10.9) Type 1 diabetes mellitus without retinopathy (HCC)  (primary encounter diagnosis)  Comment: Diagnosed at age 13. Last A1c 7.7% 05/2019. No diabetic retinopathy.  Plan: Discussed the importance of tight blood glucose control in the prevention of diabetic retinopathy. Recommend yearly dilated eye exam.    (H52.203) Myopic astigmatism of both eyes  Comment: Stable good vision with refraction  Plan:  Follows with Dr. Nice for contact lenses. No change in Rx today however patient wanted Rx so update provided. Discussed signs/sx of RT/RD and the patient knows to call immediately if they develop these symptoms.      -----------------------------------------------------------------------------------    Patient disposition:   Return in about 1 year (around 6/17/2020) for Annual Visit. or sooner as needed.    Sorin Sharpe MD  Ophthalmology Resident, PGY-2    Teaching statement:  Complete documentation of historical and exam elements from today's encounter can be found in the full encounter summary report (not reduplicated in this progress note). I personally obtained the chief complaint(s) and history of present illness.  I confirmed and edited as necessary the review of systems, past medical/surgical history, family history, social history, and examination findings as documented by others; and I examined the patient myself. I personally reviewed the relevant tests, images, and reports as documented above.     I formulated and edited as necessary the assessment and plan and discussed the findings  and management plan with the patient and family.    Patricia Talamantes MD  Comprehensive Ophthalmology & Ocular Pathology  Department of Ophthalmology and Visual Neurosciences  [email or web address]  Pager [phone]

## 2019-08-26 ENCOUNTER — OFFICE VISIT (OUTPATIENT)
Dept: ENDOCRINOLOGY | Facility: CLINIC | Age: 39
End: 2019-08-26
Payer: COMMERCIAL

## 2019-08-26 VITALS
SYSTOLIC BLOOD PRESSURE: 128 MMHG | HEIGHT: 76 IN | BODY MASS INDEX: 25.22 KG/M2 | DIASTOLIC BLOOD PRESSURE: 73 MMHG | WEIGHT: 207.1 LBS | HEART RATE: 71 BPM

## 2019-08-26 DIAGNOSIS — E10.9 WELL CONTROLLED TYPE 1 DIABETES MELLITUS (H): Primary | ICD-10-CM

## 2019-08-26 DIAGNOSIS — E10.9 TYPE 1 DIABETES MELLITUS WITHOUT COMPLICATION (H): ICD-10-CM

## 2019-08-26 LAB — HBA1C MFR BLD: 8.2 % (ref 4.3–6)

## 2019-08-26 RX ORDER — FLASH GLUCOSE SENSOR
1 KIT MISCELLANEOUS
Qty: 7 EACH | Refills: 3 | Status: SHIPPED | OUTPATIENT
Start: 2019-08-26 | End: 2019-08-28

## 2019-08-26 ASSESSMENT — MIFFLIN-ST. JEOR: SCORE: 1960.9

## 2019-08-26 ASSESSMENT — PAIN SCALES - GENERAL: PAINLEVEL: NO PAIN (0)

## 2019-08-26 NOTE — PATIENT INSTRUCTIONS
Start jamila sensor.      Swipe at least 3 times a day.      Try using skin tac to help with stickiness.    Increase Tresiba to 27 units when you start the jamila.     Send Positron data if you would like me to take a look.      [email or web address]

## 2019-08-26 NOTE — PROGRESS NOTES
HPI:   Christiano is a 39 yo man here for follow up of type 1 diabetes, which he has had since age 14.  He reports that overall things are going well.  His wife had a baby girl, Daphney on August 8th, 2019.  They are enjoying parenthood and adapting to their new life.   He has been off work since she was born.   His sugars have been climbing a bit, which he thinks is because of the recent change in their family.  He has been thinking more about a sensor since he had his daughter, and his wife is very interested in him getting one.      He finds his glucose climbs overnight.  He admits that he is very fearful of hypoglycemia in the night, so he may eat too much snack at bedtime without adequate coverage.     Novolog dosing:   Breakfast- raisin bran- 1/7g  Lunch- sometimes leftovers (low carb), [place] in [employer]. 1/6g  Dinner- usually low carb. 1/4g (1/3g with higher fat content).   HS snack- 1/10g.   Correction: 1/30 over 130 mg/dL.      He has tried taking it 15 minutes before he eats, but finds he doesn't do this often.  He worries that he will not be able to actually eat on time, so he prefers to take it right when he eats.    He takes Tresiba 26 units daily.  He has noticed that he climbs overnight.  He is testing his blood sugars 6 times a day with an overall average of 164 mg/dL this month.     He has been feeling well and in his usual state of health.  He has no other concerns today.     ROS  GENERAL: no weight loss, weight gain, fevers, chills, malaise, night sweats.   HEENT: no dysphagia, diplopia, neck pain or tenderness, dry/scratchy eyes, URI, cough, sinus drainage, tinnitus, sinus pressure  CV: no chest pain, pressure, palpitations, skipped beats, LOC  LUNGS: no SOB, WILDE, cough, sputum production, wheezing   GI: no diarrhea, constipation, abdominal pain  EXTREMITIES: no rashes, ulcers, edema  NEUROLOGY: no changes in vision, tingling or numbness in hands or feet.   MSK: no muscle aches or pains, weakness  PSYCH:  "mood stable    Past Medical History:   Diagnosis Date     Type 1 diabetes (H)     age of onset 14 years       History reviewed. No pertinent surgical history.    Family History   Problem Relation Age of Onset     Diabetes Brother      Cerebrovascular Disease Paternal Grandmother      Arthritis Mother      Arthritis Maternal Grandmother        Social History     Social History     Marital status: Single     Spouse name: N/A     Number of children: N/A     Years of education: N/A     Social History Main Topics     Smoking status: Never Smoker     Smokeless tobacco: Never Used     Alcohol use No     Drug use: No     Sexual activity: Yes     Partners: Female     Other Topics Concern     None     Social History Narrative   Social History: Works for [employer]. .  Expecting first child in August, 2019.    Current Outpatient Medications   Medication     blood glucose monitoring (CANDE MICROLET) lancets     blood glucose monitoring (NO BRAND SPECIFIED) meter device kit     cholecalciferol (VITAMIN D) 1000 UNIT tablet     CONTOUR NEXT TEST test strip     insulin aspart (NOVOLOG FLEXPEN) 100 UNIT/ML pen     insulin degludec (TRESIBA FLEXTOUCH) 200 UNIT/ML pen     insulin pen needle (B-D U/F) 31G X 8 MM miscellaneous     No current facility-administered medications for this visit.         No Known Allergies    Physical Exam  /73   Pulse 71   Ht 1.93 m (6' 4\")   Wt 93.9 kg (207 lb 1.6 oz)   BMI 25.21 kg/m    GENERAL:  Alert and oriented X3, NAD, well dressed, answering questions appropriately, appears stated age.  EXTREMITIES: no edema, +pulses, no rashes, no lesions    RESULTS  Lab Results   Component Value Date    A1C 7.7 (H) 05/13/2019    A1C 7.9 (H) 08/13/2014    HEMOGLOBINA1 7.7 (A) 02/04/2019    HEMOGLOBINA1 7.5 (A) 08/06/2018    HEMOGLOBINA1 7.8 (A) 04/24/2018    HEMOGLOBINA1 7.9 (A) 01/09/2018    HEMOGLOBINA1 8.3 (A) 08/22/2017       TSH   Date Value Ref Range Status   05/13/2019 2.14 0.40 - " 4.00 mU/L Final   01/30/2018 2.46 0.40 - 4.00 mU/L Final   09/16/2016 0.97 0.40 - 4.00 mU/L Final   06/24/2015 0.78 0.40 - 4.00 mU/L Final   12/04/2014 1.09 0.40 - 4.00 mU/L Final     Comment:     Effective 7/30/2014, the reference range for this assay has changed to reflect   new instrumentation/methodology.       T4 Free   Date Value Ref Range Status   09/16/2016 1.01 0.76 - 1.46 ng/dL Final   06/24/2015 1.02 0.76 - 1.46 ng/dL Final       ALT   Date Value Ref Range Status   03/05/2015 20 0 - 70 U/L Final   ]    Recent Labs   Lab Test 05/13/19  0722 01/30/18  0727  08/13/14  0956   CHOL 163 149   < > 162   HDL 44 44   < > 49   * 93   < > 102   TRIG 60 60   < > 56   CHOLHDLRATIO  --   --   --  3.3    < > = values in this interval not displayed.       Lab Results   Component Value Date     05/13/2019      Lab Results   Component Value Date    POTASSIUM 4.2 05/13/2019     Lab Results   Component Value Date    CHLORIDE 101 05/13/2019     Lab Results   Component Value Date    CELESTE 9.3 05/13/2019     Lab Results   Component Value Date    CO2 31 05/13/2019     Lab Results   Component Value Date    BUN 12 05/13/2019     Lab Results   Component Value Date    CR 0.80 05/13/2019       GFR Estimate   Date Value Ref Range Status   05/13/2019 >90 >60 mL/min/[1.73_m2] Final     Comment:     Non  GFR Calc  Starting 12/18/2018, serum creatinine based estimated GFR (eGFR) will be   calculated using the Chronic Kidney Disease Epidemiology Collaboration   (CKD-EPI) equation.     08/22/2017 >90 >60 mL/min/1.7m2 Final     Comment:     Non  GFR Calc   09/16/2016 >90  Non  GFR Calc   >60 mL/min/1.7m2 Final     GFR Estimate If Black   Date Value Ref Range Status   05/13/2019 >90 >60 mL/min/[1.73_m2] Final     Comment:      GFR Calc  Starting 12/18/2018, serum creatinine based estimated GFR (eGFR) will be   calculated using the Chronic Kidney Disease Epidemiology  Collaboration   (CKD-EPI) equation.     08/22/2017 >90 >60 mL/min/1.7m2 Final     Comment:      GFR Calc   09/16/2016 >90   GFR Calc   >60 mL/min/1.7m2 Final       Lab Results   Component Value Date    MICROL 5 05/13/2019     No results found for: MICROALBUMIN  No results found for: CPEPT, GADAB, ISCAB    No results found for: B12]    Most recent eye exam date: : Not Found       Assessment/Plan:     1.  Type 1 diabetes- A1c is higher than target at 8.2% (up from 7.7% at last visit).  He feels a sensor may be helpful in improving his control.  We discussed both the dexcom and the jamila sensor.  He likes the simplicity of the jamila and is not sure he needs or wants alarms.  He does have strong symptoms associated with hypoglycemia.  Will prescribe 14 day jamila, which he will use with his phone.  He will send me data in a couple weeks.  Once he starts the jamila, he will increase tresiba by 1 unit to 27 units.  I also suspect that he is not taking enough Novolog with his HS snack, so seeing patterns may reassure patient that he does not need such a large snack (or he should take more Novolog to cover it).  No other changes today.     2.  Risk factors-     Retinopathy:  No.  Due for eye exam.   Nephropathy:  BP is good.  No microalbuminuria.  Creatinine stable.  On no meds. No MA.  Neuropathy: No.    Feet: OK, no ulcers.   Taking ASA: no  Lipids:  LDL is slightly elevated.  Discussed heart healthy eating ideas and encouraged him to increase consumption of fruits, vegetables and whole grains. Suggested visit with dietitian.    Celiac screening: negative screen 5/19    3.  F/U in 3 months as planned with Dr. Banks.  I am also happy to see him as needed in the future.     I spent 35 minutes with this patient face to face and explained the conditions and plans (more than 50% of time was counseling/coordination of care, diabetes management, follow up plan for worsening hyper and  hypoglycemia) . The patient understood and is satisfied with today's visit.      Shena Iniguez PA-C, MPAS   [facility]  Department of Medicine  Division of Endocrinology and Diabetes

## 2019-08-26 NOTE — LETTER
8/26/2019       RE: Agustín Gallegos  [address]  [place] [address]     Dear Colleague,    Thank you for referring your patient, Agustín Gallegos, to the [facility] ENDOCRINOLOGY at [facility]. Please see a copy of my visit note below.    HPI:   Christiano is a 39 yo man here for follow up of type 1 diabetes, which he has had since age 14.  He reports that overall things are going well.  His wife had a baby girl, Daphney on August 8th, 2019.  They are enjoying parenthood and adapting to their new life.   He has been off work since she was born.   His sugars have been climbing a bit, which he thinks is because of the recent change in their family.  He has been thinking more about a sensor since he had his daughter, and his wife is very interested in him getting one.      He finds his glucose climbs overnight.  He admits that he is very fearful of hypoglycemia in the night, so he may eat too much snack at bedtime without adequate coverage.     Novolog dosing:   Breakfast- raisin bran- 1/7g  Lunch- sometimes leftovers (low carb), [place] [employer]. 1/6g  Dinner- usually low carb. 1/4g (1/3g with higher fat content).   HS snack- 1/10g.   Correction: 1/30 over 130 mg/dL.      He has tried taking it 15 minutes before he eats, but finds he doesn't do this often.  He worries that he will not be able to actually eat on time, so he prefers to take it right when he eats.    He takes Tresiba 26 units daily.  He has noticed that he climbs overnight.  He is testing his blood sugars 6 times a day with an overall average of 164 mg/dL this month.     He has been feeling well and in his usual state of health.  He has no other concerns today.     ROS  GENERAL: no weight loss, weight gain, fevers, chills, malaise, night sweats.   HEENT: no dysphagia, diplopia, neck pain or tenderness, dry/scratchy eyes, URI, cough, sinus drainage, tinnitus, sinus pressure  CV: no chest pain, pressure,  "palpitations, skipped beats, LOC  LUNGS: no SOB, WILDE, cough, sputum production, wheezing   GI: no diarrhea, constipation, abdominal pain  EXTREMITIES: no rashes, ulcers, edema  NEUROLOGY: no changes in vision, tingling or numbness in hands or feet.   MSK: no muscle aches or pains, weakness  PSYCH: mood stable    Past Medical History:   Diagnosis Date     Type 1 diabetes (H)     age of onset 14 years       History reviewed. No pertinent surgical history.    Family History   Problem Relation Age of Onset     Diabetes Brother      Cerebrovascular Disease Paternal Grandmother      Arthritis Mother      Arthritis Maternal Grandmother        Social History     Social History     Marital status: Single     Spouse name: N/A     Number of children: N/A     Years of education: N/A     Social History Main Topics     Smoking status: Never Smoker     Smokeless tobacco: Never Used     Alcohol use No     Drug use: No     Sexual activity: Yes     Partners: Female     Other Topics Concern     None     Social History Narrative   Social History: Works for [employer]. .  Expecting first child in August, 2019.    Current Outpatient Medications   Medication     blood glucose monitoring (CANDE MICROLET) lancets     blood glucose monitoring (NO BRAND SPECIFIED) meter device kit     cholecalciferol (VITAMIN D) 1000 UNIT tablet     CONTOUR NEXT TEST test strip     insulin aspart (NOVOLOG FLEXPEN) 100 UNIT/ML pen     insulin degludec (TRESIBA FLEXTOUCH) 200 UNIT/ML pen     insulin pen needle (B-D U/F) 31G X 8 MM miscellaneous     No current facility-administered medications for this visit.         No Known Allergies    Physical Exam  /73   Pulse 71   Ht 1.93 m (6' 4\")   Wt 93.9 kg (207 lb 1.6 oz)   BMI 25.21 kg/m     GENERAL:  Alert and oriented X3, NAD, well dressed, answering questions appropriately, appears stated age.  EXTREMITIES: no edema, +pulses, no rashes, no lesions    RESULTS  Lab Results   Component " Value Date    A1C 7.7 (H) 05/13/2019    A1C 7.9 (H) 08/13/2014    HEMOGLOBINA1 7.7 (A) 02/04/2019    HEMOGLOBINA1 7.5 (A) 08/06/2018    HEMOGLOBINA1 7.8 (A) 04/24/2018    HEMOGLOBINA1 7.9 (A) 01/09/2018    HEMOGLOBINA1 8.3 (A) 08/22/2017       TSH   Date Value Ref Range Status   05/13/2019 2.14 0.40 - 4.00 mU/L Final   01/30/2018 2.46 0.40 - 4.00 mU/L Final   09/16/2016 0.97 0.40 - 4.00 mU/L Final   06/24/2015 0.78 0.40 - 4.00 mU/L Final   12/04/2014 1.09 0.40 - 4.00 mU/L Final     Comment:     Effective 7/30/2014, the reference range for this assay has changed to reflect   new instrumentation/methodology.       T4 Free   Date Value Ref Range Status   09/16/2016 1.01 0.76 - 1.46 ng/dL Final   06/24/2015 1.02 0.76 - 1.46 ng/dL Final       ALT   Date Value Ref Range Status   03/05/2015 20 0 - 70 U/L Final   ]    Recent Labs   Lab Test 05/13/19  0722 01/30/18  0727  08/13/14  0956   CHOL 163 149   < > 162   HDL 44 44   < > 49   * 93   < > 102   TRIG 60 60   < > 56   CHOLHDLRATIO  --   --   --  3.3    < > = values in this interval not displayed.       Lab Results   Component Value Date     05/13/2019      Lab Results   Component Value Date    POTASSIUM 4.2 05/13/2019     Lab Results   Component Value Date    CHLORIDE 101 05/13/2019     Lab Results   Component Value Date    CELESTE 9.3 05/13/2019     Lab Results   Component Value Date    CO2 31 05/13/2019     Lab Results   Component Value Date    BUN 12 05/13/2019     Lab Results   Component Value Date    CR 0.80 05/13/2019       GFR Estimate   Date Value Ref Range Status   05/13/2019 >90 >60 mL/min/[1.73_m2] Final     Comment:     Non  GFR Calc  Starting 12/18/2018, serum creatinine based estimated GFR (eGFR) will be   calculated using the Chronic Kidney Disease Epidemiology Collaboration   (CKD-EPI) equation.     08/22/2017 >90 >60 mL/min/1.7m2 Final     Comment:     Non  GFR Calc   09/16/2016 >90  Non  GFR  Calc   >60 mL/min/1.7m2 Final     GFR Estimate If Black   Date Value Ref Range Status   05/13/2019 >90 >60 mL/min/[1.73_m2] Final     Comment:      GFR Calc  Starting 12/18/2018, serum creatinine based estimated GFR (eGFR) will be   calculated using the Chronic Kidney Disease Epidemiology Collaboration   (CKD-EPI) equation.     08/22/2017 >90 >60 mL/min/1.7m2 Final     Comment:      GFR Calc   09/16/2016 >90   GFR Calc   >60 mL/min/1.7m2 Final       Lab Results   Component Value Date    MICROL 5 05/13/2019     No results found for: MICROALBUMIN  No results found for: CPEPT, GADAB, ISCAB    No results found for: B12]    Most recent eye exam date: : Not Found       Assessment/Plan:     1.  Type 1 diabetes- A1c is higher than target at 8.2% (up from 7.7% at last visit).  He feels a sensor may be helpful in improving his control.  We discussed both the dexcom and the jamila sensor.  He likes the simplicity of the jamila and is not sure he needs or wants alarms.  He does have strong symptoms associated with hypoglycemia.  Will prescribe 14 day jamila, which he will use with his phone.  He will send me data in a couple weeks.  Once he starts the jamila, he will increase tresiba by 1 unit to 27 units.  I also suspect that he is not taking enough Novolog with his HS snack, so seeing patterns may reassure patient that he does not need such a large snack (or he should take more Novolog to cover it).  No other changes today.     2.  Risk factors-     Retinopathy:  No.  Due for eye exam.   Nephropathy:  BP is good.  No microalbuminuria.  Creatinine stable.  On no meds. No MA.  Neuropathy: No.    Feet: OK, no ulcers.   Taking ASA: no  Lipids:  LDL is slightly elevated.  Discussed heart healthy eating ideas and encouraged him to increase consumption of fruits, vegetables and whole grains. Suggested visit with dietitian.    Celiac screening: negative screen 5/19    3.  F/U in 3 months as  planned with Dr. Banks.  I am also happy to see him as needed in the future.     I spent 35 minutes with this patient face to face and explained the conditions and plans (more than 50% of time was counseling/coordination of care, diabetes management, follow up plan for worsening hyper and hypoglycemia) . The patient understood and is satisfied with today's visit.      Shena Iniguez PA-C, MPAS   [facility]  Department of Medicine  Division of Endocrinology and Diabetes

## 2019-08-27 ENCOUNTER — TELEPHONE (OUTPATIENT)
Dept: ENDOCRINOLOGY | Facility: CLINIC | Age: 39
End: 2019-08-27

## 2019-08-27 DIAGNOSIS — E10.9 DIABETES MELLITUS TYPE 1 (H): Primary | ICD-10-CM

## 2019-08-27 NOTE — TELEPHONE ENCOUNTER
Prior Authorization Retail Medication Request    Medication/Dose: blood glucose (CONTOUR NEXT TEST) test strip  ICD code (if different than what is on RX):E10.9  Rationale:Patient needs to communicate with device to maintain diabetes type 1    Insurance Name:[facility]  Insurance ID:[id number]       Pharmacy Information (if different than what is on RX)  Name:[facility]  Phone:[phone]

## 2019-08-28 DIAGNOSIS — E10.9 WELL CONTROLLED TYPE 1 DIABETES MELLITUS (H): ICD-10-CM

## 2019-08-28 RX ORDER — FLASH GLUCOSE SENSOR
1 KIT MISCELLANEOUS
Qty: 7 EACH | Refills: 3 | Status: SHIPPED | OUTPATIENT
Start: 2019-08-28 | End: 2023-03-30

## 2019-09-03 NOTE — TELEPHONE ENCOUNTER
Central Prior Authorization Team   Phone: [phone]      PA Initiation    Medication: blood glucose (CONTOUR NEXT TEST) test strip  Insurance Company: [employer] [place] - Phone [phone] Fax [phone]  Pharmacy Filling the Rx: [id number] [address] AT [facility] [address]  Filling Pharmacy Phone: [phone]  Filling Pharmacy Fax:    Start Date: 9/3/2019

## 2019-09-09 ENCOUNTER — MEDICAL CORRESPONDENCE (OUTPATIENT)
Dept: HEALTH INFORMATION MANAGEMENT | Facility: CLINIC | Age: 39
End: 2019-09-09

## 2019-09-09 NOTE — TELEPHONE ENCOUNTER
Prior Authorization Not Needed per Insurance    Medication: blood glucose (CONTOUR NEXT TEST) test strip-PA Not Needed  Insurance Company: [facility] - Phone [phone] Fax [phone]  Expected CoPay:      Pharmacy Filling the Rx: [id number] - [facility] [address] AT [facility]  Pharmacy Notified: Yes  Patient Notified: No

## 2019-09-12 ENCOUNTER — MEDICAL CORRESPONDENCE (OUTPATIENT)
Dept: HEALTH INFORMATION MANAGEMENT | Facility: CLINIC | Age: 39
End: 2019-09-12

## 2019-11-05 ENCOUNTER — HEALTH MAINTENANCE LETTER (OUTPATIENT)
Age: 39
End: 2019-11-05

## 2019-11-26 ENCOUNTER — OFFICE VISIT (OUTPATIENT)
Dept: ENDOCRINOLOGY | Facility: CLINIC | Age: 39
End: 2019-11-26
Payer: COMMERCIAL

## 2019-11-26 VITALS
HEIGHT: 76 IN | DIASTOLIC BLOOD PRESSURE: 87 MMHG | WEIGHT: 208.1 LBS | SYSTOLIC BLOOD PRESSURE: 148 MMHG | HEART RATE: 65 BPM | BODY MASS INDEX: 25.34 KG/M2

## 2019-11-26 DIAGNOSIS — E10.9 TYPE 1 DIABETES MELLITUS WITHOUT COMPLICATION (H): Primary | ICD-10-CM

## 2019-11-26 LAB — HBA1C MFR BLD: 7.5 % (ref 4.3–6)

## 2019-11-26 ASSESSMENT — PAIN SCALES - GENERAL: PAINLEVEL: NO PAIN (0)

## 2019-11-26 ASSESSMENT — MIFFLIN-ST. JEOR: SCORE: 1965.44

## 2019-11-26 NOTE — LETTER
11/26/2019       RE: Agustín Gallegos  [address]  [facility] [address]     Dear Colleague,    Thank you for referring your patient, Agustín Gallegos, to the [facility] ENDOCRINOLOGY at [facility]. Please see a copy of my visit note below.    Endocrinology Clinic Visit  10/30/2018  Agustín Gallegos     Chief Complaint: RECHECK (dm 1)       HPI: Christiano is a 39 year old year old male with history of type 1 diabetes who is seen in follow-up for the same.    Briefly, Christiano was diagnosed at age 14.  He is originally from the [facility], was/ living in [place], and moved to [place] in July 2014. He was first seen by me on September 2014.    Christiano and his wife had a baby girl who is now 14 weeks old.  She is doing well and he is still on paternity leave.  He will have 10 weeks of paternity leave.    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 He continues to work there, noting it is less active than his prior job. He does take a walk every day around 2pm, roughly for a half an hour. He just got  on October 6, 2018 and now has a 14 weeks old baby girl.      He is currently on Tresiba 26 units at bedtime and Novolog 1 unit per 4g of CHO with his meals and 1 unit per 30 mg/dL above 130 mg/dL.     Download from meter   Avg BG checks 1.9x/day  Avg 164  56 mg/dL  Lowest blood glucose value 55  Highest recorded value is 287  There is evidence of hypoglycemia after breakfast  A1c is 7.5%  Today.    He is not interested in CGM and insulin pump at this time. His brother is on a pump, but did not do that well for diabetes control.     Agustín does not have chronic complications of diabetes.    Retinopathy: Last eye exam was normal    Nephropathy: Microalbuminuria was negative.  He was placed on losartan as a renal protective agent around 2008.  He had no history of microalbuminuria or hypertension and after a detailed  discussion, we decided to discontinue Losartan in September 2014.  Blood pressure levels remained within the normal range.  Neuropathy: There is no history of peripheral or autonomic neuropathy.    He is on vitamin D 1000 units per day.       No Known Allergies    Current Outpatient Medications   Medication Sig Dispense Refill     blood glucose (CONTOUR NEXT TEST) test strip test up 6.8 times daily 680 each 3     blood glucose monitoring (CANDE MICROLET) lancets Use to test blood sugar 8 times daily or as directed. 6 Box 5     blood glucose monitoring (NO BRAND SPECIFIED) meter device kit Use to test blood sugar 5 times daily or as directed. 1 kit 0     cholecalciferol (VITAMIN D) 1000 UNIT tablet Take 1 tablet (1,000 Units) by mouth daily 90 tablet 3     Continuous Blood Gluc Sensor (ScodixSTYLE LILO 14 DAY SENSOR) MISC 1 each every 14 days Swipe before meals and bedtime. 7 each 3     insulin aspart (NOVOLOG FLEXPEN) 100 UNIT/ML pen Inject 1 unit per 7 grams for breakfast and 1 unit per 6 g for lunch, dinner, snacks. Approx 60 units daily. 60 mL 3     insulin degludec (TRESIBA FLEXTOUCH) 200 UNIT/ML pen Inject 24 Units Subcutaneous daily 12 mL 3     insulin pen needle (B-D U/F) 31G X 8 MM miscellaneous Use 4 pen needles daily or as directed. 100 each 11     Review of Systems   11 point ROS is negative including headaches, vision changes, fevers, chills, nausea, vomiting, weight changes, heat/cold intolerance, difficulty swallowing, shortness of breath, chest pain, abdominal pain, diarrhea, constipation, leg swelling, rashes, dysuria, hematuria, hematochezia     Past Medical History:   Diagnosis Date     Type 1 diabetes (H)     age of onset 14 years       No past surgical history on file.    Family History   Problem Relation Age of Onset     Diabetes Brother      Cerebrovascular Disease Paternal Grandmother      Arthritis Mother      Arthritis Maternal Grandmother        History     Social History     Marital Status:  "Single     Spouse Name: N/A     Number of Children: N/A     Years of Education: N/A     Social History Main Topics     Smoking status: Never Smoker      Smokeless tobacco: Never Used     Alcohol Use: No     Drug Use: No     Sexual Activity:     Partners: Female     Other Topics Concern     None     Social History [place]     Has degrees in cultural studies and film studies, 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  Christiano is  and has a 14 weeks old baby girl  No smoking. No alcohol, no illicit drugs    Family history:  Father: 71 yrs old, retired nephrologist in the [place] area, healthy  Mother: 69 yrs old, knee replacement, otherwise healthy  1 older brother, 43 yrs old, T1D since age 5, no complications; one older sister age 44 healthy.  No children    Objective:   BP (!) 148/87   Pulse 65   Ht 1.93 m (6' 4\")   Wt 94.4 kg (208 lb 1.6 oz)   BMI 25.33 kg/m     Constitutional: Appears well-developed and well-nourished. NAD  EYES: anicteric, normal extra-ocular movements, no lid lag or retraction   HEENT: Mouth/Throat: Mucous membrane is moist.   Thyroid: Thyroid gland is not palpable.  Pulmonary/Chest: Normal breathing on room air.   Neurological: Alert. Normal affect. No obvious deficits on limited exam. Gait normal   Extremities: No clubbing, cyanosis or inflammation.  No tremor out of the outstretched hands  Feet: Normal sensation to monofilament  Skin: normal texture, color and temperature  Psychological: appropriate mood and affect     Labs/Data:  Data reviewed      Assessment/Treatment Plan:      Agustín Gallegos is a 39 year old male with type 1 diabetes for the past 25 years.    1. Type 1 Diabetes: Diabetes control has deteriorated and his A1c is 7.5% today; goal is <7.0%.   Blood glucose levels have improved since his last visit.   He is having some hypoglycemia after his breakfast.  We will reduce carb coverage he with breakfast from 1 unit per 4 g to 1 unit per 5 g of carbohydrates.   We will also " reduce his insulin correction from 1 unit per 30 mg/dL to 1 unit per 40 mg/dL above target.   -- Continue Tresiba at 26 units at bedtime  -- Change carb coverage   Novolog for breakfast: 1 unit: 5 g of CHO  Novolog for lunch and dinner dinner -- if eat at home 1 unit : 4 g of CHO                                      -- if eat out, 1 unit : 3 g of CHO  Novolog correction scale: 1 unit for every 40 mg of blood sugar > 140 during the daytime and above 180 mg/dL at night.    2. Chronic diabetic complications: There is no evidence of chronic diabetic complications.  Creatinine levels and microalbuminuria screening were negative.      3. Hypertension: Pt was on prophylactic Losartan in the past.  Losartan was discontinued on September 2014 and his BP levels were normal off medications. BP is slightly elevated today, but has been otherwise normal.     4. Health maintenance: Continue vitamin D.     Orders Placed This Encounter   Procedures     Hemoglobin A1c POCT     Return to clinic in 3 months or sooner if needed.    Sharona Banks MD PhD    Division of Endocrinology and Diabetes

## 2020-02-28 ASSESSMENT — ENCOUNTER SYMPTOMS
SKIN CHANGES: 0
SINUS PAIN: 1
TROUBLE SWALLOWING: 0
POOR WOUND HEALING: 0
SMELL DISTURBANCE: 0
NECK MASS: 0
SINUS CONGESTION: 0
HOARSE VOICE: 0
TASTE DISTURBANCE: 0
SORE THROAT: 0
NAIL CHANGES: 0

## 2020-03-02 ENCOUNTER — OFFICE VISIT (OUTPATIENT)
Dept: ENDOCRINOLOGY | Facility: CLINIC | Age: 40
End: 2020-03-02
Payer: COMMERCIAL

## 2020-03-02 VITALS
BODY MASS INDEX: 25.06 KG/M2 | DIASTOLIC BLOOD PRESSURE: 87 MMHG | SYSTOLIC BLOOD PRESSURE: 136 MMHG | WEIGHT: 205.8 LBS | HEART RATE: 73 BPM | HEIGHT: 76 IN

## 2020-03-02 DIAGNOSIS — E10.9 WELL CONTROLLED TYPE 1 DIABETES MELLITUS (H): ICD-10-CM

## 2020-03-02 DIAGNOSIS — E10.9 TYPE 1 DIABETES MELLITUS WITHOUT COMPLICATION (H): Primary | ICD-10-CM

## 2020-03-02 LAB — HBA1C MFR BLD: 7.5 % (ref 4.3–6)

## 2020-03-02 ASSESSMENT — PAIN SCALES - GENERAL: PAINLEVEL: NO PAIN (1)

## 2020-03-02 ASSESSMENT — MIFFLIN-ST. JEOR: SCORE: 1955

## 2020-03-02 NOTE — LETTER
3/2/2020       RE: Agustín Gallegos  [address]  [facility] [address]     Dear Colleague,    Thank you for referring your patient, Agustín Gallegos, to the [facility] ENDOCRINOLOGY at [facility]. Please see a copy of my visit note below.    HPI:   Christiano is a 40 yo man here for follow up of type 1 diabetes, which he has had since age 14.  He reports that overall things are going well.  His daughter, Daphney is now 7 months old.  They are enjoying parenthood and adapting to their new life.  He has noticed his glucose has been more variable lately and he has found that he has been dropping quickly after eating.  He is watching his jamila sensor carefully, and admits that he may overreact to the drops, then climb again after treating.  He has also found his fasting numbers in the morning have been high.      He admits that he is very fearful of hypoglycemia in the night, so he may eat too much snack at bedtime without adequate coverage.     Novolog dosing:   Breakfast- raisin bran- 1/5g  Lunch- sometimes leftovers (low carb), [place] [employer]. 1/6g  Dinner- usually low carb. 1/4g (1/3g with higher fat content).   HS snack- 1/10g.   Correction: 1/30 over 130 mg/dL.      He has been trying to take his Novolog 10 minutes before he eats, but finds this difficult.      He takes Tresiba 26 units daily.      Christiano wears a jamila sensor with overall average of 169 mg/dL for the past two weeks.    40% in target ( mg/dL)  57% above target (>180 mg/dL)  3% low (<70 mg/dL)  0% very low (<54 mg/dL)    He has been feeling well and in his usual state of health.  He has no other concerns today.     ROS  GENERAL: no weight loss, weight gain, fevers, chills, malaise, night sweats.   HEENT: no dysphagia, diplopia, neck pain or tenderness, dry/scratchy eyes, URI, cough, sinus drainage, tinnitus, sinus pressure  CV: no chest pain, pressure, palpitations, skipped beats, LOC  LUNGS: no SOB, WILDE,  "cough, sputum production, wheezing   GI: no diarrhea, constipation, abdominal pain  EXTREMITIES: no rashes, ulcers, edema  NEUROLOGY: no changes in vision, tingling or numbness in hands or feet.   MSK: no muscle aches or pains, weakness  PSYCH: mood stable    Past Medical History:   Diagnosis Date     Type 1 diabetes (H)     age of onset 14 years       No past surgical history on file.    Family History   Problem Relation Age of Onset     Diabetes Brother      Cerebrovascular Disease Paternal Grandmother      Arthritis Mother      Arthritis Maternal Grandmother        Social History     Social History     Marital status: Single     Spouse name: N/A     Number of children: N/A     Years of education: N/A     Social History Main Topics     Smoking status: Never Smoker     Smokeless tobacco: Never Used     Alcohol use No     Drug use: No     Sexual activity: Yes     Partners: Female     Other Topics Concern     None     Social History Narrative   Social History: Works for [employer]. . DaughterDaphney born August, 2019.    Current Outpatient Medications   Medication     blood glucose (CONTOUR NEXT TEST) test strip     blood glucose monitoring (CANDE MICROLET) lancets     blood glucose monitoring (NO BRAND SPECIFIED) meter device kit     cholecalciferol (VITAMIN D) 1000 UNIT tablet     Continuous Blood Gluc Sensor (FREESTYLE LILO 14 DAY SENSOR) MISC     insulin aspart (NOVOLOG FLEXPEN) 100 UNIT/ML pen     insulin degludec (TRESIBA FLEXTOUCH) 200 UNIT/ML pen     insulin pen needle (B-D U/F) 31G X 8 MM miscellaneous     No current facility-administered medications for this visit.         No Known Allergies    Physical Exam  /87   Pulse 73   Ht 1.93 m (6' 4\")   Wt 93.4 kg (205 lb 12.8 oz)   BMI 25.05 kg/m     GENERAL:  Alert and oriented X3, NAD, well dressed, answering questions appropriately, appears stated age.  EXTREMITIES: no edema, +pulses, no rashes, no lesions    RESULTS    Lab Results "   Component Value Date    A1C 7.7 (H) 05/13/2019    A1C 7.9 (H) 08/13/2014    HEMOGLOBINA1 7.5 (A) 11/26/2019    HEMOGLOBINA1 8.2 (A) 08/26/2019    HEMOGLOBINA1 7.7 (A) 02/04/2019    HEMOGLOBINA1 7.5 (A) 08/06/2018    HEMOGLOBINA1 7.8 (A) 04/24/2018       TSH   Date Value Ref Range Status   05/13/2019 2.14 0.40 - 4.00 mU/L Final   01/30/2018 2.46 0.40 - 4.00 mU/L Final   09/16/2016 0.97 0.40 - 4.00 mU/L Final   06/24/2015 0.78 0.40 - 4.00 mU/L Final   12/04/2014 1.09 0.40 - 4.00 mU/L Final     Comment:     Effective 7/30/2014, the reference range for this assay has changed to reflect   new instrumentation/methodology.       T4 Free   Date Value Ref Range Status   09/16/2016 1.01 0.76 - 1.46 ng/dL Final   06/24/2015 1.02 0.76 - 1.46 ng/dL Final       ALT   Date Value Ref Range Status   03/05/2015 20 0 - 70 U/L Final   ]    Recent Labs   Lab Test 05/13/19  0722 01/30/18  0727  08/13/14  0956   CHOL 163 149   < > 162   HDL 44 44   < > 49   * 93   < > 102   TRIG 60 60   < > 56   CHOLHDLRATIO  --   --   --  3.3    < > = values in this interval not displayed.       Lab Results   Component Value Date     05/13/2019      Lab Results   Component Value Date    POTASSIUM 4.2 05/13/2019     Lab Results   Component Value Date    CHLORIDE 101 05/13/2019     Lab Results   Component Value Date    CELESTE 9.3 05/13/2019     Lab Results   Component Value Date    CO2 31 05/13/2019     Lab Results   Component Value Date    BUN 12 05/13/2019     Lab Results   Component Value Date    CR 0.80 05/13/2019       GFR Estimate   Date Value Ref Range Status   05/13/2019 >90 >60 mL/min/[1.73_m2] Final     Comment:     Non  GFR Calc  Starting 12/18/2018, serum creatinine based estimated GFR (eGFR) will be   calculated using the Chronic Kidney Disease Epidemiology Collaboration   (CKD-EPI) equation.     08/22/2017 >90 >60 mL/min/1.7m2 Final     Comment:     Non  GFR Calc   09/16/2016 >90  Non   American GFR Calc   >60 mL/min/1.7m2 Final     GFR Estimate If Black   Date Value Ref Range Status   05/13/2019 >90 >60 mL/min/[1.73_m2] Final     Comment:      GFR Calc  Starting 12/18/2018, serum creatinine based estimated GFR (eGFR) will be   calculated using the Chronic Kidney Disease Epidemiology Collaboration   (CKD-EPI) equation.     08/22/2017 >90 >60 mL/min/1.7m2 Final     Comment:      GFR Calc   09/16/2016 >90   GFR Calc   >60 mL/min/1.7m2 Final       Lab Results   Component Value Date    MICROL 5 05/13/2019     No results found for: MICROALBUMIN  No results found for: CPEPT, GADAB, ISCAB    No results found for: B12]    Most recent eye exam date: : Not Found     Assessment/Plan:     1.  Type 1 diabetes- Christiano is doing better with the addition of the jamila.  A1c is 7.5%, however he is having a lot of fluctuations in his glucose and is dropping significantly after eating, particularly after breakfast and lunch.  He is climbing in the morning and is routinely waking up over 200 mg/dL in the morning.  We also discussed the utility of an IN-pen (bluetooth pen connected to an leticia for dosing calculations) and the tandem pump with control IQ technology.  He will think about these things.  For now, we made the following changes (instructions given to patient):     Increase Tresiba to 28 units x 5 days.  If you are still over 180 in the morning (and not going low in the night), then increase further to 30 units.      Relax your insulin:carb ratio to   Breakfast- 1/7g (was 1/5g)  Lunch- 1/8g (was 1/6g)  Dinner- 1/4g (no change)  Snack- 1/10g  Send me data in 2 weeks- show the daily graphs x 1 week. [email or web address].    Schedule eye exam.     2.  Risk factors-     Retinopathy:  No.  Due for eye exam.   Nephropathy:  BP is good.  No microalbuminuria.  Creatinine stable.  On no meds. No MA.  Neuropathy: No.    Feet: OK, no ulcers.   Taking ASA: no  Lipids:  LDL is slightly  elevated.  Discussed heart healthy eating ideas and encouraged him to increase consumption of fruits, vegetables and whole grains and to reduce processed meats. Suggested visit with dietitian.    Celiac screening: negative screen 5/19    3.  F/U in 3 months with Dr. Banks.  I am also happy to see him as needed in the future.     I spent 35 minutes with this patient face to face and explained the conditions and plans (more than 50% of time was counseling/coordination of care, diabetes management, follow up plan for worsening hyper and hypoglycemia) . The patient understood and is satisfied with today's visit.      Shena Iniguez PA-C, MPAS   [facility]  Department of Medicine  Division of Endocrinology and Diabetes

## 2020-03-02 NOTE — PROGRESS NOTES
HPI:   Christiano is a 40 yo man here for follow up of type 1 diabetes, which he has had since age 14.  He reports that overall things are going well.  His daughter, Daphney is now 7 months old.  They are enjoying parenthood and adapting to their new life.  He has noticed his glucose has been more variable lately and he has found that he has been dropping quickly after eating.  He is watching his jamila sensor carefully, and admits that he may overreact to the drops, then climb again after treating.  He has also found his fasting numbers in the morning have been high.      He admits that he is very fearful of hypoglycemia in the night, so he may eat too much snack at bedtime without adequate coverage.     Novolog dosing:   Breakfast- raisin bran- 1/5g  Lunch- sometimes leftovers (low carb), [place] [employer]. 1/6g  Dinner- usually low carb. 1/4g (1/3g with higher fat content).   HS snack- 1/10g.   Correction: 1/30 over 130 mg/dL.      He has been trying to take his Novolog 10 minutes before he eats, but finds this difficult.      He takes Tresiba 26 units daily.      Christiano wears a jamila sensor with overall average of 169 mg/dL for the past two weeks.    40% in target ( mg/dL)  57% above target (>180 mg/dL)  3% low (<70 mg/dL)  0% very low (<54 mg/dL)    He has been feeling well and in his usual state of health.  He has no other concerns today.     ROS  GENERAL: no weight loss, weight gain, fevers, chills, malaise, night sweats.   HEENT: no dysphagia, diplopia, neck pain or tenderness, dry/scratchy eyes, URI, cough, sinus drainage, tinnitus, sinus pressure  CV: no chest pain, pressure, palpitations, skipped beats, LOC  LUNGS: no SOB, WILDE, cough, sputum production, wheezing   GI: no diarrhea, constipation, abdominal pain  EXTREMITIES: no rashes, ulcers, edema  NEUROLOGY: no changes in vision, tingling or numbness in hands or feet.   MSK: no muscle aches or pains, weakness  PSYCH: mood stable    Past Medical History:  "  Diagnosis Date     Type 1 diabetes (H)     age of onset 14 years       No past surgical history on file.    Family History   Problem Relation Age of Onset     Diabetes Brother      Cerebrovascular Disease Paternal Grandmother      Arthritis Mother      Arthritis Maternal Grandmother        Social History     Social History     Marital status: Single     Spouse name: N/A     Number of children: N/A     Years of education: N/A     Social History Main Topics     Smoking status: Never Smoker     Smokeless tobacco: Never Used     Alcohol use No     Drug use: No     Sexual activity: Yes     Partners: Female     Other Topics Concern     None     Social History Narrative   Social History: Works for [employer]. . Daughter, Daphney born August, 2019.    Current Outpatient Medications   Medication     blood glucose (CONTOUR NEXT TEST) test strip     blood glucose monitoring (CANDE MICROLET) lancets     blood glucose monitoring (NO BRAND SPECIFIED) meter device kit     cholecalciferol (VITAMIN D) 1000 UNIT tablet     Continuous Blood Gluc Sensor (NexalogySTYLE LILO 14 DAY SENSOR) MISC     insulin aspart (NOVOLOG FLEXPEN) 100 UNIT/ML pen     insulin degludec (TRESIBA FLEXTOUCH) 200 UNIT/ML pen     insulin pen needle (B-D U/F) 31G X 8 MM miscellaneous     No current facility-administered medications for this visit.         No Known Allergies    Physical Exam  /87   Pulse 73   Ht 1.93 m (6' 4\")   Wt 93.4 kg (205 lb 12.8 oz)   BMI 25.05 kg/m    GENERAL:  Alert and oriented X3, NAD, well dressed, answering questions appropriately, appears stated age.  EXTREMITIES: no edema, +pulses, no rashes, no lesions    RESULTS    Lab Results   Component Value Date    A1C 7.7 (H) 05/13/2019    A1C 7.9 (H) 08/13/2014    HEMOGLOBINA1 7.5 (A) 11/26/2019    HEMOGLOBINA1 8.2 (A) 08/26/2019    HEMOGLOBINA1 7.7 (A) 02/04/2019    HEMOGLOBINA1 7.5 (A) 08/06/2018    HEMOGLOBINA1 7.8 (A) 04/24/2018       TSH   Date Value Ref Range " Status   05/13/2019 2.14 0.40 - 4.00 mU/L Final   01/30/2018 2.46 0.40 - 4.00 mU/L Final   09/16/2016 0.97 0.40 - 4.00 mU/L Final   06/24/2015 0.78 0.40 - 4.00 mU/L Final   12/04/2014 1.09 0.40 - 4.00 mU/L Final     Comment:     Effective 7/30/2014, the reference range for this assay has changed to reflect   new instrumentation/methodology.       T4 Free   Date Value Ref Range Status   09/16/2016 1.01 0.76 - 1.46 ng/dL Final   06/24/2015 1.02 0.76 - 1.46 ng/dL Final       ALT   Date Value Ref Range Status   03/05/2015 20 0 - 70 U/L Final   ]    Recent Labs   Lab Test 05/13/19  0722 01/30/18  0727  08/13/14  0956   CHOL 163 149   < > 162   HDL 44 44   < > 49   * 93   < > 102   TRIG 60 60   < > 56   CHOLHDLRATIO  --   --   --  3.3    < > = values in this interval not displayed.       Lab Results   Component Value Date     05/13/2019      Lab Results   Component Value Date    POTASSIUM 4.2 05/13/2019     Lab Results   Component Value Date    CHLORIDE 101 05/13/2019     Lab Results   Component Value Date    CELESTE 9.3 05/13/2019     Lab Results   Component Value Date    CO2 31 05/13/2019     Lab Results   Component Value Date    BUN 12 05/13/2019     Lab Results   Component Value Date    CR 0.80 05/13/2019       GFR Estimate   Date Value Ref Range Status   05/13/2019 >90 >60 mL/min/[1.73_m2] Final     Comment:     Non  GFR Calc  Starting 12/18/2018, serum creatinine based estimated GFR (eGFR) will be   calculated using the Chronic Kidney Disease Epidemiology Collaboration   (CKD-EPI) equation.     08/22/2017 >90 >60 mL/min/1.7m2 Final     Comment:     Non  GFR Calc   09/16/2016 >90  Non  GFR Calc   >60 mL/min/1.7m2 Final     GFR Estimate If Black   Date Value Ref Range Status   05/13/2019 >90 >60 mL/min/[1.73_m2] Final     Comment:      GFR Calc  Starting 12/18/2018, serum creatinine based estimated GFR (eGFR) will be   calculated using the  Chronic Kidney Disease Epidemiology Collaboration   (CKD-EPI) equation.     08/22/2017 >90 >60 mL/min/1.7m2 Final     Comment:      GFR Calc   09/16/2016 >90   GFR Calc   >60 mL/min/1.7m2 Final       Lab Results   Component Value Date    MICROL 5 05/13/2019     No results found for: MICROALBUMIN  No results found for: CPEPT, GADAB, ISCAB    No results found for: B12]    Most recent eye exam date: : Not Found     Assessment/Plan:     1.  Type 1 diabetes- Christiano is doing better with the addition of the jamila.  A1c is 7.5%, however he is having a lot of fluctuations in his glucose and is dropping significantly after eating, particularly after breakfast and lunch.  He is climbing in the morning and is routinely waking up over 200 mg/dL in the morning.  We also discussed the utility of an IN-pen (bluetooth pen connected to an leticia for dosing calculations) and the tandem pump with control IQ technology.  He will think about these things.  For now, we made the following changes (instructions given to patient):     Increase Tresiba to 28 units x 5 days.  If you are still over 180 in the morning (and not going low in the night), then increase further to 30 units.      Relax your insulin:carb ratio to   Breakfast- 1/7g (was 1/5g)  Lunch- 1/8g (was 1/6g)  Dinner- 1/4g (no change)  Snack- 1/10g  Send me data in 2 weeks- show the daily graphs x 1 week. [email or web address].    Schedule eye exam.     2.  Risk factors-     Retinopathy:  No.  Due for eye exam.   Nephropathy:  BP is good.  No microalbuminuria.  Creatinine stable.  On no meds. No MA.  Neuropathy: No.    Feet: OK, no ulcers.   Taking ASA: no  Lipids:  LDL is slightly elevated.  Discussed heart healthy eating ideas and encouraged him to increase consumption of fruits, vegetables and whole grains and to reduce processed meats. Suggested visit with dietitian.    Celiac screening: negative screen 5/19    3.  F/U in 3 months with Dr. Banks.  I am  also happy to see him as needed in the future.     I spent 35 minutes with this patient face to face and explained the conditions and plans (more than 50% of time was counseling/coordination of care, diabetes management, follow up plan for worsening hyper and hypoglycemia) . The patient understood and is satisfied with today's visit.      Shena Iniguez PA-C, MPAS   [facility]  Department of Medicine  Division of Endocrinology and Diabetes

## 2020-03-02 NOTE — PATIENT INSTRUCTIONS
Look up Tandem X2 with control IQ    In pen- bluetooth insulin pen. Let me know if you would like me to order this for you.     Increase Tresiba to 28 units x 5 days.  If you are still over 180 in the morning (and not going low in the night), then increase further to 30 units.      Relax your insulin:carb ratio to   Breakfast- 1/7g (was 1/5g)  Lunch- 1/8g (was 1/6g)  Dinner- 1/4g (no change)  Snack- 1/10g  Send me data in 2 weeks- show the daily graphs x 1 week. [email or web address].    Schedule eye exam.

## 2020-03-09 ENCOUNTER — OFFICE VISIT (OUTPATIENT)
Dept: FAMILY MEDICINE | Facility: CLINIC | Age: 40
End: 2020-03-09
Payer: COMMERCIAL

## 2020-03-09 VITALS
SYSTOLIC BLOOD PRESSURE: 122 MMHG | TEMPERATURE: 98 F | HEART RATE: 90 BPM | DIASTOLIC BLOOD PRESSURE: 86 MMHG | BODY MASS INDEX: 25.59 KG/M2 | HEIGHT: 75 IN | OXYGEN SATURATION: 98 % | WEIGHT: 205.8 LBS

## 2020-03-09 DIAGNOSIS — R07.0 THROAT PAIN: Primary | ICD-10-CM

## 2020-03-09 LAB
DEPRECATED S PYO AG THROAT QL EIA: NEGATIVE
SPECIMEN SOURCE: NORMAL
SPECIMEN SOURCE: NORMAL
STREP GROUP A PCR: NOT DETECTED

## 2020-03-09 PROCEDURE — 40001204 ZZHCL STATISTIC STREP A RAPID: Performed by: PHYSICIAN ASSISTANT

## 2020-03-09 PROCEDURE — 87651 STREP A DNA AMP PROBE: CPT | Performed by: PHYSICIAN ASSISTANT

## 2020-03-09 PROCEDURE — 99213 OFFICE O/P EST LOW 20 MIN: CPT | Performed by: PHYSICIAN ASSISTANT

## 2020-03-09 ASSESSMENT — MIFFLIN-ST. JEOR: SCORE: 1939.13

## 2020-03-09 NOTE — PROGRESS NOTES
SUBJECTIVE:  Agustín is a 39 year old male who presents to urgent care with 3 days of sore throat.  He has noticed it is swollen today in the mirror.  Over the last 2 weeks has had on and off again cold symptoms with nasal congestion, postnasal drip with green mucus and mild cough.  He denies fevers, sinus pain, ear pain, shortness of breath, wheeze, chest pain, nausea, vomiting, diarrhea, abdominal pain or muscle aches.  He has had about 90% energy level.    Chief Complaint   Patient presents with     Pharyngitis     3 days        Past Medical History:   Diagnosis Date     Type 1 diabetes (H)     age of onset 14 years      Social History     Socioeconomic History     Marital status: Single     Spouse name: Not on file     Number of children: Not on file     Years of education: Not on file     Highest education level: Not on file   Occupational History     Not on file   Social Needs     Financial resource strain: Not on file     Food insecurity     Worry: Not on file     Inability: Not on file     Transportation needs     Medical: Not on file     Non-medical: Not on file   Tobacco Use     Smoking status: Never Smoker     Smokeless tobacco: Never Used   Substance and Sexual Activity     Alcohol use: No     Drug use: No     Sexual activity: Yes     Partners: Female   Lifestyle     Physical activity     Days per week: Not on file     Minutes per session: Not on file     Stress: Not on file   Relationships     Social connections     Talks on phone: Not on file     Gets together: Not on file     Attends Hoahaoism service: Not on file     Active member of club or organization: Not on file     Attends meetings of clubs or organizations: Not on file     Relationship status: Not on file     Intimate partner violence     Fear of current or ex partner: Not on file     Emotionally abused: Not on file     Physically abused: Not on file     Forced sexual activity: Not on file   Other Topics Concern     Not on file   Social  "History Narrative     Not on file          Current Outpatient Medications:      blood glucose (CONTOUR NEXT TEST) test strip, test up 6.8 times daily, Disp: 680 each, Rfl: 3     blood glucose monitoring (CANDE MICROLET) lancets, Use to test blood sugar 8 times daily or as directed., Disp: 6 Box, Rfl: 5     blood glucose monitoring (NO BRAND SPECIFIED) meter device kit, Use to test blood sugar 5 times daily or as directed., Disp: 1 kit, Rfl: 0     cholecalciferol (VITAMIN D) 1000 UNIT tablet, Take 1 tablet (1,000 Units) by mouth daily, Disp: 90 tablet, Rfl: 3     Continuous Blood Gluc Sensor (FREESTYLE LILO 14 DAY SENSOR) MISC, 1 each every 14 days Swipe before meals and bedtime., Disp: 7 each, Rfl: 3     insulin aspart (NOVOLOG FLEXPEN) 100 UNIT/ML pen, Use as directed up to 60 units daily., Disp: 60 mL, Rfl: 3     insulin degludec (TRESIBA FLEXTOUCH) 200 UNIT/ML pen, Inject 30 Units Subcutaneous daily, Disp: 14 mL, Rfl: 3     insulin pen needle (B-D U/F) 31G X 8 MM miscellaneous, Use 4 pen needles daily or as directed., Disp: 100 each, Rfl: 11       OBJECTIVE:  /86   Pulse 90   Temp 98  F (36.7  C) (Oral)   Ht 1.905 m (6' 3\")   Wt 93.4 kg (205 lb 12.8 oz)   SpO2 98%   BMI 25.72 kg/m     GENERAL APPEARANCE: healthy, alert and no distress  HENT: HEAD: ATNC  EYES: Conjunctiva clear, EOMI  EARS: Right TM clear serous effusion.  Left   TM flat with intact landmarks, no erythema or bulging.  NOSE: Nares Patent.  mucosa non erythematous, turbinates non swollen, sinuses nontender  THROAT: Posterior oropharynx erythema, tonsils 2+ bilaterally, no exudate, uvula midline, non occluded  NECK: Supple, non tender, no cervical adenopathy  LUNGS: No respiratory distress, clear lung sounds in all fields, no wheezes, rales, crackles or rhonchi   CV: RRR, no murmurs, rubs or gallops, no cyanosis  NUERO: AOx3, normal mentation  PSYCH: normal mood and affect    Results for orders placed or performed in visit on 03/09/20 "   Streptococcus A Rapid Scr w Reflx to PCR     Status: None    Specimen: Throat   Result Value Ref Range    Strep Specimen Description Throat     Streptococcus Group A Rapid Screen Negative NEG^Negative   Group A Streptococcus PCR Throat Swab     Status: None    Specimen: Throat   Result Value Ref Range    Specimen Description Throat     Strep Group A PCR Not Detected NDET^Not Detected        ASSESSMENT/PLAN:  Agustín was seen today for pharyngitis.    Diagnoses and all orders for this visit:    Throat pain  -     Streptococcus A Rapid Scr w Reflx to PCR      1) Exam and history are consistent with Pharyngitis. Rapid Strep was negative; await throat culture results. this is likely viral. I recommend symptomatic therapy for sore throat as below:  Ibuprofen 600 mg (3 of the 200 mg OTC tablets or 600 mg of the children's liquid) up to 4 times daily with food or milk  Salt water gargles (mix 1 tsp salt in a quart of warm water) Gargle with 10-20 cc as needed  Consider herbal tea known as Throat Coat  For severe sore throat, consider liquid Maalox 10 cc or liquid Benadryl 25 mg swish, gargled and spit up to 4 times daily before meals  Avoid Chloraseptic spray (works for minimal time, and the pain when it wears off is often worse then before)  We discussed signs and symptoms that would warrant further evaluation from a health care provider. The plan of care was discussed with the Patient. They understand and agree with the course of treatment prescribed. A printed summary was given including instructions and medications.    Jun Gonzalez PA-C

## 2020-03-09 NOTE — PATIENT INSTRUCTIONS
Ibuprofen 600 mg (3 of the 200 mg OTC tablets or 600 mg of the children's liquid) up to 4 times daily with food or milk  Salt water gargles (mix 1 tsp salt in a quart of warm water) Gargle with 10-20 cc as needed  Consider herbal tea known as Throat Coat  For severe sore throat, consider liquid Maalox 10 cc or liquid Benadryl 25 mg swish, gargled and spit up to 4 times daily before meals  Avoid Chloraseptic spray (works for minimal time, and the pain when it wears off is often worse then before)  Patient Education     Self-Care for Sore Throats    Sore throats happen for many reasons, such as colds, allergies, and infections caused by viruses or bacteria. In any case, your throat becomes red and sore. Your goal for self-care is to reduce your discomfort while giving your throat a chance to heal.  Moisten and soothe your throat  Tips include the following:    Try a sip of water first thing after waking up.    Keep your throat moist by drinking 6 or more glasses of clear liquids every day.    Run a cool-air humidifier in your room overnight.    Avoid cigarette smoke.     Suck on throat lozenges, cough drops, hard candy, ice chips, or frozen fruit-juice bars. Use the sugar-free versions if your diet or medical condition requires them.  Gargle to ease irritation  Gargling every hour or 2 can ease irritation. Try gargling with 1 of these solutions:    1/4 teaspoon of salt in 1/2 cup of warm water    An over-the-counter anesthetic gargle  Use medicine for more relief  Over-the-counter medicine can reduce sore throat symptoms. Ask your pharmacist if you have questions about which medicine to use:    Ease pain with anesthetic sprays. Aspirin or an aspirin substitute also helps. Remember, never give aspirin to anyone 18 or younger, or if you are already taking blood thinners.     For sore throats caused by allergies, try antihistamines to block the allergic reaction.    Remember: unless a sore throat is caused by a bacterial  infection, antibiotics won t help you.  Prevent future sore throats  Prevention tips include the following:    Stop smoking or reduce contact with secondhand smoke. Smoke irritates the tender throat lining.    Limit contact with pets and with allergy-causing substances, such as pollen and mold.    When you re around someone with a sore throat or cold, wash your hands often to keep viruses or bacteria from spreading.    Don t strain your vocal cords.  Contact your healthcare provider if you have:    A temperature over 101 F (38.3 C)    White spots on the throat    Great difficulty swallowing    Trouble breathing    A skin rash    Recent exposure to someone else with strep bacteria    Severe hoarseness and swollen glands in the neck or jaw  Date Last Reviewed: 8/1/2016 2000-2019 [employer]. [address] [place] [address], [place] [address]. All rights reserved. This information is not intended as a substitute for professional medical care. Always follow your healthcare professional's instructions.

## 2020-03-18 ENCOUNTER — VIRTUAL VISIT (OUTPATIENT)
Dept: FAMILY MEDICINE | Facility: OTHER | Age: 40
End: 2020-03-18

## 2020-03-18 NOTE — PROGRESS NOTES
"Date: 2020 12:41:08  Clinician: Jacqueline Vargas  Clinician NPI: [id number]  Patient: Agustín Gallegos  Patient : 1980  Patient Address: [facility] [address] [place] [id number]  Patient Phone: [phone]  Visit Protocol: URI  Patient Summary:  Agustín is a 39 year old ( : 1980 ) male who initiated a Visit for COVID-19 (Coronavirus) evaluation and screening. When asked the question \"Please sign me up to receive news, health information and promotions from [employer].\", Agustín responded \"No\".    Agustín states his symptoms started gradually 10-13 days ago. After his symptoms started, they improved and then got worse again.   His symptoms consist of nasal congestion, rhinitis, and facial pain or pressure. He is experiencing difficulty breathing due to nasal congestion but he is not short of breath.   Symptom details     Nasal secretions: The color of his mucus is clear and white.    Facial pain or pressure: The facial pain or pressure does not feel worse when bending or leaning forward.      Agustín denies having wheezing, sore throat, cough, fever, ear pain, malaise, headache, enlarged lymph nodes, myalgias, chills, and teeth pain. He also denies taking antibiotic medication for the symptoms, having a sinus infection within the past year, and having recent facial or sinus surgery in the past 60 days.    Pertinent COVID-19 (Coronavirus) information  Agustín has not traveled internationally or to the areas where COVID-19 (Coronavirus) is widespread in the last 14 days before the start of his symptoms.   Agustín has not had a close contact with a laboratory-confirmed COVID-19 patient within 14 days of symptom onset. He also has not had a close contact with a suspected COVID-19 patient within 14 days of symptom onset.   Agustín is not a healthcare worker and does not work in a healthcare facility.   Pertinent medical history  Agustín does not need a return to work/school note.   Weight: 205 " lbs   Agustín does not smoke or use smokeless tobacco.   Additional information as reported by the patient (free text): 1) Have had different resp symptoms over past 3 weeks, visit to [facility] for postnasal drip and mild fever--99.6F. Diagnosis: viral respiratory.     2) That got better, but new symptoms developed over weekend, mainly stuffy/runny nose. No cough. No fever.    3) Informed last night that coworker on my floor at work has suspected case of Covid-19. Not in close contact with this person but share common areas. My last day there was Thurs 3/12, they had symptoms starting 3/13.   Weight: 205 lbs    MEDICATIONS: Novolog U-100 Insulin aspart subcutaneous, Tresiba FlexTouch U-200 subcutaneous, ALLERGIES: NKDA  Clinician Response:  Dear Agustín,   Dear Agustín,  Based on the information you have provided about potential exposure to Coronavirus (Covid-19) but without any symptoms of the virus (cough, fever, shortness of breath are the most common symptoms), it does not appear you need Coronavirus (COVID-19) testing at this time.   But your possible exposure to Coronavirus means that we do recommend self-isolation for 14 days from the last day you may have been exposed.   What does this mean?  Isolate Yourself:   Isolate yourself at home.   Do Not allow any visitors  Do Not go to work or school  Do Not go to Rastafari,  centers, shopping, or other public places.  Do Not shake hands.  Avoid close contact with others (hugging, kissing).   Protect Others:   Cover Your Mouth and Nose with a mask, disposable tissue or wash cloth to avoid spreading germs to others.  Wash your hands and face frequently with soap and water.   If you have not developed a cough with fever by day 15 of isolation you are considered uninfected.  Thank you for limiting contact with others, wearing a simple mask to cover your cough, practice good hand hygiene habits and accessing our virtual services where possible to  limit the spread of this virus.  For more information about COVID19 and options for caring for yourself at home, please visit the CDC website at https://www.cdc.gov/coronavirus/2019-ncov/about/steps-when-sick.html  For more options for care at [facility], please visit our website at [email or web address]    Diagnosis: Nasal congestion  Diagnosis ICD: R09.81

## 2020-03-20 NOTE — PROGRESS NOTES
Endocrinology Clinic Visit  10/30/2018  Agustín Gallegos     Chief Complaint: RECHECK (dm 1)       HPI: Christiano is a 39 year old year old male with history of type 1 diabetes who is seen in follow-up for the same.    Briefly, Christiano was diagnosed at age 14.  He is originally from the [facility], was/ living in [place], and moved to [place] in July 2014. He was first seen by me on September 2014.    Christiano and his wife had a baby girl who is now 14 weeks old.  She is doing well and he is still on paternity leave.  He will have 10 weeks of paternity leave.    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 He continues to work there, noting it is less active than his prior job. He does take a walk every day around 2pm, roughly for a half an hour. He just got  on October 6, 2018 and now has a 14 weeks old baby girl.      He is currently on Tresiba 26 units at bedtime and Novolog 1 unit per 4g of CHO with his meals and 1 unit per 30 mg/dL above 130 mg/dL.     Download from meter   Avg BG checks 1.9x/day  Avg 164  56 mg/dL  Lowest blood glucose value 55  Highest recorded value is 287  There is evidence of hypoglycemia after breakfast  A1c is 7.5%  Today.    He is not interested in CGM and insulin pump at this time. His brother is on a pump, but did not do that well for diabetes control.     Agustín does not have chronic complications of diabetes.    Retinopathy: Last eye exam was normal    Nephropathy: Microalbuminuria was negative.  He was placed on losartan as a renal protective agent around 2008.  He had no history of microalbuminuria or hypertension and after a detailed discussion, we decided to discontinue Losartan in September 2014.  Blood pressure levels remained within the normal range.  Neuropathy: There is no history of peripheral or autonomic neuropathy.    He is on vitamin D 1000 units per day.       No Known Allergies    Current Outpatient Medications    Medication Sig Dispense Refill     blood glucose (CONTOUR NEXT TEST) test strip test up 6.8 times daily 680 each 3     blood glucose monitoring (CANDE MICROLET) lancets Use to test blood sugar 8 times daily or as directed. 6 Box 5     blood glucose monitoring (NO BRAND SPECIFIED) meter device kit Use to test blood sugar 5 times daily or as directed. 1 kit 0     cholecalciferol (VITAMIN D) 1000 UNIT tablet Take 1 tablet (1,000 Units) by mouth daily 90 tablet 3     Continuous Blood Gluc Sensor (FREESTYLE LILO 14 DAY SENSOR) MISC 1 each every 14 days Swipe before meals and bedtime. 7 each 3     insulin aspart (NOVOLOG FLEXPEN) 100 UNIT/ML pen Inject 1 unit per 7 grams for breakfast and 1 unit per 6 g for lunch, dinner, snacks. Approx 60 units daily. 60 mL 3     insulin degludec (TRESIBA FLEXTOUCH) 200 UNIT/ML pen Inject 24 Units Subcutaneous daily 12 mL 3     insulin pen needle (B-D U/F) 31G X 8 MM miscellaneous Use 4 pen needles daily or as directed. 100 each 11     Review of Systems   11 point ROS is negative including headaches, vision changes, fevers, chills, nausea, vomiting, weight changes, heat/cold intolerance, difficulty swallowing, shortness of breath, chest pain, abdominal pain, diarrhea, constipation, leg swelling, rashes, dysuria, hematuria, hematochezia     Past Medical History:   Diagnosis Date     Type 1 diabetes (H)     age of onset 14 years       No past surgical history on file.    Family History   Problem Relation Age of Onset     Diabetes Brother      Cerebrovascular Disease Paternal Grandmother      Arthritis Mother      Arthritis Maternal Grandmother        History     Social History     Marital Status: Single     Spouse Name: N/A     Number of Children: N/A     Years of Education: N/A     Social History Main Topics     Smoking status: Never Smoker      Smokeless tobacco: Never Used     Alcohol Use: No     Drug Use: No     Sexual Activity:     Partners: Female     Other Topics Concern     None  "    Social History Narrative     Has degrees in cultural studies and film studies, 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  Christiano is  and has a 14 weeks old baby girl  No smoking. No alcohol, no illicit drugs    Family history:  Father: 71 yrs old, retired nephrologist in the [place] area, healthy  Mother: 69 yrs old, knee replacement, otherwise healthy  1 older brother, 43 yrs old, T1D since age 5, no complications; one older sister age 44 healthy.  No children    Objective:   BP (!) 148/87   Pulse 65   Ht 1.93 m (6' 4\")   Wt 94.4 kg (208 lb 1.6 oz)   BMI 25.33 kg/m    Constitutional: Appears well-developed and well-nourished. NAD  EYES: anicteric, normal extra-ocular movements, no lid lag or retraction   HEENT: Mouth/Throat: Mucous membrane is moist.   Thyroid: Thyroid gland is not palpable.  Pulmonary/Chest: Normal breathing on room air.   Neurological: Alert. Normal affect. No obvious deficits on limited exam. Gait normal   Extremities: No clubbing, cyanosis or inflammation.  No tremor out of the outstretched hands  Feet: Normal sensation to monofilament  Skin: normal texture, color and temperature  Psychological: appropriate mood and affect     Labs/Data:  Data reviewed      Assessment/Treatment Plan:      Agustín Gallegos is a 39 year old male with type 1 diabetes for the past 25 years.    1. Type 1 Diabetes: Diabetes control has deteriorated and his A1c is 7.5% today; goal is <7.0%.   Blood glucose levels have improved since his last visit.   He is having some hypoglycemia after his breakfast.  We will reduce carb coverage he with breakfast from 1 unit per 4 g to 1 unit per 5 g of carbohydrates.   We will also reduce his insulin correction from 1 unit per 30 mg/dL to 1 unit per 40 mg/dL above target.   -- Continue Tresiba at 26 units at bedtime  -- Change carb coverage   Novolog for breakfast: 1 unit: 5 g of CHO  Novolog for lunch and dinner dinner -- if eat at home 1 unit : 4 g of CHO                       "                -- if eat out, 1 unit : 3 g of CHO  Novolog correction scale: 1 unit for every 40 mg of blood sugar > 140 during the daytime and above 180 mg/dL at night.    2. Chronic diabetic complications: There is no evidence of chronic diabetic complications.  Creatinine levels and microalbuminuria screening were negative.      3. Hypertension: Pt was on prophylactic Losartan in the past.  Losartan was discontinued on September 2014 and his BP levels were normal off medications. BP is slightly elevated today, but has been otherwise normal.     4. Health maintenance: Continue vitamin D.     Orders Placed This Encounter   Procedures     Hemoglobin A1c POCT       Return to clinic in 3 months or sooner if needed.    Sharona Banks MD PhD    Division of Endocrinology and Diabetes     No

## 2020-05-29 DIAGNOSIS — E10.9 WELL CONTROLLED TYPE 1 DIABETES MELLITUS (H): ICD-10-CM

## 2020-05-29 LAB
CHOLEST SERPL-MCNC: 150 MG/DL
CREAT SERPL-MCNC: 0.74 MG/DL (ref 0.66–1.25)
CREAT UR-MCNC: 159 MG/DL
GFR SERPL CREATININE-BSD FRML MDRD: >90 ML/MIN/{1.73_M2}
HDLC SERPL-MCNC: 41 MG/DL
LDLC SERPL CALC-MCNC: 86 MG/DL
MICROALBUMIN UR-MCNC: 10 MG/L
MICROALBUMIN/CREAT UR: 6.11 MG/G CR (ref 0–17)
NONHDLC SERPL-MCNC: 109 MG/DL
TRIGL SERPL-MCNC: 114 MG/DL
TSH SERPL DL<=0.005 MIU/L-ACNC: 2.18 MU/L (ref 0.4–4)

## 2020-06-04 ASSESSMENT — ENCOUNTER SYMPTOMS
DECREASED CONCENTRATION: 0
INSOMNIA: 0
DEPRESSION: 0
NERVOUS/ANXIOUS: 1
PANIC: 0

## 2020-06-05 NOTE — PROGRESS NOTES
"Patients Glucose Data was Sent via Email    Agustín Gallegos is a 39 year old male who is being evaluated via a billable video visit.      The patient has been notified of following:     \"This video visit will be conducted via a call between you and your physician/provider. We have found that certain health care needs can be provided without the need for an in-person physical exam.  This service lets us provide the care you need with a video conversation.  If a prescription is necessary we can send it directly to your pharmacy.  If lab work is needed we can place an order for that and you can then stop by our lab to have the test done at a later time.    Video visits are billed at different rates depending on your insurance coverage.  Please reach out to your insurance provider with any questions.    If during the course of the call the physician/provider feels a video visit is not appropriate, you will not be charged for this service.\"    Patient has given verbal consent for Video visit? Yes    How would you like to obtain your AVS? Evans    Patient would like the video invitation sent by: Send to e-mail at: [email or web address]    Will anyone else be joining your video visit? No        Video-Visit Details    Type of service:  Video Visit    Video Start Time: 0737  Video End Time:0804    Originating Location (pt. Location): home  Distant Location (provider location):  [facility] ENDOCRINOLOGY home    Platform used for Video Visit: Edu Ellis, then switched to [employer] video    HPI:   Christiano is a 40 yo man here for follow up of type 1 diabetes, which he has had since age 14.  He reports that overall things are going well.  His daughter, Daphney is now 10 months old.  They are enjoying parenthood and adapting to their new life. They have both been working from home.  He is not taking his daughter to  for now.  He has been mostly in the house with the pandemic.  He wonders when he will be able to go and " visit his parents.      He wears a jamila glucose sensor which shows an overall average of 160 mg/dL.  He tends to be highest after breakfast and lowest at bedtime.  When he has hypoglycemia, he tends to treat it, then stay high all night.  When he is not low at HS, then his sugar stays in target.      Novolog dosing:   Breakfast- cheerios- 1/5g  Lunch- sometimes leftovers (low carb) 1/7g  Dinner- usually low carb. 1/4g    HS snack- 1/10g.   Correction: 1/30 over 130 mg/dL.      He has been trying to take his Novolog 10 minutes before he eats, but finds this difficult.  It usually is just right when he eats.     He takes Tresiba 28 units daily.      Christiano wears a jamila sensor with overall average of 160 mg/dL for the past two weeks.    40% in target ( mg/dL)  57% above target (>180 mg/dL)  3% low (<70 mg/dL)  0% very low (<54 mg/dL)    He has been feeling well and in his usual state of health.  He has no other concerns today.     ROS  GENERAL: no weight loss, weight gain, fevers, chills, malaise, night sweats.   HEENT: no dysphagia, diplopia, neck pain or tenderness, dry/scratchy eyes, URI, cough, sinus drainage, tinnitus, sinus pressure  CV: no chest pain, pressure, palpitations, skipped beats, LOC  LUNGS: no SOB, WILDE, cough, sputum production, wheezing   GI: no diarrhea, constipation, abdominal pain  EXTREMITIES: no rashes, ulcers, edema  NEUROLOGY: no changes in vision, tingling or numbness in hands or feet.   MSK: no muscle aches or pains, weakness  PSYCH: mood stable    Past Medical History:   Diagnosis Date     Type 1 diabetes (H)     age of onset 14 years       No past surgical history on file.    Family History   Problem Relation Age of Onset     Diabetes Brother      Cerebrovascular Disease Paternal Grandmother      Arthritis Mother      Arthritis Maternal Grandmother        Social History     Social History     Marital status: Single     Spouse name: N/A     Number of children: N/A     Years of  education: N/A     Social History Main Topics     Smoking status: Never Smoker     Smokeless tobacco: Never Used     Alcohol use No     Drug use: No     Sexual activity: Yes     Partners: Female     Other Topics Concern     None     Social History Narrative   Social History: Works for [employer]. . Daughter, Daphney born August, 2019.    Current Outpatient Medications   Medication     blood glucose (CONTOUR NEXT TEST) test strip     blood glucose monitoring (CANDE MICROLET) lancets     blood glucose monitoring (NO BRAND SPECIFIED) meter device kit     cholecalciferol (VITAMIN D) 1000 UNIT tablet     Continuous Blood Gluc Sensor (FREESTYLE LILO 14 DAY SENSOR) MISC     insulin aspart (NOVOLOG FLEXPEN) 100 UNIT/ML pen     insulin degludec (TRESIBA FLEXTOUCH) 200 UNIT/ML pen     insulin pen needle (B-D U/F) 31G X 8 MM miscellaneous     No current facility-administered medications for this visit.         No Known Allergies    Physical Exam  GENERAL: healthy, alert and no distress  RESP: no audible wheeze, cough, or visible cyanosis.  No visible retractions or increased work of breathing.  Able to speak fully in complete sentences.  PSYCH: mentation appears normal, affect normal/bright, judgement and insight intact, normal speech and appearance well-groomed    RESULTS  Lab Results   Component Value Date    A1C 7.7 (H) 05/13/2019    A1C 7.9 (H) 08/13/2014    HEMOGLOBINA1 7.5 (A) 03/02/2020    HEMOGLOBINA1 7.5 (A) 11/26/2019    HEMOGLOBINA1 8.2 (A) 08/26/2019    HEMOGLOBINA1 7.7 (A) 02/04/2019    HEMOGLOBINA1 7.5 (A) 08/06/2018       TSH   Date Value Ref Range Status   05/29/2020 2.18 0.40 - 4.00 mU/L Final   05/13/2019 2.14 0.40 - 4.00 mU/L Final   01/30/2018 2.46 0.40 - 4.00 mU/L Final   09/16/2016 0.97 0.40 - 4.00 mU/L Final   06/24/2015 0.78 0.40 - 4.00 mU/L Final     T4 Free   Date Value Ref Range Status   09/16/2016 1.01 0.76 - 1.46 ng/dL Final   06/24/2015 1.02 0.76 - 1.46 ng/dL Final       ALT   Date  Value Ref Range Status   03/05/2015 20 0 - 70 U/L Final   ]    Recent Labs   Lab Test 05/29/20  0705 05/13/19  0722  08/13/14  0956   CHOL 150 163   < > 162   HDL 41 44   < > 49   LDL 86 107*   < > 102   TRIG 114 60   < > 56   CHOLHDLRATIO  --   --   --  3.3    < > = values in this interval not displayed.       Lab Results   Component Value Date     05/13/2019      Lab Results   Component Value Date    POTASSIUM 4.2 05/13/2019     Lab Results   Component Value Date    CHLORIDE 101 05/13/2019     Lab Results   Component Value Date    CELESTE 9.3 05/13/2019     Lab Results   Component Value Date    CO2 31 05/13/2019     Lab Results   Component Value Date    BUN 12 05/13/2019     Lab Results   Component Value Date    CR 0.74 05/29/2020       GFR Estimate   Date Value Ref Range Status   05/29/2020 >90 >60 mL/min/[1.73_m2] Final     Comment:     Non  GFR Calc  Starting 12/18/2018, serum creatinine based estimated GFR (eGFR) will be   calculated using the Chronic Kidney Disease Epidemiology Collaboration   (CKD-EPI) equation.     05/13/2019 >90 >60 mL/min/[1.73_m2] Final     Comment:     Non  GFR Calc  Starting 12/18/2018, serum creatinine based estimated GFR (eGFR) will be   calculated using the Chronic Kidney Disease Epidemiology Collaboration   (CKD-EPI) equation.     08/22/2017 >90 >60 mL/min/1.7m2 Final     Comment:     Non  GFR Calc     GFR Estimate If Black   Date Value Ref Range Status   05/29/2020 >90 >60 mL/min/[1.73_m2] Final     Comment:      GFR Calc  Starting 12/18/2018, serum creatinine based estimated GFR (eGFR) will be   calculated using the Chronic Kidney Disease Epidemiology Collaboration   (CKD-EPI) equation.     05/13/2019 >90 >60 mL/min/[1.73_m2] Final     Comment:      GFR Calc  Starting 12/18/2018, serum creatinine based estimated GFR (eGFR) will be   calculated using the Chronic Kidney Disease Epidemiology  Collaboration   (CKD-EPI) equation.     08/22/2017 >90 >60 mL/min/1.7m2 Final     Comment:      GFR Calc       Lab Results   Component Value Date    MICROL 10 05/29/2020     No results found for: MICROALBUMIN  No results found for: CPEPT, GADAB, ISCAB    No results found for: B12]    Most recent eye exam date: : Not Found       Assessment/Plan:     1.  Type 1 diabetes- Christiano is doing better with the addition of the jamila.  He remains too high after breakfast, then too low after dinner, which is causing him to overtreat the lows and go too high overnight.  We made the following changes (instructions given to patient):  Change carb ration as follows:   Breakfast- 1/4g (was 1/5g)  Dinner- 1/5g (was 1/4g)    Let me know if the lows continue in the evening.      Discussed COVID-19 precautions and increased incidence of DKA.  Will send rx for ketone strips.   The American Diabetes Association has issued a nice video with checklist for patients with diabetes: https://www.diabetes.org/diabetes/treatment-care/planning-sick-days/coronavirus    Suggested he consider the In-pen, however he feels that he does not have a problem with calculating doses and is not particularly interested.      2.  Risk factors-     Retinopathy:  No. Had recent eye exam.    Nephropathy:  BP has been good.  No microalbuminuria.  Creatinine stable.  On no meds. No MA.  Neuropathy: No.    Feet: OK, no ulcers.   Taking ASA: no  Lipids:  LDL is slightly elevated.  Discussed heart healthy eating ideas and encouraged him to increase consumption of fruits, vegetables and whole grains and to reduce processed meats. Suggested visit with dietitian.    Celiac screening: negative screen 5/19    3.  F/U in 3 months with Dr. Banks.      Shena Iniguez PA-C, MPAS   [facility]  Department of Medicine  Division of Endocrinology and Diabetes

## 2020-06-08 ENCOUNTER — VIRTUAL VISIT (OUTPATIENT)
Dept: ENDOCRINOLOGY | Facility: CLINIC | Age: 40
End: 2020-06-08
Payer: COMMERCIAL

## 2020-06-08 DIAGNOSIS — E10.9 TYPE 1 DIABETES MELLITUS WITHOUT COMPLICATION (H): ICD-10-CM

## 2020-06-08 RX ORDER — URINE ACETONE TEST STRIPS
STRIP MISCELLANEOUS
Qty: 25 EACH | Refills: 3 | Status: SHIPPED | OUTPATIENT
Start: 2020-06-08 | End: 2023-02-20

## 2020-06-08 NOTE — PATIENT INSTRUCTIONS
Change carb ration as follows:   Breakfast- 1/4g (was 1/5g)  Dinner- 1/5g (was 1/4g)    Let me know if the lows continue.      The American diabetes association has issued a nice video with checklist for patients with diabetes:     https://www.diabetes.org/diabetes/treatment-care/planning-sick-days/coronavirus

## 2020-06-08 NOTE — LETTER
"6/8/2020       RE: Agustín Gallegos  [address]  [facility] [address]     Dear Colleague,    Thank you for referring your patient, Agustín Gallegos, to the [facility] ENDOCRINOLOGY at [facility]. Please see a copy of my visit note below.    Patients Glucose Data was Sent via Email    Agustín Gallegos is a 39 year old male who is being evaluated via a billable video visit.      The patient has been notified of following:     \"This video visit will be conducted via a call between you and your physician/provider. We have found that certain health care needs can be provided without the need for an in-person physical exam.  This service lets us provide the care you need with a video conversation.  If a prescription is necessary we can send it directly to your pharmacy.  If lab work is needed we can place an order for that and you can then stop by our lab to have the test done at a later time.    Video visits are billed at different rates depending on your insurance coverage.  Please reach out to your insurance provider with any questions.    If during the course of the call the physician/provider feels a video visit is not appropriate, you will not be charged for this service.\"    Patient has given verbal consent for Video visit? Yes    How would you like to obtain your AVS? Janyhart    Patient would like the video invitation sent by: Send to e-mail at: [email or web address]    Will anyone else be joining your video visit? No        Video-Visit Details    Type of service:  Video Visit    Video Start Time: 0737  Video End Time:0804    Originating Location (pt. Location): home  Distant Location (provider location):  [facility] ENDOCRINOLOGY home    Platform used for Video Visit: Edu Ellis, then switched to [employer] video    HPI:   Christiano is a 38 yo man here for follow up of type 1 diabetes, which he has had since age 14.  He reports that overall things are going well.  " His daughter, Daphney is now 10 months old.  They are enjoying parenthood and adapting to their new life. They have both been working from home.  He is not taking his daughter to  for now.  He has been mostly in the house with the pandemic.  He wonders when he will be able to go and visit his parents.      He wears a jamila glucose sensor which shows an overall average of 160 mg/dL.  He tends to be highest after breakfast and lowest at bedtime.  When he has hypoglycemia, he tends to treat it, then stay high all night.  When he is not low at HS, then his sugar stays in target.      Novolog dosing:   Breakfast- cheerios- 1/5g  Lunch- sometimes leftovers (low carb) 1/7g  Dinner- usually low carb. 1/4g    HS snack- 1/10g.   Correction: 1/30 over 130 mg/dL.      He has been trying to take his Novolog 10 minutes before he eats, but finds this difficult.  It usually is just right when he eats.     He takes Tresiba 28 units daily.      Christiano wears a jamila sensor with overall average of 160 mg/dL for the past two weeks.    40% in target ( mg/dL)  57% above target (>180 mg/dL)  3% low (<70 mg/dL)  0% very low (<54 mg/dL)    He has been feeling well and in his usual state of health.  He has no other concerns today.     ROS  GENERAL: no weight loss, weight gain, fevers, chills, malaise, night sweats.   HEENT: no dysphagia, diplopia, neck pain or tenderness, dry/scratchy eyes, URI, cough, sinus drainage, tinnitus, sinus pressure  CV: no chest pain, pressure, palpitations, skipped beats, LOC  LUNGS: no SOB, WILDE, cough, sputum production, wheezing   GI: no diarrhea, constipation, abdominal pain  EXTREMITIES: no rashes, ulcers, edema  NEUROLOGY: no changes in vision, tingling or numbness in hands or feet.   MSK: no muscle aches or pains, weakness  PSYCH: mood stable    Past Medical History:   Diagnosis Date     Type 1 diabetes (H)     age of onset 14 years       No past surgical history on file.    Family History   Problem  Relation Age of Onset     Diabetes Brother      Cerebrovascular Disease Paternal Grandmother      Arthritis Mother      Arthritis Maternal Grandmother        Social History     Social History     Marital status: Single     Spouse name: N/A     Number of children: N/A     Years of education: N/A     Social History Main Topics     Smoking status: Never Smoker     Smokeless tobacco: Never Used     Alcohol use No     Drug use: No     Sexual activity: Yes     Partners: Female     Other Topics Concern     None     Social History Narrative   Social History: Works for [employer]. . Daughter, Daphney born August, 2019.    Current Outpatient Medications   Medication     blood glucose (CONTOUR NEXT TEST) test strip     blood glucose monitoring (CANDE MICROLET) lancets     blood glucose monitoring (NO BRAND SPECIFIED) meter device kit     cholecalciferol (VITAMIN D) 1000 UNIT tablet     Continuous Blood Gluc Sensor (TraitifySTYLE LILO 14 DAY SENSOR) MISC     insulin aspart (NOVOLOG FLEXPEN) 100 UNIT/ML pen     insulin degludec (TRESIBA FLEXTOUCH) 200 UNIT/ML pen     insulin pen needle (B-D U/F) 31G X 8 MM miscellaneous     No current facility-administered medications for this visit.         No Known Allergies    Physical Exam  GENERAL: healthy, alert and no distress  RESP: no audible wheeze, cough, or visible cyanosis.  No visible retractions or increased work of breathing.  Able to speak fully in complete sentences.  PSYCH: mentation appears normal, affect normal/bright, judgement and insight intact, normal speech and appearance well-groomed    RESULTS  Lab Results   Component Value Date    A1C 7.7 (H) 05/13/2019    A1C 7.9 (H) 08/13/2014    HEMOGLOBINA1 7.5 (A) 03/02/2020    HEMOGLOBINA1 7.5 (A) 11/26/2019    HEMOGLOBINA1 8.2 (A) 08/26/2019    HEMOGLOBINA1 7.7 (A) 02/04/2019    HEMOGLOBINA1 7.5 (A) 08/06/2018       TSH   Date Value Ref Range Status   05/29/2020 2.18 0.40 - 4.00 mU/L Final   05/13/2019 2.14 0.40 -  4.00 mU/L Final   01/30/2018 2.46 0.40 - 4.00 mU/L Final   09/16/2016 0.97 0.40 - 4.00 mU/L Final   06/24/2015 0.78 0.40 - 4.00 mU/L Final     T4 Free   Date Value Ref Range Status   09/16/2016 1.01 0.76 - 1.46 ng/dL Final   06/24/2015 1.02 0.76 - 1.46 ng/dL Final       ALT   Date Value Ref Range Status   03/05/2015 20 0 - 70 U/L Final   ]    Recent Labs   Lab Test 05/29/20  0705 05/13/19  0722  08/13/14  0956   CHOL 150 163   < > 162   HDL 41 44   < > 49   LDL 86 107*   < > 102   TRIG 114 60   < > 56   CHOLHDLRATIO  --   --   --  3.3    < > = values in this interval not displayed.       Lab Results   Component Value Date     05/13/2019      Lab Results   Component Value Date    POTASSIUM 4.2 05/13/2019     Lab Results   Component Value Date    CHLORIDE 101 05/13/2019     Lab Results   Component Value Date    CELESTE 9.3 05/13/2019     Lab Results   Component Value Date    CO2 31 05/13/2019     Lab Results   Component Value Date    BUN 12 05/13/2019     Lab Results   Component Value Date    CR 0.74 05/29/2020       GFR Estimate   Date Value Ref Range Status   05/29/2020 >90 >60 mL/min/[1.73_m2] Final     Comment:     Non  GFR Calc  Starting 12/18/2018, serum creatinine based estimated GFR (eGFR) will be   calculated using the Chronic Kidney Disease Epidemiology Collaboration   (CKD-EPI) equation.     05/13/2019 >90 >60 mL/min/[1.73_m2] Final     Comment:     Non  GFR Calc  Starting 12/18/2018, serum creatinine based estimated GFR (eGFR) will be   calculated using the Chronic Kidney Disease Epidemiology Collaboration   (CKD-EPI) equation.     08/22/2017 >90 >60 mL/min/1.7m2 Final     Comment:     Non  GFR Calc     GFR Estimate If Black   Date Value Ref Range Status   05/29/2020 >90 >60 mL/min/[1.73_m2] Final     Comment:      GFR Calc  Starting 12/18/2018, serum creatinine based estimated GFR (eGFR) will be   calculated using the Chronic Kidney Disease  Epidemiology Collaboration   (CKD-EPI) equation.     05/13/2019 >90 >60 mL/min/[1.73_m2] Final     Comment:      GFR Calc  Starting 12/18/2018, serum creatinine based estimated GFR (eGFR) will be   calculated using the Chronic Kidney Disease Epidemiology Collaboration   (CKD-EPI) equation.     08/22/2017 >90 >60 mL/min/1.7m2 Final     Comment:      GFR Calc       Lab Results   Component Value Date    MICROL 10 05/29/2020     No results found for: MICROALBUMIN  No results found for: CPEPT, GADAB, ISCAB    No results found for: B12]    Most recent eye exam date: : Not Found       Assessment/Plan:     1.  Type 1 diabetes- Christiano is doing better with the addition of the jamila.  He remains too high after breakfast, then too low after dinner, which is causing him to overtreat the lows and go too high overnight.  We made the following changes (instructions given to patient):  Change carb ration as follows:   Breakfast- 1/4g (was 1/5g)  Dinner- 1/5g (was 1/4g)    Let me know if the lows continue in the evening.      Discussed COVID-19 precautions and increased incidence of DKA.  Will send rx for ketone strips.   The American Diabetes Association has issued a nice video with checklist for patients with diabetes: https://www.diabetes.org/diabetes/treatment-care/planning-sick-days/coronavirus    Suggested he consider the In-pen, however he feels that he does not have a problem with calculating doses and is not particularly interested.      2.  Risk factors-     Retinopathy:  No. Had recent eye exam.    Nephropathy:  BP has been good.  No microalbuminuria.  Creatinine stable.  On no meds. No MA.  Neuropathy: No.    Feet: OK, no ulcers.   Taking ASA: no  Lipids:  LDL is slightly elevated.  Discussed heart healthy eating ideas and encouraged him to increase consumption of fruits, vegetables and whole grains and to reduce processed meats. Suggested visit with dietitian.    Celiac screening: negative  screen 5/19    3.  F/U in 3 months with Dr. Banks.      Shena Iniguez PA-C, MPAS   [facility]  Department of Medicine  Division of Endocrinology and Diabetes      Again, thank you for allowing me to participate in the care of your patient.      Sincerely,    Shena Iniguez PA-C

## 2020-06-08 NOTE — LETTER
"6/8/2020       RE: Agsutín Gallegos  [address] [facility] [address]       Patients Glucose Data was Sent via Email    Agustín Gallegos is a 39 year old male who is being evaluated via a billable video visit.      The patient has been notified of following:     \"This video visit will be conducted via a call between you and your physician/provider. We have found that certain health care needs can be provided without the need for an in-person physical exam.  This service lets us provide the care you need with a video conversation.  If a prescription is necessary we can send it directly to your pharmacy.  If lab work is needed we can place an order for that and you can then stop by our lab to have the test done at a later time.    Video visits are billed at different rates depending on your insurance coverage.  Please reach out to your insurance provider with any questions.    If during the course of the call the physician/provider feels a video visit is not appropriate, you will not be charged for this service.\"    Patient has given verbal consent for Video visit? Yes    How would you like to obtain your AVS? Evans    Patient would like the video invitation sent by: Send to e-mail at: [email or web address]    Will anyone else be joining your video visit? No        Video-Visit Details    Type of service:  Video Visit    Video Start Time: 0737  Video End Time:0804    Originating Location (pt. Location): home  Distant Location (provider location):  [facility] ENDOCRINOLOGY home    Platform used for Video Visit: Edu Ellis, then switched to Unomy video    HPI:   Christiano is a 38 yo man here for follow up of type 1 diabetes, which he has had since age 14.  He reports that overall things are going well.  His daughter, Daphney is now 10 months old.  They are enjoying parenthood and adapting to their new life. They have both been working from home.  He is not taking his daughter to  for now.  He has " been mostly in the house with the pandemic.  He wonders when he will be able to go and visit his parents.      He wears a jamila glucose sensor which shows an overall average of 160 mg/dL.  He tends to be highest after breakfast and lowest at bedtime.  When he has hypoglycemia, he tends to treat it, then stay high all night.  When he is not low at HS, then his sugar stays in target.      Novolog dosing:   Breakfast- cheerios- 1/5g  Lunch- sometimes leftovers (low carb) 1/7g  Dinner- usually low carb. 1/4g    HS snack- 1/10g.   Correction: 1/30 over 130 mg/dL.      He has been trying to take his Novolog 10 minutes before he eats, but finds this difficult.  It usually is just right when he eats.     He takes Tresiba 28 units daily.      Christiano wears a jamila sensor with overall average of 160 mg/dL for the past two weeks.    40% in target ( mg/dL)  57% above target (>180 mg/dL)  3% low (<70 mg/dL)  0% very low (<54 mg/dL)    He has been feeling well and in his usual state of health.  He has no other concerns today.     ROS  GENERAL: no weight loss, weight gain, fevers, chills, malaise, night sweats.   HEENT: no dysphagia, diplopia, neck pain or tenderness, dry/scratchy eyes, URI, cough, sinus drainage, tinnitus, sinus pressure  CV: no chest pain, pressure, palpitations, skipped beats, LOC  LUNGS: no SOB, WILDE, cough, sputum production, wheezing   GI: no diarrhea, constipation, abdominal pain  EXTREMITIES: no rashes, ulcers, edema  NEUROLOGY: no changes in vision, tingling or numbness in hands or feet.   MSK: no muscle aches or pains, weakness  PSYCH: mood stable    Past Medical History:   Diagnosis Date     Type 1 diabetes (H)     age of onset 14 years       No past surgical history on file.    Family History   Problem Relation Age of Onset     Diabetes Brother      Cerebrovascular Disease Paternal Grandmother      Arthritis Mother      Arthritis Maternal Grandmother        Social History     Social History      Marital status: Single     Spouse name: N/A     Number of children: N/A     Years of education: N/A     Social History Main Topics     Smoking status: Never Smoker     Smokeless tobacco: Never Used     Alcohol use No     Drug use: No     Sexual activity: Yes     Partners: Female     Other Topics Concern     None     Social History Narrative   Social History: Works for [employer]. . Daughter, Daphney born August, 2019.    Current Outpatient Medications   Medication     blood glucose (CONTOUR NEXT TEST) test strip     blood glucose monitoring (CANDE MICROLET) lancets     blood glucose monitoring (NO BRAND SPECIFIED) meter device kit     cholecalciferol (VITAMIN D) 1000 UNIT tablet     Continuous Blood Gluc Sensor (FREESTYLE LILO 14 DAY SENSOR) MISC     insulin aspart (NOVOLOG FLEXPEN) 100 UNIT/ML pen     insulin degludec (TRESIBA FLEXTOUCH) 200 UNIT/ML pen     insulin pen needle (B-D U/F) 31G X 8 MM miscellaneous     No current facility-administered medications for this visit.         No Known Allergies    Physical Exam  GENERAL: healthy, alert and no distress  RESP: no audible wheeze, cough, or visible cyanosis.  No visible retractions or increased work of breathing.  Able to speak fully in complete sentences.  PSYCH: mentation appears normal, affect normal/bright, judgement and insight intact, normal speech and appearance well-groomed    RESULTS  Lab Results   Component Value Date    A1C 7.7 (H) 05/13/2019    A1C 7.9 (H) 08/13/2014    HEMOGLOBINA1 7.5 (A) 03/02/2020    HEMOGLOBINA1 7.5 (A) 11/26/2019    HEMOGLOBINA1 8.2 (A) 08/26/2019    HEMOGLOBINA1 7.7 (A) 02/04/2019    HEMOGLOBINA1 7.5 (A) 08/06/2018       TSH   Date Value Ref Range Status   05/29/2020 2.18 0.40 - 4.00 mU/L Final   05/13/2019 2.14 0.40 - 4.00 mU/L Final   01/30/2018 2.46 0.40 - 4.00 mU/L Final   09/16/2016 0.97 0.40 - 4.00 mU/L Final   06/24/2015 0.78 0.40 - 4.00 mU/L Final     T4 Free   Date Value Ref Range Status   09/16/2016  1.01 0.76 - 1.46 ng/dL Final   06/24/2015 1.02 0.76 - 1.46 ng/dL Final       ALT   Date Value Ref Range Status   03/05/2015 20 0 - 70 U/L Final   ]    Recent Labs   Lab Test 05/29/20  0705 05/13/19  0722  08/13/14  0956   CHOL 150 163   < > 162   HDL 41 44   < > 49   LDL 86 107*   < > 102   TRIG 114 60   < > 56   CHOLHDLRATIO  --   --   --  3.3    < > = values in this interval not displayed.       Lab Results   Component Value Date     05/13/2019      Lab Results   Component Value Date    POTASSIUM 4.2 05/13/2019     Lab Results   Component Value Date    CHLORIDE 101 05/13/2019     Lab Results   Component Value Date    CELESTE 9.3 05/13/2019     Lab Results   Component Value Date    CO2 31 05/13/2019     Lab Results   Component Value Date    BUN 12 05/13/2019     Lab Results   Component Value Date    CR 0.74 05/29/2020       GFR Estimate   Date Value Ref Range Status   05/29/2020 >90 >60 mL/min/[1.73_m2] Final     Comment:     Non  GFR Calc  Starting 12/18/2018, serum creatinine based estimated GFR (eGFR) will be   calculated using the Chronic Kidney Disease Epidemiology Collaboration   (CKD-EPI) equation.     05/13/2019 >90 >60 mL/min/[1.73_m2] Final     Comment:     Non  GFR Calc  Starting 12/18/2018, serum creatinine based estimated GFR (eGFR) will be   calculated using the Chronic Kidney Disease Epidemiology Collaboration   (CKD-EPI) equation.     08/22/2017 >90 >60 mL/min/1.7m2 Final     Comment:     Non  GFR Calc     GFR Estimate If Black   Date Value Ref Range Status   05/29/2020 >90 >60 mL/min/[1.73_m2] Final     Comment:      GFR Calc  Starting 12/18/2018, serum creatinine based estimated GFR (eGFR) will be   calculated using the Chronic Kidney Disease Epidemiology Collaboration   (CKD-EPI) equation.     05/13/2019 >90 >60 mL/min/[1.73_m2] Final     Comment:      GFR Calc  Starting 12/18/2018, serum creatinine based estimated  GFR (eGFR) will be   calculated using the Chronic Kidney Disease Epidemiology Collaboration   (CKD-EPI) equation.     08/22/2017 >90 >60 mL/min/1.7m2 Final     Comment:      GFR Calc       Lab Results   Component Value Date    MICROL 10 05/29/2020     No results found for: MICROALBUMIN  No results found for: CPEPT, GADAB, ISCAB    No results found for: B12]    Most recent eye exam date: : Not Found       Assessment/Plan:     1.  Type 1 diabetes- Christiano is doing better with the addition of the jamila.  He remains too high after breakfast, then too low after dinner, which is causing him to overtreat the lows and go too high overnight.  We made the following changes (instructions given to patient):  Change carb ration as follows:   Breakfast- 1/4g (was 1/5g)  Dinner- 1/5g (was 1/4g)    Let me know if the lows continue in the evening.      Discussed COVID-19 precautions and increased incidence of DKA.  Will send rx for ketone strips.   The American Diabetes Association has issued a nice video with checklist for patients with diabetes: https://www.diabetes.org/diabetes/treatment-care/planning-sick-days/coronavirus    Suggested he consider the In-pen, however he feels that he does not have a problem with calculating doses and is not particularly interested.      2.  Risk factors-     Retinopathy:  No. Had recent eye exam.    Nephropathy:  BP has been good.  No microalbuminuria.  Creatinine stable.  On no meds. No MA.  Neuropathy: No.    Feet: OK, no ulcers.   Taking ASA: no  Lipids:  LDL is slightly elevated.  Discussed heart healthy eating ideas and encouraged him to increase consumption of fruits, vegetables and whole grains and to reduce processed meats. Suggested visit with dietitian.    Celiac screening: negative screen 5/19    3.  F/U in 3 months with Dr. Banks.      Shena Iniguez PA-C, MPAS   [facility]  Department of Medicine  Division of Endocrinology and Diabetes      Shena Iniguez,  PRERNA

## 2020-08-31 ENCOUNTER — MYC MEDICAL ADVICE (OUTPATIENT)
Dept: ENDOCRINOLOGY | Facility: CLINIC | Age: 40
End: 2020-08-31

## 2020-08-31 DIAGNOSIS — E10.9 WELL CONTROLLED TYPE 1 DIABETES MELLITUS (H): Primary | ICD-10-CM

## 2020-09-01 NOTE — TELEPHONE ENCOUNTER
RESULTS  Lab Results   Component Value Date    A1C 7.7 (H) 05/13/2019    A1C 7.9 (H) 08/13/2014    HEMOGLOBINA1 7.5 (A) 03/02/2020    HEMOGLOBINA1 7.5 (A) 11/26/2019    HEMOGLOBINA1 8.2 (A) 08/26/2019    HEMOGLOBINA1 7.7 (A) 02/04/2019    HEMOGLOBINA1 7.5 (A) 08/06/2018       TSH   Date Value Ref Range Status   05/29/2020 2.18 0.40 - 4.00 mU/L Final   05/13/2019 2.14 0.40 - 4.00 mU/L Final   01/30/2018 2.46 0.40 - 4.00 mU/L Final   09/16/2016 0.97 0.40 - 4.00 mU/L Final   06/24/2015 0.78 0.40 - 4.00 mU/L Final     T4 Free   Date Value Ref Range Status   09/16/2016 1.01 0.76 - 1.46 ng/dL Final   06/24/2015 1.02 0.76 - 1.46 ng/dL Final       ALT   Date Value Ref Range Status   03/05/2015 20 0 - 70 U/L Final   ]    Recent Labs   Lab Test 05/29/20  0705 05/13/19  0722  08/13/14  0956   CHOL 150 163   < > 162   HDL 41 44   < > 49   LDL 86 107*   < > 102   TRIG 114 60   < > 56   CHOLHDLRATIO  --   --   --  3.3    < > = values in this interval not displayed.       Lab Results   Component Value Date     05/13/2019      Lab Results   Component Value Date    POTASSIUM 4.2 05/13/2019     Lab Results   Component Value Date    CHLORIDE 101 05/13/2019     Lab Results   Component Value Date    CELESTE 9.3 05/13/2019     Lab Results   Component Value Date    CO2 31 05/13/2019     Lab Results   Component Value Date    BUN 12 05/13/2019     Lab Results   Component Value Date    CR 0.74 05/29/2020       GFR Estimate   Date Value Ref Range Status   05/29/2020 >90 >60 mL/min/[1.73_m2] Final     Comment:     Non  GFR Calc  Starting 12/18/2018, serum creatinine based estimated GFR (eGFR) will be   calculated using the Chronic Kidney Disease Epidemiology Collaboration   (CKD-EPI) equation.     05/13/2019 >90 >60 mL/min/[1.73_m2] Final     Comment:     Non  GFR Calc  Starting 12/18/2018, serum creatinine based estimated GFR (eGFR) will be   calculated using the Chronic Kidney Disease Epidemiology  Collaboration   (CKD-EPI) equation.     08/22/2017 >90 >60 mL/min/1.7m2 Final     Comment:     Non  GFR Calc     GFR Estimate If Black   Date Value Ref Range Status   05/29/2020 >90 >60 mL/min/[1.73_m2] Final     Comment:      GFR Calc  Starting 12/18/2018, serum creatinine based estimated GFR (eGFR) will be   calculated using the Chronic Kidney Disease Epidemiology Collaboration   (CKD-EPI) equation.     05/13/2019 >90 >60 mL/min/[1.73_m2] Final     Comment:      GFR Calc  Starting 12/18/2018, serum creatinine based estimated GFR (eGFR) will be   calculated using the Chronic Kidney Disease Epidemiology Collaboration   (CKD-EPI) equation.     08/22/2017 >90 >60 mL/min/1.7m2 Final     Comment:      GFR Calc       Lab Results   Component Value Date    MICROL 10 05/29/2020     No results found for: MICROALBUMIN  No results found for: CPEPT, GADAB, ISCAB    No results found for: B12]    Most recent eye exam date: : Not Found

## 2020-09-03 DIAGNOSIS — E10.9 WELL CONTROLLED TYPE 1 DIABETES MELLITUS (H): ICD-10-CM

## 2020-09-03 LAB
HBA1C MFR BLD: 6.8 % (ref 0–5.6)
VIT B12 SERPL-MCNC: 713 PG/ML (ref 193–986)

## 2020-09-03 PROCEDURE — 82607 VITAMIN B-12: CPT | Performed by: PHYSICIAN ASSISTANT

## 2020-09-03 PROCEDURE — 36415 COLL VENOUS BLD VENIPUNCTURE: CPT | Performed by: PHYSICIAN ASSISTANT

## 2020-09-03 PROCEDURE — 83036 HEMOGLOBIN GLYCOSYLATED A1C: CPT | Performed by: PHYSICIAN ASSISTANT

## 2020-09-08 ENCOUNTER — VIRTUAL VISIT (OUTPATIENT)
Dept: ENDOCRINOLOGY | Facility: CLINIC | Age: 40
End: 2020-09-08
Payer: COMMERCIAL

## 2020-09-08 DIAGNOSIS — E10.9 TYPE 1 DIABETES MELLITUS WITHOUT COMPLICATION (H): Primary | ICD-10-CM

## 2020-09-08 NOTE — LETTER
"9/8/2020       RE: Agustín Gallegos  [address]  [facility] [address]     Dear Colleague,    Thank you for referring your patient, Agustín Gallegos, to the [facility] ENDOCRINOLOGY at [facility]. Please see a copy of my visit note below.    Diabetes and Endocrinology Clinic outpatient clinic- visual visit    Agustín Gallegos is a 39 year old male who is being evaluated via a billable video visit.      The patient has been notified of following:      \"This video visit will be conducted via a call between you and your physician/provider. We have found that certain health care needs can be provided without the need for an in-person physical exam.  This service lets us provide the care you need with a video conversation.  If a prescription is necessary we can send it directly to your pharmacy.  If lab work is needed we can place an order for that and you can then stop by our lab to have the test done at a later time.     Video visits are billed at different rates depending on your insurance coverage.  Please reach out to your insurance provider with any questions.     If during the course of the call the physician/provider feels a video visit is not appropriate, you will not be charged for this service.\"     Patient has given verbal consent for Video visit? Yes     How would you like to obtain your AVS? MyChart     Patient would like the video invitation sent by: [email or web address]     Will anyone else be joining your video visit? No    Video-Visit Details     Type of service:  Video Visit     Video Start Time: 8:25 AM with student, 8:35 with Dr. Banks  Video End Time: 9:08  Total time: 43 min (student + Dr. Banks); 33 min total with Dr. Banks    Originating Location (pt. Location): Home     Distant Location (provider location):  [facility] ENDOCRINOLOGY      Platform used for Video Visit: [employer]       Problem: Type 1 diabetes recheck    HPI:  Christiano Gallegos " is a 40 y.o. male with a history of type 1 diabetes who is seen today for diabetes follow up.     Briefly, Christiano was diagnosed with diabetes at age 14.  He is originally from the [facility], was/ living in [place], and moved to [place] in July 2014. He was first seen by me on September 2014.      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 He got  on October 6, 2018 and now has a 13 month old baby girl. Patient is still working at a law firm. Has been working at home since March due to the current pandemic.    Interval history:      Patient's daughter got sick in June. Patient got a covid test that was positive at this time. He was completely asymptomatic, quarantined for 2 weeks, and has had no lasting symptoms. His wife and daughter tested negative.     Patient does get occasional hypoglycemic episodes. In the past couple weeks he will drop into the 60's often after lunch and dinner. Does also note that occasionally it will drop into the 40's overnight. Patient reduced his Tesiba dose from 28 units to 26 units. Has his last meal at around 6:30pm. Notes that at night his glucose needs to be higher before taking Tesiba, because otherwise he will have the drops into the 40's.     Patient has been going for runs/walks in the early evening. Does think this could be a cause of hypoglycemic episodes.     He is currently on Tresiba 26 units at bedtime.    Novolog dosing:   Breakfast- 1/4g  Lunch - 1/4g  Dinner - 1/5g  Snack - 1/10g  Correction: 1 unit per 30 mg/dL above 130mg/dL.      Wears a jamila sensor. Patient will provide data to our clinic to be reviewed.     A1c is 6.8% today.     He is not interested in insulin pump at this time. His brother is on a pump, but did not do that well for diabetes control.      Christiano does not have chronic complications of diabetes.    Retinopathy: Last eye exam was normal. Has not had yearly eye exam this year due to  covid, will contact clinic to see if they are doing an appointment.   Nephropathy: Microalbuminuria was negative.  He was placed on losartan as a renal protective agent around 2008.  He had no history of microalbuminuria or hypertension and after a detailed discussion, we decided to discontinue Losartan in September 2014.  Blood pressure levels remained within the normal range.  Neuropathy: There is no history of peripheral or autonomic neuropathy.     He is on vitamin D 1000 units per day.     No Known Allergies      Review Of Systems  12 point ROS negative unless noted above    Past Medical History  Past Medical History:   Diagnosis Date     Type 1 diabetes (H)     age of onset 14 years       Past Surgical History  No past surgical history on file.     Medications  Current Outpatient Medications   Medication Sig Dispense Refill     blood glucose (CONTOUR NEXT TEST) test strip test up 6.8 times daily 680 each 3     blood glucose monitoring (CANDE MICROLET) lancets Use to test blood sugar 8 times daily or as directed. 6 Box 5     blood glucose monitoring (NO BRAND SPECIFIED) meter device kit Use to test blood sugar 5 times daily or as directed. 1 kit 0     cholecalciferol (VITAMIN D) 1000 UNIT tablet Take 1 tablet (1,000 Units) by mouth daily 90 tablet 3     Continuous Blood Gluc Sensor (FREESTYLE LILO 14 DAY SENSOR) MISC 1 each every 14 days Swipe before meals and bedtime. 7 each 3     insulin aspart (NOVOLOG FLEXPEN) 100 UNIT/ML pen Use as directed up to 60 units daily. 60 mL 3     insulin degludec (TRESIBA FLEXTOUCH) 200 UNIT/ML pen Inject 30 Units Subcutaneous daily 14 mL 3     insulin pen needle (B-D U/F) 31G X 8 MM miscellaneous Use 4 pen needles daily or as directed. 100 each 11     KETOSTIX test strip Use as directed in case of hyperglycemia or illness. 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  Christiano is  and has a 13-month old baby  girl  No smoking. No alcohol, no illicit drugs     Family history:  Father: 71 yrs old, retired nephrologist in the [place] area, healthy  Mother: 69 yrs old, knee replacement, otherwise healthy  1 older brother, 43 yrs old, T1D since age 5, no complications; one older sister age 44 healthy.  13 months old baby girl       Physical Examination:  General:  Healthy, alert and oriented X3, NAD, answering questions appropriately.  HEENT: Normocephalic. No obvious scleral/conjuntival abnormalities. External ears, nose, and mouth without ulcers or lesions. No nasal drainage.   Pulmonary: No audible wheeze or cough. Able to speak fully in complete sentences.   Musculoskeletal: No gross musculoskeletal defects.   Skin: No obvious rashes, or abnormal pigmentation   Neurological: Alert. Mentation intact and speech normal.  Psychological: Normal mentation, affect normal/bright, judgement and insight intact, normal speech      Endocrine Labs:  Lab Results   Component Value Date    A1C 6.8 09/03/2020    A1C 7.7 05/13/2019    A1C 7.9 08/13/2014              Assessment and Plan:   Agustín Gallegos is a 39 year old male with type 1 diabetes for the past 25 years.     1. Type 1 Diabetes: Diabetes control has improved and his A1c is 6.8% today; goal is <7.0%.   Blood glucose levels have improved since his last visit. Patient has occasional hypoglycemia after lunch, and dinner. Will plan to reduce carb coverage at lunch to 1 unit per 4 g to 1 unit per 6 g carbohydrates. Also informed him to reduce his insulin at dinner time on days in which he exercises. Did discuss that the jamila has potential to give falsely low glucose readings, and it may be worthwhile to confirm low levels with a fingerstick blood glucose check.     - Continue Tresiba at 26 units at bedtime  - Carb coverage as below:  Breakfast- 1/7g  Lunch - 1/6g (changed)  Dinner - 1/5g (will adjust with exercise)  Snack - 1/10g  Novolog correction scale: 1 unit for every 30 mg  of blood sugar > 130 during the daytime and above 180 mg/dL at night.     2. Chronic diabetic complications: There is no evidence of chronic diabetic complications.  Creatinine levels and microalbuminuria screening were negative.     3. Hypertension: Pt was on prophylactic Losartan in the past.  Losartan was discontinued on September 2014 and his BP levels were normal off medications.      4. Health maintenance: Continue daily 1,000 mg vitamin D.      Return to clinic in 3 months    Chary Nation, MS4, served as a scribe for Dr. Banks    No orders of the defined types were placed in this encounter.      Physician Attestation   I, MAGGIE Banks, was present with the medical student who participated in the service and in the documentation of the note.  I have verified the history and personally performed the physical exam and medical decision making.  I agree with the assessment and plan of care as documented in the note.      Items personally reviewed: vitals, labs and I agree with the interpretation documented in the note.    MD Sharona Rios MD PhD    Division of Endocrinology and Diabetes

## 2020-09-08 NOTE — PROGRESS NOTES
"[facility]abetes [facility]ology [facility] outpatient clinic- visual visit    Agustín Gallegos is a 39 year old male who is being evaluated via a billable video visit.      The patient has been notified of following:      \"This video visit will be conducted via a call between you and your physician/provider. We have found that certain health care needs can be provided without the need for an in-person physical exam.  This service lets us provide the care you need with a video conversation.  If a prescription is necessary we can send it directly to your pharmacy.  If lab work is needed we can place an order for that and you can then stop by our lab to have the test done at a later time.     Video visits are billed at different rates depending on your insurance coverage.  Please reach out to your insurance provider with any questions.     If during the course of the call the physician/provider feels a video visit is not appropriate, you will not be charged for this service.\"     Patient has given verbal consent for Video visit? Yes     How would you like to obtain your AVS? [facility]     Patient would like the video invitation sent by: [email or web address]     Will anyone else be joining your video visit? No    Video-Visit Details     Type of service:  Video Visit     Video Start Time: 8:25 AM with student, 8:35 with Dr. Banks  Video End Time: 9:08  Total time: 43 min (student + Dr. Banks); 33 min total with Dr. Banks    Originating Location (pt. Location): Home     Distant Location (provider location):  [facility] ENDOCRINOLOGY      Platform used for Video Visit: Tego       Problem: Type 1 diabetes recheck    HPI:  Christiano Gallegos is a 40 y.o. male with a history of type 1 diabetes who is seen today for diabetes follow up.     Briefly, Christiano was diagnosed with diabetes at age 14.  He is originally from the [facility], was/ living in [place], and moved to [place] in July 2014. He was first seen by me on September " 2014.      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 He got  on October 6, 2018 and now has a 13 month old baby girl. Patient is still working at a law firm. Has been working at home since March due to the current pandemic.    Interval history:      Patient's daughter got sick in June. Patient got a covid test that was positive at this time. He was completely asymptomatic, quarantined for 2 weeks, and has had no lasting symptoms. His wife and daughter tested negative.     Patient does get occasional hypoglycemic episodes. In the past couple weeks he will drop into the 60's often after lunch and dinner. Does also note that occasionally it will drop into the 40's overnight. Patient reduced his Tesiba dose from 28 units to 26 units. Has his last meal at around 6:30pm. Notes that at night his glucose needs to be higher before taking Tesiba, because otherwise he will have the drops into the 40's.     Patient has been going for runs/walks in the early evening. Does think this could be a cause of hypoglycemic episodes.     He is currently on Tresiba 26 units at bedtime.    Novolog dosing:   Breakfast- 1/4g  Lunch - 1/4g  Dinner - 1/5g  Snack - 1/10g  Correction: 1 unit per 30 mg/dL above 130mg/dL.      Wears a jamila sensor. Patient will provide data to our clinic to be reviewed.     A1c is 6.8% today.     He is not interested in insulin pump at this time. His brother is on a pump, but did not do that well for diabetes control.      Christiano does not have chronic complications of diabetes.    Retinopathy: Last eye exam was normal. Has not had yearly eye exam this year due to covid, will contact clinic to see if they are doing an appointment.   Nephropathy: Microalbuminuria was negative.  He was placed on losartan as a renal protective agent around 2008.  He had no history of microalbuminuria or hypertension and after a detailed discussion, we decided to  discontinue Losartan in September 2014.  Blood pressure levels remained within the normal range.  Neuropathy: There is no history of peripheral or autonomic neuropathy.     He is on vitamin D 1000 units per day.     No Known Allergies      Review Of Systems  12 point ROS negative unless noted above    Past Medical History  Past Medical History:   Diagnosis Date     Type 1 diabetes (H)     age of onset 14 years       Past Surgical History  No past surgical history on file.     Medications  Current Outpatient Medications   Medication Sig Dispense Refill     blood glucose (CONTOUR NEXT TEST) test strip test up 6.8 times daily 680 each 3     blood glucose monitoring (CANDE MICROLET) lancets Use to test blood sugar 8 times daily or as directed. 6 Box 5     blood glucose monitoring (NO BRAND SPECIFIED) meter device kit Use to test blood sugar 5 times daily or as directed. 1 kit 0     cholecalciferol (VITAMIN D) 1000 UNIT tablet Take 1 tablet (1,000 Units) by mouth daily 90 tablet 3     Continuous Blood Gluc Sensor (Primus PowerSTYLE LILO 14 DAY SENSOR) MISC 1 each every 14 days Swipe before meals and bedtime. 7 each 3     insulin aspart (NOVOLOG FLEXPEN) 100 UNIT/ML pen Use as directed up to 60 units daily. 60 mL 3     insulin degludec (TRESIBA FLEXTOUCH) 200 UNIT/ML pen Inject 30 Units Subcutaneous daily 14 mL 3     insulin pen needle (B-D U/F) 31G X 8 MM miscellaneous Use 4 pen needles daily or as directed. 100 each 11     KETOSTIX test strip Use as directed in case of hyperglycemia or illness. 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  Christiano is  and has a 13-month old baby girl  No smoking. No alcohol, no illicit drugs     Family history:  Father: 71 yrs old, retired nephrologist in the [place] area, healthy  Mother: 69 yrs old, knee replacement, otherwise healthy  1 older brother, 43 yrs old, T1D since age 5, no complications; one older sister age 44  healthy.  13 months old baby girl       Physical Examination:  General:  Healthy, alert and oriented X3, NAD, answering questions appropriately.  HEENT: Normocephalic. No obvious scleral/conjuntival abnormalities. External ears, nose, and mouth without ulcers or lesions. No nasal drainage.   Pulmonary: No audible wheeze or cough. Able to speak fully in complete sentences.   Musculoskeletal: No gross musculoskeletal defects.   Skin: No obvious rashes, or abnormal pigmentation   Neurological: Alert. Mentation intact and speech normal.  Psychological: Normal mentation, affect normal/bright, judgement and insight intact, normal speech      Endocrine Labs:  Lab Results   Component Value Date    A1C 6.8 09/03/2020    A1C 7.7 05/13/2019    A1C 7.9 08/13/2014              Assessment and Plan:   Agustín Gallegos is a 39 year old male with type 1 diabetes for the past 25 years.     1. Type 1 Diabetes: Diabetes control has improved and his A1c is 6.8% today; goal is <7.0%.   Blood glucose levels have improved since his last visit. Patient has occasional hypoglycemia after lunch, and dinner. Will plan to reduce carb coverage at lunch to 1 unit per 4 g to 1 unit per 6 g carbohydrates. Also informed him to reduce his insulin at dinner time on days in which he exercises. Did discuss that the jamila has potential to give falsely low glucose readings, and it may be worthwhile to confirm low levels with a fingerstick blood glucose check.     - Continue Tresiba at 26 units at bedtime  - Carb coverage as below:  Breakfast- 1/7g  Lunch - 1/6g (changed)  Dinner - 1/5g (will adjust with exercise)  Snack - 1/10g  Novolog correction scale: 1 unit for every 30 mg of blood sugar > 130 during the daytime and above 180 mg/dL at night.     2. Chronic diabetic complications: There is no evidence of chronic diabetic complications.  Creatinine levels and microalbuminuria screening were negative.     3. Hypertension: Pt was on prophylactic Losartan  in the past.  Losartan was discontinued on September 2014 and his BP levels were normal off medications.      4. Health maintenance: Continue daily 1,000 mg vitamin D.      Return to clinic in 3 months    Chary Nation, MS4, served as a scribe for Dr. Banks    No orders of the defined types were placed in this encounter.      Physician Attestation   I, MAGGIE Banks, was present with the medical student who participated in the service and in the documentation of the note.  I have verified the history and personally performed the physical exam and medical decision making.  I agree with the assessment and plan of care as documented in the note.      Items personally reviewed: vitals, labs and I agree with the interpretation documented in the note.    MD Sharona Rios MD PhD    Division of Endocrinology and Diabetes

## 2020-09-08 NOTE — PATIENT INSTRUCTIONS
- Continue Tresiba 26 units in the evening  - Reduce Novolog with lunch to 1 unit per 6 g carbohydrates   - Reduce Novolog with diner to 1 uit per 6 g carbohydrates   - Consider further reducing Novolog with evening meal on the days you exercise in the evening to avoid hypoglycemia.   - Please schedule a visit with your opthalmologist  - Return to clinic in 3 months.    Sincerely,    Sharona Banks MD PhD    Division of Endocrinology and Diabetes

## 2020-09-09 ENCOUNTER — TELEPHONE (OUTPATIENT)
Dept: ENDOCRINOLOGY | Facility: CLINIC | Age: 40
End: 2020-09-09

## 2020-09-09 NOTE — TELEPHONE ENCOUNTER
CLINIC COORDINATOR SCHEDULING NOTES    CALL RESULT: LVM    APPT TYPE: VIDEO VISIT RETURN     PROVIDER: Jocelyn    DATE APPT NEEDED: December 2020    ADDITIONAL NOTES: 3 months from 9/8

## 2020-10-21 ENCOUNTER — OFFICE VISIT (OUTPATIENT)
Dept: OPTOMETRY | Facility: CLINIC | Age: 40
End: 2020-10-21
Payer: COMMERCIAL

## 2020-10-21 ENCOUNTER — OFFICE VISIT (OUTPATIENT)
Dept: OPHTHALMOLOGY | Facility: CLINIC | Age: 40
End: 2020-10-21
Attending: OPHTHALMOLOGY
Payer: COMMERCIAL

## 2020-10-21 DIAGNOSIS — H52.203 MYOPIC ASTIGMATISM OF BOTH EYES: ICD-10-CM

## 2020-10-21 DIAGNOSIS — E10.9 TYPE 1 DIABETES MELLITUS WITHOUT RETINOPATHY (H): Primary | ICD-10-CM

## 2020-10-21 DIAGNOSIS — H52.203 MYOPIA OF BOTH EYES WITH ASTIGMATISM: Primary | ICD-10-CM

## 2020-10-21 DIAGNOSIS — H52.13 MYOPIA OF BOTH EYES WITH ASTIGMATISM: Primary | ICD-10-CM

## 2020-10-21 DIAGNOSIS — H52.13 MYOPIC ASTIGMATISM OF BOTH EYES: ICD-10-CM

## 2020-10-21 PROCEDURE — 92014 COMPRE OPH EXAM EST PT 1/>: CPT | Mod: GC | Performed by: OPHTHALMOLOGY

## 2020-10-21 PROCEDURE — G0463 HOSPITAL OUTPT CLINIC VISIT: HCPCS

## 2020-10-21 ASSESSMENT — VISUAL ACUITY
CORRECTION_TYPE: GLASSES
CORRECTION_TYPE: CONTACTS
METHOD: SNELLEN - LINEAR
METHOD: SNELLEN - LINEAR
OS_CC: 20/30
OD_CC: 20/20
OS_CC: 20/30
OD_CC: 20/20

## 2020-10-21 ASSESSMENT — CUP TO DISC RATIO
OS_RATIO: 0.3
OD_RATIO: 0.3

## 2020-10-21 ASSESSMENT — TONOMETRY
IOP_METHOD: TONOPEN
OS_IOP_MMHG: 19
OD_IOP_MMHG: 21

## 2020-10-21 ASSESSMENT — REFRACTION_CURRENTRX
OD_BRAND: AIR OPTIX NIGHT & DAY
OS_BRAND: AIR OPTIX NIGHT & DAY
OD_BASECURVE: 8.4
OD_SPHERE: -6.50
OS_BASECURVE: 8.4
OS_DIAMETER: 13.8
OS_SPHERE: -6.50
OD_DIAMETER: 13.8

## 2020-10-21 ASSESSMENT — REFRACTION_WEARINGRX
OD_CYLINDER: +0.75
OS_AXIS: 105
OS_CYLINDER: +0.75
OD_SPHERE: -7.50
OD_AXIS: 083
OS_CYLINDER: +0.75
OD_SPHERE: -7.50
OD_CYLINDER: +0.75
OS_SPHERE: -8.00
OS_SPHERE: -8.00
OD_AXIS: 083
OS_AXIS: 105

## 2020-10-21 ASSESSMENT — REFRACTION_MANIFEST
OS_AXIS: 100
OS_SPHERE: -8.00
OD_SPHERE: -7.50
OD_AXIS: 077
OS_CYLINDER: +0.75
OD_CYLINDER: +0.75

## 2020-10-21 ASSESSMENT — EXTERNAL EXAM - RIGHT EYE
OD_EXAM: NORMAL
OD_EXAM: NORMAL

## 2020-10-21 ASSESSMENT — SLIT LAMP EXAM - LIDS
COMMENTS: NORMAL

## 2020-10-21 ASSESSMENT — CONF VISUAL FIELD
OD_NORMAL: 1
OS_NORMAL: 1
METHOD: COUNTING FINGERS

## 2020-10-21 ASSESSMENT — EXTERNAL EXAM - LEFT EYE
OS_EXAM: NORMAL
OS_EXAM: NORMAL

## 2020-10-21 NOTE — PROGRESS NOTES
HPI  Agustín Gallegos is a 39 year old male here for yearly diabetic eye exam. His vision is good in both eyes with his current glasses. He denies eye pain, redness, or discharge. No flashes/floaters.    POH: No history of eye surgery or trauma  PMH: Type 1 diabetes  FH: No FH of AMD or glc  SH: Non-smoker    Lab Results   Component Value Date    A1C 6.8 09/03/2020    A1C 7.7 05/13/2019    A1C 7.9 08/13/2014       Assessment & Plan      (E10.9) Type 1 diabetes mellitus without retinopathy (HCC)  (primary encounter diagnosis)  Comment: Diagnosed at age 13. Last A1c 6.8% 09/2020. No diabetic retinopathy.  Plan: Discussed the importance of tight blood glucose control in the prevention of diabetic retinopathy. Recommend yearly dilated eye exam.    (H52.203) Myopic astigmatism of both eyes  Comment: Stable good vision with refraction  Plan:  Follows with Dr. Nice for contact lenses. No change in Rx today however patient wanted Rx so update provided. Discussed signs/sx of RT/RD and the patient knows to call immediately if they develop these symptoms.   -----------------------------------------------------------------------------------    Patient disposition:   Return in about 1 year (around 10/21/2021) for Annual Visit. or sooner as needed.    Fela Brown MD  Ophthalmology Resident, PGY-4      Teaching statement:  Complete documentation of historical and exam elements from today's encounter can be found in the full encounter summary report (not reduplicated in this progress note). I personally obtained the chief complaint(s) and history of present illness.  I confirmed and edited as necessary the review of systems, past medical/surgical history, family history, social history, and examination findings as documented by others; and I examined the patient myself. I personally reviewed the relevant tests, images, and reports as documented above.     I formulated and edited as necessary the assessment and plan and  discussed the findings and management plan with the patient and family.    Patricia Talamantes MD  Comprehensive Ophthalmology & Ocular Pathology  Department of Ophthalmology and Visual Neurosciences  [email or web address]  Pager [phone]

## 2020-10-21 NOTE — NURSING NOTE
Chief Complaints and History of Present Illnesses   Patient presents with     Diabetic Eye Exam     Chief Complaint(s) and History of Present Illness(es)     Diabetic Eye Exam     Pain scale: 0/10              Comments     Glasses/CL update. Gets comprehensive care with [facility] right after this. Stable vision overall. Sometimes the right eye gets a little blurry/slightly double with CL in, blinking helps improve it sometimes. Does not notice this with glasses. Wearing CL's most days lately. Good comfort in current CL's.   Last edited by Antonina Nice, OD on 10/21/2020 10:23 AM.  Lab Results       Component                Value               Date                       A1C                      6.8                 09/03/2020                 A1C                      7.7                 05/13/2019                 A1C                      7.9                 08/13/2014

## 2020-11-22 ENCOUNTER — HEALTH MAINTENANCE LETTER (OUTPATIENT)
Age: 40
End: 2020-11-22

## 2020-12-08 DIAGNOSIS — E10.9 WELL CONTROLLED TYPE 1 DIABETES MELLITUS (H): ICD-10-CM

## 2020-12-08 LAB
ANION GAP SERPL CALCULATED.3IONS-SCNC: 5 MMOL/L (ref 3–14)
BUN SERPL-MCNC: 12 MG/DL (ref 7–30)
CALCIUM SERPL-MCNC: 9.4 MG/DL (ref 8.5–10.1)
CHLORIDE SERPL-SCNC: 105 MMOL/L (ref 94–109)
CO2 SERPL-SCNC: 27 MMOL/L (ref 20–32)
CREAT SERPL-MCNC: 0.81 MG/DL (ref 0.66–1.25)
GFR SERPL CREATININE-BSD FRML MDRD: >90 ML/MIN/{1.73_M2}
GLUCOSE SERPL-MCNC: 111 MG/DL (ref 70–99)
HBA1C MFR BLD: 6.9 % (ref 0–5.6)
POTASSIUM SERPL-SCNC: 4 MMOL/L (ref 3.4–5.3)
SODIUM SERPL-SCNC: 137 MMOL/L (ref 133–144)

## 2020-12-08 PROCEDURE — 83036 HEMOGLOBIN GLYCOSYLATED A1C: CPT | Performed by: PHYSICIAN ASSISTANT

## 2020-12-08 PROCEDURE — 36415 COLL VENOUS BLD VENIPUNCTURE: CPT | Performed by: PHYSICIAN ASSISTANT

## 2020-12-08 PROCEDURE — 80048 BASIC METABOLIC PNL TOTAL CA: CPT | Performed by: PHYSICIAN ASSISTANT

## 2020-12-11 NOTE — PROGRESS NOTES
"Outcome for 12/11/20 9:44 AM :Left Voicemail for patient to call back   Outcome for 12/13/20 2:44 PM :Patient is sharing data via clinic device website and patient has been instructed to update data on website. Find login information by using [email or web address]       Agustín Gallegos is a 40 year old male who is being evaluated via a billable video visit.    Patients Glucose Data was Sent via Email    Agustín Gallegos is a 39 year old male who is being evaluated via a billable video visit.      The patient has been notified of following:     \"This video visit will be conducted via a call between you and your physician/provider. We have found that certain health care needs can be provided without the need for an in-person physical exam.  This service lets us provide the care you need with a video conversation.  If a prescription is necessary we can send it directly to your pharmacy.  If lab work is needed we can place an order for that and you can then stop by our lab to have the test done at a later time.    Video visits are billed at different rates depending on your insurance coverage.  Please reach out to your insurance provider with any questions.    If during the course of the call the physician/provider feels a video visit is not appropriate, you will not be charged for this service.\"    Patient has given verbal consent for Video visit? Yes    How would you like to obtain your AVS? [employer]    Patient would like the video invitation sent by: Send to e-mail at: [email or web address]    Will anyone else be joining your video visit? No        Video-Visit Details    Type of service:  Video Visit    Video Start Time: 0730  Video End Time:0750    Originating Location (pt. Location): home  Distant Location (provider location):  [facility] ENDOCRINOLOGY home    Platform used for Video Visit: [employer]    HPI:   Christiano is a 39 yo man here for follow up of type 1 diabetes, which he has had since age 14.  He reports that overall " things are going well.  He continues working from home.  He did test positive for COVID in June and never became sick.  He has been mostly in the house with the pandemic. He has not been as physically active as in the past.  He feels like his glucose has improved a lot since he started wearing a sensor.     Novolog dosing:   Breakfast- cheerios- 1/4g- trying to take it more in advance of his meals.   Lunch- sometimes leftovers (low carb) 1/7g  Dinner- usually low carb. 1/3-4g .  Sometimes more lows after dinner.   HS snack- 1/10g- he is getting away from snacking at night.   Correction: 1/30 over 130 mg/dL.      He has been trying to take his Novolog 10 minutes before he eats, but finds this difficult.  It usually is just right when he eats. He finds that if he does not take it ahead, sometimes his glucose climbs too high, particularly after breakfast.     He takes Tresiba 26 units daily.      Christiano wears a jamila sensor with overall average of 148 mg/dL for the past two weeks.        He has been feeling well and in his usual state of health.  He has no other concerns today.     ROS  GENERAL: no weight loss, weight gain, fevers, chills, malaise, night sweats.   HEENT: no dysphagia, diplopia, neck pain or tenderness, dry/scratchy eyes, URI, cough, sinus drainage, tinnitus, sinus pressure  CV: no chest pain, pressure, palpitations, skipped beats, LOC  LUNGS: no SOB, WILDE, cough, sputum production, wheezing   GI: no diarrhea, constipation, abdominal pain  EXTREMITIES: no rashes, ulcers, edema  NEUROLOGY: no changes in vision, tingling or numbness in hands or feet.   MSK: no muscle aches or pains, weakness  PSYCH: mood stable    Past Medical History:   Diagnosis Date     Type 1 diabetes (H)     age of onset 14 years       No past surgical history on file.    Family History   Problem Relation Age of Onset     Diabetes Brother      Cerebrovascular Disease Paternal Grandmother      Arthritis Mother      Arthritis Maternal  Grandmother        Social History     Social History     Marital status: Single     Spouse name: N/A     Number of children: N/A     Years of education: N/A     Social History Main Topics     Smoking status: Never Smoker     Smokeless tobacco: Never Used     Alcohol use No     Drug use: No     Sexual activity: Yes     Partners: Female     Other Topics Concern     None     Social History Narrative   Social History: Works for [employer]. . Daughter, Daphney born August, 2019.    Current Outpatient Medications   Medication     blood glucose (CONTOUR NEXT TEST) test strip     blood glucose monitoring (CANDE MICROLET) lancets     blood glucose monitoring (NO BRAND SPECIFIED) meter device kit     cholecalciferol (VITAMIN D) 1000 UNIT tablet     Continuous Blood Gluc Sensor (FREESTYLE LILO 14 DAY SENSOR) MISC     insulin aspart (NOVOLOG FLEXPEN) 100 UNIT/ML pen     insulin degludec (TRESIBA FLEXTOUCH) 200 UNIT/ML pen     insulin pen needle (B-D U/F) 31G X 8 MM miscellaneous     KETOSTIX test strip     No current facility-administered medications for this visit.         No Known Allergies    Physical Exam  GENERAL: healthy, alert and no distress  RESP: no audible wheeze, cough, or visible cyanosis.  No visible retractions or increased work of breathing.  Able to speak fully in complete sentences.  PSYCH: mentation appears normal, affect normal/bright, judgement and insight intact, normal speech and appearance well-groomed    RESULTS  Lab Results   Component Value Date    A1C 6.9 (H) 12/08/2020    A1C 6.8 (H) 09/03/2020    A1C 7.7 (H) 05/13/2019    A1C 7.9 (H) 08/13/2014    HEMOGLOBINA1 7.5 (A) 03/02/2020    HEMOGLOBINA1 7.5 (A) 11/26/2019    HEMOGLOBINA1 8.2 (A) 08/26/2019    HEMOGLOBINA1 7.7 (A) 02/04/2019    HEMOGLOBINA1 7.5 (A) 08/06/2018       TSH   Date Value Ref Range Status   05/29/2020 2.18 0.40 - 4.00 mU/L Final   05/13/2019 2.14 0.40 - 4.00 mU/L Final   01/30/2018 2.46 0.40 - 4.00 mU/L Final    09/16/2016 0.97 0.40 - 4.00 mU/L Final   06/24/2015 0.78 0.40 - 4.00 mU/L Final     T4 Free   Date Value Ref Range Status   09/16/2016 1.01 0.76 - 1.46 ng/dL Final   06/24/2015 1.02 0.76 - 1.46 ng/dL Final       ALT   Date Value Ref Range Status   03/05/2015 20 0 - 70 U/L Final   ]    Recent Labs   Lab Test 05/29/20  0705 05/13/19  0722 08/13/14  0956 08/13/14  0956   CHOL 150 163   < > 162   HDL 41 44   < > 49   LDL 86 107*   < > 102   TRIG 114 60   < > 56   CHOLHDLRATIO  --   --   --  3.3    < > = values in this interval not displayed.       Lab Results   Component Value Date     12/08/2020      Lab Results   Component Value Date    POTASSIUM 4.0 12/08/2020     Lab Results   Component Value Date    CHLORIDE 105 12/08/2020     Lab Results   Component Value Date    CELESTE 9.4 12/08/2020     Lab Results   Component Value Date    CO2 27 12/08/2020     Lab Results   Component Value Date    BUN 12 12/08/2020     Lab Results   Component Value Date    CR 0.81 12/08/2020       GFR Estimate   Date Value Ref Range Status   12/08/2020 >90 >60 mL/min/[1.73_m2] Final     Comment:     Non  GFR Calc  Starting 12/18/2018, serum creatinine based estimated GFR (eGFR) will be   calculated using the Chronic Kidney Disease Epidemiology Collaboration   (CKD-EPI) equation.     05/29/2020 >90 >60 mL/min/[1.73_m2] Final     Comment:     Non  GFR Calc  Starting 12/18/2018, serum creatinine based estimated GFR (eGFR) will be   calculated using the Chronic Kidney Disease Epidemiology Collaboration   (CKD-EPI) equation.     05/13/2019 >90 >60 mL/min/[1.73_m2] Final     Comment:     Non  GFR Calc  Starting 12/18/2018, serum creatinine based estimated GFR (eGFR) will be   calculated using the Chronic Kidney Disease Epidemiology Collaboration   (CKD-EPI) equation.       GFR Estimate If Black   Date Value Ref Range Status   12/08/2020 >90 >60 mL/min/[1.73_m2] Final     Comment:       American GFR Calc  Starting 12/18/2018, serum creatinine based estimated GFR (eGFR) will be   calculated using the Chronic Kidney Disease Epidemiology Collaboration   (CKD-EPI) equation.     05/29/2020 >90 >60 mL/min/[1.73_m2] Final     Comment:      GFR Calc  Starting 12/18/2018, serum creatinine based estimated GFR (eGFR) will be   calculated using the Chronic Kidney Disease Epidemiology Collaboration   (CKD-EPI) equation.     05/13/2019 >90 >60 mL/min/[1.73_m2] Final     Comment:      GFR Calc  Starting 12/18/2018, serum creatinine based estimated GFR (eGFR) will be   calculated using the Chronic Kidney Disease Epidemiology Collaboration   (CKD-EPI) equation.         Lab Results   Component Value Date    MICROL 10 05/29/2020     No results found for: MICROALBUMIN  No results found for: CPEPT, GADAB, ISCAB    Vitamin B12   Date Value Ref Range Status   09/03/2020 713 193 - 986 pg/mL Final   ]    Most recent eye exam date: : Not Found   Eye exam: November, 2020.  No retinopathy.     Assessment/Plan:     1.  Type 1 diabetes- Christiano is doing quite well.  A1c 6.9% and he has stability in his glucose.  No changes today. Discussed possibility of using the jamila and fiasp (to eliminate need for pre-bolusing).  He will look into these and let me know if he is interested.      2.  Risk factors-     Retinopathy:  No. Had recent eye exam in October, 2020.     Nephropathy:  BP historically well controlled.  No microalbuminuria.  Creatinine stable.  On no meds.   Neuropathy: No.    Feet: OK, no ulcers.   Taking ASA: no  Lipids:  LDL is at target.   Celiac screening: negative screen 5/19    3.  F/U in 3 months with Dr. Banks.      Shena Iniguez PA-C, MPAS   [facility]  Department of Medicine  Division of Endocrinology and Diabetes

## 2020-12-11 NOTE — PATIENT INSTRUCTIONS
Nice seeing you today, Christiano!     Consider using jamila 2 sensor, which has high and low alarms    Consider using Fiasp insulin, which is designed to be taken at the start of the meal.     Keep up the great work!     We appreciate your assistance in coordinating your healthcare.     Please upload your insulin pump, blood sugar meter and/or continuous glucose monitor at home 1-2 days before your next diabetes-related appointment.   This will allow your provider to review your  data before your scheduled virtual visit.    To ask a question to your Endocrine care team, please send them a [employer] message, or reach them by phone at [phone]     To expedite your medication refill(s), please contact your pharmacy and have them   fax a refill request to: [phone].  *Please allow 3 business days for routine medication refills.  *Please allow 5 business days for controlled substance medication refills.    For after-hours urgent Endocrine issues, that do not require 911, please dial [phone], and ask to speak with the Endocrinologist On-Call

## 2020-12-14 ENCOUNTER — VIRTUAL VISIT (OUTPATIENT)
Dept: ENDOCRINOLOGY | Facility: CLINIC | Age: 40
End: 2020-12-14
Payer: COMMERCIAL

## 2020-12-14 DIAGNOSIS — E10.9 WELL CONTROLLED TYPE 1 DIABETES MELLITUS (H): Primary | ICD-10-CM

## 2020-12-14 PROCEDURE — 99213 OFFICE O/P EST LOW 20 MIN: CPT | Mod: 95 | Performed by: PHYSICIAN ASSISTANT

## 2020-12-14 NOTE — LETTER
"12/14/2020       RE: Agustín Gallegos  [address]  [facility] [address]     Dear Colleague,    Thank you for referring your patient, Agustín Gallegos, to the [facility] ENDOCRINOLOG[facility] [place] at [facility]. Please see a copy of my visit note below.    Outcome for 12/11/20 9:44 AM :Left Voicemail for patient to call back   Outcome for 12/13/20 2:44 PM :Patient is sharing data via clinic device website and patient has been instructed to update data on website. Find login information by using .[employer]       Agustín Gallegos is a 40 year old male who is being evaluated via a billable video visit.    Patients Glucose Data was Sent via Email    Agustín Gallegos is a 39 year old male who is being evaluated via a billable video visit.      The patient has been notified of following:     \"This video visit will be conducted via a call between you and your physician/provider. We have found that certain health care needs can be provided without the need for an in-person physical exam.  This service lets us provide the care you need with a video conversation.  If a prescription is necessary we can send it directly to your pharmacy.  If lab work is needed we can place an order for that and you can then stop by our lab to have the test done at a later time.    Video visits are billed at different rates depending on your insurance coverage.  Please reach out to your insurance provider with any questions.    If during the course of the call the physician/provider feels a video visit is not appropriate, you will not be charged for this service.\"    Patient has given verbal consent for Video visit? Yes    How would you like to obtain your AVS? Janyharsydni    Patient would like the video invitation sent by: Send to e-mail at: [email or web address]    Will anyone else be joining your video visit? No        Video-Visit Details    Type of service:  Video Visit    Video " Start Time: 0730  Video End Time:0750    Originating Location (pt. Location): home  Distant Location (provider location):  [facility] ENDOCRINOLOGY home    Platform used for Video Visit: Rhonda    HPI:   Christiano is a 41 yo man here for follow up of type 1 diabetes, which he has had since age 14.  He reports that overall things are going well.  He continues working from home.  He did test positive for COVID in June and never became sick.  He has been mostly in the house with the pandemic. He has not been as physically active as in the past.  He feels like his glucose has improved a lot since he started wearing a sensor.     Novolog dosing:   Breakfast- cheerios- 1/4g- trying to take it more in advance of his meals.   Lunch- sometimes leftovers (low carb) 1/7g  Dinner- usually low carb. 1/3-4g .  Sometimes more lows after dinner.   HS snack- 1/10g- he is getting away from snacking at night.   Correction: 1/30 over 130 mg/dL.      He has been trying to take his Novolog 10 minutes before he eats, but finds this difficult.  It usually is just right when he eats. He finds that if he does not take it ahead, sometimes his glucose climbs too high, particularly after breakfast.     He takes Tresiba 26 units daily.      Christiano wears a jamila sensor with overall average of 148 mg/dL for the past two weeks.        He has been feeling well and in his usual state of health.  He has no other concerns today.     ROS  GENERAL: no weight loss, weight gain, fevers, chills, malaise, night sweats.   HEENT: no dysphagia, diplopia, neck pain or tenderness, dry/scratchy eyes, URI, cough, sinus drainage, tinnitus, sinus pressure  CV: no chest pain, pressure, palpitations, skipped beats, LOC  LUNGS: no SOB, WILDE, cough, sputum production, wheezing   GI: no diarrhea, constipation, abdominal pain  EXTREMITIES: no rashes, ulcers, edema  NEUROLOGY: no changes in vision, tingling or numbness in hands or feet.   MSK: no muscle aches or pains, weakness  PSYCH:  mood stable    Past Medical History:   Diagnosis Date     Type 1 diabetes (H)     age of onset 14 years       No past surgical history on file.    Family History   Problem Relation Age of Onset     Diabetes Brother      Cerebrovascular Disease Paternal Grandmother      Arthritis Mother      Arthritis Maternal Grandmother        Social History     Social History     Marital status: Single     Spouse name: N/A     Number of children: N/A     Years of education: N/A     Social History Main Topics     Smoking status: Never Smoker     Smokeless tobacco: Never Used     Alcohol use No     Drug use: No     Sexual activity: Yes     Partners: Female     Other Topics Concern     None     Social History Narrative   Social History: Works for [employer]. . Daughter, Daphney born August, 2019.    Current Outpatient Medications   Medication     blood glucose (CONTOUR NEXT TEST) test strip     blood glucose monitoring (CANDE MICROLET) lancets     blood glucose monitoring (NO BRAND SPECIFIED) meter device kit     cholecalciferol (VITAMIN D) 1000 UNIT tablet     Continuous Blood Gluc Sensor (FREESTYLE LILO 14 DAY SENSOR) MISC     insulin aspart (NOVOLOG FLEXPEN) 100 UNIT/ML pen     insulin degludec (TRESIBA FLEXTOUCH) 200 UNIT/ML pen     insulin pen needle (B-D U/F) 31G X 8 MM miscellaneous     KETOSTIX test strip     No current facility-administered medications for this visit.         No Known Allergies    Physical Exam  GENERAL: healthy, alert and no distress  RESP: no audible wheeze, cough, or visible cyanosis.  No visible retractions or increased work of breathing.  Able to speak fully in complete sentences.  PSYCH: mentation appears normal, affect normal/bright, judgement and insight intact, normal speech and appearance well-groomed    RESULTS  Lab Results   Component Value Date    A1C 6.9 (H) 12/08/2020    A1C 6.8 (H) 09/03/2020    A1C 7.7 (H) 05/13/2019    A1C 7.9 (H) 08/13/2014    HEMOGLOBINA1 7.5 (A)  03/02/2020    HEMOGLOBINA1 7.5 (A) 11/26/2019    HEMOGLOBINA1 8.2 (A) 08/26/2019    HEMOGLOBINA1 7.7 (A) 02/04/2019    HEMOGLOBINA1 7.5 (A) 08/06/2018       TSH   Date Value Ref Range Status   05/29/2020 2.18 0.40 - 4.00 mU/L Final   05/13/2019 2.14 0.40 - 4.00 mU/L Final   01/30/2018 2.46 0.40 - 4.00 mU/L Final   09/16/2016 0.97 0.40 - 4.00 mU/L Final   06/24/2015 0.78 0.40 - 4.00 mU/L Final     T4 Free   Date Value Ref Range Status   09/16/2016 1.01 0.76 - 1.46 ng/dL Final   06/24/2015 1.02 0.76 - 1.46 ng/dL Final       ALT   Date Value Ref Range Status   03/05/2015 20 0 - 70 U/L Final   ]    Recent Labs   Lab Test 05/29/20  0705 05/13/19  0722 08/13/14  0956 08/13/14  0956   CHOL 150 163   < > 162   HDL 41 44   < > 49   LDL 86 107*   < > 102   TRIG 114 60   < > 56   CHOLHDLRATIO  --   --   --  3.3    < > = values in this interval not displayed.       Lab Results   Component Value Date     12/08/2020      Lab Results   Component Value Date    POTASSIUM 4.0 12/08/2020     Lab Results   Component Value Date    CHLORIDE 105 12/08/2020     Lab Results   Component Value Date    CELESTE 9.4 12/08/2020     Lab Results   Component Value Date    CO2 27 12/08/2020     Lab Results   Component Value Date    BUN 12 12/08/2020     Lab Results   Component Value Date    CR 0.81 12/08/2020       GFR Estimate   Date Value Ref Range Status   12/08/2020 >90 >60 mL/min/[1.73_m2] Final     Comment:     Non  GFR Calc  Starting 12/18/2018, serum creatinine based estimated GFR (eGFR) will be   calculated using the Chronic Kidney Disease Epidemiology Collaboration   (CKD-EPI) equation.     05/29/2020 >90 >60 mL/min/[1.73_m2] Final     Comment:     Non  GFR Calc  Starting 12/18/2018, serum creatinine based estimated GFR (eGFR) will be   calculated using the Chronic Kidney Disease Epidemiology Collaboration   (CKD-EPI) equation.     05/13/2019 >90 >60 mL/min/[1.73_m2] Final     Comment:     Non   American GFR Calc  Starting 12/18/2018, serum creatinine based estimated GFR (eGFR) will be   calculated using the Chronic Kidney Disease Epidemiology Collaboration   (CKD-EPI) equation.       GFR Estimate If Black   Date Value Ref Range Status   12/08/2020 >90 >60 mL/min/[1.73_m2] Final     Comment:      GFR Calc  Starting 12/18/2018, serum creatinine based estimated GFR (eGFR) will be   calculated using the Chronic Kidney Disease Epidemiology Collaboration   (CKD-EPI) equation.     05/29/2020 >90 >60 mL/min/[1.73_m2] Final     Comment:      GFR Calc  Starting 12/18/2018, serum creatinine based estimated GFR (eGFR) will be   calculated using the Chronic Kidney Disease Epidemiology Collaboration   (CKD-EPI) equation.     05/13/2019 >90 >60 mL/min/[1.73_m2] Final     Comment:      GFR Calc  Starting 12/18/2018, serum creatinine based estimated GFR (eGFR) will be   calculated using the Chronic Kidney Disease Epidemiology Collaboration   (CKD-EPI) equation.         Lab Results   Component Value Date    MICROL 10 05/29/2020     No results found for: MICROALBUMIN  No results found for: CPEPT, GADAB, ISCAB    Vitamin B12   Date Value Ref Range Status   09/03/2020 713 193 - 986 pg/mL Final   ]    Most recent eye exam date: : Not Found   Eye exam: November, 2020.  No retinopathy.     Assessment/Plan:     1.  Type 1 diabetes- Christiano is doing quite well.  A1c 6.9% and he has stability in his glucose.  No changes today. Discussed possibility of using the jamila and fiasp (to eliminate need for pre-bolusing).  He will look into these and let me know if he is interested.      2.  Risk factors-     Retinopathy:  No. Had recent eye exam in October, 2020.     Nephropathy:  BP historically well controlled.  No microalbuminuria.  Creatinine stable.  On no meds.   Neuropathy: No.    Feet: OK, no ulcers.   Taking ASA: no  Lipids:  LDL is at target.   Celiac screening: negative screen  5/19    3.  F/U in 3 months with Dr. Banks.      Shena Iniguez PA-C, MPAS   [facility]  Department of Medicine  Division of Endocrinology and Diabetes

## 2021-03-01 ENCOUNTER — MYC MEDICAL ADVICE (OUTPATIENT)
Dept: ENDOCRINOLOGY | Facility: CLINIC | Age: 41
End: 2021-03-01

## 2021-03-19 NOTE — PROGRESS NOTES
Outcome for 03/19/21 10:03 AM :Left Voicemail for patient to call back   Outcome for 03/19/21 10:39 AM :Patient is sharing data via clinic device website and patient has been instructed to update data on website. Find login information by using .[employer] (Sharla)  Agustín Gallegos  is being evaluated via a billable video visit.      How would you like to obtain your AVS? Janyharsydni  For the video visit, send the invitation by: [employer]  Will anyone else be joining your video visit? No      Video-Visit Details    Type of service:  Video Visit    Video Start Time: 0732  Video End Time:0759    Originating Location (pt. Location): home  Distant Location (provider location):  [facility] ENDOCRINOLOGY home    Platform used for Video Visit: BlueVine    HPI:   Christiano is a 41 yo man here for follow up of type 1 diabetes, which he has had since age 14.  He reports that overall things are going well.  He continues working from home.  He feels like his glucose has improved a lot since he started wearing a sensor.  He has noticed that he has a very gradual decline in his glucose overnight- he does not necessarily feel it.  To prevent this, he has not been covering his bedtime snack to prevent the drift low overnight. Christiano has been eating a lot of vegetables. His weight has been stable.  He has been more active since it has been nicer outside.  He has been walking more during the day and over    Novolog dosing:   Breakfast- cheerios- 1/4g   Lunch- sometimes leftovers (low carb) 1/7g  Dinner- usually low carb. 1/3-4g.    HS snack- he is getting away from snacking at night. Not covering this currently, but previously took 1/10g.  Correction: 1/30 over 130 mg/dL.     He takes Tresiba 26 units daily.      Christiano wears a sharla sensor with overall average of 155 mg/dL for the past two weeks.           He has been feeling well and in his usual state of health.  He has no other concerns today.     ROS  GENERAL: no weight loss, weight gain, fevers,  chills, malaise, night sweats.   HEENT: no dysphagia, diplopia, neck pain or tenderness, dry/scratchy eyes, URI, cough, sinus drainage, tinnitus, sinus pressure  CV: no chest pain, pressure, palpitations, skipped beats, LOC  LUNGS: no SOB, WILDE, cough, sputum production, wheezing   GI: no diarrhea, constipation, abdominal pain  EXTREMITIES: no rashes, ulcers, edema  NEUROLOGY: no changes in vision, tingling or numbness in hands or feet.   MSK: no muscle aches or pains, weakness  PSYCH: mood stable    Past Medical History:   Diagnosis Date     Type 1 diabetes (H)     age of onset 14 years       No past surgical history on file.    Family History   Problem Relation Age of Onset     Diabetes Brother      Cerebrovascular Disease Paternal Grandmother      Arthritis Mother      Arthritis Maternal Grandmother        Social History     Social History     Marital status: Single     Spouse name: N/A     Number of children: N/A     Years of education: N/A     Social History Main Topics     Smoking status: Never Smoker     Smokeless tobacco: Never Used     Alcohol use No     Drug use: No     Sexual activity: Yes     Partners: Female     Other Topics Concern     None     Social History Narrative   Social History: Works for [employer]. . Daughter, Daphney born August, 2019.    Current Outpatient Medications   Medication     blood glucose (CONTOUR NEXT TEST) test strip     blood glucose monitoring (CANDE MICROLET) lancets     blood glucose monitoring (NO BRAND SPECIFIED) meter device kit     cholecalciferol (VITAMIN D) 1000 UNIT tablet     Continuous Blood Gluc Sensor (FREESTYLE LILO 14 DAY SENSOR) MISC     insulin aspart (NOVOLOG FLEXPEN) 100 UNIT/ML pen     insulin degludec (TRESIBA FLEXTOUCH) 200 UNIT/ML pen     insulin pen needle (B-D U/F) 31G X 8 MM miscellaneous     KETOSTIX test strip     No current facility-administered medications for this visit.         No Known Allergies    Physical Exam  GENERAL:  healthy, alert and no distress  RESP: no audible wheeze, cough, or visible cyanosis.  No visible retractions or increased work of breathing.  Able to speak fully in complete sentences.  PSYCH: mentation appears normal, affect normal/bright, judgement and insight intact, normal speech and appearance well-groomed  RESULTS  Lab Results   Component Value Date    A1C 6.9 (H) 12/08/2020    A1C 6.8 (H) 09/03/2020    A1C 7.7 (H) 05/13/2019    A1C 7.9 (H) 08/13/2014    HEMOGLOBINA1 7.5 (A) 03/02/2020    HEMOGLOBINA1 7.5 (A) 11/26/2019    HEMOGLOBINA1 8.2 (A) 08/26/2019    HEMOGLOBINA1 7.7 (A) 02/04/2019    HEMOGLOBINA1 7.5 (A) 08/06/2018       TSH   Date Value Ref Range Status   05/29/2020 2.18 0.40 - 4.00 mU/L Final   05/13/2019 2.14 0.40 - 4.00 mU/L Final   01/30/2018 2.46 0.40 - 4.00 mU/L Final   09/16/2016 0.97 0.40 - 4.00 mU/L Final   06/24/2015 0.78 0.40 - 4.00 mU/L Final     T4 Free   Date Value Ref Range Status   09/16/2016 1.01 0.76 - 1.46 ng/dL Final   06/24/2015 1.02 0.76 - 1.46 ng/dL Final       ALT   Date Value Ref Range Status   03/05/2015 20 0 - 70 U/L Final   ]    Recent Labs   Lab Test 05/29/20  0705 05/13/19  0722 08/13/14  0956 08/13/14  0956   CHOL 150 163   < > 162   HDL 41 44   < > 49   LDL 86 107*   < > 102   TRIG 114 60   < > 56   CHOLHDLRATIO  --   --   --  3.3    < > = values in this interval not displayed.       Lab Results   Component Value Date     12/08/2020      Lab Results   Component Value Date    POTASSIUM 4.0 12/08/2020     Lab Results   Component Value Date    CHLORIDE 105 12/08/2020     Lab Results   Component Value Date    CELESTE 9.4 12/08/2020     Lab Results   Component Value Date    CO2 27 12/08/2020     Lab Results   Component Value Date    BUN 12 12/08/2020     Lab Results   Component Value Date    CR 0.81 12/08/2020       GFR Estimate   Date Value Ref Range Status   12/08/2020 >90 >60 mL/min/[1.73_m2] Final     Comment:     Non  GFR Calc  Starting 12/18/2018,  serum creatinine based estimated GFR (eGFR) will be   calculated using the Chronic Kidney Disease Epidemiology Collaboration   (CKD-EPI) equation.     05/29/2020 >90 >60 mL/min/[1.73_m2] Final     Comment:     Non  GFR Calc  Starting 12/18/2018, serum creatinine based estimated GFR (eGFR) will be   calculated using the Chronic Kidney Disease Epidemiology Collaboration   (CKD-EPI) equation.     05/13/2019 >90 >60 mL/min/[1.73_m2] Final     Comment:     Non  GFR Calc  Starting 12/18/2018, serum creatinine based estimated GFR (eGFR) will be   calculated using the Chronic Kidney Disease Epidemiology Collaboration   (CKD-EPI) equation.       GFR Estimate If Black   Date Value Ref Range Status   12/08/2020 >90 >60 mL/min/[1.73_m2] Final     Comment:      GFR Calc  Starting 12/18/2018, serum creatinine based estimated GFR (eGFR) will be   calculated using the Chronic Kidney Disease Epidemiology Collaboration   (CKD-EPI) equation.     05/29/2020 >90 >60 mL/min/[1.73_m2] Final     Comment:      GFR Calc  Starting 12/18/2018, serum creatinine based estimated GFR (eGFR) will be   calculated using the Chronic Kidney Disease Epidemiology Collaboration   (CKD-EPI) equation.     05/13/2019 >90 >60 mL/min/[1.73_m2] Final     Comment:      GFR Calc  Starting 12/18/2018, serum creatinine based estimated GFR (eGFR) will be   calculated using the Chronic Kidney Disease Epidemiology Collaboration   (CKD-EPI) equation.         Lab Results   Component Value Date    MICROL 10 05/29/2020     No results found for: MICROALBUMIN  No results found for: CPEPT, GADAB, ISCAB    Vitamin B12   Date Value Ref Range Status   09/03/2020 713 193 - 986 pg/mL Final     Most recent eye exam date: : Not Found     Eye exam: November, 2020.  No retinopathy.     25 OH Vit D total   Date Value Ref Range Status   08/22/2017 <34 20 - 75 ug/L Final     Comment:     Season, race,  dietary intake, and treatment affect the concentration of   25-hydroxy-Vitamin D. Values may decrease during winter months and increase   during summer months. Values 20-29 ug/L may indicate Vitamin D insufficiency   and values <20 ug/L may indicate Vitamin D deficiency.  This test was developed and its performance characteristics determined by the   [facility],  [facility]. It has   not been cleared or approved by the FDA. The laboratory is regulated under   CLIA as qualified to perform high-complexity testing. This test is used for   clinical purposes. It should not be regarded as investigational or for   research.     12/04/2014 (L) 30 - 75 ug/L Final    <23  Season, race, dietary intake, and treatment affect the concentration of   25-hydroxy-Vitamin D. Values may decrease during winter months and increase   during summer months. Values less than 30 ug/L may indicate Vitamin D   deficiency.       No results found for: PTHI  Calcium   Date Value Ref Range Status   12/08/2020 9.4 8.5 - 10.1 mg/dL Final   05/13/2019 9.3 8.5 - 10.1 mg/dL Final     No results found for: CALCIUMIONIZ  No results found for: ALBUMIN    Assessment/Plan:     1.  Type 1 diabetes- Christiano is doing quite well, but he is drifting down overnight. He will reduce Tresiba from 26 units to 24 units.  He may need to reduce further to 22 units if lows persist.  Encouraged healthy lifestyle/heart healthy eating.      2.  Risk factors-     Retinopathy:  No. Had recent eye exam in October, 2020.     Nephropathy:  BP historically well controlled.  No microalbuminuria.  Creatinine stable.  On no meds.   Neuropathy: No.    Feet: OK, no ulcers.   Taking ASA: no  Lipids:  LDL is at target. Statin not indicated due to his age.   Celiac screening: negative screen 5/19  Vitamin D: will check. He is taking 1000 international unit(s) daily.      3.  F/U in 3 months with Dr. Banks or myself in person.      35 minutes  spent on the date of the encounter doing chart review, review of test results, review of continuous glucose sensor, interpretation of glucose data, patient visit and documentation, counseling/coordination of care, and discussion of follow up plan for worsening hyper and hypoglycemia.  The patient understood and is satisfied with today's visit.     Shena Iniguez PA-C, MPAS   [facility]  Department of Medicine  Division of Endocrinology and Diabetes

## 2021-03-22 ENCOUNTER — VIRTUAL VISIT (OUTPATIENT)
Dept: ENDOCRINOLOGY | Facility: CLINIC | Age: 41
End: 2021-03-22
Payer: COMMERCIAL

## 2021-03-22 DIAGNOSIS — E10.9 WELL CONTROLLED TYPE 1 DIABETES MELLITUS (H): ICD-10-CM

## 2021-03-22 DIAGNOSIS — E10.9 TYPE 1 DIABETES MELLITUS WITHOUT COMPLICATION (H): ICD-10-CM

## 2021-03-22 DIAGNOSIS — E55.9 VITAMIN D DEFICIENCY: Primary | ICD-10-CM

## 2021-03-22 PROCEDURE — 99214 OFFICE O/P EST MOD 30 MIN: CPT | Mod: 95 | Performed by: PHYSICIAN ASSISTANT

## 2021-03-22 RX ORDER — PEN NEEDLE, DIABETIC 31 GX5/16"
NEEDLE, DISPOSABLE MISCELLANEOUS
Qty: 100 EACH | Refills: 11 | Status: SHIPPED | OUTPATIENT
Start: 2021-03-22 | End: 2022-11-15

## 2021-03-22 RX ORDER — INSULIN ASPART 100 [IU]/ML
INJECTION, SOLUTION INTRAVENOUS; SUBCUTANEOUS
Qty: 60 ML | Refills: 3 | Status: SHIPPED | OUTPATIENT
Start: 2021-03-22 | End: 2022-05-10

## 2021-03-22 NOTE — LETTER
3/22/2021       RE: Agustín Gallegos  [address]  [facility] [address]     Dear Colleague,    Thank you for referring your patient, Agustín Gallegos, to the [facility]CRINOLOG[facility] [place] [facility]. Please see a copy of my visit note below.    Outcome for 03/19/21 10:03 AM :Left Voicemail for patient to call back   Outcome for 03/19/21 10:39 AM :Patient is sharing data via clinic device website and patient has been instructed to update data on website. Find login information by using .S-[email or web address] ([email or web address])  Agustín Gallegos  is being evaluated via a billable video visit.      How would you like to obtain your AVS? Shooger  For the video visit, send the invitation by: Zairge  Will anyone else be joining your video visit? No      Video-Visit Details    Type of service:  Video Visit    Video Start Time: 0732  Video End Time:0759    Originating Location (pt. Location): home  Distant Location (provider location):  [facility] ENDOCRINOLOGY home    Platform used for Video Visit: Doctor Fun    HPI:   Christiano is a 41 yo man here for follow up of type 1 diabetes, which he has had since age 14.  He reports that overall things are going well.  He continues working from home.  He feels like his glucose has improved a lot since he started wearing a sensor.  He has noticed that he has a very gradual decline in his glucose overnight- he does not necessarily feel it.  To prevent this, he has not been covering his bedtime snack to prevent the drift low overnight. Christiano has been eating a lot of vegetables. His weight has been stable.  He has been more active since it has been nicer outside.  He has been walking more during the day and over    Novolog dosing:   Breakfast- cheerios- 1/4g   Lunch- sometimes leftovers (low carb) 1/7g  Dinner- usually low carb. 1/3-4g.    HS snack- he is getting away from snacking at night. Not covering this currently, but previously took  1/10g.  Correction: 1/30 over 130 mg/dL.     He takes Tresiba 26 units daily.      Christiano wears a jamila sensor with overall average of 155 mg/dL for the past two weeks.           He has been feeling well and in his usual state of health.  He has no other concerns today.     ROS  GENERAL: no weight loss, weight gain, fevers, chills, malaise, night sweats.   HEENT: no dysphagia, diplopia, neck pain or tenderness, dry/scratchy eyes, URI, cough, sinus drainage, tinnitus, sinus pressure  CV: no chest pain, pressure, palpitations, skipped beats, LOC  LUNGS: no SOB, WILDE, cough, sputum production, wheezing   GI: no diarrhea, constipation, abdominal pain  EXTREMITIES: no rashes, ulcers, edema  NEUROLOGY: no changes in vision, tingling or numbness in hands or feet.   MSK: no muscle aches or pains, weakness  PSYCH: mood stable    Past Medical History:   Diagnosis Date     Type 1 diabetes (H)     age of onset 14 years       No past surgical history on file.    Family History   Problem Relation Age of Onset     Diabetes Brother      Cerebrovascular Disease Paternal Grandmother      Arthritis Mother      Arthritis Maternal Grandmother        Social History     Social History     Marital status: Single     Spouse name: N/A     Number of children: N/A     Years of education: N/A     Social History Main Topics     Smoking status: Never Smoker     Smokeless tobacco: Never Used     Alcohol use No     Drug use: No     Sexual activity: Yes     Partners: Female     Other Topics Concern     None     Social History Narrative   Social History: Works for [employer]. . Daughter, Daphney born August, 2019.    Current Outpatient Medications   Medication     blood glucose (CONTOUR NEXT TEST) test strip     blood glucose monitoring (CANDE MICROLET) lancets     blood glucose monitoring (NO BRAND SPECIFIED) meter device kit     cholecalciferol (VITAMIN D) 1000 UNIT tablet     Continuous Blood Gluc Sensor (FREESTYLE JAMILA 14 DAY SENSOR)  MISC     insulin aspart (NOVOLOG FLEXPEN) 100 UNIT/ML pen     insulin degludec (TRESIBA FLEXTOUCH) 200 UNIT/ML pen     insulin pen needle (B-D U/F) 31G X 8 MM miscellaneous     KETOSTIX test strip     No current facility-administered medications for this visit.         No Known Allergies    Physical Exam  GENERAL: healthy, alert and no distress  RESP: no audible wheeze, cough, or visible cyanosis.  No visible retractions or increased work of breathing.  Able to speak fully in complete sentences.  PSYCH: mentation appears normal, affect normal/bright, judgement and insight intact, normal speech and appearance well-groomed  RESULTS  Lab Results   Component Value Date    A1C 6.9 (H) 12/08/2020    A1C 6.8 (H) 09/03/2020    A1C 7.7 (H) 05/13/2019    A1C 7.9 (H) 08/13/2014    HEMOGLOBINA1 7.5 (A) 03/02/2020    HEMOGLOBINA1 7.5 (A) 11/26/2019    HEMOGLOBINA1 8.2 (A) 08/26/2019    HEMOGLOBINA1 7.7 (A) 02/04/2019    HEMOGLOBINA1 7.5 (A) 08/06/2018       TSH   Date Value Ref Range Status   05/29/2020 2.18 0.40 - 4.00 mU/L Final   05/13/2019 2.14 0.40 - 4.00 mU/L Final   01/30/2018 2.46 0.40 - 4.00 mU/L Final   09/16/2016 0.97 0.40 - 4.00 mU/L Final   06/24/2015 0.78 0.40 - 4.00 mU/L Final     T4 Free   Date Value Ref Range Status   09/16/2016 1.01 0.76 - 1.46 ng/dL Final   06/24/2015 1.02 0.76 - 1.46 ng/dL Final       ALT   Date Value Ref Range Status   03/05/2015 20 0 - 70 U/L Final   ]    Recent Labs   Lab Test 05/29/20  0705 05/13/19  0722 08/13/14  0956 08/13/14  0956   CHOL 150 163   < > 162   HDL 41 44   < > 49   LDL 86 107*   < > 102   TRIG 114 60   < > 56   CHOLHDLRATIO  --   --   --  3.3    < > = values in this interval not displayed.       Lab Results   Component Value Date     12/08/2020      Lab Results   Component Value Date    POTASSIUM 4.0 12/08/2020     Lab Results   Component Value Date    CHLORIDE 105 12/08/2020     Lab Results   Component Value Date    CELESTE 9.4 12/08/2020     Lab Results   Component  Value Date    CO2 27 12/08/2020     Lab Results   Component Value Date    BUN 12 12/08/2020     Lab Results   Component Value Date    CR 0.81 12/08/2020       GFR Estimate   Date Value Ref Range Status   12/08/2020 >90 >60 mL/min/[1.73_m2] Final     Comment:     Non  GFR Calc  Starting 12/18/2018, serum creatinine based estimated GFR (eGFR) will be   calculated using the Chronic Kidney Disease Epidemiology Collaboration   (CKD-EPI) equation.     05/29/2020 >90 >60 mL/min/[1.73_m2] Final     Comment:     Non  GFR Calc  Starting 12/18/2018, serum creatinine based estimated GFR (eGFR) will be   calculated using the Chronic Kidney Disease Epidemiology Collaboration   (CKD-EPI) equation.     05/13/2019 >90 >60 mL/min/[1.73_m2] Final     Comment:     Non  GFR Calc  Starting 12/18/2018, serum creatinine based estimated GFR (eGFR) will be   calculated using the Chronic Kidney Disease Epidemiology Collaboration   (CKD-EPI) equation.       GFR Estimate If Black   Date Value Ref Range Status   12/08/2020 >90 >60 mL/min/[1.73_m2] Final     Comment:      GFR Calc  Starting 12/18/2018, serum creatinine based estimated GFR (eGFR) will be   calculated using the Chronic Kidney Disease Epidemiology Collaboration   (CKD-EPI) equation.     05/29/2020 >90 >60 mL/min/[1.73_m2] Final     Comment:      GFR Calc  Starting 12/18/2018, serum creatinine based estimated GFR (eGFR) will be   calculated using the Chronic Kidney Disease Epidemiology Collaboration   (CKD-EPI) equation.     05/13/2019 >90 >60 mL/min/[1.73_m2] Final     Comment:      GFR Calc  Starting 12/18/2018, serum creatinine based estimated GFR (eGFR) will be   calculated using the Chronic Kidney Disease Epidemiology Collaboration   (CKD-EPI) equation.         Lab Results   Component Value Date    MICROL 10 05/29/2020     No results found for: MICROALBUMIN  No results found for:  CPEPT, GADAB, ISCAB    Vitamin B12   Date Value Ref Range Status   09/03/2020 713 193 - 986 pg/mL Final     Most recent eye exam date: : Not Found     Eye exam: November, 2020.  No retinopathy.     25 OH Vit D total   Date Value Ref Range Status   08/22/2017 <34 20 - 75 ug/L Final     Comment:     Season, race, dietary intake, and treatment affect the concentration of   25-hydroxy-Vitamin D. Values may decrease during winter months and increase   during summer months. Values 20-29 ug/L may indicate Vitamin D insufficiency   and values <20 ug/L may indicate Vitamin D deficiency.  This test was developed and its performance characteristics determined by the   [facility],  Special Chemistry Laboratory. It has   not been cleared or approved by the FDA. The laboratory is regulated under   CLIA as qualified to perform high-complexity testing. This test is used for   clinical purposes. It should not be regarded as investigational or for   research.     12/04/2014 (L) 30 - 75 ug/L Final    <23  Season, race, dietary intake, and treatment affect the concentration of   25-hydroxy-Vitamin D. Values may decrease during winter months and increase   during summer months. Values less than 30 ug/L may indicate Vitamin D   deficiency.       No results found for: PTHI  Calcium   Date Value Ref Range Status   12/08/2020 9.4 8.5 - 10.1 mg/dL Final   05/13/2019 9.3 8.5 - 10.1 mg/dL Final     No results found for: CALCIUMIONIZ  No results found for: ALBUMIN    Assessment/Plan:     1.  Type 1 diabetes- Christiano is doing quite well, but he is drifting down overnight. He will reduce Tresiba from 26 units to 24 units.  He may need to reduce further to 22 units if lows persist.  Encouraged healthy lifestyle/heart healthy eating.      2.  Risk factors-     Retinopathy:  No. Had recent eye exam in October, 2020.     Nephropathy:  BP historically well controlled.  No microalbuminuria.  Creatinine stable.  On no meds.    Neuropathy: No.    Feet: OK, no ulcers.   Taking ASA: no  Lipids:  LDL is at target. Statin not indicated due to his age.   Celiac screening: negative screen 5/19  Vitamin D: will check. He is taking 1000 international unit(s) daily.      3.  F/U in 3 months with Dr. Banks or myself in person.      35 minutes spent on the date of the encounter doing chart review, review of test results, review of continuous glucose sensor, interpretation of glucose data, patient visit and documentation, counseling/coordination of care, and discussion of follow up plan for worsening hyper and hypoglycemia.  The patient understood and is satisfied with today's visit.     Shena Iniguez PA-C, MPAS   [facility]  Department of Medicine  Division of Endocrinology and Diabetes

## 2021-06-03 ENCOUNTER — TELEPHONE (OUTPATIENT)
Dept: ENDOCRINOLOGY | Facility: CLINIC | Age: 41
End: 2021-06-03

## 2021-06-07 ASSESSMENT — ENCOUNTER SYMPTOMS
SPUTUM PRODUCTION: 1
WHEEZING: 0
HEMOPTYSIS: 0
SNORES LOUDLY: 0
COUGH: 1
DYSPNEA ON EXERTION: 0
POSTURAL DYSPNEA: 0
SHORTNESS OF BREATH: 0
COUGH DISTURBING SLEEP: 0

## 2021-06-08 ENCOUNTER — TELEPHONE (OUTPATIENT)
Dept: ENDOCRINOLOGY | Facility: CLINIC | Age: 41
End: 2021-06-08

## 2021-06-08 NOTE — TELEPHONE ENCOUNTER
DMV form faxed to DMV X2  With copy mailed to patieint and copy scanned in per Shena Leo RN on 6/8/2021 at 9:09 AM

## 2021-06-17 DIAGNOSIS — E55.9 VITAMIN D DEFICIENCY: ICD-10-CM

## 2021-06-17 DIAGNOSIS — E10.9 WELL CONTROLLED TYPE 1 DIABETES MELLITUS (H): ICD-10-CM

## 2021-06-17 LAB
ANION GAP SERPL CALCULATED.3IONS-SCNC: 4 MMOL/L (ref 3–14)
BUN SERPL-MCNC: 8 MG/DL (ref 7–30)
CALCIUM SERPL-MCNC: 9.2 MG/DL (ref 8.5–10.1)
CHLORIDE SERPL-SCNC: 103 MMOL/L (ref 94–109)
CHOLEST SERPL-MCNC: 172 MG/DL
CO2 SERPL-SCNC: 31 MMOL/L (ref 20–32)
CREAT SERPL-MCNC: 0.73 MG/DL (ref 0.66–1.25)
CREAT UR-MCNC: 51 MG/DL
GFR SERPL CREATININE-BSD FRML MDRD: >90 ML/MIN/{1.73_M2}
GLUCOSE SERPL-MCNC: 206 MG/DL (ref 70–99)
HBA1C MFR BLD: 7.3 % (ref 0–5.6)
HDLC SERPL-MCNC: 44 MG/DL
LDLC SERPL CALC-MCNC: 119 MG/DL
MICROALBUMIN UR-MCNC: <5 MG/L
MICROALBUMIN/CREAT UR: NORMAL MG/G CR (ref 0–17)
NONHDLC SERPL-MCNC: 128 MG/DL
POTASSIUM SERPL-SCNC: 4 MMOL/L (ref 3.4–5.3)
SODIUM SERPL-SCNC: 138 MMOL/L (ref 133–144)
TRIGL SERPL-MCNC: 43 MG/DL
TSH SERPL DL<=0.005 MIU/L-ACNC: 0.88 MU/L (ref 0.4–4)

## 2021-06-17 PROCEDURE — 36415 COLL VENOUS BLD VENIPUNCTURE: CPT | Performed by: PHYSICIAN ASSISTANT

## 2021-06-17 PROCEDURE — 80048 BASIC METABOLIC PNL TOTAL CA: CPT | Performed by: PHYSICIAN ASSISTANT

## 2021-06-17 PROCEDURE — 83036 HEMOGLOBIN GLYCOSYLATED A1C: CPT | Performed by: PHYSICIAN ASSISTANT

## 2021-06-17 PROCEDURE — 80061 LIPID PANEL: CPT | Performed by: PHYSICIAN ASSISTANT

## 2021-06-17 PROCEDURE — 84443 ASSAY THYROID STIM HORMONE: CPT | Performed by: PHYSICIAN ASSISTANT

## 2021-06-17 PROCEDURE — 82306 VITAMIN D 25 HYDROXY: CPT | Performed by: PHYSICIAN ASSISTANT

## 2021-06-17 PROCEDURE — 82043 UR ALBUMIN QUANTITATIVE: CPT | Performed by: PHYSICIAN ASSISTANT

## 2021-06-18 LAB
DEPRECATED CALCIDIOL+CALCIFEROL SERPL-MC: <52 UG/L (ref 20–75)
VITAMIN D2 SERPL-MCNC: <5 UG/L
VITAMIN D3 SERPL-MCNC: 47 UG/L

## 2021-06-21 ENCOUNTER — OFFICE VISIT (OUTPATIENT)
Dept: ENDOCRINOLOGY | Facility: CLINIC | Age: 41
End: 2021-06-21
Payer: COMMERCIAL

## 2021-06-21 VITALS
SYSTOLIC BLOOD PRESSURE: 125 MMHG | HEIGHT: 75 IN | DIASTOLIC BLOOD PRESSURE: 82 MMHG | HEART RATE: 66 BPM | WEIGHT: 196.6 LBS | BODY MASS INDEX: 24.45 KG/M2

## 2021-06-21 DIAGNOSIS — E10.65 POORLY CONTROLLED TYPE 1 DIABETES MELLITUS (H): Primary | ICD-10-CM

## 2021-06-21 PROCEDURE — 99214 OFFICE O/P EST MOD 30 MIN: CPT | Performed by: PHYSICIAN ASSISTANT

## 2021-06-21 ASSESSMENT — MIFFLIN-ST. JEOR: SCORE: 1887.4

## 2021-06-21 ASSESSMENT — PAIN SCALES - GENERAL: PAINLEVEL: NO PAIN (0)

## 2021-06-21 NOTE — PROGRESS NOTES
Chief Complaint   Patient presents with     Diabetes     dm1     [facility]        HPI:   Christiano is a 39 yo man here for follow up of type 1 diabetes, which he has had since age 14.  He reports that overall things are going well.  He continues working from home.  He feels like his glucose has improved a lot since he started wearing a sensor. He has been trying to eat more vegetables and processed foods. He has been more active since it has been nicer outside.  He has been walking more during the day.     He has been doing a lot of traveling over the past few month.  He was doing really well until the traveling.     Novolog dosing:   Breakfast- cheerios- 1/4g   Lunch- sometimes leftovers (low carb) 1/7g  Dinner- usually low carb. 1/3g.    HS snack- he does not usually cover if he has a snack.  Sometimes 10g with no coverage.    Correction: 1/30 over 130 mg/dL.     He takes Tresiba 24 units daily.      Christiano wears a jamila sensor with overall average of 154 mg/dL  (CV 36%) for the past two weeks.           He has been feeling well and in his usual state of health.  He has no other concerns today.     ROS  GENERAL: no weight loss, weight gain, fevers, chills, malaise, night sweats.   HEENT: no dysphagia, diplopia, neck pain or tenderness, dry/scratchy eyes, URI, cough, sinus drainage, tinnitus, sinus pressure  CV: no chest pain, pressure, palpitations, skipped beats, LOC  LUNGS: no SOB, WILDE, cough, sputum production, wheezing   GI: no diarrhea, constipation, abdominal pain  EXTREMITIES: no rashes, ulcers, edema  NEUROLOGY: no changes in vision, tingling or numbness in hands or feet.   MSK: no muscle aches or pains, weakness  PSYCH: mood stable    Past Medical History:   Diagnosis Date     Type 1 diabetes (H)     age of onset 14 years       No past surgical history on file.    Family History   Problem Relation Age of Onset     Diabetes Brother      Cerebrovascular Disease Paternal Grandmother      Arthritis Mother       "Arthritis Maternal Grandmother        Social History     Social History     Marital status: Single     Spouse name: N/A     Number of children: N/A     Years of education: N/A     Social History Main Topics     Smoking status: Never Smoker     Smokeless tobacco: Never Used     Alcohol use No     Drug use: No     Sexual activity: Yes     Partners: Female     Other Topics Concern     None     Social History Narrative   Social History: Works for [employer]. . Daughter, Daphney born August, 2019.    Current Outpatient Medications   Medication     blood glucose (CONTOUR NEXT TEST) test strip     blood glucose monitoring (CANDE MICROLET) lancets     blood glucose monitoring (NO BRAND SPECIFIED) meter device kit     cholecalciferol (VITAMIN D) 1000 UNIT tablet     Continuous Blood Gluc Sensor (FREESTYLE LILO 14 DAY SENSOR) MISC     insulin aspart (NOVOLOG FLEXPEN) 100 UNIT/ML pen     insulin degludec (TRESIBA FLEXTOUCH) 200 UNIT/ML pen     insulin pen needle (B-D U/F) 31G X 8 MM miscellaneous     KETOSTIX test strip     No current facility-administered medications for this visit.         No Known Allergies    Physical Exam  /82   Pulse 66   Ht 1.905 m (6' 3\")   Wt 89.2 kg (196 lb 9.6 oz)   BMI 24.57 kg/m    Physical Exam  /82   Pulse 66   Ht 1.905 m (6' 3\")   Wt 89.2 kg (196 lb 9.6 oz)   BMI 24.57 kg/m    GENERAL:  Alert and oriented X3, NAD, well dressed, answering questions appropriately, appears stated age.  HEENT: OP clear, no lymphadenopathy, no thyromegaly, non-tender, no exophthalmus, no proptosis, EOMI, no lid lag, no retraction  EXTREMITIES: no edema, +pulses, no rashes, no lesions  NEUROLOGY: CN grossly intact, + monofilament, + DTR upper and lower extremity    RESULTS  Lab Results   Component Value Date    A1C 7.3 (H) 06/17/2021    A1C 6.9 (H) 12/08/2020    A1C 6.8 (H) 09/03/2020    A1C 7.7 (H) 05/13/2019    A1C 7.9 (H) 08/13/2014    HEMOGLOBINA1 7.5 (A) 03/02/2020    " HEMOGLOBINA1 7.5 (A) 11/26/2019    HEMOGLOBINA1 8.2 (A) 08/26/2019    HEMOGLOBINA1 7.7 (A) 02/04/2019    HEMOGLOBINA1 7.5 (A) 08/06/2018       TSH   Date Value Ref Range Status   06/17/2021 0.88 0.40 - 4.00 mU/L Final   05/29/2020 2.18 0.40 - 4.00 mU/L Final   05/13/2019 2.14 0.40 - 4.00 mU/L Final   01/30/2018 2.46 0.40 - 4.00 mU/L Final   09/16/2016 0.97 0.40 - 4.00 mU/L Final     T4 Free   Date Value Ref Range Status   09/16/2016 1.01 0.76 - 1.46 ng/dL Final   06/24/2015 1.02 0.76 - 1.46 ng/dL Final       ALT   Date Value Ref Range Status   03/05/2015 20 0 - 70 U/L Final   ]    Recent Labs   Lab Test 06/17/21  0916 05/29/20  0705 08/13/14  0956 08/13/14  0956   CHOL 172 150   < > 162   HDL 44 41   < > 49   * 86   < > 102   TRIG 43 114   < > 56   CHOLHDLRATIO  --   --   --  3.3    < > = values in this interval not displayed.       Lab Results   Component Value Date     06/17/2021      Lab Results   Component Value Date    POTASSIUM 4.0 06/17/2021     Lab Results   Component Value Date    CHLORIDE 103 06/17/2021     Lab Results   Component Value Date    CELESTE 9.2 06/17/2021     Lab Results   Component Value Date    CO2 31 06/17/2021     Lab Results   Component Value Date    BUN 8 06/17/2021     Lab Results   Component Value Date    CR 0.73 06/17/2021       GFR Estimate   Date Value Ref Range Status   06/17/2021 >90 >60 mL/min/[1.73_m2] Final     Comment:     Non  GFR Calc  Starting 12/18/2018, serum creatinine based estimated GFR (eGFR) will be   calculated using the Chronic Kidney Disease Epidemiology Collaboration   (CKD-EPI) equation.     12/08/2020 >90 >60 mL/min/[1.73_m2] Final     Comment:     Non  GFR Calc  Starting 12/18/2018, serum creatinine based estimated GFR (eGFR) will be   calculated using the Chronic Kidney Disease Epidemiology Collaboration   (CKD-EPI) equation.     05/29/2020 >90 >60 mL/min/[1.73_m2] Final     Comment:     Non  GFR  Calc  Starting 12/18/2018, serum creatinine based estimated GFR (eGFR) will be   calculated using the Chronic Kidney Disease Epidemiology Collaboration   (CKD-EPI) equation.       GFR Estimate If Black   Date Value Ref Range Status   06/17/2021 >90 >60 mL/min/[1.73_m2] Final     Comment:      GFR Calc  Starting 12/18/2018, serum creatinine based estimated GFR (eGFR) will be   calculated using the Chronic Kidney Disease Epidemiology Collaboration   (CKD-EPI) equation.     12/08/2020 >90 >60 mL/min/[1.73_m2] Final     Comment:      GFR Calc  Starting 12/18/2018, serum creatinine based estimated GFR (eGFR) will be   calculated using the Chronic Kidney Disease Epidemiology Collaboration   (CKD-EPI) equation.     05/29/2020 >90 >60 mL/min/[1.73_m2] Final     Comment:      GFR Calc  Starting 12/18/2018, serum creatinine based estimated GFR (eGFR) will be   calculated using the Chronic Kidney Disease Epidemiology Collaboration   (CKD-EPI) equation.         Lab Results   Component Value Date    MICROL <5 06/17/2021     No results found for: MICROALBUMIN  No results found for: CPEPT, GADAB, ISCAB    Vitamin B12   Date Value Ref Range Status   09/03/2020 713 193 - 986 pg/mL Final   ]    Most recent eye exam date: : Not Found   25 OH Vit D total   Date Value Ref Range Status   06/17/2021 <52 20 - 75 ug/L Final     Comment:     Season, race, dietary intake, and treatment affect the concentration of   25-hydroxy-Vitamin D. Values may decrease during winter months and increase   during summer months. Values 20-29 ug/L may indicate Vitamin D insufficiency   and values <20 ug/L may indicate Vitamin D deficiency.  This test was developed and its performance characteristics determined by the   [facility], Special Chemistry Laboratory.   It has not been cleared or approved by the FDA. The laboratory is regulated   under CLIA as qualified to perform  high-complexity testing. This test is used   for clinical purposes. It should not be regarded as investigational or for   research.     08/22/2017 <34 20 - 75 ug/L Final     Comment:     Season, race, dietary intake, and treatment affect the concentration of   25-hydroxy-Vitamin D. Values may decrease during winter months and increase   during summer months. Values 20-29 ug/L may indicate Vitamin D insufficiency   and values <20 ug/L may indicate Vitamin D deficiency.  This test was developed and its performance characteristics determined by the   [facility],  Special Chemistry Laboratory. It has   not been cleared or approved by the FDA. The laboratory is regulated under   CLIA as qualified to perform high-complexity testing. This test is used for   clinical purposes. It should not be regarded as investigational or for   research.       No results found for: PTHI  Calcium   Date Value Ref Range Status   06/17/2021 9.2 8.5 - 10.1 mg/dL Final   12/08/2020 9.4 8.5 - 10.1 mg/dL Final     No results found for: CALCIUMIONIZ  No results found for: ALBUMIN      Assessment/Plan:     1.  Type 1 diabetes- Christiano is doing quite well, but going a bit too high after eating.  We will work on taking bolus 15 minutes before eating.  Could consider Fiasp in the future.  Discussed In-pen.  He will think about it.   Encouraged healthy lifestyle/heart healthy eating.      2.  Risk factors-     Retinopathy:  No. Had recent eye exam in October, 2020.     Nephropathy:  BP historically well controlled.  No microalbuminuria.  Creatinine stable.  On no meds.   Neuropathy: No.    Feet: OK, no ulcers.   Taking ASA: no  Lipids:  LDL is a bit high.  Work on limiting processed meats and increasing fruits/vegetables.  Briefly discussed statin.    Celiac screening: negative screen 5/19  Vitamin D: will check. He is taking 1000 international unit(s) daily.      3.  F/U in 3 months with Dr. Banks or myself in person.       35 minutes spent on the date of the encounter doing chart review, review of test results, review of continuous glucose sensor, interpretation of glucose data, patient visit and documentation, counseling/coordination of care, and discussion of follow up plan for worsening hyper and hypoglycemia.  The patient understood and is satisfied with today's visit.     Shena Iniguez PA-C, MPAS   [facility]  Department of Medicine  Division of Endocrinology and Diabetes                    Answers for HPI/ROS submitted by the patient on 6/7/2021   General Symptoms: No  Skin Symptoms: No  HENT Symptoms: No  EYE SYMPTOMS: No  HEART SYMPTOMS: No  LUNG SYMPTOMS: Yes  INTESTINAL SYMPTOMS: No  URINARY SYMPTOMS: No  REPRODUCTIVE SYMPTOMS: No  SKELETAL SYMPTOMS: No  BLOOD SYMPTOMS: No  NERVOUS SYSTEM SYMPTOMS: No  MENTAL HEALTH SYMPTOMS: No  Cough: Yes  Sputum or phlegm: Yes  Coughing up blood: No  Difficulty breating or shortness of breath: No  Snoring: No  Wheezing: No  Difficulty breathing on exertion: No  Nighttime Cough: No  Difficulty breathing when lying flat: No

## 2021-06-21 NOTE — LETTER
6/21/2021       RE: Agustín Gallegos  [address]  [facility] [address]     Dear Colleague,    Thank you for referring your patient, Agustín Gallegos, to the [facility] [place] at [facility]. Please see a copy of my visit note below.    Chief Complaint   Patient presents with     Diabetes     dm1     Kevin Smith, RUSTY        HPI:   Christiano is a 41 yo man here for follow up of type 1 diabetes, which he has had since age 14.  He reports that overall things are going well.  He continues working from home.  He feels like his glucose has improved a lot since he started wearing a sensor. He has been trying to eat more vegetables and processed foods. He has been more active since it has been nicer outside.  He has been walking more during the day.     He has been doing a lot of traveling over the past few month.  He was doing really well until the traveling.     Novolog dosing:   Breakfast- cheerios- 1/4g   Lunch- sometimes leftovers (low carb) 1/7g  Dinner- usually low carb. 1/3g.    HS snack- he does not usually cover if he has a snack.  Sometimes 10g with no coverage.    Correction: 1/30 over 130 mg/dL.     He takes Tresiba 24 units daily.      Christiano wears a jamila sensor with overall average of 154 mg/dL  (CV 36%) for the past two weeks.           He has been feeling well and in his usual state of health.  He has no other concerns today.     ROS  GENERAL: no weight loss, weight gain, fevers, chills, malaise, night sweats.   HEENT: no dysphagia, diplopia, neck pain or tenderness, dry/scratchy eyes, URI, cough, sinus drainage, tinnitus, sinus pressure  CV: no chest pain, pressure, palpitations, skipped beats, LOC  LUNGS: no SOB, WILDE, cough, sputum production, wheezing   GI: no diarrhea, constipation, abdominal pain  EXTREMITIES: no rashes, ulcers, edema  NEUROLOGY: no changes in vision, tingling or numbness in hands or feet.   MSK: no  "muscle aches or pains, weakness  PSYCH: mood stable    Past Medical History:   Diagnosis Date     Type 1 diabetes (H)     age of onset 14 years       No past surgical history on file.    Family History   Problem Relation Age of Onset     Diabetes Brother      Cerebrovascular Disease Paternal Grandmother      Arthritis Mother      Arthritis Maternal Grandmother        Social History     Social History     Marital status: Single     Spouse name: N/A     Number of children: N/A     Years of education: N/A     Social History Main Topics     Smoking status: Never Smoker     Smokeless tobacco: Never Used     Alcohol use No     Drug use: No     Sexual activity: Yes     Partners: Female     Other Topics Concern     None     Social History Narrative   Social History: Works for [employer]. . Daughter, Daphney born August, 2019.    Current Outpatient Medications   Medication     blood glucose (CONTOUR NEXT TEST) test strip     blood glucose monitoring (CANDE MICROLET) lancets     blood glucose monitoring (NO BRAND SPECIFIED) meter device kit     cholecalciferol (VITAMIN D) 1000 UNIT tablet     Continuous Blood Gluc Sensor (Radiant[employer] TechnologiesSTYLE LILO 14 DAY SENSOR) MISC     insulin aspart (NOVOLOG FLEXPEN) 100 UNIT/ML pen     insulin degludec (TRESIBA FLEXTOUCH) 200 UNIT/ML pen     insulin pen needle (B-D U/F) 31G X 8 MM miscellaneous     KETOSTIX test strip     No current facility-administered medications for this visit.         No Known Allergies    Physical Exam  /82   Pulse 66   Ht 1.905 m (6' 3\")   Wt 89.2 kg (196 lb 9.6 oz)   BMI 24.57 kg/m    Physical Exam  /82   Pulse 66   Ht 1.905 m (6' 3\")   Wt 89.2 kg (196 lb 9.6 oz)   BMI 24.57 kg/m    GENERAL:  Alert and oriented X3, NAD, well dressed, answering questions appropriately, appears stated age.  HEENT: OP clear, no lymphadenopathy, no thyromegaly, non-tender, no exophthalmus, no proptosis, EOMI, no lid lag, no retraction  EXTREMITIES: no edema, " +pulses, no rashes, no lesions  NEUROLOGY: CN grossly intact, + monofilament, + DTR upper and lower extremity    RESULTS  Lab Results   Component Value Date    A1C 7.3 (H) 06/17/2021    A1C 6.9 (H) 12/08/2020    A1C 6.8 (H) 09/03/2020    A1C 7.7 (H) 05/13/2019    A1C 7.9 (H) 08/13/2014    HEMOGLOBINA1 7.5 (A) 03/02/2020    HEMOGLOBINA1 7.5 (A) 11/26/2019    HEMOGLOBINA1 8.2 (A) 08/26/2019    HEMOGLOBINA1 7.7 (A) 02/04/2019    HEMOGLOBINA1 7.5 (A) 08/06/2018       TSH   Date Value Ref Range Status   06/17/2021 0.88 0.40 - 4.00 mU/L Final   05/29/2020 2.18 0.40 - 4.00 mU/L Final   05/13/2019 2.14 0.40 - 4.00 mU/L Final   01/30/2018 2.46 0.40 - 4.00 mU/L Final   09/16/2016 0.97 0.40 - 4.00 mU/L Final     T4 Free   Date Value Ref Range Status   09/16/2016 1.01 0.76 - 1.46 ng/dL Final   06/24/2015 1.02 0.76 - 1.46 ng/dL Final       ALT   Date Value Ref Range Status   03/05/2015 20 0 - 70 U/L Final   ]    Recent Labs   Lab Test 06/17/21  0916 05/29/20  0705 08/13/14  0956 08/13/14  0956   CHOL 172 150   < > 162   HDL 44 41   < > 49   * 86   < > 102   TRIG 43 114   < > 56   CHOLHDLRATIO  --   --   --  3.3    < > = values in this interval not displayed.       Lab Results   Component Value Date     06/17/2021      Lab Results   Component Value Date    POTASSIUM 4.0 06/17/2021     Lab Results   Component Value Date    CHLORIDE 103 06/17/2021     Lab Results   Component Value Date    CELESTE 9.2 06/17/2021     Lab Results   Component Value Date    CO2 31 06/17/2021     Lab Results   Component Value Date    BUN 8 06/17/2021     Lab Results   Component Value Date    CR 0.73 06/17/2021       GFR Estimate   Date Value Ref Range Status   06/17/2021 >90 >60 mL/min/[1.73_m2] Final     Comment:     Non  GFR Calc  Starting 12/18/2018, serum creatinine based estimated GFR (eGFR) will be   calculated using the Chronic Kidney Disease Epidemiology Collaboration   (CKD-EPI) equation.     12/08/2020 >90 >60  mL/min/[1.73_m2] Final     Comment:     Non  GFR Calc  Starting 12/18/2018, serum creatinine based estimated GFR (eGFR) will be   calculated using the Chronic Kidney Disease Epidemiology Collaboration   (CKD-EPI) equation.     05/29/2020 >90 >60 mL/min/[1.73_m2] Final     Comment:     Non  GFR Calc  Starting 12/18/2018, serum creatinine based estimated GFR (eGFR) will be   calculated using the Chronic Kidney Disease Epidemiology Collaboration   (CKD-EPI) equation.       GFR Estimate If Black   Date Value Ref Range Status   06/17/2021 >90 >60 mL/min/[1.73_m2] Final     Comment:      GFR Calc  Starting 12/18/2018, serum creatinine based estimated GFR (eGFR) will be   calculated using the Chronic Kidney Disease Epidemiology Collaboration   (CKD-EPI) equation.     12/08/2020 >90 >60 mL/min/[1.73_m2] Final     Comment:      GFR Calc  Starting 12/18/2018, serum creatinine based estimated GFR (eGFR) will be   calculated using the Chronic Kidney Disease Epidemiology Collaboration   (CKD-EPI) equation.     05/29/2020 >90 >60 mL/min/[1.73_m2] Final     Comment:      GFR Calc  Starting 12/18/2018, serum creatinine based estimated GFR (eGFR) will be   calculated using the Chronic Kidney Disease Epidemiology Collaboration   (CKD-EPI) equation.         Lab Results   Component Value Date    MICROL <5 06/17/2021     No results found for: MICROALBUMIN  No results found for: CPEPT, GADAB, ISCAB    Vitamin B12   Date Value Ref Range Status   09/03/2020 713 193 - 986 pg/mL Final   ]    Most recent eye exam date: : Not Found   25 OH Vit D total   Date Value Ref Range Status   06/17/2021 <52 20 - 75 ug/L Final     Comment:     Season, race, dietary intake, and treatment affect the concentration of   25-hydroxy-Vitamin D. Values may decrease during winter months and increase   during summer months. Values 20-29 ug/L may indicate Vitamin D insufficiency   and  values <20 ug/L may indicate Vitamin D deficiency.  This test was developed and its performance characteristics determined by the   [facility].   It has not been cleared or approved by the FDA. The laboratory is regulated   under CLIA as qualified to perform high-complexity testing. This test is used   for clinical purposes. It should not be regarded as investigational or for   research.     08/22/2017 <34 20 - 75 ug/L Final     Comment:     Season, race, dietary intake, and treatment affect the concentration of   25-hydroxy-Vitamin D. Values may decrease during winter months and increase   during summer months. Values 20-29 ug/L may indicate Vitamin D insufficiency   and values <20 ug/L may indicate Vitamin D deficiency.  This test was developed and its performance characteristics determined by the   [facility],  [facility]. It has   not been cleared or approved by the FDA. The laboratory is regulated under   CLIA as qualified to perform high-complexity testing. This test is used for   clinical purposes. It should not be regarded as investigational or for   research.       No results found for: PTHI  Calcium   Date Value Ref Range Status   06/17/2021 9.2 8.5 - 10.1 mg/dL Final   12/08/2020 9.4 8.5 - 10.1 mg/dL Final     No results found for: CALCIUMIONIZ  No results found for: ALBUMIN      Assessment/Plan:     1.  Type 1 diabetes- Christiano is doing quite well, but going a bit too high after eating.  We will work on taking bolus 15 minutes before eating.  Could consider Fiasp in the future.  Discussed In-pen.  He will think about it.   Encouraged healthy lifestyle/heart healthy eating.      2.  Risk factors-     Retinopathy:  No. Had recent eye exam in October, 2020.     Nephropathy:  BP historically well controlled.  No microalbuminuria.  Creatinine stable.  On no meds.   Neuropathy: No.    Feet: OK, no  ulcers.   Taking ASA: no  Lipids:  LDL is a bit high.  Work on limiting processed meats and increasing fruits/vegetables.  Briefly discussed statin.    Celiac screening: negative screen 5/19  Vitamin D: will check. He is taking 1000 international unit(s) daily.      3.  F/U in 3 months with Dr. Banks or myself in person.      35 minutes spent on the date of the encounter doing chart review, review of test results, review of continuous glucose sensor, interpretation of glucose data, patient visit and documentation, counseling/coordination of care, and discussion of follow up plan for worsening hyper and hypoglycemia.  The patient understood and is satisfied with today's visit.     Shena Iniguez PA-C, MPAS   [facility]  Department of Medicine  Division of Endocrinology and Diabetes                    Answers for HPI/ROS submitted by the patient on 6/7/2021   General Symptoms: No  Skin Symptoms: No  HENT Symptoms: No  EYE SYMPTOMS: No  HEART SYMPTOMS: No  LUNG SYMPTOMS: Yes  INTESTINAL SYMPTOMS: No  URINARY SYMPTOMS: No  REPRODUCTIVE SYMPTOMS: No  SKELETAL SYMPTOMS: No  BLOOD SYMPTOMS: No  NERVOUS SYSTEM SYMPTOMS: No  MENTAL HEALTH SYMPTOMS: No  Cough: Yes  Sputum or phlegm: Yes  Coughing up blood: No  Difficulty breating or shortness of breath: No  Snoring: No  Wheezing: No  Difficulty breathing on exertion: No  Nighttime Cough: No  Difficulty breathing when lying flat: No

## 2021-06-21 NOTE — PATIENT INSTRUCTIONS
[email or web address]    Focus on timing the Novolog 15 minutes before eating.     Consider using the Fiasp, more rapid acting insulin.  This would be designed to be taken right when you start eating.

## 2021-07-13 ENCOUNTER — E-VISIT (OUTPATIENT)
Dept: URGENT CARE | Facility: URGENT CARE | Age: 41
End: 2021-07-13
Payer: COMMERCIAL

## 2021-07-13 DIAGNOSIS — J01.90 ACUTE BACTERIAL SINUSITIS: Primary | ICD-10-CM

## 2021-07-13 DIAGNOSIS — B96.89 ACUTE BACTERIAL SINUSITIS: Primary | ICD-10-CM

## 2021-07-13 PROCEDURE — 99421 OL DIG E/M SVC 5-10 MIN: CPT | Performed by: PHYSICIAN ASSISTANT

## 2021-07-14 NOTE — PATIENT INSTRUCTIONS
Dear Agustín Gallegos    After reviewing your responses, I've been able to diagnose you with?a sinus infection caused by bacteria.?     Based on your responses and diagnosis, I have prescribed augmentin to treat your symptoms. I have sent this to your pharmacy.?     It is also important to stay well hydrated, get lots of rest and take over-the-counter decongestants,?tylenol?or ibuprofen if you?are able to?take those medications per your primary care provider to help relieve discomfort.?     It is important that you take?all of?your prescribed medication even if your symptoms are improving after a few doses.? Taking?all of?your medicine helps prevent the symptoms from returning.?     If your symptoms worsen, you develop severe headache, vomiting, high fever (>102), or are not improving in 7 days, please contact your primary care provider for an appointment or visit any of our convenient Walk-in Care or Urgent Care Centers to be seen which can be found on our website?here.?     Thanks again for choosing?us?as your health care partner,?   ?  Jaymie Smith?     Sinusitis (Antibiotic Treatment)    The sinuses are air-filled spaces within the bones of the face. They connect to the inside of the nose. Sinusitis is an inflammation of the tissue that lines the sinuses. Sinusitis can occur during a cold. It can also happen due to allergies to pollens and other particles in the air. Sinusitis can cause symptoms of sinus congestion and a feeling of fullness. A sinus infection causes fever, headache, and facial pain. There is often green or yellow fluid draining from the nose or into the back of the throat (post-nasal drip). You have been given antibiotics to treat this condition.   Home care    Take the full course of antibiotics as instructed. Don't stop taking them, even when you feel better.    Drink plenty of water, hot tea, and other liquids as directed by the healthcare provider. This may help thin nasal mucus. It  also may help your sinuses drain fluids.    Heat may help soothe painful areas of your face. Use a towel soaked in hot water. Or,  the shower and direct the warm spray onto your face. Using a vaporizer along with a menthol rub at night may also help soothe symptoms.     An expectorant with guaifenesin may help thin nasal mucus and help your sinuses drain fluids. Talk with your provider or pharmacists before taking an over-the-counter (OTC) medicine if you have any questions about it or its side effects..    You can use an OTC decongestant, unless a similar medicine was prescribed to you. Nasal sprays work the fastest. Use one that contains phenylephrine or oxymetazoline. First blow your nose gently. Then use the spray. Don't use these medicines more often than directed on the label. If you do, your symptoms may get worse. You may also take pills that contain pseudoephedrine. Don t use products that combine multiple medicines. This is because side effects may be increased. Read labels. You can also ask the pharmacist for help. (People with high blood pressure should not use decongestants. They can raise blood pressure.) Talk with your provider or pharmacist if you have any questions about the medicine..    OTC antihistamines may help if allergies contributed to your sinusitis. Talk with your provider or pharmacist if you have any questions about the medicine..    Don't use nasal rinses or irrigation during an acute sinus infection, unless your healthcare provider tells you to. Rinsing may spread the infection to other areas in your sinuses.    Use acetaminophen or ibuprofen to control pain, unless another pain medicine was prescribed to you. If you have chronic liver or kidney disease or ever had a stomach ulcer, talk with your healthcare provider before using these medicines. Never give aspirin to anyone under age 18 who is ill with a fever. It may cause severe liver damage.    Don't smoke. This can make  symptoms worse.    Follow-up care  Follow up with your healthcare provider, or as advised.   When to seek medical advice  Call your healthcare provider if any of these occur:     Facial pain or headache that gets worse    Stiff neck    Unusual drowsiness or confusion    Swelling of your forehead or eyelids    Symptoms don't go away in 10 days    Vision problems, such as blurred or double vision    Fever of 100.4 F (38 C) or higher, or as directed by your healthcare provider  Call 911  Call 911 if any of these occur:     Seizure    Trouble breathing    Feeling dizzy or faint    Fingernails, skin or lips look blue, purple , or gray  Prevention  Here are steps you can take to help prevent an infection:     Keep good hand washing habits.    Don t have close contact with people who have sore throats, colds, or other upper respiratory infections.    Don t smoke, and stay away from secondhand smoke.    Stay up to date with of your vaccines.  [employer] last reviewed this educational content on 12/1/2019 2000-2021 [employer]. All rights reserved. This information is not intended as a substitute for professional medical care. Always follow your healthcare professional's instructions.

## 2021-09-19 ENCOUNTER — HEALTH MAINTENANCE LETTER (OUTPATIENT)
Age: 41
End: 2021-09-19

## 2021-09-27 ASSESSMENT — ENCOUNTER SYMPTOMS
CHILLS: 0
SHORTNESS OF BREATH: 0
HEMATURIA: 0
DIZZINESS: 0
NAUSEA: 0
SORE THROAT: 0
COUGH: 0
FREQUENCY: 0
HEARTBURN: 0
FEVER: 0
EYE PAIN: 0
MYALGIAS: 0
NERVOUS/ANXIOUS: 0
DIARRHEA: 0
PARESTHESIAS: 0
JOINT SWELLING: 0
HEADACHES: 0
HEMATOCHEZIA: 0
WEAKNESS: 0
PALPITATIONS: 0
DYSURIA: 0
ARTHRALGIAS: 1
CONSTIPATION: 0
ABDOMINAL PAIN: 0

## 2021-09-28 ASSESSMENT — ENCOUNTER SYMPTOMS
COUGH: 0
FEVER: 0
ARTHRALGIAS: 1
CHILLS: 0
SHORTNESS OF BREATH: 0
DIARRHEA: 0
HEMATOCHEZIA: 0
PARESTHESIAS: 0
HEADACHES: 0
DIZZINESS: 0
HEMATURIA: 0
CONSTIPATION: 0
MYALGIAS: 0
ABDOMINAL PAIN: 0
SORE THROAT: 0
FREQUENCY: 0
EYE PAIN: 0
DYSURIA: 0
NAUSEA: 0
JOINT SWELLING: 0
WEAKNESS: 0
NERVOUS/ANXIOUS: 0
HEARTBURN: 0
PALPITATIONS: 0

## 2021-09-28 NOTE — PROGRESS NOTES
SUBJECTIVE:   CC: Agustín Gallegos is an 41 year old male who presents for preventative health visit.     Patient has been advised of split billing requirements and indicates understanding: Yes     Healthy Habits:     Getting at least 3 servings of Calcium per day:  Yes    Bi-annual eye exam:  Yes    Dental care twice a year:  Yes    Sleep apnea or symptoms of sleep apnea:  None    Diet:  Regular (no restrictions)    Frequency of exercise:  4-5 days/week    Duration of exercise:  15-30 minutes    Taking medications regularly:  No    Medication side effects:  None    PHQ-2 Total Score: 0    Additional concerns today:  No    He has a history of type 1 diabetes that has been under good control on Tresiba and NovoLog.  He sees endocrinology every few months.  On 6/17/2021 his hemoglobin A1c was 7.3%, , creatinine 0.73, TSH normal, urine microalbumin normal and vitamin D 47.    He has a few concerns he would like to discuss today:    He describes having pain in the right shoulder with overhead movements over the past 2-3 months.  He denies any specific injury that may have caused this.  He denies any activities that may have triggered this either.  He denies having history of fractures, dislocations or surgery that involve this shoulder.    He describes having pain in the right anterior knee which is worse when going up and down stairs.  He puts his hand over the patellar tendon when explaining where the symptoms are located.  This has been going on for 2 weeks.  He denies swelling in the knee.  He denies any specific injury or activity that may have caused the symptoms.  He denies having catching or locking symptoms in the knee.  He denies having fractures or surgery that involve the right knee.    Occasionally he notices some discomfort in the left hip when he is changing positions when sleeping.  He wants to make sure that there is nothing significant going on there.  Does not have a history of fractures or  surgery that involve this hip.    He reports hitting and injuring his right great toe a couple of months ago.  He still having some soreness.  There is no swelling.  There is no loss of function.  He is able to ambulate without difficulty.    Social history: , 2-year-old daughter.  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              Today's PHQ-2 Score:   PHQ-2 ( 1999 [employer]) 9/27/2021   Q1: Little interest or pleasure in doing things 0   Q2: Feeling down, depressed or hopeless 0   PHQ-2 Score 0   Q1: Little interest or pleasure in doing things Not at all   Q2: Feeling down, depressed or hopeless Not at all   PHQ-2 Score 0       Abuse: Current or Past(Physical, Sexual or Emotional)- No  Do you feel safe in your environment? Yes    Have you ever done Advance Care Planning? (For example, a Health Directive, POLST, or a discussion with a medical provider or your loved ones about your wishes): No, advance care planning information given to patient to review.  Patient declined advance care planning discussion at this time.    Social History     Tobacco Use     Smoking status: Never Smoker     Smokeless tobacco: Never Used   Substance Use Topics     Alcohol use: No     If you drink alcohol do you typically have >3 drinks per day or >7 drinks per week? No    Alcohol Use 9/29/2021   Prescreen: >3 drinks/day or >7 drinks/week? -   Prescreen: >3 drinks/day or >7 drinks/week?    No flowsheet data found.    Last PSA: No results found for: PSA    Reviewed orders with patient. Reviewed health maintenance and updated orders accordingly - Yes      Reviewed and updated as needed this visit by clinical staff  Tobacco  Allergies  Meds   Med Hx  Surg Hx  Fam Hx  Soc Hx        Reviewed and updated as needed this visit by Provider                    Review of Systems   Constitutional: Negative for chills and fever.   HENT: Negative for congestion, ear pain, hearing loss and sore throat.    Eyes: Negative for pain and visual  "disturbance.   Respiratory: Negative for cough and shortness of breath.    Cardiovascular: Negative for chest pain, palpitations and peripheral edema.   Gastrointestinal: Negative for abdominal pain, constipation, diarrhea, heartburn, hematochezia and nausea.   Genitourinary: Negative for discharge, dysuria, frequency, genital sores, hematuria, impotence and urgency.   Musculoskeletal: Positive for arthralgias. Negative for joint swelling and myalgias.   Skin: Negative for rash.   Neurological: Negative for dizziness, weakness, headaches and paresthesias.   Psychiatric/Behavioral: Negative for mood changes. The patient is not nervous/anxious.        OBJECTIVE:   Resp 22   Ht 1.918 m (6' 3.5\")   Wt 89.5 kg (197 lb 4.8 oz)   BMI 24.34 kg/m      Physical Exam  GENERAL: healthy, alert and no distress  EYES: Eyes grossly normal to inspection, PERRL and conjunctivae and sclerae normal  HENT: ear canals and TM's normal, nose and mouth without ulcers or lesions  NECK: no adenopathy, no asymmetry, masses, or scars and thyroid normal to palpation  RESP: lungs clear to auscultation - no rales, rhonchi or wheezes  CV: regular rate and rhythm, normal S1 S2, no S3 or S4, no murmur, click or rub, no peripheral edema and peripheral pulses strong  ABDOMEN: soft, nontender, no hepatosplenomegaly, no masses and bowel sounds normal  MS: Right shoulder abduction is slightly diminished compared to the left.  Scaption strength and internal/external rotation strength normal bilaterally.  Ross testing positive on the right.  There is no effusion in the knees.  Knee flexion and extension normal bilaterally.  Internal and external hip rotation normal bilaterally.  Amy's testing negative bilaterally.  Varus, valgus, Lachman's and drawer testing negative bilaterally.  There is mild tenderness over the right patellar tendon.  There is no inflammation or swelling in the right great toe.  He ambulates without difficulty.  Flexion and " extension of the toe was normal.  There is some mild tenderness over the medial right great toe.  SKIN: no suspicious lesions or rashes  NEURO: Normal strength and tone, mentation intact and speech normal  PSYCH: mentation appears normal, affect normal/bright    Diagnostic Test Results:  Labs reviewed in Epic    ASSESSMENT/PLAN:   1. Routine general medical examination at a health care facility  I encouraged him to get regular exercise and eat healthy foods.  He was given the flu vaccine today.    2. Type 1 diabetes mellitus without complication (H)  I recommended he continue his current treatments and keep following closely with endocrinology.  He has a follow-up appointment with them next month.  He reports that they will be checking labs again.  I added the HIV and hepatitis C labs to be included.  I anticipate that they will be checking his cholesterol at that time as well.  I expressed concern that his LDL was elevated last June at 119.  He will keep working on dietary and lifestyle modifications to help bring this down.  His father has a history of coronary artery disease with stent placement in his 70s.    3. Right anterior knee pain  I explained that the pain in his right knee is probably from patellar tendinitis.  I recommended physical therapy and activity modification.  - Physical Therapy Referral; Future    4. Impingement syndrome of shoulder region, right  His shoulder pain symptoms are consistent with impingement syndrome.  I explained that this could be due to rotator cuff strain or possibly subacromial bursitis.  We discussed activity modification and I recommended physical therapy.  - Physical Therapy Referral; Future    5. Great toe pain, right  He still has some tenderness after injuring the right great toe, but there is no loss of function, asymmetry or abnormalities on exam.  He is going to continue monitoring symptoms for now.  If this assists he may return to the clinic and we will consider  "imaging studies at that time.    6. Screening for HIV (human immunodeficiency virus)  - HIV Antigen Antibody Combo; Future    7. Encounter for hepatitis C screening test for low risk patient  - Hepatitis C antibody; Future      Patient has been advised of split billing requirements and indicates understanding: Yes  COUNSELING:   Reviewed preventive health counseling, as reflected in patient instructions       Regular exercise       Healthy diet/nutrition       Consider Hep C screening for all patients one time for ages 18-79 years       HIV screeninx in teen years, 1x in adult years, and at intervals if high risk    Estimated body mass index is 24.34 kg/m  as calculated from the following:    Height as of this encounter: 1.918 m (6' 3.5\").    Weight as of this encounter: 89.5 kg (197 lb 4.8 oz).         He reports that he has never smoked. He has never used smokeless tobacco.      Counseling Resources:  ATP IV Guidelines  Pooled Cohorts Equation Calculator  FRAX Risk Assessment  ICSI Preventive Guidelines  Dietary Guidelines for Americans,   USDA's MyPlate  ASA Prophylaxis  Lung CA Screening    Marc Samuel, [facility]  "

## 2021-09-29 ENCOUNTER — OFFICE VISIT (OUTPATIENT)
Dept: FAMILY MEDICINE | Facility: CLINIC | Age: 41
End: 2021-09-29
Payer: COMMERCIAL

## 2021-09-29 VITALS
TEMPERATURE: 97.3 F | RESPIRATION RATE: 22 BRPM | OXYGEN SATURATION: 97 % | DIASTOLIC BLOOD PRESSURE: 87 MMHG | BODY MASS INDEX: 24.02 KG/M2 | SYSTOLIC BLOOD PRESSURE: 138 MMHG | HEIGHT: 76 IN | WEIGHT: 197.3 LBS | HEART RATE: 65 BPM

## 2021-09-29 DIAGNOSIS — Z00.00 ROUTINE GENERAL MEDICAL EXAMINATION AT A HEALTH CARE FACILITY: Primary | ICD-10-CM

## 2021-09-29 DIAGNOSIS — M79.674 GREAT TOE PAIN, RIGHT: ICD-10-CM

## 2021-09-29 DIAGNOSIS — E10.9 TYPE 1 DIABETES MELLITUS WITHOUT COMPLICATION (H): ICD-10-CM

## 2021-09-29 DIAGNOSIS — M25.561 RIGHT ANTERIOR KNEE PAIN: ICD-10-CM

## 2021-09-29 DIAGNOSIS — M75.41 IMPINGEMENT SYNDROME OF SHOULDER REGION, RIGHT: ICD-10-CM

## 2021-09-29 DIAGNOSIS — Z11.59 ENCOUNTER FOR HEPATITIS C SCREENING TEST FOR LOW RISK PATIENT: ICD-10-CM

## 2021-09-29 DIAGNOSIS — Z11.4 SCREENING FOR HIV (HUMAN IMMUNODEFICIENCY VIRUS): ICD-10-CM

## 2021-09-29 PROCEDURE — 99213 OFFICE O/P EST LOW 20 MIN: CPT | Mod: 25 | Performed by: FAMILY MEDICINE

## 2021-09-29 PROCEDURE — 90686 IIV4 VACC NO PRSV 0.5 ML IM: CPT | Performed by: FAMILY MEDICINE

## 2021-09-29 PROCEDURE — 99396 PREV VISIT EST AGE 40-64: CPT | Mod: 25 | Performed by: FAMILY MEDICINE

## 2021-09-29 PROCEDURE — 90471 IMMUNIZATION ADMIN: CPT | Performed by: FAMILY MEDICINE

## 2021-09-29 ASSESSMENT — MIFFLIN-ST. JEOR: SCORE: 1898.51

## 2021-09-29 NOTE — NURSING NOTE
Prior to immunization administration, verified patients identity using patient s name and date of birth. Please see Immunization Activity for additional information.     Screening Questionnaire for Adult Immunization    Are you sick today?   No   Do you have allergies to medications, food, a vaccine component or latex?   No   Have you ever had a serious reaction after receiving a vaccination?   No   Do you have a long-term health problem with heart, lung, kidney, or metabolic disease (e.g., diabetes), asthma, a blood disorder, no spleen, complement component deficiency, a cochlear implant, or a spinal fluid leak?  Are you on long-term aspirin therapy?   No   Do you have cancer, leukemia, HIV/AIDS, or any other immune system problem?   No   Do you have a parent, brother, or sister with an immune system problem?   No   In the past 3 months, have you taken medications that affect  your immune system, such as prednisone, other steroids, or anticancer drugs; drugs for the treatment of rheumatoid arthritis, Crohn s disease, or psoriasis; or have you had radiation treatments?   No   Have you had a seizure, or a brain or other nervous system problem?   No   During the past year, have you received a transfusion of blood or blood    products, or been given immune (gamma) globulin or antiviral drug?   No   For women: Are you pregnant or is there a chance you could become       pregnant during the next month?   No   Have you received any vaccinations in the past 4 weeks?   No     Immunization questionnaire answers were all negative.        Per orders of Dr. Aleah Samuel, injection of Fluzone given by Adenike Washington MA. Patient instructed to remain in clinic for 15 minutes afterwards, and to report any adverse reaction to me immediately.       Screening performed by Adenike Washington MA on 9/29/2021 at 9:55 AM.  Adenike Washington MA on 9/29/2021 at 9:55 AM

## 2021-10-04 ENCOUNTER — MYC MEDICAL ADVICE (OUTPATIENT)
Dept: FAMILY MEDICINE | Facility: CLINIC | Age: 41
End: 2021-10-04

## 2021-10-04 DIAGNOSIS — E10.9 TYPE 1 DIABETES MELLITUS WITHOUT COMPLICATION (H): ICD-10-CM

## 2021-10-04 DIAGNOSIS — Z13.220 SCREENING CHOLESTEROL LEVEL: Primary | ICD-10-CM

## 2021-10-19 ENCOUNTER — THERAPY VISIT (OUTPATIENT)
Dept: PHYSICAL THERAPY | Facility: CLINIC | Age: 41
End: 2021-10-19
Attending: FAMILY MEDICINE
Payer: COMMERCIAL

## 2021-10-19 DIAGNOSIS — M25.561 RIGHT ANTERIOR KNEE PAIN: ICD-10-CM

## 2021-10-19 DIAGNOSIS — M75.41 IMPINGEMENT SYNDROME OF SHOULDER REGION, RIGHT: ICD-10-CM

## 2021-10-19 PROCEDURE — 97110 THERAPEUTIC EXERCISES: CPT | Mod: GP | Performed by: PHYSICAL THERAPIST

## 2021-10-19 PROCEDURE — 97161 PT EVAL LOW COMPLEX 20 MIN: CPT | Mod: GP | Performed by: PHYSICAL THERAPIST

## 2021-10-19 PROCEDURE — 97112 NEUROMUSCULAR REEDUCATION: CPT | Mod: GP | Performed by: PHYSICAL THERAPIST

## 2021-10-19 ASSESSMENT — ACTIVITIES OF DAILY LIVING (ADL)
HOW_WOULD_YOU_RATE_THE_OVERALL_FUNCTION_OF_YOUR_KNEE_DURING_YOUR_USUAL_DAILY_ACTIVITIES?: NEARLY NORMAL
PAIN: THE SYMPTOM AFFECTS MY ACTIVITY SLIGHTLY
RISE FROM A CHAIR: ACTIVITY IS NOT DIFFICULT
RAW_SCORE: 60
STAND: ACTIVITY IS NOT DIFFICULT
WALK: ACTIVITY IS NOT DIFFICULT
SIT WITH YOUR KNEE BENT: ACTIVITY IS NOT DIFFICULT
LIMPING: THE SYMPTOM AFFECTS MY ACTIVITY SLIGHTLY
KNEE_ACTIVITY_OF_DAILY_LIVING_SCORE: 85.71
KNEE_ACTIVITY_OF_DAILY_LIVING_SUM: 60
HOW_WOULD_YOU_RATE_THE_CURRENT_FUNCTION_OF_YOUR_KNEE_DURING_YOUR_USUAL_DAILY_ACTIVITIES_ON_A_SCALE_FROM_0_TO_100_WITH_100_BEING_YOUR_LEVEL_OF_KNEE_FUNCTION_PRIOR_TO_YOUR_INJURY_AND_0_BEING_THE_INABILITY_TO_PERFORM_ANY_OF_YOUR_USUAL_DAILY_ACTIVITIES?: 85
GIVING WAY, BUCKLING OR SHIFTING OF KNEE: I DO NOT HAVE THE SYMPTOM
STIFFNESS: I DO NOT HAVE THE SYMPTOM
WEAKNESS: I DO NOT HAVE THE SYMPTOM
GO UP STAIRS: ACTIVITY IS SOMEWHAT DIFFICULT
AS_A_RESULT_OF_YOUR_KNEE_INJURY,_HOW_WOULD_YOU_RATE_YOUR_CURRENT_LEVEL_OF_DAILY_ACTIVITY?: NORMAL
KNEEL ON THE FRONT OF YOUR KNEE: ACTIVITY IS NOT DIFFICULT
SQUAT: ACTIVITY IS MINIMALLY DIFFICULT
SWELLING: I DO NOT HAVE THE SYMPTOM
GO DOWN STAIRS: ACTIVITY IS FAIRLY DIFFICULT

## 2021-10-19 NOTE — PROGRESS NOTES
Physical Therapy Initial Evaluation  Subjective:  The history is provided by the patient. No  was used.   Patient Health History  Agustín Gallegos being seen for Right Shoulder impingement, patellar tendinitis.     Date of Onset: MD order on 9/29/21.   Problem occurred: Not entirely sure. Just over time, age!   Pain is reported as 2/10 (5/10 R shoulder, 3-5/10 R knee) on pain scale.  General health as reported by patient is good.  Pertinent medical history includes: diabetes.     Medical allergies: none.   Surgeries include:  None.    Current medications:  None.       Primary job tasks include:  Computer work.                  Therapist Generated HPI Evaluation         Type of problem:  Right shoulder.    This is a new condition.  Condition occurred with:  Unknown cause.  Where condition occurred: for unknown reasons.  Patient reports pain:  Posterior.  Pain is described as sharp and is intermittent.  Pain is worse during the day.  Since onset symptoms are unchanged.  Symptoms are exacerbated by lying on extremity, using arm overhead and using arm behind back (reaching out to the side, reaching under the couch)  Relieved by: avoiding painful movements.      Restrictions due to condition include:  Working in normal job without restrictions.  Barriers include:  None as reported by patient.    Therapist Generated HPI Evaluation         Type of problem:  Right knee.    This is a new condition.  Condition occurred with:  Other reason.  Where condition occurred: for unknown reasons.  Patient reports pain:  Medial.  Pain is described as sharp and is intermittent.  Pain is worse during the day.  Since onset symptoms are gradually worsening.  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      Restrictions due to condition include:  Working in normal job with restrictions (does  stairs more slowly and avoids doing them if pssible).  Barriers include:  None as reported by patient.                        Objective:  Standing Alignment:      Shoulder/UE:  Rounded shoulders and depressed scapula R  Lumbar:  Lateral shift L  Pelvic:  Normal              Flexibility/Screens:       Lower Extremity:  Decreased left lower extremity flexibility:Hip Flexors and Quadriceps    Decreased right lower extremity flexibility:  Hip Flexors and Quadriceps               Lumbar/SI Evaluation  ROM:    AROM Lumbar:   Flexion:          Floor  Ext:                    WNL   Side Bend:        Left:     Right:   Rotation:           Left:     Right:   Side Glide:        Left:  WNL    Right:  Min limit                                      Shoulder Evaluation:  ROM:  AROM:    Flexion:  Left:  137    Right:  137+    Abduction:  Left: 180   Right:  113++      External Rotation:  Left:  71    Right:  70    Horizontal Adduction:  Left:  Post shoulder    Right:  Post shoulder        Extension/Internal Rotation:  Left:  T11    Right:  L2+          Strength:    Flexion: Left:5/5   Pain:    Right: 5/5     Pain:   Extension:  Left: 5/5    Pain:    Right: 5-/5    Pain:  Abduction:  Left: 5/5  Pain:    Right: 5/5     Pain:    Internal Rotation:  Left:5/5     Pain:    Right: 5/5     Pain:  External Rotation:   Left:5-/5     Pain:   Right:4+/5     Pain:    Horizontal Abduction:  Left:5-/5     Pain:    Right:4+/5    Pain:        Stability Testing:      Left shoulder stability negative testing:  Apprehension; Relocation and Load and shift anterior  Right shoulder stability positive testing:Apprehension; Relocation and Load and shift anterior  Special Tests:      Left shoulder negative for the following special tests:  Impingement  Right shoulder positive for the following special tests:Impingement    Mobility Tests:    Glenohumeral anterior right:  Hypermobile  Glenohumeral posterior right:  Hypomobile                                        Knee Evaluation:  ROM:  AROM: normal            Strength:     Extension:  Left: 5/5   Pain:      Right: 5-/5    Pain:+  Flexion:  Left: 5/5   Pain:      Right: 5/5   Pain:    Quad Set Left: Good    Pain:   Quad Set Right: Good    Pain:    Special Tests:     Left knee negative for the following special tests:  Patellar Tracking-Abduction Lateral  Right knee positive for the following tests:  Patellar Tracking-Abduction Lateral  Palpation:      Left knee tenderness not present at:  Medial Joint Line; Lateral Joint Line; Patellar Tendon; Patellar Medial; Patellar Lateral; Patellar Superior and Patellar Inferior  Right knee tenderness present at:  Patellar Medial  Right knee tenderness not present at:  Medial Joint Line; Lateral Joint Line; Patellar Tendon; Patellar Lateral; Patellar Superior and Patellar Inferior  Edema:  Normal    Mobility Testing:  Normal            Functional Testing:          Quad:    Single Leg Squat:  Left:         45  Hip substitution  Right:      45  Hip substitution  Bilateral Leg Squat:  90  Normal control      Proprioception:   Stork Balance Test:  Left:  30  Right:  30  % of Uninvolved:           General     ROS    Assessment/Plan:    Patient is a 41 year old male with right side shoulder and right side knee complaints.    Patient has the following significant findings with corresponding treatment plan.                Diagnosis 1:  R shoulder pain due to impingment from ant instability.  Watch for adhesive capsulitis  Pain -  manual therapy, self management, education and home program  Decreased ROM/flexibility - manual therapy, therapeutic exercise and home program  Decreased strength - therapeutic exercise, therapeutic activities and home program  Impaired muscle performance - neuro re-education and home program  Decreased function - therapeutic activities and home program  Impaired posture - neuro re-education and home program  Diagnosis 2:  R knee pain due to PFPS   Pain -  self  management, education and home program  Decreased strength - therapeutic exercise, therapeutic activities and home program  Impaired muscle performance - neuro re-education and home program  Decreased function - therapeutic activities and home program    Therapy Evaluation Codes:   1) History comprised of:   Personal factors that impact the plan of care:      None.    Comorbidity factors that impact the plan of care are:      Diabetes.     Medications impacting care: None.  2) Examination of Body Systems comprised of:   Body structures and functions that impact the plan of care:      Knee and Shoulder.   Activity limitations that impact the plan of care are:      Dressing, Sports, Stairs, Throwing and reaching under courch or out to the side.  3) Clinical presentation characteristics are:   Stable/Uncomplicated.  4) Decision-Making    Low complexity using standardized patient assessment instrument and/or measureable assessment of functional outcome.  Cumulative Therapy Evaluation is: Low complexity.    Previous and current functional limitations:  (See Goal Flow Sheet for this information)    Short term and Long term goals: (See Goal Flow Sheet for this information)     Communication ability:  Patient appears to be able to clearly communicate and understand verbal and written communication and follow directions correctly.  Treatment Explanation - The following has been discussed with the patient:   RX ordered/plan of care  Anticipated outcomes  Possible risks and side effects  This patient would benefit from PT intervention to resume normal activities.   Rehab potential is excellent.    Frequency:  1 X week, once daily  Duration:  for 4 weeks tapering to 2 X a month over 8 weeks  Discharge Plan:  Achieve all LTG.  Independent in home treatment program.  Reach maximal therapeutic benefit.    Please refer to the daily flowsheet for treatment today, total treatment time and time spent performing 1:1 timed codes.

## 2021-10-20 ENCOUNTER — LAB (OUTPATIENT)
Dept: LAB | Facility: CLINIC | Age: 41
End: 2021-10-20
Payer: COMMERCIAL

## 2021-10-20 DIAGNOSIS — Z13.220 SCREENING CHOLESTEROL LEVEL: ICD-10-CM

## 2021-10-20 DIAGNOSIS — E10.9 TYPE 1 DIABETES MELLITUS WITHOUT COMPLICATION (H): ICD-10-CM

## 2021-10-20 DIAGNOSIS — Z11.59 ENCOUNTER FOR HEPATITIS C SCREENING TEST FOR LOW RISK PATIENT: ICD-10-CM

## 2021-10-20 DIAGNOSIS — Z11.4 SCREENING FOR HIV (HUMAN IMMUNODEFICIENCY VIRUS): ICD-10-CM

## 2021-10-20 DIAGNOSIS — E10.9 WELL CONTROLLED TYPE 1 DIABETES MELLITUS (H): ICD-10-CM

## 2021-10-20 LAB
HBA1C MFR BLD: 7.4 % (ref 0–5.6)
HCV AB SERPL QL IA: NONREACTIVE
HIV 1+2 AB+HIV1 P24 AG SERPL QL IA: NONREACTIVE

## 2021-10-20 PROCEDURE — 87389 HIV-1 AG W/HIV-1&-2 AB AG IA: CPT

## 2021-10-20 PROCEDURE — 86803 HEPATITIS C AB TEST: CPT

## 2021-10-20 PROCEDURE — 80061 LIPID PANEL: CPT

## 2021-10-20 PROCEDURE — 36415 COLL VENOUS BLD VENIPUNCTURE: CPT

## 2021-10-20 PROCEDURE — 83036 HEMOGLOBIN GLYCOSYLATED A1C: CPT

## 2021-10-20 PROCEDURE — 80053 COMPREHEN METABOLIC PANEL: CPT

## 2021-10-20 ASSESSMENT — ENCOUNTER SYMPTOMS
MUSCLE CRAMPS: 0
MYALGIAS: 0
BACK PAIN: 1
JOINT SWELLING: 0
NECK PAIN: 1
STIFFNESS: 1
ARTHRALGIAS: 1
MUSCLE WEAKNESS: 0

## 2021-10-21 LAB
ALBUMIN SERPL-MCNC: 4 G/DL (ref 3.4–5)
ALP SERPL-CCNC: 63 U/L (ref 40–150)
ALT SERPL W P-5'-P-CCNC: 36 U/L (ref 0–70)
ANION GAP SERPL CALCULATED.3IONS-SCNC: 7 MMOL/L (ref 3–14)
AST SERPL W P-5'-P-CCNC: 20 U/L (ref 0–45)
BILIRUB SERPL-MCNC: 0.6 MG/DL (ref 0.2–1.3)
BUN SERPL-MCNC: 10 MG/DL (ref 7–30)
CALCIUM SERPL-MCNC: 9.6 MG/DL (ref 8.5–10.1)
CHLORIDE BLD-SCNC: 103 MMOL/L (ref 94–109)
CHOLEST SERPL-MCNC: 170 MG/DL
CO2 SERPL-SCNC: 30 MMOL/L (ref 20–32)
CREAT SERPL-MCNC: 0.84 MG/DL (ref 0.66–1.25)
FASTING STATUS PATIENT QL REPORTED: YES
GFR SERPL CREATININE-BSD FRML MDRD: >90 ML/MIN/1.73M2
GLUCOSE BLD-MCNC: 125 MG/DL (ref 70–99)
HDLC SERPL-MCNC: 42 MG/DL
LDLC SERPL CALC-MCNC: 118 MG/DL
NONHDLC SERPL-MCNC: 128 MG/DL
POTASSIUM BLD-SCNC: 4.2 MMOL/L (ref 3.4–5.3)
PROT SERPL-MCNC: 7.6 G/DL (ref 6.8–8.8)
SODIUM SERPL-SCNC: 140 MMOL/L (ref 133–144)
TRIGL SERPL-MCNC: 49 MG/DL

## 2021-10-21 NOTE — PROGRESS NOTES
Outcome for 10/21/21 9:52 AM: Data uploaded on [employer] Also emailed to provider.    Answers for HPI/ROS submitted by the patient on 10/20/2021  General Symptoms: No  Skin Symptoms: No  HENT Symptoms: No  EYE SYMPTOMS: No  HEART SYMPTOMS: No  LUNG SYMPTOMS: No  INTESTINAL SYMPTOMS: No  URINARY SYMPTOMS: No  REPRODUCTIVE SYMPTOMS: No  SKELETAL SYMPTOMS: Yes  BLOOD SYMPTOMS: No  NERVOUS SYSTEM SYMPTOMS: No  MENTAL HEALTH SYMPTOMS: No  Back pain: Yes  Muscle aches: No  Neck pain: Yes  Swollen joints: No  Joint pain: Yes  Bone pain: No  Muscle cramps: No  Muscle weakness: No  Joint stiffness: Yes  Bone fracture: No    No chief complaint on file.        Start time: 0945  End time:1010  HPI:   Christiano is a 41 yo man here for follow up of type 1 diabetes, which he has had since age 14.  He reports that overall things are going well.  He continues working from home. He is trying to walk every day. Right now he is doing physical therapy exercises for his shoulder and knee.  The physical therapist.  Worried it may be frozen shoulder.  His knee has been painful.  Today, he reports no major issues.  He had a rough patch when he could not keep glucose down for a couple of weeks.  He wonders if he gets a bad pen sometimes.      Novolog dosing:   Breakfast- cheerios- 1/4g   Lunch- sometimes leftovers (low carb) 1/7g  Dinner- usually low carb. 1/3g.    HS snack- he does not usually cover if he has a snack.  Sometimes 10g with no coverage.    Correction: 1/30 over 130 mg/dL.     He takes Tresiba 24 units daily.      Christiano wears a jamila sensor with overall average of 157  mg/dL  (CV 33%), TIR 69%, low 2%  for the past two weeks.                 He has been feeling well and in his usual state of health.  He has no other concerns today.     ROS  GENERAL: no weight loss, weight gain, fevers, chills, malaise, night sweats.   HEENT: no dysphagia, diplopia, neck pain or tenderness, dry/scratchy eyes, URI, cough, sinus drainage, tinnitus, sinus  pressure  CV: no chest pain, pressure, palpitations, skipped beats, LOC  LUNGS: no SOB, WILDE, cough, sputum production, wheezing   GI: no diarrhea, constipation, abdominal pain  EXTREMITIES: no rashes, ulcers, edema  NEUROLOGY: no changes in vision, tingling or numbness in hands or feet.   MSK: frozen shoulder, knee pain.   PSYCH: mood stable    Past Medical History:   Diagnosis Date     Type 1 diabetes (H)     age of onset 14 years       Past Surgical History:   Procedure Laterality Date     COSMETIC SURGERY  1993    Mole removal     ENT SURGERY  1985    Tubes in ears       Family History   Problem Relation Age of Onset     Diabetes Brother      Cerebrovascular Disease Paternal Grandmother      Arthritis Mother      Arthritis Maternal Grandmother      Coronary Artery Disease Father        Social History     Social History     Marital status: Single     Spouse name: N/A     Number of children: N/A     Years of education: N/A     Social History Main Topics     Smoking status: Never Smoker     Smokeless tobacco: Never Used     Alcohol use No     Drug use: No     Sexual activity: Yes     Partners: Female     Other Topics Concern     None     Social History Narrative   Social History: Works for [employer]. . DaughterDaphney born August, 2019.    Current Outpatient Medications   Medication     blood glucose (CONTOUR NEXT TEST) test strip     blood glucose monitoring (CANDE MICROLET) lancets     blood glucose monitoring (NO BRAND SPECIFIED) meter device kit     cholecalciferol (VITAMIN D) 1000 UNIT tablet     Continuous Blood Gluc Sensor (FREESTYLE LILO 14 DAY SENSOR) MISC     insulin aspart (NOVOLOG FLEXPEN) 100 UNIT/ML pen     insulin degludec (TRESIBA FLEXTOUCH) 200 UNIT/ML pen     insulin pen needle (B-D U/F) 31G X 8 MM miscellaneous     KETOSTIX test strip     No current facility-administered medications for this visit.        No Known Allergies    Physical Exam  GENERAL: healthy, alert and no  distress  RESP: no audible wheeze, cough, or visible cyanosis.  No visible retractions or increased work of breathing.  Able to speak fully in complete sentences.  PSYCH: mentation appears normal, affect normal/bright, judgement and insight intact, normal speech and appearance well-groomed    RESULTS  Lab Results   Component Value Date    A1C 7.4 (H) 10/20/2021    A1C 7.3 (H) 06/17/2021    A1C 6.9 (H) 12/08/2020    A1C 6.8 (H) 09/03/2020    A1C 7.7 (H) 05/13/2019    A1C 7.9 (H) 08/13/2014    HEMOGLOBINA1 7.5 (A) 03/02/2020    HEMOGLOBINA1 7.5 (A) 11/26/2019    HEMOGLOBINA1 8.2 (A) 08/26/2019    HEMOGLOBINA1 7.7 (A) 02/04/2019    HEMOGLOBINA1 7.5 (A) 08/06/2018       TSH   Date Value Ref Range Status   06/17/2021 0.88 0.40 - 4.00 mU/L Final   05/29/2020 2.18 0.40 - 4.00 mU/L Final   05/13/2019 2.14 0.40 - 4.00 mU/L Final   01/30/2018 2.46 0.40 - 4.00 mU/L Final   09/16/2016 0.97 0.40 - 4.00 mU/L Final     T4 Free   Date Value Ref Range Status   09/16/2016 1.01 0.76 - 1.46 ng/dL Final   06/24/2015 1.02 0.76 - 1.46 ng/dL Final       ALT   Date Value Ref Range Status   10/20/2021 36 0 - 70 U/L Final   03/05/2015 20 0 - 70 U/L Final   ]    Recent Labs   Lab Test 06/17/21  0916 05/29/20  0705 08/13/14  0956 08/13/14  0956   CHOL 172 150   < > 162   HDL 44 41   < > 49   * 86   < > 102   TRIG 43 114   < > 56   CHOLHDLRATIO  --   --   --  3.3    < > = values in this interval not displayed.       Lab Results   Component Value Date     06/17/2021      Lab Results   Component Value Date    POTASSIUM 4.0 06/17/2021     Lab Results   Component Value Date    CHLORIDE 103 06/17/2021     Lab Results   Component Value Date    CELESTE 9.2 06/17/2021     Lab Results   Component Value Date    CO2 31 06/17/2021     Lab Results   Component Value Date    BUN 8 06/17/2021     Lab Results   Component Value Date    CR 0.73 06/17/2021       GFR Estimate   Date Value Ref Range Status   10/20/2021 >90 >60 mL/min/1.73m2 Final      Comment:     As of July 11, 2021, eGFR is calculated by the CKD-EPI creatinine equation, without race adjustment. eGFR can be influenced by muscle mass, exercise, and diet. The reported eGFR is an estimation only and is only applicable if the renal function is stable.   06/17/2021 >90 >60 mL/min/[1.73_m2] Final     Comment:     Non  GFR Calc  Starting 12/18/2018, serum creatinine based estimated GFR (eGFR) will be   calculated using the Chronic Kidney Disease Epidemiology Collaboration   (CKD-EPI) equation.     12/08/2020 >90 >60 mL/min/[1.73_m2] Final     Comment:     Non  GFR Calc  Starting 12/18/2018, serum creatinine based estimated GFR (eGFR) will be   calculated using the Chronic Kidney Disease Epidemiology Collaboration   (CKD-EPI) equation.     05/29/2020 >90 >60 mL/min/[1.73_m2] Final     Comment:     Non  GFR Calc  Starting 12/18/2018, serum creatinine based estimated GFR (eGFR) will be   calculated using the Chronic Kidney Disease Epidemiology Collaboration   (CKD-EPI) equation.       GFR Estimate If Black   Date Value Ref Range Status   06/17/2021 >90 >60 mL/min/[1.73_m2] Final     Comment:      GFR Calc  Starting 12/18/2018, serum creatinine based estimated GFR (eGFR) will be   calculated using the Chronic Kidney Disease Epidemiology Collaboration   (CKD-EPI) equation.     12/08/2020 >90 >60 mL/min/[1.73_m2] Final     Comment:      GFR Calc  Starting 12/18/2018, serum creatinine based estimated GFR (eGFR) will be   calculated using the Chronic Kidney Disease Epidemiology Collaboration   (CKD-EPI) equation.     05/29/2020 >90 >60 mL/min/[1.73_m2] Final     Comment:      GFR Calc  Starting 12/18/2018, serum creatinine based estimated GFR (eGFR) will be   calculated using the Chronic Kidney Disease Epidemiology Collaboration   (CKD-EPI) equation.         Lab Results   Component Value Date    MICROL <5 06/17/2021      No results found for: MICROALBUMIN  No results found for: CPEPT, GADAB, ISCAB    Vitamin B12   Date Value Ref Range Status   09/03/2020 713 193 - 986 pg/mL Final   ]    Most recent eye exam date: : Not Found   25 OH Vit D total   Date Value Ref Range Status   06/17/2021 <52 20 - 75 ug/L Final     Comment:     Season, race, dietary intake, and treatment affect the concentration of   25-hydroxy-Vitamin D. Values may decrease during winter months and increase   during summer months. Values 20-29 ug/L may indicate Vitamin D insufficiency   and values <20 ug/L may indicate Vitamin D deficiency.  This test was developed and its performance characteristics determined by the   [facility].   It has not been cleared or approved by the FDA. The laboratory is regulated   under CLIA as qualified to perform high-complexity testing. This test is used   for clinical purposes. It should not be regarded as investigational or for   research.     08/22/2017 <34 20 - 75 ug/L Final     Comment:     Season, race, dietary intake, and treatment affect the concentration of   25-hydroxy-Vitamin D. Values may decrease during winter months and increase   during summer months. Values 20-29 ug/L may indicate Vitamin D insufficiency   and values <20 ug/L may indicate Vitamin D deficiency.  This test was developed and its performance characteristics determined by the   [facility],  Special Chemistry Laboratory. It has   not been cleared or approved by the FDA. The laboratory is regulated under   CLIA as qualified to perform high-complexity testing. This test is used for   clinical purposes. It should not be regarded as investigational or for   research.       No results found for: PTHI  Calcium   Date Value Ref Range Status   10/20/2021 9.6 8.5 - 10.1 mg/dL Final   06/17/2021 9.2 8.5 - 10.1 mg/dL Final   12/08/2020 9.4 8.5 - 10.1 mg/dL Final     No  results found for: CALCIUMIONIZ  Albumin   Date Value Ref Range Status   10/20/2021 4.0 3.4 - 5.0 g/dL Final     25 OH Vit D total   Date Value Ref Range Status   06/17/2021 <52 20 - 75 ug/L Final     Comment:     Season, race, dietary intake, and treatment affect the concentration of   25-hydroxy-Vitamin D. Values may decrease during winter months and increase   during summer months. Values 20-29 ug/L may indicate Vitamin D insufficiency   and values <20 ug/L may indicate Vitamin D deficiency.  This test was developed and its performance characteristics determined by the   [facility].   It has not been cleared or approved by the FDA. The laboratory is regulated   under CLIA as qualified to perform high-complexity testing. This test is used   for clinical purposes. It should not be regarded as investigational or for   research.     08/22/2017 <34 20 - 75 ug/L Final     Comment:     Season, race, dietary intake, and treatment affect the concentration of   25-hydroxy-Vitamin D. Values may decrease during winter months and increase   during summer months. Values 20-29 ug/L may indicate Vitamin D insufficiency   and values <20 ug/L may indicate Vitamin D deficiency.  This test was developed and its performance characteristics determined by the   [facility],  Special Chemistry Laboratory. It has   not been cleared or approved by the FDA. The laboratory is regulated under   CLIA as qualified to perform high-complexity testing. This test is used for   clinical purposes. It should not be regarded as investigational or for   research.       No results found for: PTHI  Calcium   Date Value Ref Range Status   10/20/2021 9.6 8.5 - 10.1 mg/dL Final   06/17/2021 9.2 8.5 - 10.1 mg/dL Final   12/08/2020 9.4 8.5 - 10.1 mg/dL Final     No results found for: CALCIUMIONIZ  Albumin   Date Value Ref Range Status   10/20/2021 4.0 3.4 - 5.0 g/dL  Final         Assessment/Plan:     1.  Type 1 diabetes- Christiano is doing quite well, but dropping too much after dinner.  Suggested cutting back carb ratio to 1/3.5g at dinner.  No other changes.  going a bit too high after eating. Could consider Fiasp in the future.  Discussed In-pen.  He will think about it.   Encouraged healthy lifestyle/heart healthy eating.      2.  Risk factors-     Retinopathy:  No. Had recent eye exam in October, 2020.     Nephropathy:  BP historically well controlled.  No microalbuminuria.  Creatinine stable.  On no meds.   Neuropathy: No.    Feet: OK, no ulcers.   Taking ASA: no  Lipids:  LDL is a bit high. Start pravastatin 10 mg daily.  Work on limiting processed meats and increasing fruits/vegetables.     Celiac screening: negative screen 5/19  Vitamin D: now improved. He is taking 1000 international unit(s) daily.      3.  F/U in 3 months with Dr. Banks or myself.      35 minutes spent on the date of the encounter doing chart review, review of test results, review of continuous glucose sensor, interpretation of glucose data, patient visit and documentation, counseling/coordination of care, and discussion of follow up plan for worsening hyper and hypoglycemia.  The patient understood and is satisfied with today's visit.     Shena Iniguez PA-C, MPAS   [facility]  Department of Medicine  Division of Endocrinology and Diabetes                    Answers for HPI/ROS submitted by the patient on 6/7/2021   General Symptoms: No  Skin Symptoms: No  HENT Symptoms: No  EYE SYMPTOMS: No  HEART SYMPTOMS: No  LUNG SYMPTOMS: Yes  INTESTINAL SYMPTOMS: No  URINARY SYMPTOMS: No  REPRODUCTIVE SYMPTOMS: No  SKELETAL SYMPTOMS: No  BLOOD SYMPTOMS: No  NERVOUS SYSTEM SYMPTOMS: No  MENTAL HEALTH SYMPTOMS: No  Cough: Yes  Sputum or phlegm: Yes  Coughing up blood: No  Difficulty breating or shortness of breath: No  Snoring: No  Wheezing: No  Difficulty breathing on exertion: No  Nighttime Cough:  No  Difficulty breathing when lying flat: No  No chief complaint on file.    Kevin Smith, [facility]

## 2021-10-25 ENCOUNTER — VIRTUAL VISIT (OUTPATIENT)
Dept: ENDOCRINOLOGY | Facility: CLINIC | Age: 41
End: 2021-10-25
Payer: COMMERCIAL

## 2021-10-25 DIAGNOSIS — E10.9 TYPE 1 DIABETES MELLITUS WITHOUT COMPLICATION (H): Primary | ICD-10-CM

## 2021-10-25 PROCEDURE — 99214 OFFICE O/P EST MOD 30 MIN: CPT | Mod: 95 | Performed by: PHYSICIAN ASSISTANT

## 2021-10-25 RX ORDER — PRAVASTATIN SODIUM 10 MG
10 TABLET ORAL DAILY
Qty: 90 TABLET | Refills: 3 | Status: SHIPPED | OUTPATIENT
Start: 2021-10-25 | End: 2022-10-17

## 2021-10-25 NOTE — LETTER
10/25/2021       RE: Agustín Gallegos  [address]  [facility] [address]     Dear Colleague,    Thank you for referring your patient, Agustín Gallegos, to the [facility] [place] at [facility]. Please see a copy of my visit note below.    Outcome for 10/21/21 9:52 AM: Data uploaded on Z[employer] Also emailed to provider.    Answers for HPI/ROS submitted by the patient on 10/20/2021  General Symptoms: No  Skin Symptoms: No  HENT Symptoms: No  EYE SYMPTOMS: No  HEART SYMPTOMS: No  LUNG SYMPTOMS: No  INTESTINAL SYMPTOMS: No  URINARY SYMPTOMS: No  REPRODUCTIVE SYMPTOMS: No  SKELETAL SYMPTOMS: Yes  BLOOD SYMPTOMS: No  NERVOUS SYSTEM SYMPTOMS: No  MENTAL HEALTH SYMPTOMS: No  Back pain: Yes  Muscle aches: No  Neck pain: Yes  Swollen joints: No  Joint pain: Yes  Bone pain: No  Muscle cramps: No  Muscle weakness: No  Joint stiffness: Yes  Bone fracture: No    No chief complaint on file.        Start time: 0945  End time:1010  HPI:   Christiano is a 39 yo man here for follow up of type 1 diabetes, which he has had since age 14.  He reports that overall things are going well.  He continues working from home. He is trying to walk every day. Right now he is doing physical therapy exercises for his shoulder and knee.  The physical therapist.  Worried it may be frozen shoulder.  His knee has been painful.  Today, he reports no major issues.  He had a rough patch when he could not keep glucose down for a couple of weeks.  He wonders if he gets a bad pen sometimes.      Novolog dosing:   Breakfast- cheerios- 1/4g   Lunch- sometimes leftovers (low carb) 1/7g  Dinner- usually low carb. 1/3g.    HS snack- he does not usually cover if he has a snack.  Sometimes 10g with no coverage.    Correction: 1/30 over 130 mg/dL.     He takes Tresiba 24 units daily.      Christiano wears a jamila sensor with overall average of 157  mg/dL  (CV 33%), TIR 69%, low 2%  for the past  two weeks.                 He has been feeling well and in his usual state of health.  He has no other concerns today.     ROS  GENERAL: no weight loss, weight gain, fevers, chills, malaise, night sweats.   HEENT: no dysphagia, diplopia, neck pain or tenderness, dry/scratchy eyes, URI, cough, sinus drainage, tinnitus, sinus pressure  CV: no chest pain, pressure, palpitations, skipped beats, LOC  LUNGS: no SOB, WILDE, cough, sputum production, wheezing   GI: no diarrhea, constipation, abdominal pain  EXTREMITIES: no rashes, ulcers, edema  NEUROLOGY: no changes in vision, tingling or numbness in hands or feet.   MSK: frozen shoulder, knee pain.   PSYCH: mood stable    Past Medical History:   Diagnosis Date     Type 1 diabetes (H)     age of onset 14 years       Past Surgical History:   Procedure Laterality Date     COSMETIC SURGERY  1993    Mole removal     ENT SURGERY  1985    Tubes in ears       Family History   Problem Relation Age of Onset     Diabetes Brother      Cerebrovascular Disease Paternal Grandmother      Arthritis Mother      Arthritis Maternal Grandmother      Coronary Artery Disease Father        Social History     Social History     Marital status: Single     Spouse name: N/A     Number of children: N/A     Years of education: N/A     Social History Main Topics     Smoking status: Never Smoker     Smokeless tobacco: Never Used     Alcohol use No     Drug use: No     Sexual activity: Yes     Partners: Female     Other Topics Concern     None     Social History Narrative   Social History: Works for [employer]. . DaughterDaphney born August, 2019.    Current Outpatient Medications   Medication     blood glucose (CONTOUR NEXT TEST) test strip     blood glucose monitoring (CANDE MICROLET) lancets     blood glucose monitoring (NO BRAND SPECIFIED) meter device kit     cholecalciferol (VITAMIN D) 1000 UNIT tablet     Continuous Blood Gluc Sensor (FREESTYLE LILO 14 DAY SENSOR) [facility]     insulin  aspart (NOVOLOG FLEXPEN) 100 UNIT/ML pen     insulin degludec (TRESIBA FLEXTOUCH) 200 UNIT/ML pen     insulin pen needle (B-D U/F) 31G X 8 MM miscellaneous     KETOSTIX test strip     No current facility-administered medications for this visit.        No Known Allergies    Physical Exam  GENERAL: healthy, alert and no distress  RESP: no audible wheeze, cough, or visible cyanosis.  No visible retractions or increased work of breathing.  Able to speak fully in complete sentences.  PSYCH: mentation appears normal, affect normal/bright, judgement and insight intact, normal speech and appearance well-groomed    RESULTS  Lab Results   Component Value Date    A1C 7.4 (H) 10/20/2021    A1C 7.3 (H) 06/17/2021    A1C 6.9 (H) 12/08/2020    A1C 6.8 (H) 09/03/2020    A1C 7.7 (H) 05/13/2019    A1C 7.9 (H) 08/13/2014    HEMOGLOBINA1 7.5 (A) 03/02/2020    HEMOGLOBINA1 7.5 (A) 11/26/2019    HEMOGLOBINA1 8.2 (A) 08/26/2019    HEMOGLOBINA1 7.7 (A) 02/04/2019    HEMOGLOBINA1 7.5 (A) 08/06/2018       TSH   Date Value Ref Range Status   06/17/2021 0.88 0.40 - 4.00 mU/L Final   05/29/2020 2.18 0.40 - 4.00 mU/L Final   05/13/2019 2.14 0.40 - 4.00 mU/L Final   01/30/2018 2.46 0.40 - 4.00 mU/L Final   09/16/2016 0.97 0.40 - 4.00 mU/L Final     T4 Free   Date Value Ref Range Status   09/16/2016 1.01 0.76 - 1.46 ng/dL Final   06/24/2015 1.02 0.76 - 1.46 ng/dL Final       ALT   Date Value Ref Range Status   10/20/2021 36 0 - 70 U/L Final   03/05/2015 20 0 - 70 U/L Final   ]    Recent Labs   Lab Test 06/17/21  0916 05/29/20  0705 08/13/14  0956 08/13/14  0956   CHOL 172 150   < > 162   HDL 44 41   < > 49   * 86   < > 102   TRIG 43 114   < > 56   CHOLHDLRATIO  --   --   --  3.3    < > = values in this interval not displayed.       Lab Results   Component Value Date     06/17/2021      Lab Results   Component Value Date    POTASSIUM 4.0 06/17/2021     Lab Results   Component Value Date    CHLORIDE 103 06/17/2021     Lab Results    Component Value Date    CELESTE 9.2 06/17/2021     Lab Results   Component Value Date    CO2 31 06/17/2021     Lab Results   Component Value Date    BUN 8 06/17/2021     Lab Results   Component Value Date    CR 0.73 06/17/2021       GFR Estimate   Date Value Ref Range Status   10/20/2021 >90 >60 mL/min/1.73m2 Final     Comment:     As of July 11, 2021, eGFR is calculated by the CKD-EPI creatinine equation, without race adjustment. eGFR can be influenced by muscle mass, exercise, and diet. The reported eGFR is an estimation only and is only applicable if the renal function is stable.   06/17/2021 >90 >60 mL/min/[1.73_m2] Final     Comment:     Non  GFR Calc  Starting 12/18/2018, serum creatinine based estimated GFR (eGFR) will be   calculated using the Chronic Kidney Disease Epidemiology Collaboration   (CKD-EPI) equation.     12/08/2020 >90 >60 mL/min/[1.73_m2] Final     Comment:     Non  GFR Calc  Starting 12/18/2018, serum creatinine based estimated GFR (eGFR) will be   calculated using the Chronic Kidney Disease Epidemiology Collaboration   (CKD-EPI) equation.     05/29/2020 >90 >60 mL/min/[1.73_m2] Final     Comment:     Non  GFR Calc  Starting 12/18/2018, serum creatinine based estimated GFR (eGFR) will be   calculated using the Chronic Kidney Disease Epidemiology Collaboration   (CKD-EPI) equation.       GFR Estimate If Black   Date Value Ref Range Status   06/17/2021 >90 >60 mL/min/[1.73_m2] Final     Comment:      GFR Calc  Starting 12/18/2018, serum creatinine based estimated GFR (eGFR) will be   calculated using the Chronic Kidney Disease Epidemiology Collaboration   (CKD-EPI) equation.     12/08/2020 >90 >60 mL/min/[1.73_m2] Final     Comment:      GFR Calc  Starting 12/18/2018, serum creatinine based estimated GFR (eGFR) will be   calculated using the Chronic Kidney Disease Epidemiology Collaboration   (CKD-EPI) equation.      05/29/2020 >90 >60 mL/min/[1.73_m2] Final     Comment:      GFR Calc  Starting 12/18/2018, serum creatinine based estimated GFR (eGFR) will be   calculated using the Chronic Kidney Disease Epidemiology Collaboration   (CKD-EPI) equation.         Lab Results   Component Value Date    MICROL <5 06/17/2021     No results found for: MICROALBUMIN  No results found for: CPEPT, GADAB, ISCAB    Vitamin B12   Date Value Ref Range Status   09/03/2020 713 193 - 986 pg/mL Final   ]    Most recent eye exam date: : Not Found   25 OH Vit D total   Date Value Ref Range Status   06/17/2021 <52 20 - 75 ug/L Final     Comment:     Season, race, dietary intake, and treatment affect the concentration of   25-hydroxy-Vitamin D. Values may decrease during winter months and increase   during summer months. Values 20-29 ug/L may indicate Vitamin D insufficiency   and values <20 ug/L may indicate Vitamin D deficiency.  This test was developed and its performance characteristics determined by the   [facility].   It has not been cleared or approved by the FDA. The laboratory is regulated   under CLIA as qualified to perform high-complexity testing. This test is used   for clinical purposes. It should not be regarded as investigational or for   research.     08/22/2017 <34 20 - 75 ug/L Final     Comment:     Season, race, dietary intake, and treatment affect the concentration of   25-hydroxy-Vitamin D. Values may decrease during winter months and increase   during summer months. Values 20-29 ug/L may indicate Vitamin D insufficiency   and values <20 ug/L may indicate Vitamin D deficiency.  This test was developed and its performance characteristics determined by the   [facility],  Special Chemistry Laboratory. It has   not been cleared or approved by the FDA. The laboratory is regulated under   CLIA as qualified to perform  high-complexity testing. This test is used for   clinical purposes. It should not be regarded as investigational or for   research.       No results found for: PTHI  Calcium   Date Value Ref Range Status   10/20/2021 9.6 8.5 - 10.1 mg/dL Final   06/17/2021 9.2 8.5 - 10.1 mg/dL Final   12/08/2020 9.4 8.5 - 10.1 mg/dL Final     No results found for: CALCIUMIONIZ  Albumin   Date Value Ref Range Status   10/20/2021 4.0 3.4 - 5.0 g/dL Final     25 OH Vit D total   Date Value Ref Range Status   06/17/2021 <52 20 - 75 ug/L Final     Comment:     Season, race, dietary intake, and treatment affect the concentration of   25-hydroxy-Vitamin D. Values may decrease during winter months and increase   during summer months. Values 20-29 ug/L may indicate Vitamin D insufficiency   and values <20 ug/L may indicate Vitamin D deficiency.  This test was developed and its performance characteristics determined by the   [facility].   It has not been cleared or approved by the FDA. The laboratory is regulated   under CLIA as qualified to perform high-complexity testing. This test is used   for clinical purposes. It should not be regarded as investigational or for   research.     08/22/2017 <34 20 - 75 ug/L Final     Comment:     Season, race, dietary intake, and treatment affect the concentration of   25-hydroxy-Vitamin D. Values may decrease during winter months and increase   during summer months. Values 20-29 ug/L may indicate Vitamin D insufficiency   and values <20 ug/L may indicate Vitamin D deficiency.  This test was developed and its performance characteristics determined by the   [facility],  Special Chemistry Laboratory. It has   not been cleared or approved by the FDA. The laboratory is regulated under   CLIA as qualified to perform high-complexity testing. This test is used for   clinical purposes. It should not be regarded as  investigational or for   research.       No results found for: PTHI  Calcium   Date Value Ref Range Status   10/20/2021 9.6 8.5 - 10.1 mg/dL Final   06/17/2021 9.2 8.5 - 10.1 mg/dL Final   12/08/2020 9.4 8.5 - 10.1 mg/dL Final     No results found for: CALCIUMIONIZ  Albumin   Date Value Ref Range Status   10/20/2021 4.0 3.4 - 5.0 g/dL Final         Assessment/Plan:     1.  Type 1 diabetes- Christiano is doing quite well, but dropping too much after dinner.  Suggested cutting back carb ratio to 1/3.5g at dinner.  No other changes.  going a bit too high after eating. Could consider Fiasp in the future.  Discussed In-pen.  He will think about it.   Encouraged healthy lifestyle/heart healthy eating.      2.  Risk factors-     Retinopathy:  No. Had recent eye exam in October, 2020.     Nephropathy:  BP historically well controlled.  No microalbuminuria.  Creatinine stable.  On no meds.   Neuropathy: No.    Feet: OK, no ulcers.   Taking ASA: no  Lipids:  LDL is a bit high. Start pravastatin 10 mg daily.  Work on limiting processed meats and increasing fruits/vegetables.     Celiac screening: negative screen 5/19  Vitamin D: now improved. He is taking 1000 international unit(s) daily.      3.  F/U in 3 months with Dr. Banks or myself.      35 minutes spent on the date of the encounter doing chart review, review of test results, review of continuous glucose sensor, interpretation of glucose data, patient visit and documentation, counseling/coordination of care, and discussion of follow up plan for worsening hyper and hypoglycemia.  The patient understood and is satisfied with today's visit.     Shena Iniguez PA-C, MPAS   [facility]  Department of Medicine  Division of Endocrinology and Diabetes    Answers for HPI/ROS submitted by the patient on 6/7/2021   General Symptoms: No  Skin Symptoms: No  HENT Symptoms: No  EYE SYMPTOMS: No  HEART SYMPTOMS: No  LUNG SYMPTOMS: Yes  INTESTINAL SYMPTOMS: No  URINARY SYMPTOMS:  No  REPRODUCTIVE SYMPTOMS: No  SKELETAL SYMPTOMS: No  BLOOD SYMPTOMS: No  NERVOUS SYSTEM SYMPTOMS: No  MENTAL HEALTH SYMPTOMS: No  Cough: Yes  Sputum or phlegm: Yes  Coughing up blood: No  Difficulty breating or shortness of breath: No  Snoring: No  Wheezing: No  Difficulty breathing on exertion: No  Nighttime Cough: No  Difficulty breathing when lying flat: No  No chief complaint on file.    Kevin Smith CMA    Christiano is a 41 year old who is being evaluated via a billable video visit.      How would you like to obtain your AVS? MyChart  If the video visit is dropped, the invitation should be resent by: Text to cell phone: [phone]  Will anyone else be joining your video visit? No

## 2021-10-25 NOTE — PATIENT INSTRUCTIONS
1/3.5g at dinner- if still dropping low, cut back to 1/4g.      Start pravastatin 10 mg daily.      Recheck cholesterol in 3 months fasting.  Also a1c.      Lab [phone].    Schedule eye exam.

## 2021-10-25 NOTE — PROGRESS NOTES
Christiano is a 41 year old who is being evaluated via a billable video visit.      How would you like to obtain your AVS? Channelkithart  If the video visit is dropped, the invitation should be resent by: Text to cell phone: [phone]  Will anyone else be joining your video visit? No

## 2021-11-05 ENCOUNTER — TELEPHONE (OUTPATIENT)
Dept: ENDOCRINOLOGY | Facility: CLINIC | Age: 41
End: 2021-11-05

## 2021-11-05 NOTE — TELEPHONE ENCOUNTER
ADRIAN and sent mychart for pt to schedule 3 month follow up with Hira and 6 month follow up with Jocelyn

## 2021-11-09 ENCOUNTER — THERAPY VISIT (OUTPATIENT)
Dept: PHYSICAL THERAPY | Facility: CLINIC | Age: 41
End: 2021-11-09
Payer: COMMERCIAL

## 2021-11-09 DIAGNOSIS — M25.561 ACUTE PAIN OF RIGHT KNEE: ICD-10-CM

## 2021-11-09 DIAGNOSIS — M25.511 ACUTE PAIN OF RIGHT SHOULDER: ICD-10-CM

## 2021-11-09 PROCEDURE — 97110 THERAPEUTIC EXERCISES: CPT | Mod: GP | Performed by: PHYSICAL THERAPIST

## 2021-11-09 PROCEDURE — 97112 NEUROMUSCULAR REEDUCATION: CPT | Mod: GP | Performed by: PHYSICAL THERAPIST

## 2021-11-09 PROCEDURE — 97140 MANUAL THERAPY 1/> REGIONS: CPT | Mod: GP | Performed by: PHYSICAL THERAPIST

## 2021-11-16 ENCOUNTER — THERAPY VISIT (OUTPATIENT)
Dept: PHYSICAL THERAPY | Facility: CLINIC | Age: 41
End: 2021-11-16
Payer: COMMERCIAL

## 2021-11-16 DIAGNOSIS — M25.561 ACUTE PAIN OF RIGHT KNEE: ICD-10-CM

## 2021-11-16 DIAGNOSIS — M25.511 ACUTE PAIN OF RIGHT SHOULDER: Primary | ICD-10-CM

## 2021-11-16 PROCEDURE — 97140 MANUAL THERAPY 1/> REGIONS: CPT | Mod: GP | Performed by: PHYSICAL THERAPIST

## 2021-11-16 PROCEDURE — 97110 THERAPEUTIC EXERCISES: CPT | Mod: GP | Performed by: PHYSICAL THERAPIST

## 2021-11-16 PROCEDURE — 97530 THERAPEUTIC ACTIVITIES: CPT | Mod: GP | Performed by: PHYSICAL THERAPIST

## 2021-11-30 ENCOUNTER — THERAPY VISIT (OUTPATIENT)
Dept: PHYSICAL THERAPY | Facility: CLINIC | Age: 41
End: 2021-11-30
Payer: COMMERCIAL

## 2021-11-30 DIAGNOSIS — M25.511 ACUTE PAIN OF RIGHT SHOULDER: ICD-10-CM

## 2021-11-30 DIAGNOSIS — M25.561 ACUTE PAIN OF RIGHT KNEE: ICD-10-CM

## 2021-11-30 PROCEDURE — 97140 MANUAL THERAPY 1/> REGIONS: CPT | Mod: GP | Performed by: PHYSICAL THERAPIST

## 2021-11-30 PROCEDURE — 97110 THERAPEUTIC EXERCISES: CPT | Mod: GP | Performed by: PHYSICAL THERAPIST

## 2021-11-30 PROCEDURE — 97112 NEUROMUSCULAR REEDUCATION: CPT | Mod: GP | Performed by: PHYSICAL THERAPIST

## 2021-12-06 ENCOUNTER — OFFICE VISIT (OUTPATIENT)
Dept: OPHTHALMOLOGY | Facility: CLINIC | Age: 41
End: 2021-12-06
Attending: OPHTHALMOLOGY
Payer: COMMERCIAL

## 2021-12-06 DIAGNOSIS — H52.13 MYOPIC ASTIGMATISM OF BOTH EYES: ICD-10-CM

## 2021-12-06 DIAGNOSIS — E10.9 TYPE 1 DIABETES MELLITUS WITHOUT RETINOPATHY (H): Primary | ICD-10-CM

## 2021-12-06 DIAGNOSIS — H52.203 MYOPIC ASTIGMATISM OF BOTH EYES: ICD-10-CM

## 2021-12-06 PROCEDURE — 92014 COMPRE OPH EXAM EST PT 1/>: CPT | Performed by: OPHTHALMOLOGY

## 2021-12-06 PROCEDURE — G0463 HOSPITAL OUTPT CLINIC VISIT: HCPCS

## 2021-12-06 ASSESSMENT — VISUAL ACUITY
OD_CC+: -1
CORRECTION_TYPE: CONTACTS
METHOD: SNELLEN - LINEAR
OD_CC: 20/15
OS_CC: 20/15
OS_CC+: -2

## 2021-12-06 ASSESSMENT — EXTERNAL EXAM - LEFT EYE: OS_EXAM: NORMAL

## 2021-12-06 ASSESSMENT — TONOMETRY
OS_IOP_MMHG: 20
OD_IOP_MMHG: 19
IOP_METHOD: TONOPEN

## 2021-12-06 ASSESSMENT — CUP TO DISC RATIO
OD_RATIO: 0.3
OS_RATIO: 0.3

## 2021-12-06 ASSESSMENT — SLIT LAMP EXAM - LIDS
COMMENTS: NORMAL
COMMENTS: NORMAL

## 2021-12-06 ASSESSMENT — CONF VISUAL FIELD
OD_NORMAL: 1
METHOD: COUNTING FINGERS
OS_NORMAL: 1

## 2021-12-06 ASSESSMENT — EXTERNAL EXAM - RIGHT EYE: OD_EXAM: NORMAL

## 2021-12-06 NOTE — NURSING NOTE
Chief Complaints and History of Present Illnesses   Patient presents with     Diabetic Eye Exam     Chief Complaint(s) and History of Present Illness(es)     Diabetic Eye Exam     Vision: is stable    Associated symptoms: Negative for flashes, tearing, headaches, redness and floaters    Diabetes Type: Type 1    Treatments tried: no treatments    Pain scale: 0/10              Comments     Pt states no change in VA since last visit  Pt denies eye pain , redness, or tearing  No flashes or floaters  LBS: 181    Last A1c: 7.4  Lab Results       Component                Value               Date                       A1C                      7.4                 10/20/2021                 A1C                      7.3                 06/17/2021                 A1C                      6.9                 12/08/2020                 A1C                      6.8                 09/03/2020                 A1C                      7.7                 05/13/2019                 A1C                      7.9                 08/13/2014              Renata Oneill COT 12:58 PM December 6, 2021

## 2021-12-06 NOTE — PROGRESS NOTES
HPI     Diabetic Eye Exam     Vision is stable.  Associated symptoms include Negative for flashes, tearing, headaches, redness and floaters.  Diabetes characteristics include Type 1.  Treatments tried include no treatments.  Pain was noted as 0/10.              Comments     Pt states no change in VA since last visit  Pt denies eye pain , redness, or tearing  No flashes or floaters  LBS: 181    Last A1c: 7.4  Lab Results       Component                Value               Date                       A1C                      7.4                 10/20/2021                 A1C                      7.3                 06/17/2021                 A1C                      6.9                 12/08/2020                 A1C                      6.8                 09/03/2020                 A1C                      7.7                 05/13/2019                 A1C                      7.9                 08/13/2014              Renata Oneill COT 12:58 PM December 6, 2021                Last edited by Renata Oneill on 12/6/2021  1:02 PM. (History)            HPI  Agustín Gallegos is a 41 year old male here for yearly diabetic eye exam. His vision is good in both eyes with his current glasses and contact lenses. He denies eye pain, redness, or discharge. No flashes/floaters.    POH: No history of eye surgery or trauma  PMH: Type 1 diabetes  FH: No FH of AMD or glc  SH: Non-smoker    Assessment & Plan      (E10.9) Type 1 diabetes mellitus without retinopathy (HCC)  (primary encounter diagnosis)  Comment: Diagnosed at age 13. Last A1c 7.4% 10/2021. No diabetic retinopathy.  Plan: Discussed the importance of tight blood glucose control in the prevention of diabetic retinopathy. Recommend yearly dilated eye exam.     (H52.203) Myopic astigmatism of both eyes  Comment: Stable good vision with current contacts  Plan:  Follows with Dr. Nice for contact lenses. Discussed signs/sx of RT/RD and the patient knows to call immediately if  they develop these symptoms.   -----------------------------------------------------------------------------------    Patient disposition:   Return for Dr. Ncie next available because he needs a CL update, ok to see her for diabetic exams as well.     Teaching statement:  Complete documentation of historical and exam elements from today's encounter can be found in the full encounter summary report (not reduplicated in this progress note). I personally obtained the chief complaint(s) and history of present illness.  I confirmed and edited as necessary the review of systems, past medical/surgical history, family history, social history, and examination findings as documented by others; and I examined the patient myself. I personally reviewed the relevant tests, images, and reports as documented above.     I formulated and edited as necessary the assessment and plan and discussed the findings and management plan with the patient and family.    Patricia Talamantes MD  Comprehensive Ophthalmology & Ocular Pathology  Department of Ophthalmology and Visual Neurosciences  [email or web address]  Pager [phone]

## 2021-12-14 ENCOUNTER — OFFICE VISIT (OUTPATIENT)
Dept: OPTOMETRY | Facility: CLINIC | Age: 41
End: 2021-12-14
Payer: COMMERCIAL

## 2021-12-14 DIAGNOSIS — H52.203 MYOPIA OF BOTH EYES WITH ASTIGMATISM: Primary | ICD-10-CM

## 2021-12-14 DIAGNOSIS — H52.13 MYOPIA OF BOTH EYES WITH ASTIGMATISM: Primary | ICD-10-CM

## 2021-12-14 ASSESSMENT — VISUAL ACUITY
OD_CC: 20/20
METHOD: SNELLEN - LINEAR
CORRECTION_TYPE: CONTACTS
OS_CC: 20/25

## 2021-12-14 ASSESSMENT — REFRACTION_MANIFEST
OD_CYLINDER: +0.50
OS_CYLINDER: +0.50
OD_AXIS: 065
OS_AXIS: 100
OS_SPHERE: -8.00
OD_SPHERE: -7.50

## 2021-12-14 ASSESSMENT — REFRACTION_WEARINGRX
OS_SPHERE: -8.00
OS_CYLINDER: +0.75
OD_AXIS: 077
OS_AXIS: 100
OD_CYLINDER: +0.75
OD_SPHERE: -7.50

## 2021-12-14 ASSESSMENT — REFRACTION_CURRENTRX
OS_DIAMETER: 13.8
OS_BRAND: AIR OPTIX NIGHT & DAY
OS_SPHERE: -6.50
OD_BRAND: AIR OPTIX NIGHT & DAY
OS_BASECURVE: 8.4
OD_BASECURVE: 8.4
OD_DIAMETER: 13.8
OD_SPHERE: -6.50

## 2021-12-15 ASSESSMENT — EXTERNAL EXAM - LEFT EYE: OS_EXAM: NORMAL

## 2021-12-15 ASSESSMENT — SLIT LAMP EXAM - LIDS
COMMENTS: NORMAL
COMMENTS: NORMAL

## 2021-12-15 ASSESSMENT — EXTERNAL EXAM - RIGHT EYE: OD_EXAM: NORMAL

## 2021-12-15 NOTE — PROGRESS NOTES
A/P  1.) Myopia/Astigmatism each eye  -Mild change in spec Rx, updated today  -Doing well with current CL's - continue  -Asymptomatic for presbyopia at this time. Slight underminus left eye. Would rec continuing in same mercado for now  -Can use low plus readers prn if small print becomes difficult. If very bothersome we can certainly consider monovision/multifocals at any time in the future    RTC 1 year diabetic eye exam + glasses/CL check. Previously saw Dr. Talamantes - can do complete eye exam with me if desired

## 2021-12-28 ENCOUNTER — THERAPY VISIT (OUTPATIENT)
Dept: PHYSICAL THERAPY | Facility: CLINIC | Age: 41
End: 2021-12-28
Payer: COMMERCIAL

## 2021-12-28 DIAGNOSIS — M25.511 ACUTE PAIN OF RIGHT SHOULDER: ICD-10-CM

## 2021-12-28 DIAGNOSIS — M25.561 ACUTE PAIN OF RIGHT KNEE: ICD-10-CM

## 2021-12-28 PROCEDURE — 97112 NEUROMUSCULAR REEDUCATION: CPT | Mod: GP | Performed by: PHYSICAL THERAPIST

## 2021-12-28 PROCEDURE — 97110 THERAPEUTIC EXERCISES: CPT | Mod: GP | Performed by: PHYSICAL THERAPIST

## 2021-12-28 PROCEDURE — 97140 MANUAL THERAPY 1/> REGIONS: CPT | Mod: GP | Performed by: PHYSICAL THERAPIST

## 2021-12-28 ASSESSMENT — ACTIVITIES OF DAILY LIVING (ADL)
SIT WITH YOUR KNEE BENT: ACTIVITY IS NOT DIFFICULT
LIMPING: I DO NOT HAVE THE SYMPTOM
KNEEL ON THE FRONT OF YOUR KNEE: ACTIVITY IS NOT DIFFICULT
WEAKNESS: I DO NOT HAVE THE SYMPTOM
GO UP STAIRS: ACTIVITY IS NOT DIFFICULT
PAIN: I DO NOT HAVE THE SYMPTOM
HOW_WOULD_YOU_RATE_THE_OVERALL_FUNCTION_OF_YOUR_KNEE_DURING_YOUR_USUAL_DAILY_ACTIVITIES?: NORMAL
RAW_SCORE: 70
SQUAT: ACTIVITY IS NOT DIFFICULT
STIFFNESS: I DO NOT HAVE THE SYMPTOM
HOW_WOULD_YOU_RATE_THE_CURRENT_FUNCTION_OF_YOUR_KNEE_DURING_YOUR_USUAL_DAILY_ACTIVITIES_ON_A_SCALE_FROM_0_TO_100_WITH_100_BEING_YOUR_LEVEL_OF_KNEE_FUNCTION_PRIOR_TO_YOUR_INJURY_AND_0_BEING_THE_INABILITY_TO_PERFORM_ANY_OF_YOUR_USUAL_DAILY_ACTIVITIES?: 100
STAND: ACTIVITY IS NOT DIFFICULT
GIVING WAY, BUCKLING OR SHIFTING OF KNEE: I DO NOT HAVE THE SYMPTOM
GO DOWN STAIRS: ACTIVITY IS NOT DIFFICULT
AS_A_RESULT_OF_YOUR_KNEE_INJURY,_HOW_WOULD_YOU_RATE_YOUR_CURRENT_LEVEL_OF_DAILY_ACTIVITY?: NORMAL
SWELLING: I DO NOT HAVE THE SYMPTOM
KNEE_ACTIVITY_OF_DAILY_LIVING_SCORE: 100
RISE FROM A CHAIR: ACTIVITY IS NOT DIFFICULT
KNEE_ACTIVITY_OF_DAILY_LIVING_SUM: 70
WALK: ACTIVITY IS NOT DIFFICULT

## 2021-12-28 NOTE — PROGRESS NOTES
PROGRESS  REPORT    Progress reporting period is from 10/19/21 to 12/28/21.       SUBJECTIVE  Subjective changes noted by patient:   Knee feels great.  No twinges or pain with stairs anymore.  The shoulder improved ROM, but still has the same pain when he does reach his limit.  The stretches make it feel good though.     Current pain level is 0-3/10 shoulder, 0/10 knee.     Initial Pain level: 5/10.   Changes in function:  Yes (See Goal flowsheet attached for changes in current functional level)  Adverse reaction to treatment or activity: None    OBJECTIVE  Changes noted in objective findings:  Yes,     R shoulder AROM: FLex 147, abd 180(tight only), ER 70, ext/ir T12+.    R shoulder strength: all 5/5 except horiz abd 5-/5.     Still R scapular dyskinesis    R knee strength all 5/5 and pain free.  R knee no longer TTP at medial patella    ASSESSMENT/PLAN  Updated problem list and treatment plan: Diagnosis 1:  R shoulder pain due to impingement from ant instability  Pain -  self management, education and home program  Decreased joint mobility - manual therapy, therapeutic exercise and home program  Decreased strength - therapeutic exercise, therapeutic activities and home program  Decreased proprioception - neuro re-education, therapeutic activities and home program  Impaired muscle performance - neuro re-education and home program  Decreased function - therapeutic activities and home program  Diagnosis 2:  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  Discontinue PT for this diagnosis  STG/LTGs have been met or progress has been made towards goals:  Yes (See Goal flow sheet completed today.)  Assessment of Progress: The patient's condition is improving.  Self Management Plans:  Patient has been instructed in a home treatment program.  Patient  has been instructed in self management of symptoms.  I have re-evaluated this patient and find that the nature, scope, duration and intensity of the therapy is  appropriate for the medical condition of the patient.  Agustín continues to require the following intervention to meet STG and LTG's:  PT    Recommendations:  This patient would benefit from continued therapy for his R shoulder.     Frequency:  1-2 X a month, once daily  Duration:  for 2 months        Please refer to the daily flowsheet for treatment today, total treatment time and time spent performing 1:1 timed codes.

## 2022-01-25 ENCOUNTER — THERAPY VISIT (OUTPATIENT)
Dept: PHYSICAL THERAPY | Facility: CLINIC | Age: 42
End: 2022-01-25
Payer: COMMERCIAL

## 2022-01-25 DIAGNOSIS — M25.511 ACUTE PAIN OF RIGHT SHOULDER: ICD-10-CM

## 2022-01-25 DIAGNOSIS — M25.561 ACUTE PAIN OF RIGHT KNEE: ICD-10-CM

## 2022-01-25 PROCEDURE — 97110 THERAPEUTIC EXERCISES: CPT | Mod: GP | Performed by: PHYSICAL THERAPIST

## 2022-01-25 PROCEDURE — 97112 NEUROMUSCULAR REEDUCATION: CPT | Mod: GP | Performed by: PHYSICAL THERAPIST

## 2022-03-14 ENCOUNTER — LAB (OUTPATIENT)
Dept: LAB | Facility: CLINIC | Age: 42
End: 2022-03-14
Payer: COMMERCIAL

## 2022-03-14 DIAGNOSIS — E10.9 TYPE 1 DIABETES MELLITUS WITHOUT COMPLICATION (H): ICD-10-CM

## 2022-03-14 LAB
CHOLEST SERPL-MCNC: 145 MG/DL
FASTING STATUS PATIENT QL REPORTED: YES
HDLC SERPL-MCNC: 46 MG/DL
LDLC SERPL CALC-MCNC: 89 MG/DL
NONHDLC SERPL-MCNC: 99 MG/DL
TRIGL SERPL-MCNC: 50 MG/DL

## 2022-03-14 PROCEDURE — 80061 LIPID PANEL: CPT

## 2022-03-14 PROCEDURE — 36415 COLL VENOUS BLD VENIPUNCTURE: CPT

## 2022-03-15 ASSESSMENT — ENCOUNTER SYMPTOMS
SINUS PAIN: 0
HOARSE VOICE: 1
TROUBLE SWALLOWING: 0
TASTE DISTURBANCE: 0
SORE THROAT: 1
SINUS CONGESTION: 1
NECK MASS: 0
SMELL DISTURBANCE: 0

## 2022-03-17 NOTE — PROGRESS NOTES
Outcome for 03/17/22 12:14 PM :Patient is sharing data via clinic device website and patient has been instructed to update data on website. Find login information by using .[employer]      HPI:   Christiano is a 42 yo man here for follow up of type 1 diabetes, which he has had since age 14. He has no known complications from his diabetes. He reports that overall things are going well.  He continues working from home. He is trying to walk every day. His glucose has been under better control recently.  He finds it helps to take insulin early.  Because he is working from home, it makes it easier if he goes low.  He is using sugar to manage the lows frequently.    Novolog dosing:   Breakfast- cheerios- 1/4g   Lunch- sometimes leftovers (low carb) 1/7g  Dinner- usually low carb. 1/4g.    HS snack- cut out.  Only eats if he is 100 or lower at bedtime.   Correction: 1/30 over 130 mg/dL.     He takes Tresiba 24 units daily.      Christiano wears a jamila sensor with overall average of 154  mg/dL  (CV 34%), TIR 71%, low 1%  for the past two weeks.                     Christiano agreed to start a statin after his last visit.  He has been tolerating this well. Cholesterol has improved.      His wife will be having their second daughter in June.      He has been feeling well and in his usual state of health.  He has no other concerns today.     ROS  GENERAL: no weight loss, weight gain, fevers, chills, malaise, night sweats.   HEENT: no dysphagia, diplopia, neck pain or tenderness, dry/scratchy eyes, URI, cough, sinus drainage, tinnitus, sinus pressure  CV: no chest pain, pressure, palpitations, skipped beats, LOC  LUNGS: no SOB, WILDE, cough, sputum production, wheezing   GI: no diarrhea, constipation, abdominal pain  EXTREMITIES: no rashes, ulcers, edema  NEUROLOGY: no changes in vision, tingling or numbness in hands or feet.   MSK: frozen shoulder, knee pain.   PSYCH: mood stable    Past Medical History:   Diagnosis Date     Type 1 diabetes (H)   "   age of onset 14 years       Past Surgical History:   Procedure Laterality Date     COSMETIC SURGERY  1993    Mole removal     ENT SURGERY  1985    Tubes in ears       Family History   Problem Relation Age of Onset     Diabetes Brother      Cerebrovascular Disease Paternal Grandmother      Arthritis Mother      Arthritis Maternal Grandmother      Coronary Artery Disease Father        Social History     Social History     Marital status: Single     Spouse name: N/A     Number of children: N/A     Years of education: N/A     Social History Main Topics     Smoking status: Never Smoker     Smokeless tobacco: Never Used     Alcohol use No     Drug use: No     Sexual activity: Yes     Partners: Female     Other Topics Concern     None     Social History Narrative   Social History: Works for [employer]. . Daughter, Daphney born August, 2019.    Current Outpatient Medications   Medication     blood glucose (CONTOUR NEXT TEST) test strip     blood glucose monitoring (CANDE MICROLET) lancets     blood glucose monitoring (NO BRAND SPECIFIED) meter device kit     cholecalciferol (VITAMIN D) 1000 UNIT tablet     Continuous Blood Gluc Sensor (FREESTYLE LILO 14 DAY SENSOR) MISC     insulin aspart (NOVOLOG FLEXPEN) 100 UNIT/ML pen     insulin degludec (TRESIBA FLEXTOUCH) 200 UNIT/ML pen     insulin pen needle (B-D U/F) 31G X 8 MM miscellaneous     KETOSTIX test strip     pravastatin (PRAVACHOL) 10 MG tablet     No current facility-administered medications for this visit.        No Known Allergies    Physical Exam  BP (!) 149/76 (BP Location: Left arm, Patient Position: Sitting, Cuff Size: Adult Regular)   Pulse 78   Ht 1.905 m (6' 3\")   Wt 88 kg (194 lb)   BMI 24.25 kg/m    GENERAL:  Alert and oriented X3, NAD, well dressed, answering questions appropriately, appears stated age.  HEENT: OP clear, no lymphadenopathy, no thyromegaly, non-tender, no exophthalmus, no proptosis, EOMI, no lid lag, no " retraction  EXTREMITIES: no edema, +pulses, no rashes, no lesions  NEUROLOGY: CN grossly intact, + monofilament, +vibratory sensation      RESULTS  Lab Results   Component Value Date    A1C 7.4 (H) 10/20/2021    A1C 7.3 (H) 06/17/2021    A1C 6.9 (H) 12/08/2020    A1C 6.8 (H) 09/03/2020    A1C 7.7 (H) 05/13/2019    A1C 7.9 (H) 08/13/2014    HEMOGLOBINA1 7.5 (A) 03/02/2020    HEMOGLOBINA1 7.5 (A) 11/26/2019    HEMOGLOBINA1 8.2 (A) 08/26/2019    HEMOGLOBINA1 7.7 (A) 02/04/2019    HEMOGLOBINA1 7.5 (A) 08/06/2018       TSH   Date Value Ref Range Status   06/17/2021 0.88 0.40 - 4.00 mU/L Final   05/29/2020 2.18 0.40 - 4.00 mU/L Final   05/13/2019 2.14 0.40 - 4.00 mU/L Final   01/30/2018 2.46 0.40 - 4.00 mU/L Final   09/16/2016 0.97 0.40 - 4.00 mU/L Final     T4 Free   Date Value Ref Range Status   09/16/2016 1.01 0.76 - 1.46 ng/dL Final   06/24/2015 1.02 0.76 - 1.46 ng/dL Final       ALT   Date Value Ref Range Status   10/20/2021 36 0 - 70 U/L Final   03/05/2015 20 0 - 70 U/L Final   ]    Recent Labs   Lab Test 03/14/22  0850 10/20/21  0843 09/16/16  0823 08/13/14  0956   CHOL 145 170   < > 162   HDL 46 42   < > 49   LDL 89 118*   < > 102   TRIG 50 49   < > 56   CHOLHDLRATIO  --   --   --  3.3    < > = values in this interval not displayed.       Lab Results   Component Value Date     10/20/2021     06/17/2021      Lab Results   Component Value Date    POTASSIUM 4.2 10/20/2021    POTASSIUM 4.0 06/17/2021     Lab Results   Component Value Date    CHLORIDE 103 10/20/2021    CHLORIDE 103 06/17/2021     Lab Results   Component Value Date    CELESTE 9.6 10/20/2021    CELESTE 9.2 06/17/2021     Lab Results   Component Value Date    CO2 30 10/20/2021    CO2 31 06/17/2021     Lab Results   Component Value Date    BUN 10 10/20/2021    BUN 8 06/17/2021     Lab Results   Component Value Date    CR 0.84 10/20/2021    CR 0.73 06/17/2021       GFR Estimate   Date Value Ref Range Status   10/20/2021 >90 >60 mL/min/1.73m2 Final      Comment:     As of July 11, 2021, eGFR is calculated by the CKD-EPI creatinine equation, without race adjustment. eGFR can be influenced by muscle mass, exercise, and diet. The reported eGFR is an estimation only and is only applicable if the renal function is stable.   06/17/2021 >90 >60 mL/min/[1.73_m2] Final     Comment:     Non  GFR Calc  Starting 12/18/2018, serum creatinine based estimated GFR (eGFR) will be   calculated using the Chronic Kidney Disease Epidemiology Collaboration   (CKD-EPI) equation.     12/08/2020 >90 >60 mL/min/[1.73_m2] Final     Comment:     Non  GFR Calc  Starting 12/18/2018, serum creatinine based estimated GFR (eGFR) will be   calculated using the Chronic Kidney Disease Epidemiology Collaboration   (CKD-EPI) equation.     05/29/2020 >90 >60 mL/min/[1.73_m2] Final     Comment:     Non  GFR Calc  Starting 12/18/2018, serum creatinine based estimated GFR (eGFR) will be   calculated using the Chronic Kidney Disease Epidemiology Collaboration   (CKD-EPI) equation.       GFR Estimate If Black   Date Value Ref Range Status   06/17/2021 >90 >60 mL/min/[1.73_m2] Final     Comment:      GFR Calc  Starting 12/18/2018, serum creatinine based estimated GFR (eGFR) will be   calculated using the Chronic Kidney Disease Epidemiology Collaboration   (CKD-EPI) equation.     12/08/2020 >90 >60 mL/min/[1.73_m2] Final     Comment:      GFR Calc  Starting 12/18/2018, serum creatinine based estimated GFR (eGFR) will be   calculated using the Chronic Kidney Disease Epidemiology Collaboration   (CKD-EPI) equation.     05/29/2020 >90 >60 mL/min/[1.73_m2] Final     Comment:      GFR Calc  Starting 12/18/2018, serum creatinine based estimated GFR (eGFR) will be   calculated using the Chronic Kidney Disease Epidemiology Collaboration   (CKD-EPI) equation.         Lab Results   Component Value Date    MICROL <5 06/17/2021      No results found for: MICROALBUMIN  No results found for: CPEPT, GADAB, ISCAB    Vitamin B12   Date Value Ref Range Status   09/03/2020 713 193 - 986 pg/mL Final   ]    Most recent eye exam date: : Not Found     Assessment/Plan:     1.  Type 1 diabetes- Christiano is doing quite well, but dropping a bit overnight.  No changes today, but he will reduce tresiba to 22 units if lows continue. . Could consider Fiasp in the future.  Discussed In-pen.  He will think about it.  Discussed heart healthy eating ideas and encouraged patient to increase consumption of fruits, vegetables and whole grains (high fiber diet).     2.  Risk factors-     Retinopathy:  No. Had recent eye exam in December, 2022.  Nephropathy:  BP high today.  This is unusual.  He has a cuff at home and will start to check this and let me know if consistently >140 systolic.  Discussed lower salt/heart healthy diet and he would like to start here.  Could consider low dose ACE inhibitor if bp remains elevated.  No microalbuminuria.  Creatinine stable.    Neuropathy: No.    Feet: OK, no ulcers.   Taking ASA: no  Lipids:  LDL is in target, now on pravastatin 10 mg daily.  Tolerating this well.     Celiac screening: negative screen 5/19  Vitamin D: now improved. He is taking 1000 international unit(s) daily.      3.  F/U in 3 months with me, in 6 months to establish with new endocrinologist, as Dr Campuzano will be leaving.      35 minutes spent on the date of the encounter doing chart review, review of test results, review of continuous glucose sensor, interpretation of glucose data, patient visit and documentation, counseling/coordination of care, and discussion of follow up plan for worsening hyper and hypoglycemia.  The patient understood and is satisfied with today's visit.     Shena Iniguez PA-C, MPAS   [facility]  Department of Medicine  Division of Endocrinology and Diabetes

## 2022-03-21 ENCOUNTER — OFFICE VISIT (OUTPATIENT)
Dept: ENDOCRINOLOGY | Facility: CLINIC | Age: 42
End: 2022-03-21
Payer: COMMERCIAL

## 2022-03-21 VITALS
DIASTOLIC BLOOD PRESSURE: 76 MMHG | BODY MASS INDEX: 24.12 KG/M2 | HEART RATE: 78 BPM | HEIGHT: 75 IN | SYSTOLIC BLOOD PRESSURE: 149 MMHG | WEIGHT: 194 LBS

## 2022-03-21 DIAGNOSIS — E10.9 TYPE 1 DIABETES MELLITUS WITHOUT COMPLICATION (H): Primary | ICD-10-CM

## 2022-03-21 LAB — HBA1C MFR BLD: 7 % (ref 4.3–?)

## 2022-03-21 PROCEDURE — 99214 OFFICE O/P EST MOD 30 MIN: CPT | Performed by: PHYSICIAN ASSISTANT

## 2022-03-21 PROCEDURE — 83036 HEMOGLOBIN GLYCOSYLATED A1C: CPT | Performed by: PHYSICIAN ASSISTANT

## 2022-03-21 ASSESSMENT — PAIN SCALES - GENERAL: PAINLEVEL: NO PAIN (0)

## 2022-03-21 NOTE — LETTER
3/21/2022       RE: Agustín Gallegos  [address]  [place] [facility] [address]     Dear Colleague,    Thank you for referring your patient, Agustín Gallegos, to the [facility] [place] [facility]. Please see a copy of my visit note below.    Outcome for 03/17/22 12:14 PM :Patient is sharing data via clinic device website and patient has been instructed to update data on website. Find login information by using .[employer]      HPI:   Christiano is a 42 yo man here for follow up of type 1 diabetes, which he has had since age 14. He has no known complications from his diabetes. He reports that overall things are going well.  He continues working from home. He is trying to walk every day. His glucose has been under better control recently.  He finds it helps to take insulin early.  Because he is working from home, it makes it easier if he goes low.  He is using sugar to manage the lows frequently.    Novolog dosing:   Breakfast- cheerios- 1/4g   Lunch- sometimes leftovers (low carb) 1/7g  Dinner- usually low carb. 1/4g.    HS snack- cut out.  Only eats if he is 100 or lower at bedtime.   Correction: 1/30 over 130 mg/dL.     He takes Tresiba 24 units daily.      Christiano wears a jamila sensor with overall average of 154  mg/dL  (CV 34%), TIR 71%, low 1%  for the past two weeks.                     Christiano agreed to start a statin after his last visit.  He has been tolerating this well. Cholesterol has improved.      His wife will be having their second daughter in June.      He has been feeling well and in his usual state of health.  He has no other concerns today.     ROS  GENERAL: no weight loss, weight gain, fevers, chills, malaise, night sweats.   HEENT: no dysphagia, diplopia, neck pain or tenderness, dry/scratchy eyes, URI, cough, sinus drainage, tinnitus, sinus pressure  CV: no chest pain, pressure, palpitations, skipped beats, LOC  LUNGS: no  SOB, WILDE, cough, sputum production, wheezing   GI: no diarrhea, constipation, abdominal pain  EXTREMITIES: no rashes, ulcers, edema  NEUROLOGY: no changes in vision, tingling or numbness in hands or feet.   MSK: frozen shoulder, knee pain.   PSYCH: mood stable    Past Medical History:   Diagnosis Date     Type 1 diabetes (H)     age of onset 14 years       Past Surgical History:   Procedure Laterality Date     COSMETIC SURGERY  1993    Mole removal     ENT SURGERY  1985    Tubes in ears       Family History   Problem Relation Age of Onset     Diabetes Brother      Cerebrovascular Disease Paternal Grandmother      Arthritis Mother      Arthritis Maternal Grandmother      Coronary Artery Disease Father        Social History     Social History     Marital status: Single     Spouse name: N/A     Number of children: N/A     Years of education: N/A     Social History Main Topics     Smoking status: Never Smoker     Smokeless tobacco: Never Used     Alcohol use No     Drug use: No     Sexual activity: Yes     Partners: Female     Other Topics Concern     None     Social History Narrative   Social History: Works for [employer]. . Daughter, Daphney born August, 2019.    Current Outpatient Medications   Medication     blood glucose (CONTOUR NEXT TEST) test strip     blood glucose monitoring (CANDE MICROLET) lancets     blood glucose monitoring (NO BRAND SPECIFIED) meter device kit     cholecalciferol (VITAMIN D) 1000 UNIT tablet     Continuous Blood Gluc Sensor (FREESTYLE LILO 14 DAY SENSOR) MISC     insulin aspart (NOVOLOG FLEXPEN) 100 UNIT/ML pen     insulin degludec (TRESIBA FLEXTOUCH) 200 UNIT/ML pen     insulin pen needle (B-D U/F) 31G X 8 MM miscellaneous     KETOSTIX test strip     pravastatin (PRAVACHOL) 10 MG tablet     No current facility-administered medications for this visit.        No Known Allergies    Physical Exam  BP (!) 149/76 (BP Location: Left arm, Patient Position: Sitting, Cuff Size:  "Adult Regular)   Pulse 78   Ht 1.905 m (6' 3\")   Wt 88 kg (194 lb)   BMI 24.25 kg/m    GENERAL:  Alert and oriented X3, NAD, well dressed, answering questions appropriately, appears stated age.  HEENT: OP clear, no lymphadenopathy, no thyromegaly, non-tender, no exophthalmus, no proptosis, EOMI, no lid lag, no retraction  EXTREMITIES: no edema, +pulses, no rashes, no lesions  NEUROLOGY: CN grossly intact, + monofilament, +vibratory sensation      RESULTS  Lab Results   Component Value Date    A1C 7.4 (H) 10/20/2021    A1C 7.3 (H) 06/17/2021    A1C 6.9 (H) 12/08/2020    A1C 6.8 (H) 09/03/2020    A1C 7.7 (H) 05/13/2019    A1C 7.9 (H) 08/13/2014    HEMOGLOBINA1 7.5 (A) 03/02/2020    HEMOGLOBINA1 7.5 (A) 11/26/2019    HEMOGLOBINA1 8.2 (A) 08/26/2019    HEMOGLOBINA1 7.7 (A) 02/04/2019    HEMOGLOBINA1 7.5 (A) 08/06/2018       TSH   Date Value Ref Range Status   06/17/2021 0.88 0.40 - 4.00 mU/L Final   05/29/2020 2.18 0.40 - 4.00 mU/L Final   05/13/2019 2.14 0.40 - 4.00 mU/L Final   01/30/2018 2.46 0.40 - 4.00 mU/L Final   09/16/2016 0.97 0.40 - 4.00 mU/L Final     T4 Free   Date Value Ref Range Status   09/16/2016 1.01 0.76 - 1.46 ng/dL Final   06/24/2015 1.02 0.76 - 1.46 ng/dL Final       ALT   Date Value Ref Range Status   10/20/2021 36 0 - 70 U/L Final   03/05/2015 20 0 - 70 U/L Final   ]    Recent Labs   Lab Test 03/14/22  0850 10/20/21  0843 09/16/16  0823 08/13/14  0956   CHOL 145 170   < > 162   HDL 46 42   < > 49   LDL 89 118*   < > 102   TRIG 50 49   < > 56   CHOLHDLRATIO  --   --   --  3.3    < > = values in this interval not displayed.       Lab Results   Component Value Date     10/20/2021     06/17/2021      Lab Results   Component Value Date    POTASSIUM 4.2 10/20/2021    POTASSIUM 4.0 06/17/2021     Lab Results   Component Value Date    CHLORIDE 103 10/20/2021    CHLORIDE 103 06/17/2021     Lab Results   Component Value Date    CELESTE 9.6 10/20/2021    CELESTE 9.2 06/17/2021     Lab Results "   Component Value Date    CO2 30 10/20/2021    CO2 31 06/17/2021     Lab Results   Component Value Date    BUN 10 10/20/2021    BUN 8 06/17/2021     Lab Results   Component Value Date    CR 0.84 10/20/2021    CR 0.73 06/17/2021       GFR Estimate   Date Value Ref Range Status   10/20/2021 >90 >60 mL/min/1.73m2 Final     Comment:     As of July 11, 2021, eGFR is calculated by the CKD-EPI creatinine equation, without race adjustment. eGFR can be influenced by muscle mass, exercise, and diet. The reported eGFR is an estimation only and is only applicable if the renal function is stable.   06/17/2021 >90 >60 mL/min/[1.73_m2] Final     Comment:     Non  GFR Calc  Starting 12/18/2018, serum creatinine based estimated GFR (eGFR) will be   calculated using the Chronic Kidney Disease Epidemiology Collaboration   (CKD-EPI) equation.     12/08/2020 >90 >60 mL/min/[1.73_m2] Final     Comment:     Non  GFR Calc  Starting 12/18/2018, serum creatinine based estimated GFR (eGFR) will be   calculated using the Chronic Kidney Disease Epidemiology Collaboration   (CKD-EPI) equation.     05/29/2020 >90 >60 mL/min/[1.73_m2] Final     Comment:     Non  GFR Calc  Starting 12/18/2018, serum creatinine based estimated GFR (eGFR) will be   calculated using the Chronic Kidney Disease Epidemiology Collaboration   (CKD-EPI) equation.       GFR Estimate If Black   Date Value Ref Range Status   06/17/2021 >90 >60 mL/min/[1.73_m2] Final     Comment:      GFR Calc  Starting 12/18/2018, serum creatinine based estimated GFR (eGFR) will be   calculated using the Chronic Kidney Disease Epidemiology Collaboration   (CKD-EPI) equation.     12/08/2020 >90 >60 mL/min/[1.73_m2] Final     Comment:      GFR Calc  Starting 12/18/2018, serum creatinine based estimated GFR (eGFR) will be   calculated using the Chronic Kidney Disease Epidemiology Collaboration   (CKD-EPI) equation.      05/29/2020 >90 >60 mL/min/[1.73_m2] Final     Comment:      GFR Calc  Starting 12/18/2018, serum creatinine based estimated GFR (eGFR) will be   calculated using the Chronic Kidney Disease Epidemiology Collaboration   (CKD-EPI) equation.         Lab Results   Component Value Date    MICROL <5 06/17/2021     No results found for: MICROALBUMIN  No results found for: CPEPT, GADAB, ISCAB    Vitamin B12   Date Value Ref Range Status   09/03/2020 713 193 - 986 pg/mL Final   ]    Most recent eye exam date: : Not Found     Assessment/Plan:     1.  Type 1 diabetes- Christiano is doing quite well, but dropping a bit overnight.  No changes today, but he will reduce tresiba to 22 units if lows continue. . Could consider Fiasp in the future.  Discussed In-pen.  He will think about it.  Discussed heart healthy eating ideas and encouraged patient to increase consumption of fruits, vegetables and whole grains (high fiber diet).     2.  Risk factors-     Retinopathy:  No. Had recent eye exam in December, 2022.  Nephropathy:  BP high today.  This is unusual.  He has a cuff at home and will start to check this and let me know if consistently >140 systolic.  Discussed lower salt/heart healthy diet and he would like to start here.  Could consider low dose ACE inhibitor if bp remains elevated.  No microalbuminuria.  Creatinine stable.    Neuropathy: No.    Feet: OK, no ulcers.   Taking ASA: no  Lipids:  LDL is in target, now on pravastatin 10 mg daily.  Tolerating this well.     Celiac screening: negative screen 5/19  Vitamin D: now improved. He is taking 1000 international unit(s) daily.      3.  F/U in 3 months with me, in 6 months to establish with new endocrinologist, as Dr Campuzano will be leaving.      35 minutes spent on the date of the encounter doing chart review, review of test results, review of continuous glucose sensor, interpretation of glucose data, patient visit and documentation, counseling/coordination of  care, and discussion of follow up plan for worsening hyper and hypoglycemia.  The patient understood and is satisfied with today's visit.     Shena Iniguez PA-C, MPAS   [facility]  Department of Medicine  Division of Endocrinology and Diabetes

## 2022-03-21 NOTE — NURSING NOTE
"Chief Complaint   Patient presents with     Diabetes     Vital signs:      BP: (!) 149/76 Pulse: 78           Height: 190.5 cm (6' 3\") Weight: 88 kg (194 lb)  Estimated body mass index is 24.25 kg/m  as calculated from the following:    Height as of this encounter: 1.905 m (6' 3\").    Weight as of this encounter: 88 kg (194 lb).        "

## 2022-03-21 NOTE — PATIENT INSTRUCTIONS
Check blood pressure at home.      Send me a [employer] message if BP is consistently over 140 systolic (top number).     If lows overnight continue, lower Tresiba to 22 units.     Look into Fiasp as an alternative to Novolog to prevent post-meal spikes.     Women should try to eat at least 21 to 25 grams of fiber a day, while men should aim for 30 to 38 grams a day along with plenty of water.    Here's a look at how much dietary fiber is found in some common foods. When buying packaged foods, check the Nutrition Facts label for fiber content. It can vary among brands.    Fruits     Serving size  Total fiber (grams)*  Avocado    1 cup   9.8   Raspberries    1 cup   8.0  Pear     1 medium  5.5  Apple, with skin   1 medium  4.5  Banana    1 medium  3.0  Orange    1 medium  3.0  Strawberries    1 cup   3.0    Vegetables    Serving size  Total fiber (grams)*  Green peas, boiled   1 cup   9.0  Broccoli, boiled   1 cup chopped 5.0  Turnip greens, boiled   1 cup   5.0  Bypro sprouts, boiled  1 cup   4.0  Potato, with skin, baked  1 medium  4.0  Sweet corn, boiled   1 cup   3.5  Cauliflower, raw   1 cup chopped 2.0  Carrot, raw    1 medium  1.5    Grains     Serving size  Total fiber (grams)*  Spaghetti, whole-wheat, cooked 1 cup   6.0  Barley, pearled, cooked  1 cup   6.0  Bran flakes    3/4 cup  5.5  Quinoa, cooked   1 cup   5.0  Oat bran muffin   1 medium  5.0  Oatmeal, instant, cooked  1 cup   5.0  Popcorn, air-popped   3 cups   3.5  Brown rice, cooked   1 cup   3.5  Bread, whole-wheat   1 slice   2.0  Bread, rye    1 slice   2.0    Legumes, nuts and seeds  Serving size  Total fiber (grams)*  Split peas, boiled   1 cup   16.0  Lentils, boiled    1 cup   15.5  Black beans, boiled   1 cup   15.0  Baked beans, canned   1 cup   10.0  Trav seeds    1 ounce  10.0  Almonds    1 ounce (23 nuts) 3.5  Pistachios    1 ounce (49 nuts) 3.0  Sunflower kernels   1 ounce  3.0  *Rounded to nearest 0.5 gram.    Source: USDA National  Nutrient Database                                             Heart Healthy Diet and Lifestyle    - Eat your veggies and fruits -- and switch to whole grains. An abundance and variety of plant foods should make up the majority of your meals. Strive for seven to 10 servings a day of veggies and fruits. Add in beans and lentils.  Switch to whole-grain bread and cereal, and begin to eat more whole-grain rice and pasta products.    - Go nuts. Keep almonds, cashews, pistachios and walnuts on hand for a quick snack. Choose natural peanut butter or almond butter, rather than the kind with hydrogenated fat added. Try hummus as a dip or spread for bread.    - Pass on the butter. Try olive or canola oil as a healthy replacement for butter or margarine. Use it in cooking. Dip bread in flavored olive oil or lightly spread it on whole-grain bread for a tasty alternative to butter.     - Spice it up. Herbs and spices make food tasty and are also rich in health-promoting substances. Season your meals with herbs and spices rather than salt.    - Go fish. Eat fish twice a week. Fresh or water-packed tuna, salmon, trout, mackerel and herring are healthy choices. Grilled fish tastes good and requires little cleanup. Avoid fried fish, unless it's sauteed in a small amount of canola oil.    - Rein in the red meat. Substitute fish and poultry for red meat. When eaten, make sure it's lean and keep portions small (about the size of a deck of cards). Try to limit sausage, chang and other high-fat or processed meats.    - Avoid being sedentary.  Decrease the amount of time spent in daily sedentary behavior.  Prolonged sitting should be interrupted every 30 min for blood glucose benefits.     - Be active.  30 minutes of moderate-to-vigorous intensity aerobic exercise at least 5 days a week or a total of 150 minutes spread over 3 days per week.  2-3 sessions/week of resistance exercise on nonconsecutive days. Flexibility training and balance  training 2-3 times/week for older adults with diabetes.     Make an appointment with a dietitian for a personalized meal plan/nutrition review.

## 2022-03-24 PROBLEM — M25.561 ACUTE PAIN OF RIGHT KNEE: Status: RESOLVED | Noted: 2021-11-09 | Resolved: 2022-03-24

## 2022-03-24 PROBLEM — M25.511 ACUTE PAIN OF RIGHT SHOULDER: Status: RESOLVED | Noted: 2021-11-09 | Resolved: 2022-03-24

## 2022-03-24 NOTE — PROGRESS NOTES
Discharge Note    Progress reporting period is from last progress note on 12/28/21 to Jan 25, 2022.    Please see information below for last relevant information on current status.  Patient seen for 6 visits.    SUBJECTIVE  Subjective changes noted by patient:  Has pain in shoulder at very extreme end range of motion, but so much better.  MOstly only notes it with reaching up behind back.  However, today we discovered he had pain with end range 90/90 when throwing a ball and during deceleration. No pain in the knees now.   .  Current pain level is  (0-3/10).     Previous pain level was  5/10.   Changes in function:  Yes (See Goal flowsheet attached for changes in current functional level)  Adverse reaction to treatment or activity: None    OBJECTIVE  Changes noted in objective findings: R shoulder AROM: Flex 150, abd 180, ER 71, ext/ir T12+.  Pain at end range 90/90 with throwing and then during deceleration.      ASSESSMENT/PLAN  Diagnosis: R post shoulder impingment with instability   Updated problem list and treatment plan:   Pain - HEP  Decreased function - HEP  Decreased strength - HEP  Impaired muscle performance - HEP  STG/LTGs have been met or progress has been made towards goals:  Yes, please see goal flowsheet for most current information  Assessment of Progress: current status is unknown.    Last current status:     Self Management Plans:  HEP  I have re-evaluated this patient and find that the nature, scope, duration and intensity of the therapy is appropriate for the medical condition of the patient.  Agustín continues to require the following intervention to meet STG and LTG's:  HEP.    Recommendations:  Discharge with current home program.  Patient to follow up with MD as needed.    Please refer to the daily flowsheet for treatment today, total treatment time and time spent performing 1:1 timed codes.

## 2022-04-13 ENCOUNTER — MYC MEDICAL ADVICE (OUTPATIENT)
Dept: ENDOCRINOLOGY | Facility: CLINIC | Age: 42
End: 2022-04-13
Payer: COMMERCIAL

## 2022-04-13 DIAGNOSIS — I10 BENIGN ESSENTIAL HYPERTENSION: Primary | ICD-10-CM

## 2022-04-18 RX ORDER — LISINOPRIL 5 MG/1
5 TABLET ORAL DAILY
Qty: 30 TABLET | Refills: 11 | Status: SHIPPED | OUTPATIENT
Start: 2022-04-18 | End: 2022-10-04

## 2022-05-10 ENCOUNTER — MYC MEDICAL ADVICE (OUTPATIENT)
Dept: ENDOCRINOLOGY | Facility: CLINIC | Age: 42
End: 2022-05-10
Payer: COMMERCIAL

## 2022-05-10 DIAGNOSIS — E10.9 WELL CONTROLLED TYPE 1 DIABETES MELLITUS (H): ICD-10-CM

## 2022-05-10 RX ORDER — INSULIN ASPART 100 [IU]/ML
INJECTION, SOLUTION INTRAVENOUS; SUBCUTANEOUS
Qty: 60 ML | Refills: 3 | Status: SHIPPED | OUTPATIENT
Start: 2022-05-10 | End: 2023-06-12

## 2022-05-10 NOTE — TELEPHONE ENCOUNTER
insulin aspart (NOVOLOG FLEXPEN) 100 UNIT/ML pen  Last Written Prescription Date:  3/22/21  Last Fill Quantity: 60ml,   # refills: 3  Last Office Visit : 3/21/22  Future Office visit:  7/18/22    Routing refill request to provider for review/approval because:  endo triage to fill

## 2022-06-09 DIAGNOSIS — E10.9 TYPE 1 DIABETES MELLITUS WITHOUT COMPLICATION (H): ICD-10-CM

## 2022-06-09 RX ORDER — INSULIN DEGLUDEC 200 U/ML
22 INJECTION, SOLUTION SUBCUTANEOUS DAILY
Qty: 30 ML | Refills: 1 | Status: SHIPPED | OUTPATIENT
Start: 2022-06-09 | End: 2022-07-18

## 2022-06-09 NOTE — TELEPHONE ENCOUNTER
insulin degludec (TRESIBA FLEXTOUCH) 200 UNIT/ML pen        Last Written Prescription Date:  03/22/21  Last Fill Quantity: 11mL,   # refills: 3  Last Office Visit : 03/21/22  Future Office Visit:  07/18/22    Routing refill request to provider for review/approval because:  Insulin - refilled per clinic

## 2022-06-17 ENCOUNTER — TRANSFERRED RECORDS (OUTPATIENT)
Dept: HEALTH INFORMATION MANAGEMENT | Facility: CLINIC | Age: 42
End: 2022-06-17

## 2022-06-23 ENCOUNTER — THERAPY VISIT (OUTPATIENT)
Dept: PHYSICAL THERAPY | Facility: CLINIC | Age: 42
End: 2022-06-23
Payer: COMMERCIAL

## 2022-06-23 DIAGNOSIS — M25.511 ACUTE PAIN OF RIGHT SHOULDER: Primary | ICD-10-CM

## 2022-06-23 PROCEDURE — 97110 THERAPEUTIC EXERCISES: CPT | Mod: GP | Performed by: PHYSICAL THERAPIST

## 2022-06-23 PROCEDURE — 97164 PT RE-EVAL EST PLAN CARE: CPT | Mod: GP | Performed by: PHYSICAL THERAPIST

## 2022-06-23 NOTE — PROGRESS NOTES
PROGRESS  REPORT    Progress reporting period is from 1/25/22 to 6/23/22.       SUBJECTIVE  Subjective changes noted by patient:   Patient saw ortho and had MR arthrogram.  He went because he was doing his home PT, but suddenly he developed R lateral shoulder pain that was diffierent and worse than the post shoulder pain.  He then had to stop some of his PT and had a new baby and therefor completely stopped everything.  The arthrogram showed subacromial bursitis and mild supra and infra tendinosis.  Reaching across body is the most painful and limited.  He can lie on it, but will be sore in am if on it all night.  Internal rotation will also hurt, which he does when caring for baby.  No cortisone injection completed due to it messing with his blood sugars.    Current pain level is 6/10  .     Previous pain level was  5/10  .   Changes in function:  Yes (See Goal flowsheet attached for changes in current functional level)  Adverse reaction to treatment or activity: activity - giving self a shot for his diabetes    OBJECTIVE  Changes noted in objective findings:  Yes,   R shoulder AROM:   Flex 125+  Abd 156+/-  ER 68+  Ext/ir L1+  Horiz add to lat shoulder++    R shoulder strength:  All 5/5 except ext and ER 5-/5 and horiz abd 4/5.     +impingment and tight post capsule so still possible frozen shoulder on the R.      ASSESSMENT/PLAN  Updated problem list and treatment plan: Diagnosis 1:  R shoulder pain  Pain -  self management, education and home program  Decreased ROM/flexibility - manual therapy, therapeutic exercise and home program  Decreased joint mobility - manual therapy, therapeutic exercise and home program  Decreased strength - therapeutic exercise, therapeutic activities and home program  Impaired muscle performance - neuro re-education and home program  Decreased function - therapeutic activities and home program  STG/LTGs have been met or progress has been made towards goals:  Patient demonstrates a new  injury and exacerbation of old one.  Assessment of Progress: The patient's condition has exacerbated.  Self Management Plans:  Patient has been instructed in a home treatment program.  Patient  has been instructed in self management of symptoms.  I have re-evaluated this patient and find that the nature, scope, duration and intensity of the therapy is appropriate for the medical condition of the patient.  Agustín continues to require the following intervention to meet STG and LTG's:  PT    Recommendations:  This patient would benefit from continued therapy.     Frequency:  2 X a month, once daily  Duration:  for 2-3 months        Please refer to the daily flowsheet for treatment today, total treatment time and time spent performing 1:1 timed codes.

## 2022-07-07 ENCOUNTER — THERAPY VISIT (OUTPATIENT)
Dept: PHYSICAL THERAPY | Facility: CLINIC | Age: 42
End: 2022-07-07
Payer: COMMERCIAL

## 2022-07-07 DIAGNOSIS — M25.511 ACUTE PAIN OF RIGHT SHOULDER: Primary | ICD-10-CM

## 2022-07-07 PROCEDURE — 97110 THERAPEUTIC EXERCISES: CPT | Mod: GP | Performed by: PHYSICAL THERAPIST

## 2022-07-07 PROCEDURE — 97140 MANUAL THERAPY 1/> REGIONS: CPT | Mod: GP | Performed by: PHYSICAL THERAPIST

## 2022-07-11 ASSESSMENT — ENCOUNTER SYMPTOMS
VOMITING: 0
JOINT SWELLING: 0
MUSCLE WEAKNESS: 0
NECK PAIN: 0
BLOATING: 0
ABDOMINAL PAIN: 1
HEARTBURN: 0
NAUSEA: 1
ARTHRALGIAS: 1
RECTAL PAIN: 0
DIARRHEA: 1
STIFFNESS: 0
CONSTIPATION: 0
BACK PAIN: 0
BLOOD IN STOOL: 0
BOWEL INCONTINENCE: 0
MUSCLE CRAMPS: 0
MYALGIAS: 0
JAUNDICE: 0

## 2022-07-11 NOTE — PROGRESS NOTES
Outcome for 07/11/22 12:07 PM: Data uploaded on NICA Hunt  Outcome for 07/15/22 10:05 AM: Sharla emailed to provider  NICA Epps     HPI:   Christiano is a 43 yo man here for follow up of type 1 diabetes, which he has had since age 14. He has no known complications from his diabetes. He reports that overall things are going well.  He and his wife just had their second daughter, Ebony in May.  They are adjusting well, but up a lot at night.  He continues working from home. He is trying to walk every day. He was sick for a couple weeks and glucose ran higher.  Not COVID. Otherwise, glucose has been under good control.  We have talked about in-pen and pumps in the past, but he is comfortable with doing his own calculations.  He finds that his glucose has been going low after dinner, then he treats and it goes too high.     Novolog dosing:   Breakfast- cheerios- 1/4g   Lunch- sometimes leftovers 1/6g  Dinner- usually low carb. 1/4g.    HS snack- cut out.  Only eats if he is 100 or lower at bedtime.   Correction: 1/30 over 130 mg/dL.     He takes Tresiba 24 units daily.      Christiano wears a sharla sensor with overall average of 182 (up from 154 mg/dL at last visit)  (CV 39%), TIR 59%, low 1%  for the past two weeks.                Christiano agreed to start a statin after his last visit.  He has been tolerating this well. Cholesterol has improved.  Dealing with frozen shoulder issues. Tendonitis and bursitis.  He is doing physical therapy. Thinking about a cortisone injection, but worried about glucose.     Thinking about getting a vasectomy.     He otherwise has been feeling well and in his usual state of health.  He has no other concerns today.       Past Medical History:   Diagnosis Date     Type 1 diabetes (H)     age of onset 14 years       Past Surgical History:   Procedure Laterality Date     COSMETIC SURGERY  1993    Mole removal     ENT SURGERY  1985    Tubes in ears       Family History   Problem Relation  Age of Onset     Diabetes Brother      Cerebrovascular Disease Paternal Grandmother      Arthritis Mother      Arthritis Maternal Grandmother      Coronary Artery Disease Father        Social History     Social History     Marital status: Single     Spouse name: N/A     Number of children: N/A     Years of education: N/A     Social History Main Topics     Smoking status: Never Smoker     Smokeless tobacco: Never Used     Alcohol use No     Drug use: No     Sexual activity: Yes     Partners: Female     Other Topics Concern     None     Social History Narrative   Social History: Works for [employer]. . Daughter, Daphney born August, 2019.  DaughterEbony born May, 2022.     Current Outpatient Medications   Medication     blood glucose (CONTOUR NEXT TEST) test strip     blood glucose monitoring (CANDE MICROLET) lancets     blood glucose monitoring (NO BRAND SPECIFIED) meter device kit     cholecalciferol (VITAMIN D) 1000 UNIT tablet     Continuous Blood Gluc Sensor (Astoria RoadSTYLE LILO 14 DAY SENSOR) MISC     Insulin Aspart FlexPen 100 UNIT/ML SOPN     insulin degludec (TRESIBA FLEXTOUCH) 200 UNIT/ML pen     insulin pen needle (B-D U/F) 31G X 8 MM miscellaneous     KETOSTIX test strip     lisinopril (ZESTRIL) 5 MG tablet     pravastatin (PRAVACHOL) 10 MG tablet     No current facility-administered medications for this visit.        No Known Allergies    Physical Exam  There were no vitals taken for this visit.  GENERAL:  Alert and oriented X3, NAD, well dressed, answering questions appropriately, appears stated age.  HEENT: OP clear, no lymphadenopathy, no thyromegaly, non-tender, no exophthalmus, no proptosis, EOMI, no lid lag, no retraction  EXTREMITIES: no edema, +pulses, no rashes, no lesions  NEUROLOGY: CN grossly intact, + monofilament, +vibratory sensation      RESULTS  Lab Results   Component Value Date    A1C 7.4 (H) 10/20/2021    A1C 7.3 (H) 06/17/2021    A1C 6.9 (H) 12/08/2020    A1C 6.8 (H)  09/03/2020    A1C 7.7 (H) 05/13/2019    A1C 7.9 (H) 08/13/2014    HEMOGLOBINA1 7.0 07/18/2022    HEMOGLOBINA1 7.0 03/21/2022    HEMOGLOBINA1 7.5 (A) 03/02/2020    HEMOGLOBINA1 7.5 (A) 11/26/2019    HEMOGLOBINA1 8.2 (A) 08/26/2019       TSH   Date Value Ref Range Status   06/17/2021 0.88 0.40 - 4.00 mU/L Final   05/29/2020 2.18 0.40 - 4.00 mU/L Final   05/13/2019 2.14 0.40 - 4.00 mU/L Final   01/30/2018 2.46 0.40 - 4.00 mU/L Final   09/16/2016 0.97 0.40 - 4.00 mU/L Final     T4 Free   Date Value Ref Range Status   09/16/2016 1.01 0.76 - 1.46 ng/dL Final   06/24/2015 1.02 0.76 - 1.46 ng/dL Final       ALT   Date Value Ref Range Status   10/20/2021 36 0 - 70 U/L Final   03/05/2015 20 0 - 70 U/L Final   ]    Recent Labs   Lab Test 03/14/22  0850 10/20/21  0843 09/16/16  0823 08/13/14  0956   CHOL 145 170   < > 162   HDL 46 42   < > 49   LDL 89 118*   < > 102   TRIG 50 49   < > 56   CHOLHDLRATIO  --   --   --  3.3    < > = values in this interval not displayed.       Lab Results   Component Value Date     10/20/2021     06/17/2021      Lab Results   Component Value Date    POTASSIUM 4.2 10/20/2021    POTASSIUM 4.0 06/17/2021     Lab Results   Component Value Date    CHLORIDE 103 10/20/2021    CHLORIDE 103 06/17/2021     Lab Results   Component Value Date    CELESTE 9.6 10/20/2021    CELESTE 9.2 06/17/2021     Lab Results   Component Value Date    CO2 30 10/20/2021    CO2 31 06/17/2021     Lab Results   Component Value Date    BUN 10 10/20/2021    BUN 8 06/17/2021     Lab Results   Component Value Date    CR 0.84 10/20/2021    CR 0.73 06/17/2021       GFR Estimate   Date Value Ref Range Status   10/20/2021 >90 >60 mL/min/1.73m2 Final     Comment:     As of July 11, 2021, eGFR is calculated by the CKD-EPI creatinine equation, without race adjustment. eGFR can be influenced by muscle mass, exercise, and diet. The reported eGFR is an estimation only and is only applicable if the renal function is stable.   06/17/2021 >90  >60 mL/min/[1.73_m2] Final     Comment:     Non  GFR Calc  Starting 12/18/2018, serum creatinine based estimated GFR (eGFR) will be   calculated using the Chronic Kidney Disease Epidemiology Collaboration   (CKD-EPI) equation.     12/08/2020 >90 >60 mL/min/[1.73_m2] Final     Comment:     Non  GFR Calc  Starting 12/18/2018, serum creatinine based estimated GFR (eGFR) will be   calculated using the Chronic Kidney Disease Epidemiology Collaboration   (CKD-EPI) equation.     05/29/2020 >90 >60 mL/min/[1.73_m2] Final     Comment:     Non  GFR Calc  Starting 12/18/2018, serum creatinine based estimated GFR (eGFR) will be   calculated using the Chronic Kidney Disease Epidemiology Collaboration   (CKD-EPI) equation.       GFR Estimate If Black   Date Value Ref Range Status   06/17/2021 >90 >60 mL/min/[1.73_m2] Final     Comment:      GFR Calc  Starting 12/18/2018, serum creatinine based estimated GFR (eGFR) will be   calculated using the Chronic Kidney Disease Epidemiology Collaboration   (CKD-EPI) equation.     12/08/2020 >90 >60 mL/min/[1.73_m2] Final     Comment:      GFR Calc  Starting 12/18/2018, serum creatinine based estimated GFR (eGFR) will be   calculated using the Chronic Kidney Disease Epidemiology Collaboration   (CKD-EPI) equation.     05/29/2020 >90 >60 mL/min/[1.73_m2] Final     Comment:      GFR Calc  Starting 12/18/2018, serum creatinine based estimated GFR (eGFR) will be   calculated using the Chronic Kidney Disease Epidemiology Collaboration   (CKD-EPI) equation.         Lab Results   Component Value Date    MICROL <5 06/17/2021     No results found for: MICROALBUMIN  No results found for: CPEPT, GADAB, ISCAB    Vitamin B12   Date Value Ref Range Status   09/03/2020 713 193 - 986 pg/mL Final   ]    Most recent eye exam date: : Not Found     25 OH Vit D total   Date Value Ref Range Status   06/17/2021 <52 20 -  75 ug/L Final     Comment:     Season, race, dietary intake, and treatment affect the concentration of   25-hydroxy-Vitamin D. Values may decrease during winter months and increase   during summer months. Values 20-29 ug/L may indicate Vitamin D insufficiency   and values <20 ug/L may indicate Vitamin D deficiency.  This test was developed and its performance characteristics determined by the   [facility], Special Chemistry Laboratory.   It has not been cleared or approved by the FDA. The laboratory is regulated   under CLIA as qualified to perform high-complexity testing. This test is used   for clinical purposes. It should not be regarded as investigational or for   research.     08/22/2017 <34 20 - 75 ug/L Final     Comment:     Season, race, dietary intake, and treatment affect the concentration of   25-hydroxy-Vitamin D. Values may decrease during winter months and increase   during summer months. Values 20-29 ug/L may indicate Vitamin D insufficiency   and values <20 ug/L may indicate Vitamin D deficiency.  This test was developed and its performance characteristics determined by the   [facility],  Special Chemistry Laboratory. It has   not been cleared or approved by the FDA. The laboratory is regulated under   CLIA as qualified to perform high-complexity testing. This test is used for   clinical purposes. It should not be regarded as investigational or for   research.       No results found for: PTHI  Calcium   Date Value Ref Range Status   10/20/2021 9.6 8.5 - 10.1 mg/dL Final   06/17/2021 9.2 8.5 - 10.1 mg/dL Final   12/08/2020 9.4 8.5 - 10.1 mg/dL Final     No results found for: CALCIUMIONIZ  Albumin   Date Value Ref Range Status   10/20/2021 4.0 3.4 - 5.0 g/dL Final     Assessment/Plan:     1.  Type 1 diabetes- Christiano is doing quite well, but dropping after dinner and climbing too high after treating lows.  A1c is 7.0%, which is stable.  We  made the following plan today (instructions given to patient):     Novolog dosing:   Breakfast- cheerios- 1/4.5 (was 1/4g)  Lunch- sometimes leftovers 1/6g  Dinner- usually low carb. 1/5 (was 1/4)g.      Let me know if you have cortisone injection.  We will need to adjust your insulin.      Please let me know if you are having low blood sugars less than 70 or over 250 mg/dL.      If you have concerns, please send me a [employer] message M-Th, call the clinic at [phone], or call [phone] after hours/weekends and ask to speak with the endocrinologist on call.      2.  Risk factors-     Retinopathy:  No. Had recent eye exam in December, 2022.  Nephropathy:  BP is well controlled on lisinopril 5 mg daily.  No microalbuminuria.  Creatinine stable.    Neuropathy: No.    Feet: OK, no ulcers.   Taking ASA: no  Lipids:  LDL is in target, now on pravastatin 10 mg daily.  Tolerating this well.     Celiac screening: negative screen 5/19  Vitamin D: now improved. He is inconsistently taking 1000 international unit(s) daily.      3.  F/U in 3 months to establish with  Dr. Bland, as Dr Campuzano is no longer here.      47 minutes spent on the date of the encounter doing chart review, review of test results, review of continuous glucose sensor, interpretation of glucose data, patient visit and documentation, counseling/coordination of care, and discussion of follow up plan for worsening hyper and hypoglycemia.  The patient understood and is satisfied with today's visit.     Shena Iniguez PA-C, MPAS   [facility]  Department of Medicine  Division of Endocrinology and Diabetes

## 2022-07-18 ENCOUNTER — OFFICE VISIT (OUTPATIENT)
Dept: ENDOCRINOLOGY | Facility: CLINIC | Age: 42
End: 2022-07-18
Payer: COMMERCIAL

## 2022-07-18 VITALS
HEART RATE: 92 BPM | BODY MASS INDEX: 23.76 KG/M2 | SYSTOLIC BLOOD PRESSURE: 130 MMHG | WEIGHT: 191.1 LBS | DIASTOLIC BLOOD PRESSURE: 69 MMHG | HEIGHT: 75 IN

## 2022-07-18 DIAGNOSIS — E10.9 TYPE 1 DIABETES MELLITUS WITHOUT COMPLICATION (H): Primary | ICD-10-CM

## 2022-07-18 LAB — HBA1C MFR BLD: 7 % (ref 4.3–?)

## 2022-07-18 PROCEDURE — 83036 HEMOGLOBIN GLYCOSYLATED A1C: CPT | Performed by: PHYSICIAN ASSISTANT

## 2022-07-18 PROCEDURE — 99215 OFFICE O/P EST HI 40 MIN: CPT | Performed by: PHYSICIAN ASSISTANT

## 2022-07-18 RX ORDER — INSULIN DEGLUDEC 200 U/ML
24 INJECTION, SOLUTION SUBCUTANEOUS DAILY
Qty: 30 ML | Refills: 1 | COMMUNITY
Start: 2022-07-18 | End: 2023-07-24

## 2022-07-18 RX ORDER — GLUCAGON 3 MG/1
1 POWDER NASAL
Qty: 2 EACH | Refills: 3 | Status: SHIPPED | OUTPATIENT
Start: 2022-07-18

## 2022-07-18 ASSESSMENT — PAIN SCALES - GENERAL: PAINLEVEL: MODERATE PAIN (4)

## 2022-07-18 NOTE — NURSING NOTE
"Chief Complaint   Patient presents with     Diabetes     Vital signs:      BP: 130/69 Pulse: 92           Height: 190.5 cm (6' 3\") Weight: 86.7 kg (191 lb 1.6 oz)  Estimated body mass index is 23.89 kg/m  as calculated from the following:    Height as of this encounter: 1.905 m (6' 3\").    Weight as of this encounter: 86.7 kg (191 lb 1.6 oz).          "

## 2022-07-18 NOTE — LETTER
7/18/2022       RE: Agustín Gallegos  [address]  [facility] [address]     Dear Colleague,    Thank you for referring your patient, Agustín Gallegos, to the [facility] ENDOCRINOLOGY CLINIC [place] at [facility]. Please see a copy of my visit note below.    Outcome for 07/11/22 12:07 PM: Data uploaded on NICA Hunt  Outcome for 07/15/22 10:05 AM: Sharla emailed to provider  NICA Epps     HPI:   Christiano is a 41 yo man here for follow up of type 1 diabetes, which he has had since age 14. He has no known complications from his diabetes. He reports that overall things are going well.  He and his wife just had their second daughter, Ebony in May.  They are adjusting well, but up a lot at night.  He continues working from home. He is trying to walk every day. He was sick for a couple weeks and glucose ran higher.  Not COVID. Otherwise, glucose has been under good control.  We have talked about in-pen and pumps in the past, but he is comfortable with doing his own calculations.  He finds that his glucose has been going low after dinner, then he treats and it goes too high.     Novolog dosing:   Breakfast- cheerios- 1/4g   Lunch- sometimes leftovers 1/6g  Dinner- usually low carb. 1/4g.    HS snack- cut out.  Only eats if he is 100 or lower at bedtime.   Correction: 1/30 over 130 mg/dL.     He takes Tresiba 24 units daily.      Christiano wears a sharla sensor with overall average of 182 (up from 154 mg/dL at last visit)  (CV 39%), TIR 59%, low 1%  for the past two weeks.                Christiano agreed to start a statin after his last visit.  He has been tolerating this well. Cholesterol has improved.  Dealing with frozen shoulder issues. Tendonitis and bursitis.  He is doing physical therapy. Thinking about a cortisone injection, but worried about glucose.     Thinking about getting a vasectomy.     He otherwise has been feeling well and in his  usual state of health.  He has no other concerns today.       Past Medical History:   Diagnosis Date     Type 1 diabetes (H)     age of onset 14 years       Past Surgical History:   Procedure Laterality Date     COSMETIC SURGERY  1993    Mole removal     ENT SURGERY  1985    Tubes in ears       Family History   Problem Relation Age of Onset     Diabetes Brother      Cerebrovascular Disease Paternal Grandmother      Arthritis Mother      Arthritis Maternal Grandmother      Coronary Artery Disease Father        Social History     Social History     Marital status: Single     Spouse name: N/A     Number of children: N/A     Years of education: N/A     Social History Main Topics     Smoking status: Never Smoker     Smokeless tobacco: Never Used     Alcohol use No     Drug use: No     Sexual activity: Yes     Partners: Female     Other Topics Concern     None     Social History Narrative   Social History: Works for [employer]. . Daughter, Daphney born August, 2019.  DaughterEbony born May, 2022.     Current Outpatient Medications   Medication     blood glucose (CONTOUR NEXT TEST) test strip     blood glucose monitoring (CANDE MICROLET) lancets     blood glucose monitoring (NO BRAND SPECIFIED) meter device kit     cholecalciferol (VITAMIN D) 1000 UNIT tablet     Continuous Blood Gluc Sensor (FREESTYLE LILO 14 DAY SENSOR) MISC     Insulin Aspart FlexPen 100 UNIT/ML SOPN     insulin degludec (TRESIBA FLEXTOUCH) 200 UNIT/ML pen     insulin pen needle (B-D U/F) 31G X 8 MM miscellaneous     KETOSTIX test strip     lisinopril (ZESTRIL) 5 MG tablet     pravastatin (PRAVACHOL) 10 MG tablet     No current facility-administered medications for this visit.        No Known Allergies    Physical Exam  There were no vitals taken for this visit.  GENERAL:  Alert and oriented X3, NAD, well dressed, answering questions appropriately, appears stated age.  HEENT: OP clear, no lymphadenopathy, no thyromegaly, non-tender, no  exophthalmus, no proptosis, EOMI, no lid lag, no retraction  EXTREMITIES: no edema, +pulses, no rashes, no lesions  NEUROLOGY: CN grossly intact, + monofilament, +vibratory sensation      RESULTS  Lab Results   Component Value Date    A1C 7.4 (H) 10/20/2021    A1C 7.3 (H) 06/17/2021    A1C 6.9 (H) 12/08/2020    A1C 6.8 (H) 09/03/2020    A1C 7.7 (H) 05/13/2019    A1C 7.9 (H) 08/13/2014    HEMOGLOBINA1 7.0 07/18/2022    HEMOGLOBINA1 7.0 03/21/2022    HEMOGLOBINA1 7.5 (A) 03/02/2020    HEMOGLOBINA1 7.5 (A) 11/26/2019    HEMOGLOBINA1 8.2 (A) 08/26/2019       TSH   Date Value Ref Range Status   06/17/2021 0.88 0.40 - 4.00 mU/L Final   05/29/2020 2.18 0.40 - 4.00 mU/L Final   05/13/2019 2.14 0.40 - 4.00 mU/L Final   01/30/2018 2.46 0.40 - 4.00 mU/L Final   09/16/2016 0.97 0.40 - 4.00 mU/L Final     T4 Free   Date Value Ref Range Status   09/16/2016 1.01 0.76 - 1.46 ng/dL Final   06/24/2015 1.02 0.76 - 1.46 ng/dL Final       ALT   Date Value Ref Range Status   10/20/2021 36 0 - 70 U/L Final   03/05/2015 20 0 - 70 U/L Final   ]    Recent Labs   Lab Test 03/14/22  0850 10/20/21  0843 09/16/16  0823 08/13/14  0956   CHOL 145 170   < > 162   HDL 46 42   < > 49   LDL 89 118*   < > 102   TRIG 50 49   < > 56   CHOLHDLRATIO  --   --   --  3.3    < > = values in this interval not displayed.       Lab Results   Component Value Date     10/20/2021     06/17/2021      Lab Results   Component Value Date    POTASSIUM 4.2 10/20/2021    POTASSIUM 4.0 06/17/2021     Lab Results   Component Value Date    CHLORIDE 103 10/20/2021    CHLORIDE 103 06/17/2021     Lab Results   Component Value Date    CELESTE 9.6 10/20/2021    CELESTE 9.2 06/17/2021     Lab Results   Component Value Date    CO2 30 10/20/2021    CO2 31 06/17/2021     Lab Results   Component Value Date    BUN 10 10/20/2021    BUN 8 06/17/2021     Lab Results   Component Value Date    CR 0.84 10/20/2021    CR 0.73 06/17/2021       GFR Estimate   Date Value Ref Range Status    10/20/2021 >90 >60 mL/min/1.73m2 Final     Comment:     As of July 11, 2021, eGFR is calculated by the CKD-EPI creatinine equation, without race adjustment. eGFR can be influenced by muscle mass, exercise, and diet. The reported eGFR is an estimation only and is only applicable if the renal function is stable.   06/17/2021 >90 >60 mL/min/[1.73_m2] Final     Comment:     Non  GFR Calc  Starting 12/18/2018, serum creatinine based estimated GFR (eGFR) will be   calculated using the Chronic Kidney Disease Epidemiology Collaboration   (CKD-EPI) equation.     12/08/2020 >90 >60 mL/min/[1.73_m2] Final     Comment:     Non  GFR Calc  Starting 12/18/2018, serum creatinine based estimated GFR (eGFR) will be   calculated using the Chronic Kidney Disease Epidemiology Collaboration   (CKD-EPI) equation.     05/29/2020 >90 >60 mL/min/[1.73_m2] Final     Comment:     Non  GFR Calc  Starting 12/18/2018, serum creatinine based estimated GFR (eGFR) will be   calculated using the Chronic Kidney Disease Epidemiology Collaboration   (CKD-EPI) equation.       GFR Estimate If Black   Date Value Ref Range Status   06/17/2021 >90 >60 mL/min/[1.73_m2] Final     Comment:      GFR Calc  Starting 12/18/2018, serum creatinine based estimated GFR (eGFR) will be   calculated using the Chronic Kidney Disease Epidemiology Collaboration   (CKD-EPI) equation.     12/08/2020 >90 >60 mL/min/[1.73_m2] Final     Comment:      GFR Calc  Starting 12/18/2018, serum creatinine based estimated GFR (eGFR) will be   calculated using the Chronic Kidney Disease Epidemiology Collaboration   (CKD-EPI) equation.     05/29/2020 >90 >60 mL/min/[1.73_m2] Final     Comment:      GFR Calc  Starting 12/18/2018, serum creatinine based estimated GFR (eGFR) will be   calculated using the Chronic Kidney Disease Epidemiology Collaboration   (CKD-EPI) equation.         Lab Results    Component Value Date    MICROL <5 06/17/2021     No results found for: MICROALBUMIN  No results found for: CPEPT, GADAB, ISCAB    Vitamin B12   Date Value Ref Range Status   09/03/2020 713 193 - 986 pg/mL Final   ]    Most recent eye exam date: : Not Found     25 OH Vit D total   Date Value Ref Range Status   06/17/2021 <52 20 - 75 ug/L Final     Comment:     Season, race, dietary intake, and treatment affect the concentration of   25-hydroxy-Vitamin D. Values may decrease during winter months and increase   during summer months. Values 20-29 ug/L may indicate Vitamin D insufficiency   and values <20 ug/L may indicate Vitamin D deficiency.  This test was developed and its performance characteristics determined by the   [facility].   It has not been cleared or approved by the FDA. The laboratory is regulated   under CLIA as qualified to perform high-complexity testing. This test is used   for clinical purposes. It should not be regarded as investigational or for   research.     08/22/2017 <34 20 - 75 ug/L Final     Comment:     Season, race, dietary intake, and treatment affect the concentration of   25-hydroxy-Vitamin D. Values may decrease during winter months and increase   during summer months. Values 20-29 ug/L may indicate Vitamin D insufficiency   and values <20 ug/L may indicate Vitamin D deficiency.  This test was developed and its performance characteristics determined by the   [facility],  Special Chemistry Laboratory. It has   not been cleared or approved by the FDA. The laboratory is regulated under   CLIA as qualified to perform high-complexity testing. This test is used for   clinical purposes. It should not be regarded as investigational or for   research.       No results found for: PTHI  Calcium   Date Value Ref Range Status   10/20/2021 9.6 8.5 - 10.1 mg/dL Final   06/17/2021 9.2 8.5 - 10.1 mg/dL Final    12/08/2020 9.4 8.5 - 10.1 mg/dL Final     No results found for: CALCIUMIONIZ  Albumin   Date Value Ref Range Status   10/20/2021 4.0 3.4 - 5.0 g/dL Final     Assessment/Plan:     1.  Type 1 diabetes- Christiano is doing quite well, but dropping after dinner and climbing too high after treating lows.  A1c is 7.0%, which is stable.  We made the following plan today (instructions given to patient):     Novolog dosing:   Breakfast- cheerios- 1/4.5 (was 1/4g)  Lunch- sometimes leftovers 1/6g  Dinner- usually low carb. 1/5 (was 1/4)g.      Let me know if you have cortisone injection.  We will need to adjust your insulin.      Please let me know if you are having low blood sugars less than 70 or over 250 mg/dL.      If you have concerns, please send me a [employer] message M-Th, call the clinic at [phone], or call [phone] after hours/weekends and ask to speak with the endocrinologist on call.      2.  Risk factors-     Retinopathy:  No. Had recent eye exam in December, 2022.  Nephropathy:  BP is well controlled on lisinopril 5 mg daily.  No microalbuminuria.  Creatinine stable.    Neuropathy: No.    Feet: OK, no ulcers.   Taking ASA: no  Lipids:  LDL is in target, now on pravastatin 10 mg daily.  Tolerating this well.     Celiac screening: negative screen 5/19  Vitamin D: now improved. He is inconsistently taking 1000 international unit(s) daily.      3.  F/U in 3 months to establish with  Dr. Bland, as Dr Campuzano is no longer here.      47 minutes spent on the date of the encounter doing chart review, review of test results, review of continuous glucose sensor, interpretation of glucose data, patient visit and documentation, counseling/coordination of care, and discussion of follow up plan for worsening hyper and hypoglycemia.  The patient understood and is satisfied with today's visit.     Shena Iniguez PA-C, MPAS   [facility]  Department of Medicine  Division of Endocrinology and Diabetes

## 2022-07-21 ENCOUNTER — THERAPY VISIT (OUTPATIENT)
Dept: PHYSICAL THERAPY | Facility: CLINIC | Age: 42
End: 2022-07-21
Payer: COMMERCIAL

## 2022-07-21 DIAGNOSIS — M25.511 ACUTE PAIN OF RIGHT SHOULDER: Primary | ICD-10-CM

## 2022-07-21 PROCEDURE — 97110 THERAPEUTIC EXERCISES: CPT | Mod: GP | Performed by: PHYSICAL THERAPIST

## 2022-07-21 PROCEDURE — 97140 MANUAL THERAPY 1/> REGIONS: CPT | Mod: GP | Performed by: PHYSICAL THERAPIST

## 2022-07-21 NOTE — PROGRESS NOTES
Discharge Note    Progress reporting period is from last progress note on 06/23/22 to Jul 21, 2022.    Agustín failed to follow up and current status is unknown.  Please see information below for last relevant information on current status.  Patient seen for 9 visits.    SUBJECTIVE  Subjective changes noted by patient:  Less pain when he 'sets it off'.  Might have increased ROM.  Has been doing the new mob with 5#.  He feels pressure when he does it.  Feels like he is improving, but slowly and can continue HEP.  Current pain level is 3/10.     Previous pain level was  5/10.   Changes in function:  Yes (See Goal flowsheet attached for changes in current functional level)  Adverse reaction to treatment or activity: None    OBJECTIVE  Changes noted in objective findings:   R shoulder AROM: Flex 125+, abd 160, er 68, ext/ir T12.    R shoulder strength: all 5/5 except ER 5-/5 and horiz abd 4/5.    Hypomobile R inferior and posterior capsule     ASSESSMENT/PLAN  Diagnosis: R shoulder pain with subacromial bursitis and RC tendinosis   Updated problem list and treatment plan:   Pain - HEP  Decreased ROM/flexibility - HEP  Decreased function - HEP  Decreased strength - HEP  Decreased joint mobility - HEP  STG/LTGs have been met or progress has been made towards goals:  Yes, please see goal flowsheet for most current information  Assessment of Progress: current status is unknown.    Last current status:     Self Management Plans:  HEP  I have re-evaluated this patient and find that the nature, scope, duration and intensity of the therapy is appropriate for the medical condition of the patient.  Agustín continues to require the following intervention to meet STG and LTG's:  HEP.    Recommendations:  Discharge with current home program.  Patient to follow up with MD as needed.    Please refer to the daily flowsheet for treatment today, total treatment time and time spent performing 1:1 timed codes.

## 2022-08-04 ENCOUNTER — MYC MEDICAL ADVICE (OUTPATIENT)
Dept: FAMILY MEDICINE | Facility: CLINIC | Age: 42
End: 2022-08-04

## 2022-08-04 DIAGNOSIS — Z30.2 ENCOUNTER FOR VASECTOMY: Primary | ICD-10-CM

## 2022-08-07 ENCOUNTER — PRE VISIT (OUTPATIENT)
Dept: UROLOGY | Facility: CLINIC | Age: 42
End: 2022-08-07

## 2022-08-07 NOTE — TELEPHONE ENCOUNTER
MEDICAL RECORDS REQUEST   [facility]  Urology Clinic  [address] [place] [address]  PHONE: [phone]  Fax: [phone]        FUTURE VISIT INFORMATION                                                   Agustín Gallegos, : 1980 scheduled for future visit at [facility]    APPOINTMENT INFORMATION:    Date: 2022    Provider:  Calvin Rainey MD    Reason for Visit/Diagnosis: vasectomy consult    REFERRAL INFORMATION:    Referring provider:  Marc Bennett, DO in [facility]      RECORDS REQUESTED FOR VISIT                                                     NOTES  STATUS/DETAILS   MEDICATION LIST  yes     PRE-VISIT CHECKLIST      Record collection complete Yes   Appointment appropriately scheduled           (right time/right provider) Yes   Joint diagnostic appointment coordinated correctly          (ensure right order & amount of time) Yes   MyChart activation Yes   Questionnaire complete If no, please explain pending

## 2022-08-18 NOTE — TELEPHONE ENCOUNTER
Action 8/18/22 sv    Action Taken Too early to be calling patient about any outside medical records, will be calling patient latter in the day (7:40am)     8:40- called pt and LVM      Action 9.13.22 sv    Action Taken Called patient and LVM about gathering records      Action 9.15.22 sv    Action Taken Called patient and LVM about gathering medical records         Action 9.20.22 sv    Action Taken Called pt and LVM with clinic number about gathering medical records               RECORDS RECEIVED FROM: internal / in process    DATE RECEIVED: 9/22/22    NOTES (FOR ALL VISITS) STATUS DETAILS   OFFICE NOTES from referring provider  Self referred    OFFICE NOTES from other specialist in process     ED NOTES in process     OPERATIVE REPORT  (thyroid, pituitary, adrenal, parathyroid) in process     MEDICATION LIST internal     IMAGING      DEXASCAN in process     MRI (BRAIN) in process     XR (Chest) in process     CT (HEAD/NECK/CHEST/ABDOMEN) in process     NUCLEAR  in process     ULTRASOUND (HEAD/NECK) in process     LABS     DIABETES: HBGA1C, CREATININE, FASTING LIPIDS, MICROALBUMIN URINE, POTASSIUM, TSH, T4    THYROID: TSH, T4, CBC, THYRODLONULIN, TOTAL T3, FREE T4, CALCITONIN, CEA internal  HBGA1C- 7/18/22   Lipid- 3.14.22  Cmp- 10.20.21  TSH/T4- 6.17.21  BMP- 12.8.20

## 2022-09-15 ASSESSMENT — ENCOUNTER SYMPTOMS
HOARSE VOICE: 1
SINUS PAIN: 0
SORE THROAT: 1
SNORES LOUDLY: 1
COUGH: 1
HEMOPTYSIS: 0
TROUBLE SWALLOWING: 0
NECK MASS: 0
TASTE DISTURBANCE: 0
COUGH DISTURBING SLEEP: 0
SPUTUM PRODUCTION: 1
SHORTNESS OF BREATH: 0
SINUS CONGESTION: 0
WHEEZING: 0
POSTURAL DYSPNEA: 0
DYSPNEA ON EXERTION: 0
SMELL DISTURBANCE: 0

## 2022-09-22 ENCOUNTER — PRE VISIT (OUTPATIENT)
Dept: ENDOCRINOLOGY | Facility: CLINIC | Age: 42
End: 2022-09-22

## 2022-09-22 ENCOUNTER — OFFICE VISIT (OUTPATIENT)
Dept: ENDOCRINOLOGY | Facility: CLINIC | Age: 42
End: 2022-09-22
Payer: COMMERCIAL

## 2022-09-22 VITALS
SYSTOLIC BLOOD PRESSURE: 115 MMHG | OXYGEN SATURATION: 99 % | HEART RATE: 97 BPM | BODY MASS INDEX: 23.92 KG/M2 | DIASTOLIC BLOOD PRESSURE: 74 MMHG | WEIGHT: 191.4 LBS

## 2022-09-22 DIAGNOSIS — Z79.4 LONG TERM (CURRENT) USE OF INSULIN (H): ICD-10-CM

## 2022-09-22 DIAGNOSIS — I10 HYPERTENSION, UNSPECIFIED TYPE: ICD-10-CM

## 2022-09-22 DIAGNOSIS — E10.65 TYPE 1 DIABETES MELLITUS WITH HYPERGLYCEMIA (H): Primary | ICD-10-CM

## 2022-09-22 DIAGNOSIS — E78.00 HYPERCHOLESTEREMIA: ICD-10-CM

## 2022-09-22 PROCEDURE — 99214 OFFICE O/P EST MOD 30 MIN: CPT | Performed by: INTERNAL MEDICINE

## 2022-09-22 ASSESSMENT — PAIN SCALES - GENERAL: PAINLEVEL: NO PAIN (0)

## 2022-09-22 NOTE — PROGRESS NOTES
Subjective:    Established patient, previously saw Dr. aBnks, and has been following closely with Shena Iniguez PA-C (most recent appointment 7/18/2022)     Agustín Gallegos is a 42 year old male who presents for DM-1.    Diagnosed with DM: diagnosed at age 14 when he developed polydipsia, polyuria, he was immediately started on insulin and has been on it to date     History of DKA/HHS: DKA episode in 2003    FH of DM: brother with DM-1    Glycemic control over the years:   -HbA1c 2014 - 2018: ~8.0 - 8.5%  -2018 to date: ~7.0 - 7.5%; most recently 7.0% 7/2022    Current DM therapy:  -NovoLog: breakfast 1:4, lunch 1:6, evening meal 1:5; CS using a sensitivity of 30 mg/dL starting at 130 mg/dL    -Tresiba 0-0-0-24    Prior DM therapy:  -Has never used an insulin pump     Current glycemic control:  -He has intact hypoglycemia awareness     Diet: 3 meals/day, only snacks if low glucose, rarely drinks calories     Physical activity: he walks frequently     Tobacco/alcohol use: no    Complications noted in the A/P section.          Objective:    BMI 23.92 kg/m2, /74    Appears well.    Thyroid examined and normal. No cervical LAD.    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     DM foot exam performed and normal.     Assessment/Plan:    # DM-1  -He has intranasal glucagon at home   # No DM retinopathy, most recent eye exam 12/2021  # Hypertension, on lisinopril   -10/2021: GFR >90  -6/2021: urine microalbumin normal   # No peripheral neuropathy  # No prior ASCVD event  # Mild hypercholesterolemia, on pravastatin 10 mg daily, not on ASA  -3/2022: , TG 50, HDL 46, LDL 89  # Other  -6/2021: TSH normal; 2014 negative Tg Ab, TPO Ab detectable but normal   -2019: Celiac screen negative   -2020: B12 normal     We reviewed his CGM data in detail.  Some considerations would be increasing the time between NovoLog injection and meal.  He could consider a bit less insulin with his noon  meal.  We also reviewed minimizing overtreatment of hypoglycemia.    These small changes will likely reduce his hemoglobin A1c to less than 7%.    Long-term I do think Christiano would benefit from an insulin pump.  We reviewed options in detail.  He will let me know if he wants to meet with a CDE to review in detail.    Return in about 4 months with labs and DM eye exam ordered.    50 minutes spent on the date of the encounter doing chart review, history and exam, documentation and further activities as noted above. This did not include time spent on CGM review.

## 2022-09-22 NOTE — LETTER
9/22/2022       RE: Agustín Gallegos  [address]  [facility] [address]     Dear Colleague,    Thank you for referring your patient, Agustín Gallegos, to the [facility] ENDOCRINOLOGY CLINIC [place] at [facility]. Please see a copy of my visit note below.    Outcome for 09/21/22 10:44 AM :Glucose sent via Email   Anabel Hennessy        Subjective:    Established patient, previously saw Dr. Banks, and has been following closely with Shena Iniguez PA-C (most recent appointment 7/18/2022)     Agustín Gallegos is a 42 year old male who presents for DM-1.    Diagnosed with DM: diagnosed at age 14 when he developed polydipsia, polyuria, he was immediately started on insulin and has been on it to date     History of DKA/HHS: DKA episode in 2003    FH of DM: brother with DM-1    Glycemic control over the years:   -HbA1c 2014 - 2018: ~8.0 - 8.5%  -2018 to date: ~7.0 - 7.5%; most recently 7.0% 7/2022    Current DM therapy:  -NovoLog: breakfast 1:4, lunch 1:6, evening meal 1:5; CS using a sensitivity of 30 mg/dL starting at 130 mg/dL    -Tresiba 0-0-0-24    Prior DM therapy:  -Has never used an insulin pump     Current glycemic control:  -He has intact hypoglycemia awareness     Diet: 3 meals/day, only snacks if low glucose, rarely drinks calories     Physical activity: he walks frequently     Tobacco/alcohol use: no    Complications noted in the A/P section.          Objective:    BMI 23.92 kg/m2, /74    Appears well.    Thyroid examined and normal. No cervical LAD.    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     DM foot exam performed and normal.     Assessment/Plan:    # DM-1  -He has intranasal glucagon at home   # No DM retinopathy, most recent eye exam 12/2021  # Hypertension, on lisinopril   -10/2021: GFR >90  -6/2021: urine microalbumin normal   # No peripheral neuropathy  # No prior ASCVD event  # Mild  hypercholesterolemia, on pravastatin 10 mg daily, not on ASA  -3/2022: , TG 50, HDL 46, LDL 89  # Other  -6/2021: TSH normal; 2014 negative Tg Ab, TPO Ab detectable but normal   -2019: Celiac screen negative   -2020: B12 normal     We reviewed his CGM data in detail.  Some considerations would be increasing the time between NovoLog injection and meal.  He could consider a bit less insulin with his noon meal.  We also reviewed minimizing overtreatment of hypoglycemia.    These small changes will likely reduce his hemoglobin A1c to less than 7%.    Long-term I do think Christiano would benefit from an insulin pump.  We reviewed options in detail.  He will let me know if he wants to meet with a CDE to review in detail.    Return in about 4 months with labs and DM eye exam ordered.    50 minutes spent on the date of the encounter doing chart review, history and exam, documentation and further activities as noted above. This did not include time spent on CGM review.     Sincerely,    Raymond Bland MD

## 2022-10-01 ASSESSMENT — ENCOUNTER SYMPTOMS
HEARTBURN: 0
CONSTIPATION: 0
NERVOUS/ANXIOUS: 0
NAUSEA: 0
PARESTHESIAS: 0
CHILLS: 0
JOINT SWELLING: 0
DIZZINESS: 0
HEMATOCHEZIA: 0
DIARRHEA: 0
ARTHRALGIAS: 1
PALPITATIONS: 0
WEAKNESS: 0
SHORTNESS OF BREATH: 0
EYE PAIN: 0
HEMATURIA: 0
ABDOMINAL PAIN: 0
MYALGIAS: 0
FREQUENCY: 0
HEADACHES: 0
DYSURIA: 0
FEVER: 0
COUGH: 1
SORE THROAT: 1

## 2022-10-04 ENCOUNTER — OFFICE VISIT (OUTPATIENT)
Dept: FAMILY MEDICINE | Facility: CLINIC | Age: 42
End: 2022-10-04
Payer: COMMERCIAL

## 2022-10-04 VITALS
TEMPERATURE: 97.4 F | HEART RATE: 68 BPM | DIASTOLIC BLOOD PRESSURE: 76 MMHG | HEIGHT: 75 IN | WEIGHT: 192 LBS | RESPIRATION RATE: 16 BRPM | BODY MASS INDEX: 23.87 KG/M2 | OXYGEN SATURATION: 100 % | SYSTOLIC BLOOD PRESSURE: 124 MMHG

## 2022-10-04 DIAGNOSIS — J02.9 SORE THROAT: ICD-10-CM

## 2022-10-04 DIAGNOSIS — E10.9 TYPE 1 DIABETES MELLITUS WITHOUT COMPLICATION (H): ICD-10-CM

## 2022-10-04 DIAGNOSIS — R05.3 CHRONIC COUGH: ICD-10-CM

## 2022-10-04 DIAGNOSIS — M75.01 ADHESIVE CAPSULITIS OF RIGHT SHOULDER: ICD-10-CM

## 2022-10-04 DIAGNOSIS — Z00.00 ENCOUNTER FOR ROUTINE ADULT HEALTH EXAMINATION WITHOUT ABNORMAL FINDINGS: Primary | ICD-10-CM

## 2022-10-04 DIAGNOSIS — E78.5 HYPERLIPIDEMIA LDL GOAL <70: ICD-10-CM

## 2022-10-04 DIAGNOSIS — I10 ESSENTIAL HYPERTENSION, BENIGN: ICD-10-CM

## 2022-10-04 PROCEDURE — 99396 PREV VISIT EST AGE 40-64: CPT | Mod: 25 | Performed by: FAMILY MEDICINE

## 2022-10-04 PROCEDURE — 99214 OFFICE O/P EST MOD 30 MIN: CPT | Mod: 25 | Performed by: FAMILY MEDICINE

## 2022-10-04 PROCEDURE — 90471 IMMUNIZATION ADMIN: CPT | Performed by: FAMILY MEDICINE

## 2022-10-04 PROCEDURE — 90677 PCV20 VACCINE IM: CPT | Performed by: FAMILY MEDICINE

## 2022-10-04 RX ORDER — LOSARTAN POTASSIUM 25 MG/1
25 TABLET ORAL DAILY
Qty: 90 TABLET | Refills: 3 | Status: SHIPPED | OUTPATIENT
Start: 2022-10-04 | End: 2023-10-04

## 2022-10-04 ASSESSMENT — ENCOUNTER SYMPTOMS
CHILLS: 0
NAUSEA: 0
PALPITATIONS: 0
SORE THROAT: 1
DIZZINESS: 0
HEADACHES: 0
FEVER: 0
SHORTNESS OF BREATH: 0
HEMATOCHEZIA: 0
HEMATURIA: 0
DIARRHEA: 0
FREQUENCY: 0
JOINT SWELLING: 0
HEARTBURN: 0
NERVOUS/ANXIOUS: 0
ARTHRALGIAS: 1
MYALGIAS: 0
PARESTHESIAS: 0
COUGH: 1
DYSURIA: 0
CONSTIPATION: 0
WEAKNESS: 0
EYE PAIN: 0
ABDOMINAL PAIN: 0

## 2022-10-04 ASSESSMENT — PAIN SCALES - GENERAL: PAINLEVEL: MILD PAIN (3)

## 2022-10-04 NOTE — NURSING NOTE
Prior to immunization administration, verified patients identity using patient s name and date of birth. Please see Immunization Activity for additional information.     Screening Questionnaire for Adult Immunization    Are you sick today?   No   Do you have allergies to medications, food, a vaccine component or latex?   No   Have you ever had a serious reaction after receiving a vaccination?   No   Do you have a long-term health problem with heart, lung, kidney, or metabolic disease (e.g., diabetes), asthma, a blood disorder, no spleen, complement component deficiency, a cochlear implant, or a spinal fluid leak?  Are you on long-term aspirin therapy?   No   Do you have cancer, leukemia, HIV/AIDS, or any other immune system problem?   No   Do you have a parent, brother, or sister with an immune system problem?   No   In the past 3 months, have you taken medications that affect  your immune system, such as prednisone, other steroids, or anticancer drugs; drugs for the treatment of rheumatoid arthritis, Crohn s disease, or psoriasis; or have you had radiation treatments?   No   Have you had a seizure, or a brain or other nervous system problem?   No   During the past year, have you received a transfusion of blood or blood    products, or been given immune (gamma) globulin or antiviral drug?   No   For women: Are you pregnant or is there a chance you could become       pregnant during the next month?   No   Have you received any vaccinations in the past 4 weeks?   No     Immunization questionnaire answers were all negative.        Per orders of Dr. Bullard, injection of [id number] given by Jahaira Boyd RN. Patient instructed to remain in clinic for 15 minutes afterwards, and to report any adverse reaction to me immediately.       Screening performed by Jahaira Boyd RN on 10/4/2022 at 11:21 AM.

## 2022-10-04 NOTE — PROGRESS NOTES
SUBJECTIVE:   CC: Christiano is an 42 year old who presents for preventative health visit.     Patient has been advised of split billing requirements and indicates understanding: Yes  Healthy Habits:     Getting at least 3 servings of Calcium per day:  Yes    Bi-annual eye exam:  Yes    Dental care twice a year:  Yes    Sleep apnea or symptoms of sleep apnea:  None    Diet:  Regular (no restrictions)    Frequency of exercise:  4-5 days/week    Duration of exercise:  30-45 minutes    Taking medications regularly:  Yes    Medication side effects:  Not applicable    PHQ-2 Total Score: 0    Additional concerns today:  No      He has a history of type 1 diabetes that has been under good control on Tresiba and NovoLog.  He sees endocrinology every few months.  He feels like the diabetes has been under good control.  On 7/18/2022 his A1c was 7%.  He continues to struggle with right shoulder pain and decreased range of motion.  He has been working physical therapy over the past year and saw an orthopedic specialist.  He reports that they checked an MRI and he was told that structurally everything looks good.  He was diagnosed with adhesive capsulitis.  They told him that he could get a corticosteroid injection if you would like.  He is still deciding whether or not he would like to pursue this.  He feels like the range of motion has improved some with the PT.    He is concerned about a chronic dry cough he has been experiencing for at least a month.  He denies having significant mucus in the throat or draining.  He does not feel like symptoms are related to heartburn, but he decided to try taking Prilosec recently to see if it would help.  He is also tried Zyrtec which did not help.  He has been taking lisinopril 5 mg daily for the past 6 months or so to help bring the blood pressure down.    Social history: , 2 young children.  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        PROBLEMS TO ADD ON...    Today's PHQ-2 Score:    PHQ-2 ( 1999 Pf[employer]) 10/1/2022   Q1: Little interest or pleasure in doing things 0   Q2: Feeling down, depressed or hopeless 0   PHQ-2 Score 0   PHQ-2 Total Score (12-17 Years)- Positive if 3 or more points; Administer PHQ-A if positive -   Q1: Little interest or pleasure in doing things Not at all   Q2: Feeling down, depressed or hopeless Not at all   PHQ-2 Score 0       Abuse: Current or Past(Physical, Sexual or Emotional)- No  Do you feel safe in your environment? Yes        Social History     Tobacco Use     Smoking status: Never Smoker     Smokeless tobacco: Never Used   Substance Use Topics     Alcohol use: No     If you drink alcohol do you typically have >3 drinks per day or >7 drinks per week? No    Alcohol Use 10/4/2022   Prescreen: >3 drinks/day or >7 drinks/week? -   Prescreen: >3 drinks/day or >7 drinks/week?    No flowsheet data found.    Last PSA: No results found for: PSA    Reviewed orders with patient. Reviewed health maintenance and updated orders accordingly - Yes  Labs reviewed in EPIC    Reviewed and updated as needed this visit by clinical staff   Tobacco  Allergies  Meds                Reviewed and updated as needed this visit by Provider     Meds                   Review of Systems   Constitutional: Negative for chills and fever.   HENT: Positive for sore throat. Negative for congestion, ear pain and hearing loss.    Eyes: Negative for pain and visual disturbance.   Respiratory: Positive for cough. Negative for shortness of breath.    Cardiovascular: Negative for chest pain, palpitations and peripheral edema.   Gastrointestinal: Negative for abdominal pain, constipation, diarrhea, heartburn, hematochezia and nausea.   Genitourinary: Negative for dysuria, frequency, genital sores, hematuria, impotence, penile discharge and urgency.   Musculoskeletal: Positive for arthralgias. Negative for joint swelling and myalgias.   Skin: Negative for rash.   Neurological: Negative for dizziness,  "weakness, headaches and paresthesias.   Psychiatric/Behavioral: Negative for mood changes. The patient is not nervous/anxious.          OBJECTIVE:   /76   Pulse 68   Temp 97.4  F (36.3  C) (Tympanic)   Resp 16   Ht 1.905 m (6' 3\")   Wt 87.1 kg (192 lb)   SpO2 100%   BMI 24.00 kg/m      Physical Exam  GENERAL: healthy, alert and no distress  EYES: Eyes grossly normal to inspection, PERRL and conjunctivae and sclerae normal  HENT: ear canals and TM's normal, nose and mouth without ulcers or lesions  NECK: no adenopathy, no asymmetry, masses, or scars and thyroid normal to palpation  RESP: lungs clear to auscultation - no rales, rhonchi or wheezes  CV: regular rate and rhythm, normal S1 S2, no S3 or S4, no murmur, click or rub, no peripheral edema and peripheral pulses strong  ABDOMEN: soft, nontender, no hepatosplenomegaly, no masses and bowel sounds normal  MS: no gross musculoskeletal defects noted, no edema  SKIN: no suspicious lesions or rashes  NEURO: Normal strength and tone, mentation intact and speech normal  PSYCH: mentation appears normal, affect normal/bright    Diagnostic Test Results:  Labs reviewed in Epic    ASSESSMENT/PLAN:   1. Encounter for routine adult health examination without abnormal findings  I encouraged him to get regular exercise and eat a healthy diet.  He has several labs ordered through his endocrinologist that he is going to be getting in the coming months.    2. Type 1 diabetes mellitus without complication (H)  This issue has been stable and under good control on his current medications.  On 7/18/2022 his hemoglobin A1c was 7%.  He will continue following closely with his endocrinologist.    3. Adhesive capsulitis of right shoulder  He has a history of adhesive capsulitis in the right shoulder.  He has already been seen by an orthopedic specialist and has been working on physical therapy.  He is considering whether or not to get a corticosteroid injection.  He may " "schedule this with me if he would like or with his orthopedic specialist.    4. Hyperlipidemia LDL goal <70  Continue pravastatin.  On 3/14/2022 his LDL was 89.  He will be having this rechecked in a few months.    5. Chronic cough  The underlying cause of his chronic coughing symptoms is not entirely clear, but it could be due to the lisinopril.  I recommended he stop this and switch to losartan to see if it helps.  He was also given a referral to ENT for further evaluation.  He reports that it does not feel like it is related to GERD and he is not having significant postnasal drainage.  - Adult ENT  Referral; Future    6. Sore throat  The underlying cause of this is still unclear.  It may be from the chronic coughing.  He was given a referral to ENT and we are switching from lisinopril to losartan.  - Adult ENT  Referral; Future    7. Essential hypertension, benign  His blood pressures been under good control on low-dose lisinopril, but he has been complaining of a dry cough over the past several weeks.  We decided to switch to losartan to see if this helps.  He will continue to monitor his blood pressure closely and let me know if it is running high.  - losartan (COZAAR) 25 MG tablet; Take 1 tablet (25 mg) by mouth daily  Dispense: 90 tablet; Refill: 3      Patient has been advised of split billing requirements and indicates understanding: Yes    COUNSELING:   Reviewed preventive health counseling, as reflected in patient instructions       Regular exercise       Healthy diet/nutrition    Estimated body mass index is 24 kg/m  as calculated from the following:    Height as of this encounter: 1.905 m (6' 3\").    Weight as of this encounter: 87.1 kg (192 lb).         He reports that he has never smoked. He has never used smokeless tobacco.      Counseling Resources:  ATP IV Guidelines  Pooled Cohorts Equation Calculator  FRAX Risk Assessment  ICSI Preventive Guidelines  Dietary Guidelines for " Americans, 2010  USDA's MyPlate  ASA Prophylaxis  Lung CA Screening    Marc Samuel, [facility]

## 2022-10-12 DIAGNOSIS — E10.9 TYPE 1 DIABETES MELLITUS WITHOUT COMPLICATION (H): ICD-10-CM

## 2022-10-17 RX ORDER — PRAVASTATIN SODIUM 10 MG
10 TABLET ORAL DAILY
Qty: 90 TABLET | Refills: 1 | Status: SHIPPED | OUTPATIENT
Start: 2022-10-17 | End: 2023-05-01

## 2022-11-09 ENCOUNTER — OFFICE VISIT (OUTPATIENT)
Dept: OPHTHALMOLOGY | Facility: CLINIC | Age: 42
End: 2022-11-09
Attending: INTERNAL MEDICINE
Payer: COMMERCIAL

## 2022-11-09 DIAGNOSIS — E10.9 TYPE 1 DIABETES MELLITUS WITHOUT RETINOPATHY (H): Primary | ICD-10-CM

## 2022-11-09 PROCEDURE — G0463 HOSPITAL OUTPT CLINIC VISIT: HCPCS

## 2022-11-09 PROCEDURE — 92014 COMPRE OPH EXAM EST PT 1/>: CPT | Mod: GC | Performed by: OPHTHALMOLOGY

## 2022-11-09 ASSESSMENT — CUP TO DISC RATIO
OD_RATIO: 0.3
OS_RATIO: 0.3

## 2022-11-09 ASSESSMENT — SLIT LAMP EXAM - LIDS
COMMENTS: NORMAL
COMMENTS: NORMAL

## 2022-11-09 ASSESSMENT — CONF VISUAL FIELD
OD_SUPERIOR_TEMPORAL_RESTRICTION: 0
OS_INFERIOR_TEMPORAL_RESTRICTION: 0
OS_INFERIOR_NASAL_RESTRICTION: 0
OS_NORMAL: 1
OD_INFERIOR_TEMPORAL_RESTRICTION: 0
OD_NORMAL: 1
METHOD: COUNTING FINGERS
OS_SUPERIOR_TEMPORAL_RESTRICTION: 0
OD_SUPERIOR_NASAL_RESTRICTION: 0
OS_SUPERIOR_NASAL_RESTRICTION: 0
OD_INFERIOR_NASAL_RESTRICTION: 0

## 2022-11-09 ASSESSMENT — TONOMETRY
OS_IOP_MMHG: 21
OD_IOP_MMHG: 19
IOP_METHOD: TONOPEN

## 2022-11-09 ASSESSMENT — EXTERNAL EXAM - LEFT EYE: OS_EXAM: NORMAL

## 2022-11-09 ASSESSMENT — EXTERNAL EXAM - RIGHT EYE: OD_EXAM: NORMAL

## 2022-11-09 ASSESSMENT — VISUAL ACUITY
CORRECTION_TYPE: CONTACTS
OD_CC: 20/15
METHOD: SNELLEN - LINEAR
OS_CC: 20/15
OS_CC+: -2
OD_CC+: -2

## 2022-11-09 NOTE — NURSING NOTE
Chief Complaints and History of Present Illnesses   Patient presents with     Diabetic Eye Exam     Type 1 diabetes mellitus without retinopathy     Chief Complaint(s) and History of Present Illness(es)     Diabetic Eye Exam            Comments: Type 1 diabetes mellitus without retinopathy          Comments    Pt states no change in VA since last visit  Pt states no flashes, floaters eye painor redness  LBS: 160  Last A1C: 7.3  Lab Results       Component                Value               Date                       A1C                      7.4                 10/20/2021                 A1C                      7.3                 06/17/2021                 A1C                      6.9                 12/08/2020                 A1C                      6.8                 09/03/2020                 A1C                      7.7                 05/13/2019                 A1C                      7.9                 08/13/2014              Renata Oneill COT 9:51 AM November 9, 2022

## 2022-11-09 NOTE — PROGRESS NOTES
Chief complaint   Annual DR exam  Referring Provider: Self    MARLYN Gallegos 42 year old male PMHx of DM1 (age 14 years old, most recent A1c 09/2022 7.0), POhx of myopia wears CLs. Here for annual exam. Last visit 1 year ago.  Reports vision is good, no dryness, irritation, pain or fluctuation. No new floaters, no flashes of light.     Past ocular history   Prior eye surgery/laser/Trauma: No  CTL wearer:yes, monthlies  Glasses: SVL  Family Hx of eye disease: No    PMH     Past Medical History:   Diagnosis Date     Type 1 diabetes (H)     age of onset 14 years       PSH     Past Surgical History:   Procedure Laterality Date     COSMETIC SURGERY  1993    Mole removal     ENT SURGERY  1985    Tubes in ears       Meds     Current Outpatient Medications   Medication     blood glucose (CONTOUR NEXT TEST) test strip     blood glucose monitoring (CANDE MICROLET) lancets     blood glucose monitoring (NO BRAND SPECIFIED) meter device kit     cholecalciferol (VITAMIN D) 1000 UNIT tablet     Continuous Blood Gluc Sensor (POS on CLOUDSTYLE LILO 14 DAY SENSOR) MISC     Glucagon (BAQSIMI TWO PACK) 3 MG/DOSE POWD     Insulin Aspart FlexPen 100 UNIT/ML SOPN     insulin degludec (TRESIBA FLEXTOUCH) 200 UNIT/ML pen     insulin pen needle (B-D U/F) 31G X 8 MM miscellaneous     KETOSTIX test strip     losartan (COZAAR) 25 MG tablet     pravastatin (PRAVACHOL) 10 MG tablet     No current facility-administered medications for this visit.     Drops Currently Taking   None    Assessment/Plan   # DM1 w/o DR  - Dx at age 14 years  Lab Results   Component Value Date    A1C 7.4 10/20/2021    A1C 7.3 06/17/2021    A1C 6.9 12/08/2020   -  No DR on exam 11/09/2022  Plan  - Continue good BG/BP control to prevent diabetic eye disease  - RTC in 1 year   Plan: Discussed the importance of tight blood glucose control in the prevention of diabetic retinopathy. Recommend yearly dilated eye exam.    # Myopia with astigmatism  - BCVA 20/15 in current  MRx  - Sees Dr Nice for monthly CL    # KAYE, each eye  - Mild  - ATs PRN    Follow up:  Oph: General clinic in 1 year VTD for annual DR exam  Dr Nice for updated MRx and CL fitting    Attending Physician Attestation:  Complete documentation of historical and exam elements from today's encounter can be found in the full encounter summary report (not reduplicated in this progress note).  I personally obtained the chief complaint(s) and history of present illness.  I confirmed and edited as necessary the review of systems, past medical/surgical history, family history, social history, and examination findings as documented by others; and I examined the patient myself.  I personally reviewed the relevant tests, images, and reports as documented above.  I formulated and edited as necessary the assessment and plan and discussed the findings and management plan with the patient and family. - Rekha Oconnell MD

## 2022-11-15 DIAGNOSIS — E10.9 WELL CONTROLLED TYPE 1 DIABETES MELLITUS (H): ICD-10-CM

## 2022-11-15 RX ORDER — PEN NEEDLE, DIABETIC 31 GX5/16"
NEEDLE, DISPOSABLE MISCELLANEOUS
Qty: 100 EACH | Refills: 11 | Status: SHIPPED | OUTPATIENT
Start: 2022-11-15 | End: 2024-05-28

## 2022-11-18 ENCOUNTER — VIRTUAL VISIT (OUTPATIENT)
Dept: PHARMACY | Facility: CLINIC | Age: 42
End: 2022-11-18

## 2022-11-18 DIAGNOSIS — Z71.89 ENCOUNTER FOR MEDICATION REVIEW AND COUNSELING: Primary | ICD-10-CM

## 2022-11-18 PROCEDURE — 99207 PR NO CHARGE LOS: CPT

## 2022-11-27 NOTE — PROGRESS NOTES
Clinical Pharmacy Consult:                                                    Agustín Gallegos is a 42 year old male called for a clinical pharmacist consult.  He was referred to me from the endo nurse triage line.     Reason for Consult: Tresiba question    Discussion: Patient called nurse triage line stating that his pharmacy Tresiba 200 units/mL incorrectly filled with Tresiba 100 units/mL.  Currently only has the incorrectly filled prescription from his pharmacy, as the pharmacy had to order the correct 200 units/mL pen for Monday.  He inquires if he is to give the same 24 unit dose on the 100 units/mL pen as he needs to use this pen for the weekend.    Plan:  1.  Counseled patient on the differences between the 2 concentrations of Tresiba.  Advised that patient can give 24 units of the 100 units/mL pen, as this is the same dose on his regular pen.  The key difference that the patient will notice is his regular pen is dosed by increments of 2 units and holds up to 160 units per pen, while the 100 unit/mL is dosed by increments of 1 unit and only holds 80 units per pen.  Patient verbalized understanding of this information.    2.  Patient will get correct 200 unit/mL pen next week from the pharmacy at no cost as this was the pharmacy's error.    Anthony Rockwell, PharmD  Endocrine & Diabetes [facility] Pharmacist  [address] [place] [id number]  Direct Voicemail: [phone]

## 2022-12-14 ENCOUNTER — OFFICE VISIT (OUTPATIENT)
Dept: OPTOMETRY | Facility: CLINIC | Age: 42
End: 2022-12-14
Payer: COMMERCIAL

## 2022-12-14 DIAGNOSIS — H52.13 MYOPIA OF BOTH EYES WITH ASTIGMATISM: Primary | ICD-10-CM

## 2022-12-14 DIAGNOSIS — H52.203 MYOPIA OF BOTH EYES WITH ASTIGMATISM: Primary | ICD-10-CM

## 2022-12-14 ASSESSMENT — REFRACTION_MANIFEST
OD_CYLINDER: +0.50
OS_AXIS: 120
OD_AXIS: 065
OD_SPHERE: -7.50
OS_SPHERE: -7.75
OS_CYLINDER: +0.25

## 2022-12-14 ASSESSMENT — REFRACTION_CURRENTRX
OD_BRAND: AIR OPTIX NIGHT & DAY
OS_BRAND: AIR OPTIX NIGHT & DAY
OD_DIAMETER: 13.8
OS_DIAMETER: 13.8
OD_BASECURVE: 8.4
OS_BASECURVE: 8.4
OS_SPHERE: -6.50
OD_SPHERE: -6.50

## 2022-12-14 ASSESSMENT — REFRACTION_WEARINGRX
OD_AXIS: 065
OS_AXIS: 100
OD_CYLINDER: +0.50
OS_SPHERE: -8.00
OS_CYLINDER: +0.50
OD_SPHERE: -7.50

## 2022-12-14 ASSESSMENT — VISUAL ACUITY
CORRECTION_TYPE: CONTACTS
OS_CC: 20/25+3
OD_CC: 20/20
METHOD: SNELLEN - LINEAR
OS_CC: 0.5M
OD_CC: 0.5M

## 2022-12-14 NOTE — PROGRESS NOTES
A/P  1.) Myopia/Astigmatism each eye  -Overall doing well in current glasses and CL's. Asymptomatic for presbyopia, though does note he sometimes needs more light to see small print  -Able to reduce minus slightly in glasses - good vision on trial frame  -Would trial reducing minus in CL's right eye - noticed no difference in office today (still 20/20+). If able to adapt would rec -6.00 right eye/-6.50 left to delay presbyopic issues    Gets annual diabetic eye exams here. Can f/u with CL check in 2 years if doing well.

## 2023-02-10 ENCOUNTER — LAB (OUTPATIENT)
Dept: LAB | Facility: CLINIC | Age: 43
End: 2023-02-10
Payer: COMMERCIAL

## 2023-02-10 DIAGNOSIS — E10.9 TYPE 1 DIABETES MELLITUS WITHOUT COMPLICATION (H): ICD-10-CM

## 2023-02-10 DIAGNOSIS — E10.65 TYPE 1 DIABETES MELLITUS WITH HYPERGLYCEMIA (H): ICD-10-CM

## 2023-02-10 DIAGNOSIS — E10.9 TYPE 1 DIABETES MELLITUS WITHOUT RETINOPATHY (H): ICD-10-CM

## 2023-02-10 LAB
ANION GAP SERPL CALCULATED.3IONS-SCNC: 12 MMOL/L (ref 7–15)
BUN SERPL-MCNC: 9.1 MG/DL (ref 6–20)
CALCIUM SERPL-MCNC: 9.8 MG/DL (ref 8.6–10)
CHLORIDE SERPL-SCNC: 100 MMOL/L (ref 98–107)
CHOLEST SERPL-MCNC: 137 MG/DL
CREAT SERPL-MCNC: 0.65 MG/DL (ref 0.67–1.17)
CREAT UR-MCNC: 36 MG/DL
DEPRECATED HCO3 PLAS-SCNC: 24 MMOL/L (ref 22–29)
GFR SERPL CREATININE-BSD FRML MDRD: >90 ML/MIN/1.73M2
GLUCOSE SERPL-MCNC: 241 MG/DL (ref 70–99)
HBA1C MFR BLD: 7.7 % (ref 0–5.6)
HDLC SERPL-MCNC: 39 MG/DL
LDLC SERPL CALC-MCNC: 88 MG/DL
MICROALBUMIN UR-MCNC: <12 MG/L
MICROALBUMIN/CREAT UR: NORMAL MG/G{CREAT}
NONHDLC SERPL-MCNC: 98 MG/DL
POTASSIUM SERPL-SCNC: 4.2 MMOL/L (ref 3.4–5.3)
SODIUM SERPL-SCNC: 136 MMOL/L (ref 136–145)
TRIGL SERPL-MCNC: 49 MG/DL
TSH SERPL DL<=0.005 MIU/L-ACNC: 0.46 UIU/ML (ref 0.3–4.2)
VIT B12 SERPL-MCNC: 1071 PG/ML (ref 232–1245)

## 2023-02-10 PROCEDURE — 80061 LIPID PANEL: CPT

## 2023-02-10 PROCEDURE — 82570 ASSAY OF URINE CREATININE: CPT

## 2023-02-10 PROCEDURE — 83036 HEMOGLOBIN GLYCOSYLATED A1C: CPT

## 2023-02-10 PROCEDURE — 84681 ASSAY OF C-PEPTIDE: CPT

## 2023-02-10 PROCEDURE — 82607 VITAMIN B-12: CPT

## 2023-02-10 PROCEDURE — 82784 ASSAY IGA/IGD/IGG/IGM EACH: CPT

## 2023-02-10 PROCEDURE — 82043 UR ALBUMIN QUANTITATIVE: CPT

## 2023-02-10 PROCEDURE — 84443 ASSAY THYROID STIM HORMONE: CPT

## 2023-02-10 PROCEDURE — 80048 BASIC METABOLIC PNL TOTAL CA: CPT

## 2023-02-10 PROCEDURE — 36415 COLL VENOUS BLD VENIPUNCTURE: CPT

## 2023-02-10 PROCEDURE — 86364 TISS TRNSGLTMNASE EA IG CLAS: CPT

## 2023-02-10 PROCEDURE — 83921 ORGANIC ACID SINGLE QUANT: CPT

## 2023-02-13 LAB
C PEPTIDE SERPL-MCNC: <0.1 NG/ML (ref 0.9–6.9)
IGA SERPL-MCNC: 138 MG/DL (ref 84–499)
TTG IGA SER-ACNC: 9.8 U/ML
TTG IGG SER-ACNC: 0.7 U/ML

## 2023-02-13 ASSESSMENT — ENCOUNTER SYMPTOMS
EYE IRRITATION: 1
JAUNDICE: 0
EYE PAIN: 1
BOWEL INCONTINENCE: 0
HEARTBURN: 0
DIARRHEA: 1
EYE REDNESS: 1
DOUBLE VISION: 0
VOMITING: 0
ABDOMINAL PAIN: 1
RECTAL PAIN: 0
NAUSEA: 0
EYE WATERING: 0
BLOOD IN STOOL: 0
CONSTIPATION: 0
BLOATING: 0

## 2023-02-14 LAB — METHYLMALONATE SERPL-SCNC: 0.22 UMOL/L (ref 0–0.4)

## 2023-02-17 NOTE — PROGRESS NOTES
BG data obtained via CGM website.  Anabel Hennessy    HPI:   Christiano is a 43 yo man here for follow up of type 1 diabetes, which he has had since age 14. He also recently established with Dr. Bland.  He has no known complications from his diabetes. He reports that overall things are going well.  He and his family have been sick with norovirus for several weeks.  He continues working from home. He is trying to walk every day.  Otherwise, glucose has been under good control.  We have talked about in-pen and pumps in the past, but he is comfortable with doing his own calculations.  He finds that he sees his glucose trending down, then he treats and it goes too high. He usually does not have symptoms. He wonders if he is over-treating these.  Also, glucose has been higher with the holidays and illness.       Novolog dosing:   Breakfast- cheerios- 1/4g   Lunch- sometimes leftovers 1/6g  Dinner- usually low carb. 1/5g.    HS snack- cut out.  Only eats if he is 100 or lower at bedtime.   Correction: 1/30 over 130 mg/dL.     He takes Tresiba 24 units daily.      Christiano wears a jamila sensor with overall average of 170 mg/dL (CV 33%), TIR 57%, low 1%  for the past two weeks.            Christiano agreed to start a statin last year.  He has been tolerating this well. Cholesterol has improved.  Dealing with frozen shoulder issues. Tendonitis and bursitis.  He is doing physical therapy. Thinking about a cortisone injection, but worried about glucose. 1.5 years has been dealing with this. Wonders how to manage his glucose.       He otherwise has been feeling well and in his usual state of health.  He has no other concerns today.       Past Medical History:   Diagnosis Date     Type 1 diabetes (H)     age of onset 14 years       Past Surgical History:   Procedure Laterality Date     COSMETIC SURGERY  1993    Mole removal     ENT SURGERY  1985    Tubes in ears       Family History   Problem Relation Age of Onset     Diabetes Brother       Cerebrovascular Disease Paternal Grandmother      Arthritis Mother      Arthritis Maternal Grandmother      Coronary Artery Disease Father        Social History     Social History     Marital status: Single     Spouse name: N/A     Number of children: N/A     Years of education: N/A     Social History Main Topics     Smoking status: Never Smoker     Smokeless tobacco: Never Used     Alcohol use No     Drug use: No     Sexual activity: Yes     Partners: Female     Other Topics Concern     None     Social History Narrative   Social History: Works for [employer]. . Daughter, Daphney born August, 2019.  DaughterEbony born May, 2022.     Current Outpatient Medications   Medication     blood glucose (CONTOUR NEXT TEST) test strip     blood glucose monitoring (CANDE MICROLET) lancets     blood glucose monitoring (NO BRAND SPECIFIED) meter device kit     cholecalciferol (VITAMIN D) 1000 UNIT tablet     Continuous Blood Gluc Sensor (FREESTYLE LILO 14 DAY SENSOR) MISC     Glucagon (BAQSIMI TWO PACK) 3 MG/DOSE POWD     Insulin Aspart FlexPen 100 UNIT/ML SOPN     insulin degludec (TRESIBA FLEXTOUCH) 200 UNIT/ML pen     insulin pen needle (B-D U/F) 31G X 8 MM miscellaneous     KETOSTIX test strip     losartan (COZAAR) 25 MG tablet     pravastatin (PRAVACHOL) 10 MG tablet     No current facility-administered medications for this visit.        No Known Allergies    Physical Exam  There were no vitals taken for this visit.  GENERAL:  Alert and oriented X3, NAD, well dressed, answering questions appropriately, appears stated age.  HEENT: OP clear, no lymphadenopathy, no thyromegaly, non-tender, no exophthalmus, no proptosis, EOMI, no lid lag, no retraction  EXTREMITIES: no edema, +pulses, no rashes, no lesions  NEUROLOGY: CN grossly intact, + monofilament, +vibratory sensation    RESULTS  Lab Results   Component Value Date    A1C 7.7 (H) 02/10/2023    A1C 7.4 (H) 10/20/2021    A1C 7.3 (H) 06/17/2021    A1C 6.9 (H)  12/08/2020    A1C 6.8 (H) 09/03/2020    A1C 7.7 (H) 05/13/2019    A1C 7.9 (H) 08/13/2014    HEMOGLOBINA1 7.0 07/18/2022    HEMOGLOBINA1 7.0 03/21/2022    HEMOGLOBINA1 7.5 (A) 03/02/2020    HEMOGLOBINA1 7.5 (A) 11/26/2019    HEMOGLOBINA1 8.2 (A) 08/26/2019       TSH   Date Value Ref Range Status   02/10/2023 0.46 0.30 - 4.20 uIU/mL Final   06/17/2021 0.88 0.40 - 4.00 mU/L Final   05/29/2020 2.18 0.40 - 4.00 mU/L Final   05/13/2019 2.14 0.40 - 4.00 mU/L Final   01/30/2018 2.46 0.40 - 4.00 mU/L Final   09/16/2016 0.97 0.40 - 4.00 mU/L Final     T4 Free   Date Value Ref Range Status   09/16/2016 1.01 0.76 - 1.46 ng/dL Final   06/24/2015 1.02 0.76 - 1.46 ng/dL Final       ALT   Date Value Ref Range Status   10/20/2021 36 0 - 70 U/L Final   03/05/2015 20 0 - 70 U/L Final   ]    Recent Labs   Lab Test 02/10/23  0936 03/14/22  0850   CHOL 137 145   HDL 39* 46   LDL 88 89   TRIG 49 50       Lab Results   Component Value Date     02/10/2023     06/17/2021      Lab Results   Component Value Date    POTASSIUM 4.2 02/10/2023    POTASSIUM 4.2 10/20/2021    POTASSIUM 4.0 06/17/2021     Lab Results   Component Value Date    CHLORIDE 100 02/10/2023    CHLORIDE 103 10/20/2021    CHLORIDE 103 06/17/2021     Lab Results   Component Value Date    CELESTE 9.8 02/10/2023    CELESTE 9.2 06/17/2021     Lab Results   Component Value Date    CO2 24 02/10/2023    CO2 30 10/20/2021    CO2 31 06/17/2021     Lab Results   Component Value Date    BUN 9.1 02/10/2023    BUN 10 10/20/2021    BUN 8 06/17/2021     Lab Results   Component Value Date    CR 0.65 02/10/2023    CR 0.73 06/17/2021       GFR Estimate   Date Value Ref Range Status   02/10/2023 >90 >60 mL/min/1.73m2 Final     Comment:     eGFR calculated using 2021 CKD-EPI equation.   10/20/2021 >90 >60 mL/min/1.73m2 Final     Comment:     As of July 11, 2021, eGFR is calculated by the CKD-EPI creatinine equation, without race adjustment. eGFR can be influenced by muscle mass, exercise, and  diet. The reported eGFR is an estimation only and is only applicable if the renal function is stable.   06/17/2021 >90 >60 mL/min/[1.73_m2] Final     Comment:     Non  GFR Calc  Starting 12/18/2018, serum creatinine based estimated GFR (eGFR) will be   calculated using the Chronic Kidney Disease Epidemiology Collaboration   (CKD-EPI) equation.     12/08/2020 >90 >60 mL/min/[1.73_m2] Final     Comment:     Non  GFR Calc  Starting 12/18/2018, serum creatinine based estimated GFR (eGFR) will be   calculated using the Chronic Kidney Disease Epidemiology Collaboration   (CKD-EPI) equation.     05/29/2020 >90 >60 mL/min/[1.73_m2] Final     Comment:     Non  GFR Calc  Starting 12/18/2018, serum creatinine based estimated GFR (eGFR) will be   calculated using the Chronic Kidney Disease Epidemiology Collaboration   (CKD-EPI) equation.       GFR Estimate If Black   Date Value Ref Range Status   06/17/2021 >90 >60 mL/min/[1.73_m2] Final     Comment:      GFR Calc  Starting 12/18/2018, serum creatinine based estimated GFR (eGFR) will be   calculated using the Chronic Kidney Disease Epidemiology Collaboration   (CKD-EPI) equation.     12/08/2020 >90 >60 mL/min/[1.73_m2] Final     Comment:      GFR Calc  Starting 12/18/2018, serum creatinine based estimated GFR (eGFR) will be   calculated using the Chronic Kidney Disease Epidemiology Collaboration   (CKD-EPI) equation.     05/29/2020 >90 >60 mL/min/[1.73_m2] Final     Comment:      GFR Calc  Starting 12/18/2018, serum creatinine based estimated GFR (eGFR) will be   calculated using the Chronic Kidney Disease Epidemiology Collaboration   (CKD-EPI) equation.         Lab Results   Component Value Date    MICROL <12.0 02/10/2023    MICROL <5 06/17/2021     No results found for: MICROALBUMIN  Lab Results   Component Value Date    CPEPT <0.1 (L) 02/10/2023       Vitamin B12   Date Value Ref  Range Status   02/10/2023 1,071 232 - 1,245 pg/mL Final   09/03/2020 713 193 - 986 pg/mL Final   ]    Most recent eye exam date: : Not Found     Assessment/Plan:     1.  Type 1 diabetes- Christiano is doing quite well, but dropping after dinner and climbing too high after treating lows.  A1c is 7.7%, which is up a bit.  We made the following plan today (instructions given to patient):   Switch to jamila 3 if covered.      Work on taking bolus Novolog 15 minutes ahead of your meal.     Consider switch to Fiasp when you are out of Novolog.     Emergency issues: Please contact the clinic as soon as you recognize a problem.  Here are some concerns you should contact us about.  -Vomiting: more than twice.  Please check ketones.  If positive, go to ER. Monitor glucose hourly.   -High glucose (over 300 mg/dL twice in a row): Please check ketones.  If ketones are negative, take an insulin correction and recheck glucose in 1 hour.  If glucose is not coming down, please call the clinic. If ketones are moderate or large, drink lots of water, take an insulin correction, and recheck ketones in 1 hour.  If ketones are still high (or you are vomiting), go to the ER.  -Hypoglycemia (low glucose):   If glucose is less than 70 mg/dL, treat with 15g carb (4 glucose tablets), recheck glucose in 10 minutes.  If low again, repeat.   If glucose is less than 54 mg/dL, treat with 30g carb, recheck glucose in 10 minutes.  If low again, repeat.  Keep glucagon in your home in case of severe hypoglycemia and train someone how to use this.    Emergency kit (please ensure you always have these with you):   Glucose tablets  Glucagon  Insulin  Syringes (if on a pump)  Extra infusion set (if on a pump)  Ketone strips    Contact information:   If you have concerns, please send me a [employer] message or call the clinic at [phone].  For more urgent concerns, please call [phone] after hours/weekends and ask to speak with the endocrinologist on call.       Please let me know if you are having low blood sugars less than 70 or over 350 mg/dL.  Do not wait until your next appointment if this is happening.      If you have concerns, please send me a [employer] message M-Th, call the clinic at [phone], or call [phone] after hours/weekends and ask to speak with the endocrinologist on call.      2.  Risk factors-     Retinopathy:  No. Had recent eye exam in December, 2022.  Nephropathy:  BP is well controlled on lisinopril 5 mg daily.  No microalbuminuria.  Creatinine stable.    Neuropathy: No.    Feet: OK, no ulcers.   Taking ASA: no  Lipids:  LDL is in target, now on pravastatin 10 mg daily.  Tolerating this well.     Celiac screening: negative screen 5/19  Vitamin D: now improved. He is inconsistently taking 1000 international unit(s) daily.      3.  Frozen shoulder-  Asked him to reach out if he will be getting a cortisone injection.  Will need to increase bolus insulin or add NPH.     4.  F/U in 3 months with me, in 6 mos with Dr. Bland.     38 minutes spent on the date of the encounter doing chart review, review of test results, review of continuous glucose sensor, interpretation of glucose data, patient visit and documentation, counseling/coordination of care, and discussion of follow up plan for worsening hyper and hypoglycemia.  The patient understood and is satisfied with today's visit.     Shena Iniguez PA-C, MPAS   [facility]  Department of Medicine  Division of Endocrinology and Diabetes

## 2023-02-20 ENCOUNTER — OFFICE VISIT (OUTPATIENT)
Dept: ENDOCRINOLOGY | Facility: CLINIC | Age: 43
End: 2023-02-20
Payer: COMMERCIAL

## 2023-02-20 VITALS
SYSTOLIC BLOOD PRESSURE: 130 MMHG | WEIGHT: 198 LBS | DIASTOLIC BLOOD PRESSURE: 81 MMHG | BODY MASS INDEX: 24.75 KG/M2 | HEART RATE: 61 BPM | OXYGEN SATURATION: 97 %

## 2023-02-20 DIAGNOSIS — E10.9 TYPE 1 DIABETES MELLITUS WITHOUT COMPLICATION (H): ICD-10-CM

## 2023-02-20 PROCEDURE — 99214 OFFICE O/P EST MOD 30 MIN: CPT | Performed by: PHYSICIAN ASSISTANT

## 2023-02-20 RX ORDER — BLOOD-GLUCOSE SENSOR
1 EACH MISCELLANEOUS
Qty: 7 EACH | Refills: 3 | Status: SHIPPED | OUTPATIENT
Start: 2023-02-20 | End: 2024-01-22

## 2023-02-20 RX ORDER — URINE ACETONE TEST STRIPS
STRIP MISCELLANEOUS
Qty: 50 STRIP | Refills: 3 | Status: SHIPPED | OUTPATIENT
Start: 2023-02-20

## 2023-02-20 ASSESSMENT — PAIN SCALES - GENERAL: PAINLEVEL: NO PAIN (0)

## 2023-02-20 NOTE — PATIENT INSTRUCTIONS
Switch to jamila 3 if covered.      Work on taking bolus Novolog 15 minutes ahead of your meal.     Consider switch to Fiasp when you are out of Novolog.     Emergency issues: Please contact the clinic as soon as you recognize a problem.  Here are some concerns you should contact us about.  -Vomiting: more than twice.  Please check ketones.  If positive, go to ER. Monitor glucose hourly.   -High glucose (over 300 mg/dL twice in a row): Please check ketones.  If ketones are negative, take an insulin correction and recheck glucose in 1 hour.  If glucose is not coming down, please call the clinic. If ketones are moderate or large, drink lots of water, take an insulin correction, and recheck ketones in 1 hour.  If ketones are still high (or you are vomiting), go to the ER.  -Hypoglycemia (low glucose):   If glucose is less than 70 mg/dL, treat with 15g carb (4 glucose tablets), recheck glucose in 10 minutes.  If low again, repeat.   If glucose is less than 54 mg/dL, treat with 30g carb, recheck glucose in 10 minutes.  If low again, repeat.  Keep glucagon in your home in case of severe hypoglycemia and train someone how to use this.    Emergency kit (please ensure you always have these with you):   Glucose tablets  Glucagon  Insulin  Syringes (if on a pump)  Extra infusion set (if on a pump)  Ketone strips    Contact information:   If you have concerns, please send me a [employer] message or call the clinic at [phone].  For more urgent concerns, please call [phone] after hours/weekends and ask to speak with the endocrinologist on call.      Please let me know if you are having low blood sugars less than 70 or over 350 mg/dL.  Do not wait until your next appointment if this is happening.

## 2023-02-20 NOTE — LETTER
2/20/2023       RE: Agustín Gallegos  [address]  [facility] [address]     Dear Colleague,    Thank you for referring your patient, Agustín Gallegos, to the [facility] ENDOCRINOLOGY CLINIC [place] at [facility]. Please see a copy of my visit note below.    BG data obtained via CGM website.  Anabel Hennessy    HPI:   Christiano is a 43 yo man here for follow up of type 1 diabetes, which he has had since age 14. He also recently established with Dr. Bland.  He has no known complications from his diabetes. He reports that overall things are going well.  He and his family have been sick with norovirus for several weeks.  He continues working from home. He is trying to walk every day.  Otherwise, glucose has been under good control.  We have talked about in-pen and pumps in the past, but he is comfortable with doing his own calculations.  He finds that he sees his glucose trending down, then he treats and it goes too high. He usually does not have symptoms. He wonders if he is over-treating these.  Also, glucose has been higher with the holidays and illness.       Novolog dosing:   Breakfast- cheerios- 1/4g   Lunch- sometimes leftovers 1/6g  Dinner- usually low carb. 1/5g.    HS snack- cut out.  Only eats if he is 100 or lower at bedtime.   Correction: 1/30 over 130 mg/dL.     He takes Tresiba 24 units daily.      Christiano wears a jamila sensor with overall average of 170 mg/dL (CV 33%), TIR 57%, low 1%  for the past two weeks.            Christiano agreed to start a statin last year.  He has been tolerating this well. Cholesterol has improved.  Dealing with frozen shoulder issues. Tendonitis and bursitis.  He is doing physical therapy. Thinking about a cortisone injection, but worried about glucose. 1.5 years has been dealing with this. Wonders how to manage his glucose.       He otherwise has been feeling well and in his usual state of health.  He has no other  concerns today.       Past Medical History:   Diagnosis Date     Type 1 diabetes (H)     age of onset 14 years       Past Surgical History:   Procedure Laterality Date     COSMETIC SURGERY  1993    Mole removal     ENT SURGERY  1985    Tubes in ears       Family History   Problem Relation Age of Onset     Diabetes Brother      Cerebrovascular Disease Paternal Grandmother      Arthritis Mother      Arthritis Maternal Grandmother      Coronary Artery Disease Father        Social History     Social History     Marital status: Single     Spouse name: N/A     Number of children: N/A     Years of education: N/A     Social History Main Topics     Smoking status: Never Smoker     Smokeless tobacco: Never Used     Alcohol use No     Drug use: No     Sexual activity: Yes     Partners: Female     Other Topics Concern     None     Social History Narrative   Social History: Works for [employer]. . Daughter, Daphney born August, 2019.  Daughter, Ebony born May, 2022.     Current Outpatient Medications   Medication     blood glucose (CONTOUR NEXT TEST) test strip     blood glucose monitoring (CANDE MICROLET) lancets     blood glucose monitoring (NO BRAND SPECIFIED) meter device kit     cholecalciferol (VITAMIN D) 1000 UNIT tablet     Continuous Blood Gluc Sensor (FREESTYLE LILO 14 DAY SENSOR) MISC     Glucagon (BAQSIMI TWO PACK) 3 MG/DOSE POWD     Insulin Aspart FlexPen 100 UNIT/ML SOPN     insulin degludec (TRESIBA FLEXTOUCH) 200 UNIT/ML pen     insulin pen needle (B-D U/F) 31G X 8 MM miscellaneous     KETOSTIX test strip     losartan (COZAAR) 25 MG tablet     pravastatin (PRAVACHOL) 10 MG tablet     No current facility-administered medications for this visit.        No Known Allergies    Physical Exam  There were no vitals taken for this visit.  GENERAL:  Alert and oriented X3, NAD, well dressed, answering questions appropriately, appears stated age.  HEENT: OP clear, no lymphadenopathy, no thyromegaly,  non-tender, no exophthalmus, no proptosis, EOMI, no lid lag, no retraction  EXTREMITIES: no edema, +pulses, no rashes, no lesions  NEUROLOGY: CN grossly intact, + monofilament, +vibratory sensation    RESULTS  Lab Results   Component Value Date    A1C 7.7 (H) 02/10/2023    A1C 7.4 (H) 10/20/2021    A1C 7.3 (H) 06/17/2021    A1C 6.9 (H) 12/08/2020    A1C 6.8 (H) 09/03/2020    A1C 7.7 (H) 05/13/2019    A1C 7.9 (H) 08/13/2014    HEMOGLOBINA1 7.0 07/18/2022    HEMOGLOBINA1 7.0 03/21/2022    HEMOGLOBINA1 7.5 (A) 03/02/2020    HEMOGLOBINA1 7.5 (A) 11/26/2019    HEMOGLOBINA1 8.2 (A) 08/26/2019       TSH   Date Value Ref Range Status   02/10/2023 0.46 0.30 - 4.20 uIU/mL Final   06/17/2021 0.88 0.40 - 4.00 mU/L Final   05/29/2020 2.18 0.40 - 4.00 mU/L Final   05/13/2019 2.14 0.40 - 4.00 mU/L Final   01/30/2018 2.46 0.40 - 4.00 mU/L Final   09/16/2016 0.97 0.40 - 4.00 mU/L Final     T4 Free   Date Value Ref Range Status   09/16/2016 1.01 0.76 - 1.46 ng/dL Final   06/24/2015 1.02 0.76 - 1.46 ng/dL Final       ALT   Date Value Ref Range Status   10/20/2021 36 0 - 70 U/L Final   03/05/2015 20 0 - 70 U/L Final   ]    Recent Labs   Lab Test 02/10/23  0936 03/14/22  0850   CHOL 137 145   HDL 39* 46   LDL 88 89   TRIG 49 50       Lab Results   Component Value Date     02/10/2023     06/17/2021      Lab Results   Component Value Date    POTASSIUM 4.2 02/10/2023    POTASSIUM 4.2 10/20/2021    POTASSIUM 4.0 06/17/2021     Lab Results   Component Value Date    CHLORIDE 100 02/10/2023    CHLORIDE 103 10/20/2021    CHLORIDE 103 06/17/2021     Lab Results   Component Value Date    CELESTE 9.8 02/10/2023    CELESTE 9.2 06/17/2021     Lab Results   Component Value Date    CO2 24 02/10/2023    CO2 30 10/20/2021    CO2 31 06/17/2021     Lab Results   Component Value Date    BUN 9.1 02/10/2023    BUN 10 10/20/2021    BUN 8 06/17/2021     Lab Results   Component Value Date    CR 0.65 02/10/2023    CR 0.73 06/17/2021       GFR Estimate   Date  Value Ref Range Status   02/10/2023 >90 >60 mL/min/1.73m2 Final     Comment:     eGFR calculated using 2021 CKD-EPI equation.   10/20/2021 >90 >60 mL/min/1.73m2 Final     Comment:     As of July 11, 2021, eGFR is calculated by the CKD-EPI creatinine equation, without race adjustment. eGFR can be influenced by muscle mass, exercise, and diet. The reported eGFR is an estimation only and is only applicable if the renal function is stable.   06/17/2021 >90 >60 mL/min/[1.73_m2] Final     Comment:     Non  GFR Calc  Starting 12/18/2018, serum creatinine based estimated GFR (eGFR) will be   calculated using the Chronic Kidney Disease Epidemiology Collaboration   (CKD-EPI) equation.     12/08/2020 >90 >60 mL/min/[1.73_m2] Final     Comment:     Non  GFR Calc  Starting 12/18/2018, serum creatinine based estimated GFR (eGFR) will be   calculated using the Chronic Kidney Disease Epidemiology Collaboration   (CKD-EPI) equation.     05/29/2020 >90 >60 mL/min/[1.73_m2] Final     Comment:     Non  GFR Calc  Starting 12/18/2018, serum creatinine based estimated GFR (eGFR) will be   calculated using the Chronic Kidney Disease Epidemiology Collaboration   (CKD-EPI) equation.       GFR Estimate If Black   Date Value Ref Range Status   06/17/2021 >90 >60 mL/min/[1.73_m2] Final     Comment:      GFR Calc  Starting 12/18/2018, serum creatinine based estimated GFR (eGFR) will be   calculated using the Chronic Kidney Disease Epidemiology Collaboration   (CKD-EPI) equation.     12/08/2020 >90 >60 mL/min/[1.73_m2] Final     Comment:      GFR Calc  Starting 12/18/2018, serum creatinine based estimated GFR (eGFR) will be   calculated using the Chronic Kidney Disease Epidemiology Collaboration   (CKD-EPI) equation.     05/29/2020 >90 >60 mL/min/[1.73_m2] Final     Comment:      GFR Calc  Starting 12/18/2018, serum creatinine based estimated GFR  (eGFR) will be   calculated using the Chronic Kidney Disease Epidemiology Collaboration   (CKD-EPI) equation.         Lab Results   Component Value Date    MICROL <12.0 02/10/2023    MICROL <5 06/17/2021     No results found for: MICROALBUMIN  Lab Results   Component Value Date    CPEPT <0.1 (L) 02/10/2023       Vitamin B12   Date Value Ref Range Status   02/10/2023 1,071 232 - 1,245 pg/mL Final   09/03/2020 713 193 - 986 pg/mL Final   ]    Most recent eye exam date: : Not Found     Assessment/Plan:     1.  Type 1 diabetes- Christiano is doing quite well, but dropping after dinner and climbing too high after treating lows.  A1c is 7.7%, which is up a bit.  We made the following plan today (instructions given to patient):   Switch to jamila 3 if covered.      Work on taking bolus Novolog 15 minutes ahead of your meal.     Consider switch to Fiasp when you are out of Novolog.     Emergency issues: Please contact the clinic as soon as you recognize a problem.  Here are some concerns you should contact us about.  -Vomiting: more than twice.  Please check ketones.  If positive, go to ER. Monitor glucose hourly.   -High glucose (over 300 mg/dL twice in a row): Please check ketones.  If ketones are negative, take an insulin correction and recheck glucose in 1 hour.  If glucose is not coming down, please call the clinic. If ketones are moderate or large, drink lots of water, take an insulin correction, and recheck ketones in 1 hour.  If ketones are still high (or you are vomiting), go to the ER.  -Hypoglycemia (low glucose):   If glucose is less than 70 mg/dL, treat with 15g carb (4 glucose tablets), recheck glucose in 10 minutes.  If low again, repeat.   If glucose is less than 54 mg/dL, treat with 30g carb, recheck glucose in 10 minutes.  If low again, repeat.  Keep glucagon in your home in case of severe hypoglycemia and train someone how to use this.    Emergency kit (please ensure you always have these with you):   Glucose  tablets  Glucagon  Insulin  Syringes (if on a pump)  Extra infusion set (if on a pump)  Ketone strips    Contact information:   If you have concerns, please send me a [employer] message or call the clinic at [phone].  For more urgent concerns, please call [phone] after hours/weekends and ask to speak with the endocrinologist on call.      Please let me know if you are having low blood sugars less than 70 or over 350 mg/dL.  Do not wait until your next appointment if this is happening.      If you have concerns, please send me a [employer] message M-Th, call the clinic at [phone], or call [phone] after hours/weekends and ask to speak with the endocrinologist on call.      2.  Risk factors-     Retinopathy:  No. Had recent eye exam in December, 2022.  Nephropathy:  BP is well controlled on lisinopril 5 mg daily.  No microalbuminuria.  Creatinine stable.    Neuropathy: No.    Feet: OK, no ulcers.   Taking ASA: no  Lipids:  LDL is in target, now on pravastatin 10 mg daily.  Tolerating this well.     Celiac screening: negative screen 5/19  Vitamin D: now improved. He is inconsistently taking 1000 international unit(s) daily.      3.  Frozen shoulder-  Asked him to reach out if he will be getting a cortisone injection.  Will need to increase bolus insulin or add NPH.     4.  F/U in 3 months with me, in 6 mos with Dr. Bland.     38 minutes spent on the date of the encounter doing chart review, review of test results, review of continuous glucose sensor, interpretation of glucose data, patient visit and documentation, counseling/coordination of care, and discussion of follow up plan for worsening hyper and hypoglycemia.  The patient understood and is satisfied with today's visit.     Shena Iniguez PA-C, MPAS   [facility]  Department of Medicine  Division of Endocrinology and Diabetes

## 2023-02-27 ENCOUNTER — MYC MEDICAL ADVICE (OUTPATIENT)
Dept: ENDOCRINOLOGY | Facility: CLINIC | Age: 43
End: 2023-02-27
Payer: COMMERCIAL

## 2023-02-27 DIAGNOSIS — E10.9 TYPE 1 DIABETES MELLITUS WITHOUT RETINOPATHY (H): Primary | ICD-10-CM

## 2023-03-10 ENCOUNTER — MYC MEDICAL ADVICE (OUTPATIENT)
Dept: ENDOCRINOLOGY | Facility: CLINIC | Age: 43
End: 2023-03-10
Payer: COMMERCIAL

## 2023-03-10 DIAGNOSIS — E10.9 TYPE 1 DIABETES MELLITUS WITHOUT COMPLICATION (H): Primary | ICD-10-CM

## 2023-03-14 ENCOUNTER — TELEPHONE (OUTPATIENT)
Dept: ENDOCRINOLOGY | Facility: CLINIC | Age: 43
End: 2023-03-14
Payer: COMMERCIAL

## 2023-03-14 NOTE — TELEPHONE ENCOUNTER
E will be getting a cortizone shot Wednesday and thought he was to be prescribe NPH for blood sugars that will increase . Message sent earlier with no reply - he called back Jolly Leo RN on 3/14/2023 at 6:58 PM

## 2023-03-14 NOTE — TELEPHONE ENCOUNTER
[facility] Call Center    Phone Message    May a detailed message be left on voicemail: yes     Reason for Call: Other: patient calling in regards to his [email or web address] message. Wants to make sure he is all clear for the cortizone shot tomorrow. Please advise.      Action Taken: Other: Endo    Travel Screening: Not Applicable

## 2023-03-15 ENCOUNTER — TRANSFERRED RECORDS (OUTPATIENT)
Dept: HEALTH INFORMATION MANAGEMENT | Facility: CLINIC | Age: 43
End: 2023-03-15

## 2023-03-17 NOTE — TELEPHONE ENCOUNTER
He was prescribed NPH insulin to help wit blood sugars following a  cortizone injection. He needs to  the NPH in order to control his numbers. If after starting the NPH he is having issues he needs to let us know. Jolly Leo RN on 3/17/2023 at 9:11 AM

## 2023-03-17 NOTE — TELEPHONE ENCOUNTER
Patient calling back stating he is still having issues with his blood sugars after all the adjustments. The only thing he has not done is  the NPH and he will pick that up today he believes. Please call to discuss thank you.

## 2023-03-30 ENCOUNTER — OFFICE VISIT (OUTPATIENT)
Dept: UROLOGY | Facility: CLINIC | Age: 43
End: 2023-03-30
Attending: FAMILY MEDICINE
Payer: COMMERCIAL

## 2023-03-30 ENCOUNTER — PRE VISIT (OUTPATIENT)
Dept: UROLOGY | Facility: CLINIC | Age: 43
End: 2023-03-30

## 2023-03-30 VITALS
BODY MASS INDEX: 24.62 KG/M2 | HEART RATE: 84 BPM | WEIGHT: 198 LBS | SYSTOLIC BLOOD PRESSURE: 128 MMHG | HEIGHT: 75 IN | DIASTOLIC BLOOD PRESSURE: 78 MMHG

## 2023-03-30 DIAGNOSIS — Z30.09 ENCOUNTER FOR VASECTOMY COUNSELING: Primary | ICD-10-CM

## 2023-03-30 PROCEDURE — 99202 OFFICE O/P NEW SF 15 MIN: CPT | Performed by: UROLOGY

## 2023-03-30 NOTE — PROGRESS NOTES
"CC: Desires sterilization, consult for vasectomy from Dr. Marc Samuel     HPI: Agustín Gallegos is a 42 year old male with 2 children, and he is intersted in getting a vasectomy for sterilization.      No history of  trauma or scrotal surgery.  No history of bleeding problems.    History of sore right testis as a young man - sounds like torsion of testis appendage, remotely.    PMH:   Past Medical History:   Diagnosis Date     Type 1 diabetes (H)     age of onset 14 years     Past Surgical History:   Procedure Laterality Date     COSMETIC SURGERY  1993    Mole removal     ENT SURGERY  1985    Tubes in ears       FAMILY HX:   Family History   Problem Relation Age of Onset     Diabetes Brother      Cerebrovascular Disease Paternal Grandmother      Arthritis Mother      Arthritis Maternal Grandmother      Coronary Artery Disease Father       No history of coagulopathies    ALLERGIES:    No Known Allergies    MEDS:   Current Outpatient Medications   Medication Sig     blood glucose (CONTOUR NEXT TEST) test strip test 1-6 times daily, as directed.     blood glucose monitoring (CANDE MICROLET) lancets Use to test blood sugar 8 times daily or as directed.     blood glucose monitoring (NO BRAND SPECIFIED) meter device kit Use to test blood sugar 5 times daily or as directed.     cholecalciferol (VITAMIN D) 1000 UNIT tablet Take 1 tablet (1,000 Units) by mouth daily     Continuous Blood Gluc Sensor (FREESTYLE LILO 3 SENSOR) MISC 1 each every 14 days     Glucagon (BAQSIMI TWO PACK) 3 MG/DOSE POWD Spray 1 each in nostril once as needed (severe hypoglycemia)     Insulin Aspart FlexPen 100 UNIT/ML SOPN USE AS DIRECTED UP TO 60 UNITS UNDER THE SKIN DAILY AS DIRECTED     insulin degludec (TRESIBA FLEXTOUCH) 200 UNIT/ML pen Inject 24 Units Subcutaneous daily     insulin pen needle (B-D U/F) 31G X 8 MM miscellaneous Use 4 pen needles daily or as directed.     insulin syringe-needle U-100 (30G X 1/2\" 0.5 ML) 30G X " "1/2\" 0.5 ML miscellaneous Use 4 syringes daily or as directed.     KETOSTIX test strip Use as directed in case of hyperglycemia or illness.     losartan (COZAAR) 25 MG tablet Take 1 tablet (25 mg) by mouth daily     pravastatin (PRAVACHOL) 10 MG tablet Take 1 tablet (10 mg) by mouth daily     Continuous Blood Gluc Sensor (FREESTYLE LILO 14 DAY SENSOR) MISC 1 each every 14 days Swipe before meals and bedtime.     insulin  UNIT/ML vial Take 10-15 units every morning for 2-3 days after steroid injection.     No current facility-administered medications for this visit.       SOCIAL HISTORY:  Social History     Tobacco Use     Smoking status: Never     Smokeless tobacco: Never   Vaping Use     Vaping Use: Never used   Substance Use Topics     Alcohol use: No     Drug use: No       GENERAL PHYSICAL EXAM:   /78   Pulse 84   Ht 1.905 m (6' 3\")   Wt 89.8 kg (198 lb)   BMI 24.75 kg/m       Constitutional: No acute distress. Well nourished.   PSYCH: Normal mood and affect.   NEURO: Normal gait, no focal deficits.   EYES: Anicteric, EOMI, PERR  CARDIOPULMONARY: Breathing non-labored, pulse regular, no peripheral edema.   GI: Abdomen soft, non-tender, surgical scars: none,  no organomegaly.  MUSCULOSKELETAL: Normal limb proportions, no muscle wasting, no contractures.    SKIN: Normal virilized hair distribution, no lesions, warts or rashes over genitalia, abdomen extremities or face.   HEME/LYMPH: No echymosis, no lymphadenopathy in groin, no lymphedema.     EXAM:  Phallus circumcised, meatus adequate, no plaques palpated.   Testes descended, consistency is normal. No intra-testicular masses.  Epididymes present.  Vas present bilateraly, both very easy to find.  ALEENA deferred.        I discussed with him at length the risks and benefits of the procedure. He understands that this is a sterilization procedure, and not reversible contraception. He understands that reversals, while possible, are not guaranteed to " work and fairly complex. I discussed with him the option of sperm cryopreservation.     I stressed that he continues to be fertile in the post-operative period, and that he should continue using other contraceptive methods, such as a condom, until he obtains a semen analysis and we review the results to confirm success of the procedure and infertility. I also stressed to him that recanalization and pregnancy can possibly occur (approx 1/2000 chance), even after we clear him with a semen analysis showing no motile sperm. I counseled him on the rianna-operative risks of bleeding, infection, pain.  I described to him post-vasectomy pain syndrome that can occur in about 1 to 2% of men undergoing vasectomy. Usually this improves with time but in very rare cases can be persisting.    We also discussed recovery times (typically days if no complications) and post-operative care including use of ice packs, pain medication and wound care.    He will think about the above and schedule the procedure if he decides to proceed.  Discussed I would preferably prescribe him antibiotics post procedure, as a preventative.    Additional Coding Information:    Problems:  one acute uncomplicated illness or injury    Data Reviewed  Minimal or none    Level of risk:   low risk (e.g., OTC medication or observation, minor surgery without risks)    Time spent:    11 minutes spent on the date of the encounter doing chart review, history and exam, documentation and further activities as noted above.

## 2023-03-30 NOTE — LETTER
3/30/2023       RE: Agustín Gallegos  [address] [facility] [address]     Dear Colleague,    Thank you for referring your patient, Agustín Gallegos, to the [facility] UROLOGY CLINIC [place] at [facility]. Please see a copy of my visit note below.    CC: Desires sterilization, consult for vasectomy from Dr. Marc Samuel     HPI: Agustín Gallegos is a 42 year old male with 2 children, and he is intersted in getting a vasectomy for sterilization.      No history of  trauma or scrotal surgery.  No history of bleeding problems.    History of sore right testis as a young man - sounds like torsion of testis appendage, remotely.    PMH:   Past Medical History:   Diagnosis Date     Type 1 diabetes (H)     age of onset 14 years     Past Surgical History:   Procedure Laterality Date     COSMETIC SURGERY  1993    Mole removal     ENT SURGERY  1985    Tubes in ears       FAMILY HX:   Family History   Problem Relation Age of Onset     Diabetes Brother      Cerebrovascular Disease Paternal Grandmother      Arthritis Mother      Arthritis Maternal Grandmother      Coronary Artery Disease Father       No history of coagulopathies    ALLERGIES:    No Known Allergies    MEDS:   Current Outpatient Medications   Medication Sig     blood glucose (CONTOUR NEXT TEST) test strip test 1-6 times daily, as directed.     blood glucose monitoring (CANDE MICROLET) lancets Use to test blood sugar 8 times daily or as directed.     blood glucose monitoring (NO BRAND SPECIFIED) meter device kit Use to test blood sugar 5 times daily or as directed.     cholecalciferol (VITAMIN D) 1000 UNIT tablet Take 1 tablet (1,000 Units) by mouth daily     Continuous Blood Gluc Sensor (FREESTYLE LILO 3 SENSOR) MISC 1 each every 14 days     Glucagon (BAQSIMI TWO PACK) 3 MG/DOSE POWD Spray 1 each in nostril once as needed (severe hypoglycemia)     Insulin Aspart FlexPen 100 UNIT/ML  "SOPN USE AS DIRECTED UP TO 60 UNITS UNDER THE SKIN DAILY AS DIRECTED     insulin degludec (TRESIBA FLEXTOUCH) 200 UNIT/ML pen Inject 24 Units Subcutaneous daily     insulin pen needle (B-D U/F) 31G X 8 MM miscellaneous Use 4 pen needles daily or as directed.     insulin syringe-needle U-100 (30G X 1/2\" 0.5 ML) 30G X 1/2\" 0.5 ML miscellaneous Use 4 syringes daily or as directed.     KETOSTIX test strip Use as directed in case of hyperglycemia or illness.     losartan (COZAAR) 25 MG tablet Take 1 tablet (25 mg) by mouth daily     pravastatin (PRAVACHOL) 10 MG tablet Take 1 tablet (10 mg) by mouth daily     Continuous Blood Gluc Sensor (FREESTYLE LILO 14 DAY SENSOR) MISC 1 each every 14 days Swipe before meals and bedtime.     insulin  UNIT/ML vial Take 10-15 units every morning for 2-3 days after steroid injection.     No current facility-administered medications for this visit.       SOCIAL HISTORY:  Social History     Tobacco Use     Smoking status: Never     Smokeless tobacco: Never   Vaping Use     Vaping Use: Never used   Substance Use Topics     Alcohol use: No     Drug use: No       GENERAL PHYSICAL EXAM:   /78   Pulse 84   Ht 1.905 m (6' 3\")   Wt 89.8 kg (198 lb)   BMI 24.75 kg/m       Constitutional: No acute distress. Well nourished.   PSYCH: Normal mood and affect.   NEURO: Normal gait, no focal deficits.   EYES: Anicteric, EOMI, PERR  CARDIOPULMONARY: Breathing non-labored, pulse regular, no peripheral edema.   GI: Abdomen soft, non-tender, surgical scars: none,  no organomegaly.  MUSCULOSKELETAL: Normal limb proportions, no muscle wasting, no contractures.    SKIN: Normal virilized hair distribution, no lesions, warts or rashes over genitalia, abdomen extremities or face.   HEME/LYMPH: No echymosis, no lymphadenopathy in groin, no lymphedema.     EXAM:  Phallus circumcised, meatus adequate, no plaques palpated.   Testes descended, consistency is normal. No intra-testicular " masses.  Epididymes present.  Vas present bilateraly, both very easy to find.  ALEENA deferred.        I discussed with him at length the risks and benefits of the procedure. He understands that this is a sterilization procedure, and not reversible contraception. He understands that reversals, while possible, are not guaranteed to work and fairly complex. I discussed with him the option of sperm cryopreservation.     I stressed that he continues to be fertile in the post-operative period, and that he should continue using other contraceptive methods, such as a condom, until he obtains a semen analysis and we review the results to confirm success of the procedure and infertility. I also stressed to him that recanalization and pregnancy can possibly occur (approx 1/2000 chance), even after we clear him with a semen analysis showing no motile sperm. I counseled him on the rianna-operative risks of bleeding, infection, pain.  I described to him post-vasectomy pain syndrome that can occur in about 1 to 2% of men undergoing vasectomy. Usually this improves with time but in very rare cases can be persisting.    We also discussed recovery times (typically days if no complications) and post-operative care including use of ice packs, pain medication and wound care.    He will think about the above and schedule the procedure if he decides to proceed.  Discussed I would preferably prescribe him antibiotics post procedure, as a preventative.    Additional Coding Information:    Problems:  one acute uncomplicated illness or injury    Data Reviewed  Minimal or none    Level of risk:   low risk (e.g., OTC medication or observation, minor surgery without risks)    Time spent:    11 minutes spent on the date of the encounter doing chart review, history and exam, documentation and further activities as noted above.       Sincerely,    Calvin Rainey MD

## 2023-03-30 NOTE — NURSING NOTE
"Chief Complaint   Patient presents with     Consult     Vasectomy       Height 1.905 m (6' 3\"), weight 89.8 kg (198 lb). Body mass index is 24.75 kg/m .    Patient Active Problem List   Diagnosis     Type 1 diabetes mellitus without retinopathy (H)     Myopic astigmatism of both eyes     Adhesive capsulitis of right shoulder       No Known Allergies    Current Outpatient Medications   Medication Sig Dispense Refill     blood glucose (CONTOUR NEXT TEST) test strip test 1-6 times daily, as directed. 200 strip 3     blood glucose monitoring (CANDE MICROLET) lancets Use to test blood sugar 8 times daily or as directed. 6 Box 5     blood glucose monitoring (NO BRAND SPECIFIED) meter device kit Use to test blood sugar 5 times daily or as directed. 1 kit 0     cholecalciferol (VITAMIN D) 1000 UNIT tablet Take 1 tablet (1,000 Units) by mouth daily 90 tablet 3     Continuous Blood Gluc Sensor (FREESTYLE LILO 3 SENSOR) MISC 1 each every 14 days 7 each 3     Glucagon (BAQSIMI TWO PACK) 3 MG/DOSE POWD Spray 1 each in nostril once as needed (severe hypoglycemia) 2 each 3     Insulin Aspart FlexPen 100 UNIT/ML SOPN USE AS DIRECTED UP TO 60 UNITS UNDER THE SKIN DAILY AS DIRECTED 60 mL 3     insulin degludec (TRESIBA FLEXTOUCH) 200 UNIT/ML pen Inject 24 Units Subcutaneous daily 30 mL 1     insulin pen needle (B-D U/F) 31G X 8 MM miscellaneous Use 4 pen needles daily or as directed. 100 each 11     insulin syringe-needle U-100 (30G X 1/2\" 0.5 ML) 30G X 1/2\" 0.5 ML miscellaneous Use 4 syringes daily or as directed. 100 each 3     KETOSTIX test strip Use as directed in case of hyperglycemia or illness. 50 strip 3     losartan (COZAAR) 25 MG tablet Take 1 tablet (25 mg) by mouth daily 90 tablet 3     pravastatin (PRAVACHOL) 10 MG tablet Take 1 tablet (10 mg) by mouth daily 90 tablet 1     Continuous Blood Gluc Sensor (FREESTYLE LILO 14 DAY SENSOR) MISC 1 each every 14 days Swipe before meals and bedtime. 7 each 3     insulin  " UNIT/ML vial Take 10-15 units every morning for 2-3 days after steroid injection. 10 mL 1       Social History     Tobacco Use     Smoking status: Never     Smokeless tobacco: Never   Vaping Use     Vaping Use: Never used   Substance Use Topics     Alcohol use: No     Drug use: No       Angus Bartlett EMT  3/30/2023  9:52 AM

## 2023-03-30 NOTE — PATIENT INSTRUCTIONS
Please call if you are interested in scheduling a vasectomy.    It was a pleasure meeting with you today.  Thank you for allowing me and my team the privilege of caring for you today.  YOU are the reason we are here, and I truly hope we provided you with the excellent service you deserve.  Please let us know if there is anything else we can do for you so that we can be sure you are leaving completely satisfied with your care experience.

## 2023-04-05 ENCOUNTER — E-VISIT (OUTPATIENT)
Dept: URGENT CARE | Facility: CLINIC | Age: 43
End: 2023-04-05
Payer: COMMERCIAL

## 2023-04-05 ENCOUNTER — NURSE TRIAGE (OUTPATIENT)
Dept: FAMILY MEDICINE | Facility: CLINIC | Age: 43
End: 2023-04-05

## 2023-04-05 DIAGNOSIS — Z20.822 SUSPECTED COVID-19 VIRUS INFECTION: Primary | ICD-10-CM

## 2023-04-05 PROCEDURE — 99421 OL DIG E/M SVC 5-10 MIN: CPT | Mod: CS | Performed by: PHYSICIAN ASSISTANT

## 2023-04-05 NOTE — TELEPHONE ENCOUNTER
Patient called  Symptoms began 4/2  Positive covid home test  Would qualify for treatment due to diabetes diagnosis  States symptoms of congestion, cough, body aches have been mild  Took an advil this morning  Denies shortness of breath or breathing difficulties  Declines paxlovid treatment at this time  Could prescribe through Friday  Patient will call back if new or worsening symtpoms  Or if interested in paxlovid treatment within timeframe  Sara MCKEON RN       Reason for Disposition    [1] COVID-19 diagnosed by positive lab test (e.g., PCR, rapid self-test kit) AND [2] mild symptoms (e.g., cough, fever, others) AND [3] no complications or SOB    Additional Information    Negative: SEVERE difficulty breathing (e.g., struggling for each breath, speaks in single words)    Negative: Difficult to awaken or acting confused (e.g., disoriented, slurred speech)    Negative: Bluish (or gray) lips or face now    Negative: Shock suspected (e.g., cold/pale/clammy skin, too weak to stand, low BP, rapid pulse)    Negative: Sounds like a life-threatening emergency to the triager    Negative: [1] Diagnosed or suspected COVID-19 AND [2] symptoms lasting 3 or more weeks    Negative: [1] COVID-19 exposure AND [2] no symptoms    Negative: COVID-19 vaccine reaction suspected (e.g., fever, headache, muscle aches) occurring 1 to 3 days after getting vaccine    Negative: COVID-19 vaccine, questions about    Negative: [1] Lives with someone known to have influenza (flu test positive) AND [2] flu-like symptoms (e.g., cough, runny nose, sore throat, SOB; with or without fever)    Negative: [1] Adult with possible COVID-19 symptoms AND [2] triager concerned about severity of symptoms or other causes    Negative: COVID-19 and breastfeeding, questions about    Negative: SEVERE or constant chest pain or pressure  (Exception: Mild central chest pain, present only when coughing.)    Negative: MODERATE difficulty breathing (e.g., speaks in  phrases, SOB even at rest, pulse 100-120)    Negative: Headache and stiff neck (can't touch chin to chest)    Negative: Oxygen level (e.g., pulse oximetry) 90 percent or lower    Negative: Chest pain or pressure  (Exception: MILD central chest pain, present only when coughing)    Negative: Patient sounds very sick or weak to the triager    Negative: MILD difficulty breathing (e.g., minimal/no SOB at rest, SOB with walking, pulse <100)    Negative: Fever > 103 F (39.4 C)    Negative: [1] Fever > 101 F (38.3 C) AND [2] over 60 years of age    Negative: [1] Fever > 100.0 F (37.8 C) AND [2] bedridden (e.g., nursing home patient, CVA, chronic illness, recovering from surgery)    Negative: [1] HIGH RISK for severe COVID complications (e.g., weak immune system, age > 64 years, obesity with BMI of 30 or higher, pregnant, chronic lung disease or other chronic medical condition) AND [2] COVID symptoms (e.g., cough, fever)  (Exceptions: Already seen by PCP and no new or worsening symptoms.)    Negative: [1] HIGH RISK patient AND [2] influenza is widespread in the community AND [3] ONE OR MORE respiratory symptoms: cough, sore throat, runny or stuffy nose    Negative: [1] HIGH RISK patient AND [2] influenza exposure within the last 7 days AND [3] ONE OR MORE respiratory symptoms: cough, sore throat, runny or stuffy nose    Negative: Oxygen level (e.g., pulse oximetry) 91 to 94 percent    Negative: [1] COVID-19 infection suspected by caller or triager AND [2] mild symptoms (cough, fever, or others) AND [3] negative COVID-19 rapid test    Negative: Fever present > 3 days (72 hours)    Negative: [1] Fever returns after gone for over 24 hours AND [2] symptoms worse or not improved    Negative: [1] Continuous (nonstop) coughing interferes with work or school AND [2] no improvement using cough treatment per Care Advice    Negative: Cough present > 3 weeks    Negative: [1] COVID-19 diagnosed by positive lab test (e.g., PCR, rapid  self-test kit) AND [2] NO symptoms (e.g., cough, fever, others)    Protocols used: CORONAVIRUS (COVID-19) DIAGNOSED OR [id number]

## 2023-04-05 NOTE — PATIENT INSTRUCTIONS
Dear Christiano,      Based on your responses, you may have COVID-19.     Will I be tested for COVID-19?  We would like to test you for COVID. I have placed orders for this test.     To schedule: go to your [employer] home page and scroll down to the section that says  You have an appointment that needs to be scheduled  and click the large green button that says  Schedule Now  and follow the steps to find the next available openings.    If you are unable to complete these [employer] scheduling steps, please call [phone] to schedule your testing.     How do I self-isolate?  You isolate when you have symptoms of COVID or a test shows you have COVID, even if you don t have symptoms.     If you DO have symptoms:  o Stay home and away from others  - For at least 5 days after your symptoms started, AND   - You are fever free for 24 hours (with no medicine that reduces fever), AND  - Your other symptoms are better.  o Wear a mask for 10 full days any time you are around others.    If you DON T have symptoms:  o Stay at home and away from others for at least 5 days after your positive test.  o Wear a mask for 10 full days any time you are around others.    How can I take care of myself?  Over the counter medications may help with your symptoms such as runny or stuffy nose, cough, chills, or fever.  Talk to your care team about your options.     Some people are at high risk of severe illness (for example, you have a weak immune system, you re 50 years or older, or you have certain medical problems). If your risk is high and your symptoms started in the last 5 days, we strongly recommend for you to get COVID treatment as soon as possible.There are safe and effective medicines available that can make you feel better faster, and prevent hospitalization and death.       To discuss COVID treatment you can:    Call your clinic OR [phone] ([phone]) and ask to speak to a nurse about a positive COVID test.    Send a [employer]  "message to your care team. In [employer], select \"Ask a Medical Question\" then \"Do I need an appointment\" and put \"COVID\" in the subject line. Please include a phone number to call you back in the message.               Get lots of rest. Drink extra fluids (unless a doctor has told you not to)    Take Tylenol (acetaminophen) or ibuprofen for fever or pain. If you have liver or kidney problems, ask your family doctor if it's okay to take Tylenol or ibuprofen    Take over the counter medications for your symptoms, as directed by your doctor. You may also talk to your pharmacist.      If you have other health problems (like cancer, heart failure, an organ transplant or severe kidney disease): Call your specialty clinic if you don't feel better in the next 2 days.    Know when to call 911. Emergency warning signs include:  o Trouble breathing or shortness of breath  o Pain or pressure in the chest that doesn't go away  o Feeling confused like you haven't felt before, or not being able to wake up  o Bluish-colored lips or face    Where can I get more information?    [facility] - About COVID-19: [email or web address]     CDC - What to Do If You're Sick: https://www.cdc.gov/coronavirus/2019-ncov/if-you-are-sick/index.html     CDC -  Isolation https://www.cdc.gov/coronavirus/2019-ncov/your-health/isolation.html  "

## 2023-04-23 ENCOUNTER — MYC MEDICAL ADVICE (OUTPATIENT)
Dept: FAMILY MEDICINE | Facility: CLINIC | Age: 43
End: 2023-04-23
Payer: COMMERCIAL

## 2023-04-28 ENCOUNTER — TELEPHONE (OUTPATIENT)
Dept: ENDOCRINOLOGY | Facility: CLINIC | Age: 43
End: 2023-04-28
Payer: COMMERCIAL

## 2023-04-28 NOTE — TELEPHONE ENCOUNTER
ADRIAN and sent mychart for patient to reschedule 9/7/23 appointment with Dr. Bland as he will no longer be seeing pts at [facility].     Schedule with another provider.

## 2023-05-01 ENCOUNTER — MYC MEDICAL ADVICE (OUTPATIENT)
Dept: ENDOCRINOLOGY | Facility: CLINIC | Age: 43
End: 2023-05-01
Payer: COMMERCIAL

## 2023-05-01 DIAGNOSIS — E10.9 TYPE 1 DIABETES MELLITUS WITHOUT COMPLICATION (H): ICD-10-CM

## 2023-05-01 RX ORDER — PRAVASTATIN SODIUM 10 MG
10 TABLET ORAL DAILY
Qty: 90 TABLET | Refills: 1 | Status: SHIPPED | OUTPATIENT
Start: 2023-05-01 | End: 2023-05-02

## 2023-05-02 RX ORDER — PRAVASTATIN SODIUM 10 MG
10 TABLET ORAL DAILY
Qty: 90 TABLET | Refills: 3 | Status: SHIPPED | OUTPATIENT
Start: 2023-05-02 | End: 2024-08-14 | Stop reason: DRUGHIGH

## 2023-06-06 DIAGNOSIS — E10.9 TYPE 1 DIABETES MELLITUS WITHOUT COMPLICATION (H): Primary | ICD-10-CM

## 2023-06-12 DIAGNOSIS — E10.9 WELL CONTROLLED TYPE 1 DIABETES MELLITUS (H): ICD-10-CM

## 2023-06-12 RX ORDER — INSULIN ASPART 100 [IU]/ML
60 INJECTION, SOLUTION INTRAVENOUS; SUBCUTANEOUS DAILY
Qty: 60 ML | Refills: 3 | Status: SHIPPED | OUTPATIENT
Start: 2023-06-12

## 2023-06-14 ENCOUNTER — LAB (OUTPATIENT)
Dept: LAB | Facility: CLINIC | Age: 43
End: 2023-06-14
Payer: COMMERCIAL

## 2023-06-14 DIAGNOSIS — E10.9 TYPE 1 DIABETES MELLITUS WITHOUT COMPLICATION (H): ICD-10-CM

## 2023-06-14 LAB — HBA1C MFR BLD: 7.6 % (ref 0–5.6)

## 2023-06-14 PROCEDURE — 36415 COLL VENOUS BLD VENIPUNCTURE: CPT

## 2023-06-14 PROCEDURE — 83036 HEMOGLOBIN GLYCOSYLATED A1C: CPT

## 2023-07-21 DIAGNOSIS — E10.9 TYPE 1 DIABETES MELLITUS WITHOUT COMPLICATION (H): ICD-10-CM

## 2023-07-24 ENCOUNTER — MYC MEDICAL ADVICE (OUTPATIENT)
Dept: FAMILY MEDICINE | Facility: CLINIC | Age: 43
End: 2023-07-24
Payer: COMMERCIAL

## 2023-07-24 DIAGNOSIS — E10.9 TYPE 1 DIABETES MELLITUS WITHOUT COMPLICATION (H): ICD-10-CM

## 2023-07-24 RX ORDER — INSULIN DEGLUDEC 200 U/ML
24 INJECTION, SOLUTION SUBCUTANEOUS DAILY
Qty: 30 ML | Refills: 1 | Status: CANCELLED | OUTPATIENT
Start: 2023-07-24

## 2023-07-24 RX ORDER — INSULIN DEGLUDEC 200 U/ML
24 INJECTION, SOLUTION SUBCUTANEOUS DAILY
Qty: 30 ML | Refills: 3 | Status: SHIPPED | OUTPATIENT
Start: 2023-07-24

## 2023-07-24 NOTE — TELEPHONE ENCOUNTER
insulin degludec (TRESIBA FLEXTOUCH) 200 UNIT/ML pen        Last Written Prescription Date:  7/18/22  Last Fill Quantity: 30 ml,   # refills: 1  Last Office Visit : 2/20/23  Future Office visit:  9/11/23    Routing refill request to provider for review/approval because:  Insulin - refilled per clinic

## 2023-08-26 NOTE — PROGRESS NOTES
A/P  1.) Myopia/Astigmatism each eye  -Mild change in spec Rx, updated today  -Doing well with current CL's - continue. No appreciation for cyl today  -Reviewed option of increasing minus left eye. He would prefer to leave undercorrected to help with upcoming presbyopia and leave mercado the same  -AT prn with CL use, especially when noticing any blur/double    Monitor 2 years (sooner if symptomatic for presbyopia), gets comprehensive care with Dr. Talamantes       [facility] [place]  ORTHOPEDIC TRANSFER OF CARE NOTE   Admission Date: 8/25/2023  Today's Date: 8/26/2023    Hospital Day: Hospital Day: 2     Attending: Aubrey Ladd MD  Orthopedic Consultant:  Apolinar Bear MD     Subjective    HISTORY OF PRESENT ILLNESS     CHIEF COMPLAINT:  Right periprosthetic distal femur fracture     I was asked to see Mirna Ramirez at the request of Aubrey Ladd MD  for transfer of care regarding right periprosthetic distal femur fracture after a fall. She was found down by her family and brought to the emergency room.  X-rays revealed the periprosthetic fracture about her right total knee.     The total knee was done in 2018 with Dr Pablo Villafuerte. She had recently had a fall with admission back in June resulting in left greater trochanteric fracture being treating conservatively.  She ambulates with a walker at baseline.    She is chronically anticoagulated on eliquis for atrial fib.  Last dose was yesterday.      HISTORIES:     I have personally reviewed and updated the following EPIC sections: Current medications, Allergies, Problem list, Past Medical History and Past Surgical History    REVIEW OF SYSTEMS:     All other systems are reviewed and are negative except as documented in the History of Present Illness.   Objective    PHYSICAL EXAMINATION     Vital Signs Last Value 24 Hour Range   Temperature 97.7 °F (36.5 °C) Temp  Min: 96.1 °F (35.6 °C)  Max: 97.9 °F (36.6 °C)   Pulse 91 Pulse  Min: 85  Max: 132   Respiratory 20 Resp  Min: 18  Max: 22   Blood Pressure 116/69 BP  Min: 95/60  Max: 141/47   Pulse Oximetry 96 % SpO2  Min: 83 %  Max: 98 %     General:  Awake, alert, no acute distress.    Musculoskeletal:  Exam shows her skin is intact.  She has a well healed anterior incision.  Tender about the distal femur.  Knee immobilizer in place.  Sensation intact to light touch. Capillary refill intact.     TEST RESULTS  Labs Since Admission  Micro Summary  Imaging       Labs:   Recent Labs   Lab 08/26/23  0523 08/25/23  1121   WBC 19.6* 27.5*   HGB 10.2* 12.9   HCT 31.9* 40.1    342       Recent Labs   Lab 08/26/23  0523 08/25/23  1134 08/25/23  1121   SODIUM 140  --  138   POTASSIUM 3.9  --  4.3   CHLORIDE 111*  --  108   CO2 23  --  19*   CALCIUM 8.4  --  8.8   GLUCOSE 133*  --  261*   BUN 38*  --  33*   CREATININE 1.10* 1.60* 1.51*        Recent Labs   Lab 08/25/23  1121   PT 12.6*   INR 1.2   PTT 29         Radiology: Imaging studies have been reviewed and pertinent findings discussed in the Assessment and Plan.  Results for orders placed or performed during the hospital encounter of 08/25/23 (from the past 48 hour(s))   CT HEAD WO CONTRAST    Impression    IMPRESSION: Stable noncontrast CT of the head.    Electronically Signed by: Don Tian MD   Signed on: 8/25/2023 12:22 PM   Workstation ID: [id number]   CT CERVICAL SPINE WO CONTRAST    Impression    IMPRESSION: Degenerative changes are noted without evidence of a fracture  nor dislocation      Electronically Signed by: Don Tian MD   Signed on: 8/25/2023 12:27 PM   Workstation ID: [id number]   CT FACIAL BONES WO CONTRAST    Impression    IMPRESSION: No evidence of a fracture nor dislocation      Electronically Signed by: Don Tian MD   Signed on: 8/25/2023 12:32 PM   Workstation ID: [id number]   XR CHEST PA OR AP 1 VIEW    Impression    IMPRESSION: Slight infiltrate is seen in both lung bases      Electronically Signed by: Don Tian MD   Signed on: 8/25/2023 2:46 PM   Workstation ID: [id number]   XR HIP 2 VIEWS RIGHT AND PELVIS    Impression    IMPRESSION: Stable exam as outlined above      Electronically Signed by: Don Tian MD   Signed on: 8/25/2023 2:44 PM   Workstation ID: [id number]   XR KNEE 3 VIEW LEFT    Impression    IMPRESSION: No definite evidence of a fracture as outlined above however on  the crosstable lateral view of the knee there is a suggestion of a  fat/fluid level  in the knee joint. CT could be obtained if clinical  suspicion for a fracture is high      Electronically Signed by: Don Tian MD   Signed on: 8/25/2023 2:49 PM   Workstation ID: [id number]   XR KNEE 3 VIEWS RIGHT    Impression    IMPRESSION: Right knee arthroplasty is evident with a fracture of the  distal femur      Electronically Signed by: Don Tian MD   Signed on: 8/25/2023 2:51 PM   Workstation ID: [id number]   XR SHOULDER 3 VIEWS RIGHT    Impression    IMPRESSION: No evidence of a fracture nor dislocation      Electronically Signed by: Don Tian MD   Signed on: 8/25/2023 2:50 PM   Workstation ID: [id number]   CT KNEE WO CONTRAST RIGHT    Impression    IMPRESSION: Laterally displaced periprosthetic supracondylar right femur  fracture. Knee prosthesis appears intact.    Electronically Signed by: Cedric Andrea MD   Signed on: 8/25/2023 5:10 PM   Workstation ID: [id number]           ASSESSMENT AND PLAN     1. Right periprosthetic distal femur fracture    I have gone over treatment options. Discussed surgical interventions with patient and her niece Sharon. CT scan right knee to be reviewed by Dr Bear for surgical planning.  Awaiting medical optimization and clearance.  If she is cleared earliest she could proceed with surgery is Monday due to the Eliquis.      Mirna F Manolomoris is to be medically cleared by Aubrey Ladd MD.       Weightbearing Status: Bed Rest

## 2023-09-01 ENCOUNTER — TRANSFERRED RECORDS (OUTPATIENT)
Dept: HEALTH INFORMATION MANAGEMENT | Facility: CLINIC | Age: 43
End: 2023-09-01
Payer: COMMERCIAL

## 2023-09-08 NOTE — PROGRESS NOTES
HPI:   Christiano is a 44 yo man here for follow up of type 1 diabetes, which he has had since age 14. He also recently established with Dr. Bland.  He has no known complications from his diabetes. He reports that overall things are going well.  He does not get as much exercise as he wants to.  Busy with work and two little children.   We have talked about in-pen and pumps in the past, but he is comfortable with doing his own calculations.  He finds that he sees his glucose trending down, then he treats and it goes too high. He usually does not have symptoms. He wonders if he is over-treating these.  He is trying to avoid overtreating, but usually consumes 20-30g carb when he is heading low.  He has been trying to do a fingerstick, rather than rely on his sensor to see when he is coming up.     Novolog dosing:   Breakfast- cheerios- 1/5g   Lunch- sometimes leftovers 1/6g  Dinner- usually low carb. 1/5g.    HS snack- cut out.  Only eats if he is 100 or lower at bedtime.   Correction: 1/30 over 130 mg/dL.     He takes Tresiba 24 units daily.      Christiano wears a jamila sensor with overall average of 170 mg/dL (CV 33%), TIR 57%, low 1%  for the past two weeks.                Christiano agreed to start a statin last year.  He has been tolerating this well. Cholesterol has improved.  Dealing with frozen shoulder issues.  Had a cortisone injection and we put him on NPH during that time. It worked well.  He is doing physical therapy.     He otherwise has been feeling well and in his usual state of health.  He has no other concerns today.       Past Medical History:   Diagnosis Date    Type 1 diabetes (H)     age of onset 14 years       Past Surgical History:   Procedure Laterality Date    COSMETIC SURGERY  1993    Mole removal    ENT SURGERY  1985    Tubes in ears       Family History   Problem Relation Age of Onset    Diabetes Brother     Cerebrovascular Disease Paternal Grandmother     Arthritis Mother     Arthritis Maternal  Grandmother     Coronary Artery Disease Father        Social History     Social History    Marital status: Single     Spouse name: N/A    Number of children: N/A    Years of education: N/A     Social History Main Topics    Smoking status: Never Smoker    Smokeless tobacco: Never Used    Alcohol use No    Drug use: No    Sexual activity: Yes     Partners: Female     Other Topics Concern    None     Social History Narrative   Social History: Works for [employer]. . Daughter, Daphney born August, 2019.  Daughter, Ebony born May, 2022.     Current Outpatient Medications   Medication    blood glucose (CONTOUR NEXT TEST) test strip    blood glucose monitoring (CANDE MICROLET) lancets    blood glucose monitoring (NO BRAND SPECIFIED) meter device kit    cholecalciferol (VITAMIN D) 1000 UNIT tablet    Continuous Blood Gluc Sensor (FREESTYLE LILO 3 SENSOR) MISC    Glucagon (BAQSIMI TWO PACK) 3 MG/DOSE POWD    Insulin Aspart FlexPen 100 UNIT/ML SOPN    insulin degludec (TRESIBA FLEXTOUCH) 200 UNIT/ML pen    insulin pen needle (B-D U/F) 31G X 8 MM miscellaneous    KETOSTIX test strip    losartan (COZAAR) 25 MG tablet    pravastatin (PRAVACHOL) 10 MG tablet     No current facility-administered medications for this visit.        No Known Allergies    Physical Exam  /88   Pulse 68   Wt 88.5 kg (195 lb)   SpO2 99%   BMI 24.37 kg/m    GENERAL:  Alert and oriented X3, NAD, well dressed, answering questions appropriately, appears stated age.  HEENT: OP clear, no lymphadenopathy, no thyromegaly, non-tender, no exophthalmus, no proptosis, EOMI, no lid lag, no retraction  EXTREMITIES: no edema, +pulses, no rashes, no lesions  NEUROLOGY: CN grossly intact, + monofilament, +vibratory sensation    RESULTS  Lab Results   Component Value Date    A1C 7.6 (H) 06/14/2023    A1C 7.7 (H) 02/10/2023    A1C 7.4 (H) 10/20/2021    A1C 7.3 (H) 06/17/2021    A1C 6.9 (H) 12/08/2020    A1C 6.8 (H) 09/03/2020    A1C 7.7 (H)  05/13/2019    A1C 7.9 (H) 08/13/2014    HEMOGLOBINA1 7.0 07/18/2022    HEMOGLOBINA1 7.0 03/21/2022    HEMOGLOBINA1 7.5 (A) 03/02/2020    HEMOGLOBINA1 7.5 (A) 11/26/2019    HEMOGLOBINA1 8.2 (A) 08/26/2019       TSH   Date Value Ref Range Status   02/10/2023 0.46 0.30 - 4.20 uIU/mL Final   06/17/2021 0.88 0.40 - 4.00 mU/L Final   05/29/2020 2.18 0.40 - 4.00 mU/L Final   05/13/2019 2.14 0.40 - 4.00 mU/L Final   01/30/2018 2.46 0.40 - 4.00 mU/L Final   09/16/2016 0.97 0.40 - 4.00 mU/L Final     T4 Free   Date Value Ref Range Status   09/16/2016 1.01 0.76 - 1.46 ng/dL Final   06/24/2015 1.02 0.76 - 1.46 ng/dL Final       ALT   Date Value Ref Range Status   10/20/2021 36 0 - 70 U/L Final   03/05/2015 20 0 - 70 U/L Final   ]    Recent Labs   Lab Test 02/10/23  0936 03/14/22  0850   CHOL 137 145   HDL 39* 46   LDL 88 89   TRIG 49 50       Lab Results   Component Value Date     02/10/2023     06/17/2021      Lab Results   Component Value Date    POTASSIUM 4.2 02/10/2023    POTASSIUM 4.2 10/20/2021    POTASSIUM 4.0 06/17/2021     Lab Results   Component Value Date    CHLORIDE 100 02/10/2023    CHLORIDE 103 10/20/2021    CHLORIDE 103 06/17/2021     Lab Results   Component Value Date    CELESTE 9.8 02/10/2023    CELESTE 9.2 06/17/2021     Lab Results   Component Value Date    CO2 24 02/10/2023    CO2 30 10/20/2021    CO2 31 06/17/2021     Lab Results   Component Value Date    BUN 9.1 02/10/2023    BUN 10 10/20/2021    BUN 8 06/17/2021     Lab Results   Component Value Date    CR 0.65 02/10/2023    CR 0.73 06/17/2021       GFR Estimate   Date Value Ref Range Status   02/10/2023 >90 >60 mL/min/1.73m2 Final     Comment:     eGFR calculated using 2021 CKD-EPI equation.   10/20/2021 >90 >60 mL/min/1.73m2 Final     Comment:     As of July 11, 2021, eGFR is calculated by the CKD-EPI creatinine equation, without race adjustment. eGFR can be influenced by muscle mass, exercise, and diet. The reported eGFR is an estimation only and  is only applicable if the renal function is stable.   06/17/2021 >90 >60 mL/min/[1.73_m2] Final     Comment:     Non  GFR Calc  Starting 12/18/2018, serum creatinine based estimated GFR (eGFR) will be   calculated using the Chronic Kidney Disease Epidemiology Collaboration   (CKD-EPI) equation.     12/08/2020 >90 >60 mL/min/[1.73_m2] Final     Comment:     Non  GFR Calc  Starting 12/18/2018, serum creatinine based estimated GFR (eGFR) will be   calculated using the Chronic Kidney Disease Epidemiology Collaboration   (CKD-EPI) equation.     05/29/2020 >90 >60 mL/min/[1.73_m2] Final     Comment:     Non  GFR Calc  Starting 12/18/2018, serum creatinine based estimated GFR (eGFR) will be   calculated using the Chronic Kidney Disease Epidemiology Collaboration   (CKD-EPI) equation.       GFR Estimate If Black   Date Value Ref Range Status   06/17/2021 >90 >60 mL/min/[1.73_m2] Final     Comment:      GFR Calc  Starting 12/18/2018, serum creatinine based estimated GFR (eGFR) will be   calculated using the Chronic Kidney Disease Epidemiology Collaboration   (CKD-EPI) equation.     12/08/2020 >90 >60 mL/min/[1.73_m2] Final     Comment:      GFR Calc  Starting 12/18/2018, serum creatinine based estimated GFR (eGFR) will be   calculated using the Chronic Kidney Disease Epidemiology Collaboration   (CKD-EPI) equation.     05/29/2020 >90 >60 mL/min/[1.73_m2] Final     Comment:      GFR Calc  Starting 12/18/2018, serum creatinine based estimated GFR (eGFR) will be   calculated using the Chronic Kidney Disease Epidemiology Collaboration   (CKD-EPI) equation.         Lab Results   Component Value Date    MICROL <12.0 02/10/2023    MICROL <5 06/17/2021     No results found for: MICROALBUMIN  Lab Results   Component Value Date    CPEPT <0.1 (L) 02/10/2023       Vitamin B12   Date Value Ref Range Status   02/10/2023 1,071 232 - 1,245 pg/mL  Final   09/03/2020 713 193 - 986 pg/mL Final   ]    Most recent eye exam date: : Not Found     25 OH Vit D total   Date Value Ref Range Status   06/17/2021 <52 20 - 75 ug/L Final     Comment:     Season, race, dietary intake, and treatment affect the concentration of   25-hydroxy-Vitamin D. Values may decrease during winter months and increase   during summer months. Values 20-29 ug/L may indicate Vitamin D insufficiency   and values <20 ug/L may indicate Vitamin D deficiency.  This test was developed and its performance characteristics determined by the   [facility].   It has not been cleared or approved by the FDA. The laboratory is regulated   under CLIA as qualified to perform high-complexity testing. This test is used   for clinical purposes. It should not be regarded as investigational or for   research.     08/22/2017 <34 20 - 75 ug/L Final     Comment:     Season, race, dietary intake, and treatment affect the concentration of   25-hydroxy-Vitamin D. Values may decrease during winter months and increase   during summer months. Values 20-29 ug/L may indicate Vitamin D insufficiency   and values <20 ug/L may indicate Vitamin D deficiency.  This test was developed and its performance characteristics determined by the   [facility],  [facility]. It has   not been cleared or approved by the FDA. The laboratory is regulated under   CLIA as qualified to perform high-complexity testing. This test is used for   clinical purposes. It should not be regarded as investigational or for   research.       No results found for: PTHI  Calcium   Date Value Ref Range Status   02/10/2023 9.8 8.6 - 10.0 mg/dL Final   10/20/2021 9.6 8.5 - 10.1 mg/dL Final   06/17/2021 9.2 8.5 - 10.1 mg/dL Final   12/08/2020 9.4 8.5 - 10.1 mg/dL Final     No results found for: CALCIUMIONIZ  Albumin   Date Value Ref Range Status    10/20/2021 4.0 3.4 - 5.0 g/dL Final         Assessment/Plan:     1.  Type 1 diabetes- Christiano is doing quite well, but likely underestimating carbs and going too high.  He has minimal hypoglycemia.  We discussed importance of treating with just 15g carb and rechecking with a finger stick.  Will check A1c. We made the following plan today (instructions given to patient):     Christiano- your glucose is well controlled, but just a bit too high after eating.     Focus on getting more physical activity    Work on covering all of your carbs.     Emergency issues: Please contact the clinic as soon as you recognize a problem.  Here are some concerns you should contact us about.  -Vomiting: more than twice.  Please check ketones.  If positive, go to ER. Monitor glucose hourly.   -High glucose (over 300 mg/dL twice in a row): Please check ketones.  If ketones are negative, take an insulin correction and recheck glucose in 1 hour.  If glucose is not coming down, please call the clinic. If ketones are moderate or large, drink lots of water, take an insulin correction, and recheck ketones in 1 hour.  If ketones are still high (or you are vomiting), go to the ER.  -Hypoglycemia (low glucose):   If glucose is less than 70 mg/dL, treat with 15g carb (4 glucose tablets), recheck glucose in 10 minutes.  If low again, repeat.   If glucose is less than 54 mg/dL, treat with 30g carb, recheck glucose in 10 minutes.  If low again, repeat.  Keep glucagon in your home in case of severe hypoglycemia and train someone how to use this.    Emergency kit (please ensure you always have these with you):   Glucose tablets  Glucagon  Insulin  Syringes (if on a pump)  Extra infusion set (if on a pump)  Ketone strips    Contact information:   If you have concerns, please send me a [employer] message or call the clinic at [phone].  For more urgent concerns, please call [phone] after hours/weekends and ask to speak with the endocrinologist on call.       Please let me know if you are having low blood sugars less than 70 or over 350 mg/dL.  Do not wait until your next appointment if this is happening.      If you have concerns, please send me a [employer] message M-Th, call the clinic at [phone], or call [phone] after hours/weekends and ask to speak with the endocrinologist on call.      2.  Risk factors-     Retinopathy:  No. Had recent eye exam in December, 2022.  Nephropathy:  BP is well controlled on lisinopril 5 mg daily.  No microalbuminuria.  Creatinine stable.    Neuropathy: No.    Feet: OK, no ulcers.   Taking ASA: no  Lipids:  LDL is in target, now on pravastatin 10 mg daily.  Tolerating this well.     Celiac screening: negative screen 5/19  Vitamin D: Improved. He is not taking 1000 international unit(s) daily.  Will resume.     3.  F/U in 3 months with me, in 6 mos with Dr. Bland.     27 minutes spent on the date of the encounter doing chart review, review of test results, review of continuous glucose sensor, interpretation of glucose data, patient visit and documentation, counseling/coordination of care, and discussion of follow up plan for worsening hyper and hypoglycemia.  The patient understood and is satisfied with today's visit.     Shena Iniguez PA-C, MPAS   [facility]  Department of Medicine  Division of Endocrinology and Diabetes

## 2023-09-11 ENCOUNTER — OFFICE VISIT (OUTPATIENT)
Dept: ENDOCRINOLOGY | Facility: CLINIC | Age: 43
End: 2023-09-11
Payer: COMMERCIAL

## 2023-09-11 VITALS
BODY MASS INDEX: 24.37 KG/M2 | SYSTOLIC BLOOD PRESSURE: 135 MMHG | HEART RATE: 68 BPM | WEIGHT: 195 LBS | DIASTOLIC BLOOD PRESSURE: 88 MMHG | OXYGEN SATURATION: 99 %

## 2023-09-11 DIAGNOSIS — E10.9 WELL CONTROLLED TYPE 1 DIABETES MELLITUS (H): Primary | ICD-10-CM

## 2023-09-11 PROCEDURE — 99213 OFFICE O/P EST LOW 20 MIN: CPT | Performed by: PHYSICIAN ASSISTANT

## 2023-09-11 ASSESSMENT — PAIN SCALES - GENERAL: PAINLEVEL: NO PAIN (0)

## 2023-09-11 NOTE — PATIENT INSTRUCTIONS
Christiano- your glucose is well controlled, but just a bit too high after eating.     Focus on getting more physical activity    Work on covering all of your carbs.     Emergency issues: Please contact the clinic as soon as you recognize a problem.  Here are some concerns you should contact us about.  -Vomiting: more than twice.  Please check ketones.  If positive, go to ER. Monitor glucose hourly.   -High glucose (over 300 mg/dL twice in a row): Please check ketones.  If ketones are negative, take an insulin correction and recheck glucose in 1 hour.  If glucose is not coming down, please call the clinic. If ketones are moderate or large, drink lots of water, take an insulin correction, and recheck ketones in 1 hour.  If ketones are still high (or you are vomiting), go to the ER.  -Hypoglycemia (low glucose):   If glucose is less than 70 mg/dL, treat with 15g carb (4 glucose tablets), recheck glucose in 10 minutes.  If low again, repeat.   If glucose is less than 54 mg/dL, treat with 30g carb, recheck glucose in 10 minutes.  If low again, repeat.  Keep glucagon in your home in case of severe hypoglycemia and train someone how to use this.    Emergency kit (please ensure you always have these with you):   Glucose tablets  Glucagon  Insulin  Syringes (if on a pump)  Extra infusion set (if on a pump)  Ketone strips    Contact information:   If you have concerns, please send me a [employer] message or call the clinic at [phone].  For more urgent concerns, please call [phone] after hours/weekends and ask to speak with the endocrinologist on call.      Please let me know if you are having low blood sugars less than 70 or over 350 mg/dL.  Do not wait until your next appointment if this is happening.      If you have concerns, please send me a [employer] message M-Th, call the clinic at [phone], or call [phone] after hours/weekends and ask to speak with the endocrinologist on call.

## 2023-09-11 NOTE — LETTER
9/11/2023       RE: Agustín Gallegos  [address]  [facility] [address]     Dear Colleague,    Thank you for referring your patient, Agustín Gallegos, to the [facility] ENDOCRINOLOGY CLINIC [place] at [facility]. Please see a copy of my visit note below.      HPI:   Christiano is a 44 yo man here for follow up of type 1 diabetes, which he has had since age 14. He also recently established with Dr. Bland.  He has no known complications from his diabetes. He reports that overall things are going well.  He does not get as much exercise as he wants to.  Busy with work and two little children.   We have talked about in-pen and pumps in the past, but he is comfortable with doing his own calculations.  He finds that he sees his glucose trending down, then he treats and it goes too high. He usually does not have symptoms. He wonders if he is over-treating these.  He is trying to avoid overtreating, but usually consumes 20-30g carb when he is heading low.  He has been trying to do a fingerstick, rather than rely on his sensor to see when he is coming up.     Novolog dosing:   Breakfast- cheerios- 1/5g   Lunch- sometimes leftovers 1/6g  Dinner- usually low carb. 1/5g.    HS snack- cut out.  Only eats if he is 100 or lower at bedtime.   Correction: 1/30 over 130 mg/dL.     He takes Tresiba 24 units daily.      Christiano wears a jamila sensor with overall average of 170 mg/dL (CV 33%), TIR 57%, low 1%  for the past two weeks.                Christiano agreed to start a statin last year.  He has been tolerating this well. Cholesterol has improved.  Dealing with frozen shoulder issues.  Had a cortisone injection and we put him on NPH during that time. It worked well.  He is doing physical therapy.     He otherwise has been feeling well and in his usual state of health.  He has no other concerns today.       Past Medical History:   Diagnosis Date    Type 1 diabetes (H)     age  of onset 14 years       Past Surgical History:   Procedure Laterality Date    COSMETIC SURGERY  1993    Mole removal    ENT SURGERY  1985    Tubes in ears       Family History   Problem Relation Age of Onset    Diabetes Brother     Cerebrovascular Disease Paternal Grandmother     Arthritis Mother     Arthritis Maternal Grandmother     Coronary Artery Disease Father        Social History     Social History    Marital status: Single     Spouse name: N/A    Number of children: N/A    Years of education: N/A     Social History Main Topics    Smoking status: Never Smoker    Smokeless tobacco: Never Used    Alcohol use No    Drug use: No    Sexual activity: Yes     Partners: Female     Other Topics Concern    None     Social History Narrative   Social History: Works for [employer]. . DaughterDaphney born August, 2019.  DaughterEbony born May, 2022.     Current Outpatient Medications   Medication    blood glucose (CONTOUR NEXT TEST) test strip    blood glucose monitoring (CANDE MICROLET) lancets    blood glucose monitoring (NO BRAND SPECIFIED) meter device kit    cholecalciferol (VITAMIN D) 1000 UNIT tablet    Continuous Blood Gluc Sensor (Emerging TravelSTYLE LILO 3 SENSOR) MISC    Glucagon (BAQSIMI TWO PACK) 3 MG/DOSE POWD    Insulin Aspart FlexPen 100 UNIT/ML SOPN    insulin degludec (TRESIBA FLEXTOUCH) 200 UNIT/ML pen    insulin pen needle (B-D U/F) 31G X 8 MM miscellaneous    KETOSTIX test strip    losartan (COZAAR) 25 MG tablet    pravastatin (PRAVACHOL) 10 MG tablet     No current facility-administered medications for this visit.        No Known Allergies    Physical Exam  /88   Pulse 68   Wt 88.5 kg (195 lb)   SpO2 99%   BMI 24.37 kg/m    GENERAL:  Alert and oriented X3, NAD, well dressed, answering questions appropriately, appears stated age.  HEENT: OP clear, no lymphadenopathy, no thyromegaly, non-tender, no exophthalmus, no proptosis, EOMI, no lid lag, no retraction  EXTREMITIES: no edema,  +pulses, no rashes, no lesions  NEUROLOGY: CN grossly intact, + monofilament, +vibratory sensation    RESULTS  Lab Results   Component Value Date    A1C 7.6 (H) 06/14/2023    A1C 7.7 (H) 02/10/2023    A1C 7.4 (H) 10/20/2021    A1C 7.3 (H) 06/17/2021    A1C 6.9 (H) 12/08/2020    A1C 6.8 (H) 09/03/2020    A1C 7.7 (H) 05/13/2019    A1C 7.9 (H) 08/13/2014    HEMOGLOBINA1 7.0 07/18/2022    HEMOGLOBINA1 7.0 03/21/2022    HEMOGLOBINA1 7.5 (A) 03/02/2020    HEMOGLOBINA1 7.5 (A) 11/26/2019    HEMOGLOBINA1 8.2 (A) 08/26/2019       TSH   Date Value Ref Range Status   02/10/2023 0.46 0.30 - 4.20 uIU/mL Final   06/17/2021 0.88 0.40 - 4.00 mU/L Final   05/29/2020 2.18 0.40 - 4.00 mU/L Final   05/13/2019 2.14 0.40 - 4.00 mU/L Final   01/30/2018 2.46 0.40 - 4.00 mU/L Final   09/16/2016 0.97 0.40 - 4.00 mU/L Final     T4 Free   Date Value Ref Range Status   09/16/2016 1.01 0.76 - 1.46 ng/dL Final   06/24/2015 1.02 0.76 - 1.46 ng/dL Final       ALT   Date Value Ref Range Status   10/20/2021 36 0 - 70 U/L Final   03/05/2015 20 0 - 70 U/L Final   ]    Recent Labs   Lab Test 02/10/23  0936 03/14/22  0850   CHOL 137 145   HDL 39* 46   LDL 88 89   TRIG 49 50       Lab Results   Component Value Date     02/10/2023     06/17/2021      Lab Results   Component Value Date    POTASSIUM 4.2 02/10/2023    POTASSIUM 4.2 10/20/2021    POTASSIUM 4.0 06/17/2021     Lab Results   Component Value Date    CHLORIDE 100 02/10/2023    CHLORIDE 103 10/20/2021    CHLORIDE 103 06/17/2021     Lab Results   Component Value Date    CELESTE 9.8 02/10/2023    CELESTE 9.2 06/17/2021     Lab Results   Component Value Date    CO2 24 02/10/2023    CO2 30 10/20/2021    CO2 31 06/17/2021     Lab Results   Component Value Date    BUN 9.1 02/10/2023    BUN 10 10/20/2021    BUN 8 06/17/2021     Lab Results   Component Value Date    CR 0.65 02/10/2023    CR 0.73 06/17/2021       GFR Estimate   Date Value Ref Range Status   02/10/2023 >90 >60 mL/min/1.73m2 Final      Comment:     eGFR calculated using 2021 CKD-EPI equation.   10/20/2021 >90 >60 mL/min/1.73m2 Final     Comment:     As of July 11, 2021, eGFR is calculated by the CKD-EPI creatinine equation, without race adjustment. eGFR can be influenced by muscle mass, exercise, and diet. The reported eGFR is an estimation only and is only applicable if the renal function is stable.   06/17/2021 >90 >60 mL/min/[1.73_m2] Final     Comment:     Non  GFR Calc  Starting 12/18/2018, serum creatinine based estimated GFR (eGFR) will be   calculated using the Chronic Kidney Disease Epidemiology Collaboration   (CKD-EPI) equation.     12/08/2020 >90 >60 mL/min/[1.73_m2] Final     Comment:     Non  GFR Calc  Starting 12/18/2018, serum creatinine based estimated GFR (eGFR) will be   calculated using the Chronic Kidney Disease Epidemiology Collaboration   (CKD-EPI) equation.     05/29/2020 >90 >60 mL/min/[1.73_m2] Final     Comment:     Non  GFR Calc  Starting 12/18/2018, serum creatinine based estimated GFR (eGFR) will be   calculated using the Chronic Kidney Disease Epidemiology Collaboration   (CKD-EPI) equation.       GFR Estimate If Black   Date Value Ref Range Status   06/17/2021 >90 >60 mL/min/[1.73_m2] Final     Comment:      GFR Calc  Starting 12/18/2018, serum creatinine based estimated GFR (eGFR) will be   calculated using the Chronic Kidney Disease Epidemiology Collaboration   (CKD-EPI) equation.     12/08/2020 >90 >60 mL/min/[1.73_m2] Final     Comment:      GFR Calc  Starting 12/18/2018, serum creatinine based estimated GFR (eGFR) will be   calculated using the Chronic Kidney Disease Epidemiology Collaboration   (CKD-EPI) equation.     05/29/2020 >90 >60 mL/min/[1.73_m2] Final     Comment:      GFR Calc  Starting 12/18/2018, serum creatinine based estimated GFR (eGFR) will be   calculated using the Chronic Kidney Disease Epidemiology  Collaboration   (CKD-EPI) equation.         Lab Results   Component Value Date    MICROL <12.0 02/10/2023    MICROL <5 06/17/2021     No results found for: MICROALBUMIN  Lab Results   Component Value Date    CPEPT <0.1 (L) 02/10/2023       Vitamin B12   Date Value Ref Range Status   02/10/2023 1,071 232 - 1,245 pg/mL Final   09/03/2020 713 193 - 986 pg/mL Final   ]    Most recent eye exam date: : Not Found     25 OH Vit D total   Date Value Ref Range Status   06/17/2021 <52 20 - 75 ug/L Final     Comment:     Season, race, dietary intake, and treatment affect the concentration of   25-hydroxy-Vitamin D. Values may decrease during winter months and increase   during summer months. Values 20-29 ug/L may indicate Vitamin D insufficiency   and values <20 ug/L may indicate Vitamin D deficiency.  This test was developed and its performance characteristics determined by the   [facility].   It has not been cleared or approved by the FDA. The laboratory is regulated   under CLIA as qualified to perform high-complexity testing. This test is used   for clinical purposes. It should not be regarded as investigational or for   research.     08/22/2017 <34 20 - 75 ug/L Final     Comment:     Season, race, dietary intake, and treatment affect the concentration of   25-hydroxy-Vitamin D. Values may decrease during winter months and increase   during summer months. Values 20-29 ug/L may indicate Vitamin D insufficiency   and values <20 ug/L may indicate Vitamin D deficiency.  This test was developed and its performance characteristics determined by the   [facility],  [facility]. It has   not been cleared or approved by the FDA. The laboratory is regulated under   CLIA as qualified to perform high-complexity testing. This test is used for   clinical purposes. It should not be regarded as investigational or for   research.        No results found for: PTHI  Calcium   Date Value Ref Range Status   02/10/2023 9.8 8.6 - 10.0 mg/dL Final   10/20/2021 9.6 8.5 - 10.1 mg/dL Final   06/17/2021 9.2 8.5 - 10.1 mg/dL Final   12/08/2020 9.4 8.5 - 10.1 mg/dL Final     No results found for: CALCIUMIONIZ  Albumin   Date Value Ref Range Status   10/20/2021 4.0 3.4 - 5.0 g/dL Final         Assessment/Plan:     1.  Type 1 diabetes- Christiano is doing quite well, but likely underestimating carbs and going too high.  He has minimal hypoglycemia.  We discussed importance of treating with just 15g carb and rechecking with a finger stick.  Will check A1c. We made the following plan today (instructions given to patient):     Christiano- your glucose is well controlled, but just a bit too high after eating.     Focus on getting more physical activity    Work on covering all of your carbs.     Emergency issues: Please contact the clinic as soon as you recognize a problem.  Here are some concerns you should contact us about.  -Vomiting: more than twice.  Please check ketones.  If positive, go to ER. Monitor glucose hourly.   -High glucose (over 300 mg/dL twice in a row): Please check ketones.  If ketones are negative, take an insulin correction and recheck glucose in 1 hour.  If glucose is not coming down, please call the clinic. If ketones are moderate or large, drink lots of water, take an insulin correction, and recheck ketones in 1 hour.  If ketones are still high (or you are vomiting), go to the ER.  -Hypoglycemia (low glucose):   If glucose is less than 70 mg/dL, treat with 15g carb (4 glucose tablets), recheck glucose in 10 minutes.  If low again, repeat.   If glucose is less than 54 mg/dL, treat with 30g carb, recheck glucose in 10 minutes.  If low again, repeat.  Keep glucagon in your home in case of severe hypoglycemia and train someone how to use this.    Emergency kit (please ensure you always have these with you):   Glucose tablets  Glucagon  Insulin  Syringes (if  on a pump)  Extra infusion set (if on a pump)  Ketone strips    Contact information:   If you have concerns, please send me a [employer] message or call the clinic at [phone].  For more urgent concerns, please call [phone] after hours/weekends and ask to speak with the endocrinologist on call.      Please let me know if you are having low blood sugars less than 70 or over 350 mg/dL.  Do not wait until your next appointment if this is happening.      If you have concerns, please send me a [employer] message M-Th, call the clinic at [phone], or call [phone] after hours/weekends and ask to speak with the endocrinologist on call.      2.  Risk factors-     Retinopathy:  No. Had recent eye exam in December, 2022.  Nephropathy:  BP is well controlled on lisinopril 5 mg daily.  No microalbuminuria.  Creatinine stable.    Neuropathy: No.    Feet: OK, no ulcers.   Taking ASA: no  Lipids:  LDL is in target, now on pravastatin 10 mg daily.  Tolerating this well.     Celiac screening: negative screen 5/19  Vitamin D: Improved. He is not taking 1000 international unit(s) daily.  Will resume.     3.  F/U in 3 months with me, in 6 mos with Dr. Bland.     27 minutes spent on the date of the encounter doing chart review, review of test results, review of continuous glucose sensor, interpretation of glucose data, patient visit and documentation, counseling/coordination of care, and discussion of follow up plan for worsening hyper and hypoglycemia.  The patient understood and is satisfied with today's visit.     Shena Iniguez PA-C, MPAS   [facility]  Department of Medicine  Division of Endocrinology and Diabetes

## 2023-09-22 ENCOUNTER — MYC MEDICAL ADVICE (OUTPATIENT)
Dept: FAMILY MEDICINE | Facility: CLINIC | Age: 43
End: 2023-09-22
Payer: COMMERCIAL

## 2023-09-22 DIAGNOSIS — I10 ESSENTIAL HYPERTENSION, BENIGN: ICD-10-CM

## 2023-10-05 ENCOUNTER — MYC MEDICAL ADVICE (OUTPATIENT)
Dept: FAMILY MEDICINE | Facility: CLINIC | Age: 43
End: 2023-10-05
Payer: COMMERCIAL

## 2023-10-05 RX ORDER — LOSARTAN POTASSIUM 25 MG/1
25 TABLET ORAL DAILY
Qty: 90 TABLET | Refills: 3 | Status: SHIPPED | OUTPATIENT
Start: 2023-10-05 | End: 2024-10-03

## 2023-10-05 NOTE — TELEPHONE ENCOUNTER
Routing refill request to provider for review/approval because:  Labs out of range:  Cr  Mychart message sent to schedule physical   Sara MCKEON RN

## 2023-10-27 ENCOUNTER — TRANSFERRED RECORDS (OUTPATIENT)
Dept: HEALTH INFORMATION MANAGEMENT | Facility: CLINIC | Age: 43
End: 2023-10-27
Payer: COMMERCIAL

## 2023-11-13 ASSESSMENT — ENCOUNTER SYMPTOMS
HEARTBURN: 0
DIARRHEA: 0
EYE PAIN: 0
HEMATURIA: 0
CONSTIPATION: 0
JOINT SWELLING: 0
PALPITATIONS: 0
SHORTNESS OF BREATH: 0
NAUSEA: 0
ARTHRALGIAS: 0
FEVER: 0
CHILLS: 0
DYSURIA: 0
WEAKNESS: 0
PARESTHESIAS: 0
DIZZINESS: 0
NERVOUS/ANXIOUS: 1
ABDOMINAL PAIN: 0
FREQUENCY: 0
HEADACHES: 0
SORE THROAT: 0
HEMATOCHEZIA: 0
COUGH: 1
MYALGIAS: 1

## 2023-11-15 ENCOUNTER — OFFICE VISIT (OUTPATIENT)
Dept: FAMILY MEDICINE | Facility: CLINIC | Age: 43
End: 2023-11-15
Payer: COMMERCIAL

## 2023-11-15 VITALS
SYSTOLIC BLOOD PRESSURE: 126 MMHG | RESPIRATION RATE: 14 BRPM | HEART RATE: 95 BPM | WEIGHT: 195 LBS | OXYGEN SATURATION: 97 % | TEMPERATURE: 97.5 F | HEIGHT: 75 IN | DIASTOLIC BLOOD PRESSURE: 73 MMHG | BODY MASS INDEX: 24.25 KG/M2

## 2023-11-15 DIAGNOSIS — L98.9 SKIN LESION: ICD-10-CM

## 2023-11-15 DIAGNOSIS — H61.21 IMPACTED CERUMEN OF RIGHT EAR: ICD-10-CM

## 2023-11-15 DIAGNOSIS — I10 ESSENTIAL HYPERTENSION, BENIGN: ICD-10-CM

## 2023-11-15 DIAGNOSIS — E78.5 HYPERLIPIDEMIA LDL GOAL <70: ICD-10-CM

## 2023-11-15 DIAGNOSIS — Z01.84 IMMUNITY STATUS TESTING: ICD-10-CM

## 2023-11-15 DIAGNOSIS — E10.9 WELL CONTROLLED TYPE 1 DIABETES MELLITUS (H): ICD-10-CM

## 2023-11-15 DIAGNOSIS — G89.29 CHRONIC RIGHT-SIDED LOW BACK PAIN WITHOUT SCIATICA: ICD-10-CM

## 2023-11-15 DIAGNOSIS — E10.9 TYPE 1 DIABETES MELLITUS WITHOUT RETINOPATHY (H): ICD-10-CM

## 2023-11-15 DIAGNOSIS — M54.50 CHRONIC RIGHT-SIDED LOW BACK PAIN WITHOUT SCIATICA: ICD-10-CM

## 2023-11-15 DIAGNOSIS — Z00.00 ROUTINE GENERAL MEDICAL EXAMINATION AT A HEALTH CARE FACILITY: Primary | ICD-10-CM

## 2023-11-15 LAB
ALBUMIN SERPL BCG-MCNC: 4.4 G/DL (ref 3.5–5.2)
ALP SERPL-CCNC: 69 U/L (ref 40–150)
ALT SERPL W P-5'-P-CCNC: 20 U/L (ref 0–70)
ANION GAP SERPL CALCULATED.3IONS-SCNC: 9 MMOL/L (ref 7–15)
AST SERPL W P-5'-P-CCNC: 16 U/L (ref 0–45)
BILIRUB SERPL-MCNC: 0.4 MG/DL
BUN SERPL-MCNC: 7.6 MG/DL (ref 6–20)
CALCIUM SERPL-MCNC: 9.7 MG/DL (ref 8.6–10)
CHLORIDE SERPL-SCNC: 99 MMOL/L (ref 98–107)
CHOLEST SERPL-MCNC: 137 MG/DL
CREAT SERPL-MCNC: 0.7 MG/DL (ref 0.67–1.17)
DEPRECATED HCO3 PLAS-SCNC: 29 MMOL/L (ref 22–29)
EGFRCR SERPLBLD CKD-EPI 2021: >90 ML/MIN/1.73M2
GLUCOSE SERPL-MCNC: 251 MG/DL (ref 70–99)
HBA1C MFR BLD: 7.3 % (ref 0–5.6)
HBV SURFACE AB SERPL IA-ACNC: 0.66 M[IU]/ML
HBV SURFACE AB SERPL IA-ACNC: NONREACTIVE M[IU]/ML
HDLC SERPL-MCNC: 41 MG/DL
LDLC SERPL CALC-MCNC: 84 MG/DL
NONHDLC SERPL-MCNC: 96 MG/DL
POTASSIUM SERPL-SCNC: 4.1 MMOL/L (ref 3.4–5.3)
PROT SERPL-MCNC: 7.4 G/DL (ref 6.4–8.3)
SODIUM SERPL-SCNC: 137 MMOL/L (ref 135–145)
TRIGL SERPL-MCNC: 58 MG/DL

## 2023-11-15 PROCEDURE — 80061 LIPID PANEL: CPT | Performed by: FAMILY MEDICINE

## 2023-11-15 PROCEDURE — 86706 HEP B SURFACE ANTIBODY: CPT | Performed by: FAMILY MEDICINE

## 2023-11-15 PROCEDURE — 80053 COMPREHEN METABOLIC PANEL: CPT | Performed by: FAMILY MEDICINE

## 2023-11-15 PROCEDURE — 99213 OFFICE O/P EST LOW 20 MIN: CPT | Mod: 25 | Performed by: FAMILY MEDICINE

## 2023-11-15 PROCEDURE — 83036 HEMOGLOBIN GLYCOSYLATED A1C: CPT | Performed by: FAMILY MEDICINE

## 2023-11-15 PROCEDURE — 36415 COLL VENOUS BLD VENIPUNCTURE: CPT | Performed by: FAMILY MEDICINE

## 2023-11-15 PROCEDURE — 99396 PREV VISIT EST AGE 40-64: CPT | Mod: 25 | Performed by: FAMILY MEDICINE

## 2023-11-15 PROCEDURE — 69210 REMOVE IMPACTED EAR WAX UNI: CPT | Mod: RT | Performed by: FAMILY MEDICINE

## 2023-11-15 ASSESSMENT — ENCOUNTER SYMPTOMS
PALPITATIONS: 0
PARESTHESIAS: 0
HEMATOCHEZIA: 0
WEAKNESS: 0
FEVER: 0
DIARRHEA: 0
CONSTIPATION: 0
SORE THROAT: 0
EYE PAIN: 0
DYSURIA: 0
HEADACHES: 0
FREQUENCY: 0
ARTHRALGIAS: 0
CHILLS: 0
NAUSEA: 0
JOINT SWELLING: 0
SHORTNESS OF BREATH: 0
COUGH: 1
HEMATURIA: 0
NERVOUS/ANXIOUS: 1
HEARTBURN: 0
DIZZINESS: 0
MYALGIAS: 1
ABDOMINAL PAIN: 0

## 2023-11-15 ASSESSMENT — PAIN SCALES - GENERAL: PAINLEVEL: NO PAIN (0)

## 2023-11-15 NOTE — PROGRESS NOTES
"SUBJECTIVE:   Christiano is a 43 year old, presenting for the following:  Physical        11/15/2023     9:47 AM   Additional Questions   Roomed by Saeid YOUSIF       Healthy Habits:     Getting at least 3 servings of Calcium per day:  Yes    Bi-annual eye exam:  Yes    Dental care twice a year:  Yes    Sleep apnea or symptoms of sleep apnea:  None    Diet:  Regular (no restrictions)    Frequency of exercise:  2-3 days/week    Duration of exercise:  15-30 minutes    Taking medications regularly:  Yes    Medication side effects:  Not applicable and None    Additional concerns today:  Yes    He has a history of type 1 diabetes that has been under good control on Tresiba and NovoLog.  He sees endocrinology every few months.  He feels like the diabetes has been under good control.  On 6/14/2023 his hemoglobin A1c was 7.6%.    On 2/10/2023 his LDL was 88    He describes occasionally getting low back pain symptoms.  This tends to be worse on the right side.  He denies any symptoms radiating down the legs.  He is wondering if there are any things he can do to help improve this issue which seems to be a chronic problem now.    He can feel plugging symptoms in the right ear.  He has had issues with wax buildup in the past.    Social history: , 2 young children.  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                   Social History     Tobacco Use    Smoking status: Never    Smokeless tobacco: Never   Substance Use Topics    Alcohol use: Not Currently     Comment: Have not drank since 2007 11/13/2023    11:22 AM   Alcohol Use   Prescreen: >3 drinks/day or >7 drinks/week? Not Applicable          No data to display                Last PSA: No results found for: \"PSA\"    Reviewed orders with patient. Reviewed health maintenance and updated orders accordingly - Yes  Lab work is in process  Labs reviewed in EPIC    Reviewed and updated as needed this visit by clinical staff   Tobacco  Allergies  Meds          " "    Reviewed and updated as needed this visit by Provider     Meds                 Review of Systems   Constitutional:  Negative for chills and fever.   HENT:  Negative for congestion, ear pain, hearing loss and sore throat.    Eyes:  Negative for pain and visual disturbance.   Respiratory:  Positive for cough. Negative for shortness of breath.    Cardiovascular:  Negative for chest pain, palpitations and peripheral edema.   Gastrointestinal:  Negative for abdominal pain, constipation, diarrhea, heartburn, hematochezia and nausea.   Genitourinary:  Negative for dysuria, frequency, genital sores, hematuria, impotence, penile discharge and urgency.   Musculoskeletal:  Positive for myalgias. Negative for arthralgias and joint swelling.   Skin:  Negative for rash.   Neurological:  Negative for dizziness, weakness, headaches and paresthesias.   Psychiatric/Behavioral:  Negative for mood changes. The patient is nervous/anxious.          OBJECTIVE:   /73   Pulse 95   Temp 97.5  F (36.4  C) (Temporal)   Resp 14   Ht 1.908 m (6' 3.1\")   Wt 88.5 kg (195 lb)   SpO2 97%   BMI 24.31 kg/m      Physical Exam  GENERAL: healthy, alert and no distress  EYES: Eyes grossly normal to inspection, PERRL and conjunctivae and sclerae normal  HENT: ear canals and TM's normal, nose and mouth without ulcers or lesions  NECK: no adenopathy, no asymmetry, masses, or scars and thyroid normal to palpation  RESP: lungs clear to auscultation - no rales, rhonchi or wheezes  CV: regular rate and rhythm, normal S1 S2, no S3 or S4, no murmur, click or rub, no peripheral edema and peripheral pulses strong  ABDOMEN: soft, nontender, no hepatosplenomegaly, no masses and bowel sounds normal  MS: no gross musculoskeletal defects noted, no edema.  Nontender over the thoracic and lumbar spine.  He is mildly tender over the paraspinal muscles in the right lumbar region.  SKIN: There is a 1 x 0.6 cm flat brown mole in the mid back.  The surrounding " skin appears normal.  He has a smooth skin colored bump on his right hand and a similar skin lesion in the left upper back.  NEURO: Normal strength and tone, mentation intact and speech normal  PSYCH: mentation appears normal, affect normal/bright    Diagnostic Test Results:  Labs reviewed in Epic    ASSESSMENT/PLAN:   1. Routine general medical examination at a health care facility  I encouraged him to keep working on getting regular exercise and eat a healthy diet.  We are going to check nonfasting labs as listed below.    2. Type 1 diabetes mellitus without retinopathy (H)  We are including a hemoglobin A1c as part of today's labs.  He will continue following closely with his endocrinologist.    3. Essential hypertension, benign  Blood pressures well controlled on losartan.  Refills were given.  - Comprehensive metabolic panel (BMP + Alb, Alk Phos, ALT, AST, Total. Bili, TP); Future  - Comprehensive metabolic panel (BMP + Alb, Alk Phos, ALT, AST, Total. Bili, TP)    4. Hyperlipidemia LDL goal <70  Continue pravastatin.  - Comprehensive metabolic panel (BMP + Alb, Alk Phos, ALT, AST, Total. Bili, TP); Future  - Lipid Profile (Chol, Trig, HDL, LDL calc); Future  - Comprehensive metabolic panel (BMP + Alb, Alk Phos, ALT, AST, Total. Bili, TP)  - Lipid Profile (Chol, Trig, HDL, LDL calc)    5. Immunity status testing  - Hepatitis B Surface Antibody; Future  - Hepatitis B Surface Antibody    6. Chronic right-sided low back pain without sciatica  I recommended he pursue physical therapy for the chronic right-sided low back pain symptoms.  He would benefit from core strengthening exercises to help prevent future exacerbations.  - Physical Therapy Referral; Future    7. Impacted cerumen of right ear  I personally removed a large amount of impacted cerumen from the right canal using the lighted curette today.  He tolerated the procedure well.  - CO REMOVAL IMPACTED CERUMEN IRRIGATION/LVG UNILAT    8. Skin lesion  He has a  fairly large mole in the back that measures 1 x 0.6 cm.  I recommended he see dermatology for further evaluation.  - Adult Dermatology  Referral; Future    9. Well controlled type 1 diabetes mellitus (H)  - Hemoglobin A1c      Patient has been advised of split billing requirements and indicates understanding: Yes      COUNSELING:   Reviewed preventive health counseling, as reflected in patient instructions       Regular exercise       Healthy diet/nutrition        He reports that he has never smoked. He has never used smokeless tobacco.            Marc Samuel, [facility]

## 2023-12-05 NOTE — PROGRESS NOTES
Outcome for 12/05/23 8:37 AM: Data uploaded on [employer]  Sindhu Blackwell MA  Outcome for 12/08/23 1:45 PM: Data obtained via [employer] website  Sindhu Blackwell MA    Start time: 0856  End time: 0909  Provider location: off site- home  Patient location: off site- home.    HPI:   Christiano is a 44 yo man here for follow up of type 1 diabetes, which he has had since age 14. He also sees Dr. Bland.  He has no known complications from his diabetes. He reports that overall things are going well.  He does not get as much exercise as he wants to.  Busy with work and two little children.   We have talked about in-pen and pumps in the past, but he is comfortable with doing his own calculations.  His glucose has been a bit high after breakfast and sometimes is dropping after lunch.  He finds that he sees his glucose trending down, then he treats and it goes too high. He usually does not have symptoms.  He is trying to avoid overtreating, but usually consumes 20-30g carb when he is heading low. He does recognize there is a delay in his sensor and glucose does not come up as quickly as with a finger stick.      He has been fighting through holiday eating- more snacks at work.  This past weekend, had norovirus.  He never vomited. Just a lot of diarrhea. Doing better now.      Novolog dosing:   Breakfast- cheerios- 1/5g   Lunch- sometimes leftovers 1/6g  Dinner- usually low carb. 1/5g.    HS snack- cut out.  Only eats if he is 100 or lower at bedtime.   Correction: 1/30 over 130 mg/dL.     He takes Tresiba 24 units daily.      Christiano wears a jamila sensor with overall average of 169 mg/dL (CV 30%), TIR 62%, low 0%  for the past two weeks.                Christiano remains on a statin. Cholesterol has improved.  Dealing with frozen shoulder issues- doing better.  Had a cortisone injection and we put him on NPH during that time. It worked well.  He is doing physical therapy at home. Now having low back issues.  Will be seeing someone soon about  this.    He otherwise has been feeling well and in his usual state of health.  He has no other concerns today.       Past Medical History:   Diagnosis Date    Hypertension     Type 1 diabetes (H)     age of onset 14 years       Past Surgical History:   Procedure Laterality Date    COSMETIC SURGERY  1993    Mole removal    ENT SURGERY  1985    Tubes in ears    GENITOURINARY SURGERY  2023    Vasectomy       Family History   Problem Relation Age of Onset    Diabetes Brother     Cerebrovascular Disease Paternal Grandmother     Arthritis Mother     Arthritis Maternal Grandmother     Coronary Artery Disease Father        Social History     Social History    Marital status: Single     Spouse name: N/A    Number of children: N/A    Years of education: N/A     Social History Main Topics    Smoking status: Never Smoker    Smokeless tobacco: Never Used    Alcohol use No    Drug use: No    Sexual activity: Yes     Partners: Female     Other Topics Concern    None     Social History Narrative   Social History: Works for [employer]. . DaughterDaphney born August, 2019.  DaughterEbony born May, 2022.     Current Outpatient Medications   Medication    blood glucose (CONTOUR NEXT TEST) test strip    blood glucose monitoring (CANDE MICROLET) lancets    blood glucose monitoring (NO BRAND SPECIFIED) meter device kit    cholecalciferol (VITAMIN D) 1000 UNIT tablet    Continuous Blood Gluc Sensor (FREESTYLE LILO 3 SENSOR) MISC    Glucagon (BAQSIMI TWO PACK) 3 MG/DOSE POWD    Insulin Aspart FlexPen 100 UNIT/ML SOPN    insulin degludec (TRESIBA FLEXTOUCH) 200 UNIT/ML pen    insulin pen needle (B-D U/F) 31G X 8 MM miscellaneous    KETOSTIX test strip    losartan (COZAAR) 25 MG tablet    pravastatin (PRAVACHOL) 10 MG tablet     No current facility-administered medications for this visit.        No Known Allergies    Physical Exam:  GENERAL: healthy, alert and no distress  RESP: no audible wheeze, cough, or visible  cyanosis.  No visible retractions or increased work of breathing.  Able to speak fully in complete sentences.  PSYCH: mentation appears normal, affect normal/bright, judgement and insight intact, normal speech and appearance well-groomed      RESULTS  Lab Results   Component Value Date    A1C 7.3 (H) 11/15/2023    A1C 7.6 (H) 06/14/2023    A1C 7.7 (H) 02/10/2023    A1C 7.4 (H) 10/20/2021    A1C 7.3 (H) 06/17/2021    A1C 6.9 (H) 12/08/2020    A1C 6.8 (H) 09/03/2020    A1C 7.7 (H) 05/13/2019    A1C 7.9 (H) 08/13/2014    HEMOGLOBINA1 7.0 07/18/2022    HEMOGLOBINA1 7.0 03/21/2022    HEMOGLOBINA1 7.5 (A) 03/02/2020    HEMOGLOBINA1 7.5 (A) 11/26/2019    HEMOGLOBINA1 8.2 (A) 08/26/2019       TSH   Date Value Ref Range Status   02/10/2023 0.46 0.30 - 4.20 uIU/mL Final   06/17/2021 0.88 0.40 - 4.00 mU/L Final   05/29/2020 2.18 0.40 - 4.00 mU/L Final   05/13/2019 2.14 0.40 - 4.00 mU/L Final   01/30/2018 2.46 0.40 - 4.00 mU/L Final   09/16/2016 0.97 0.40 - 4.00 mU/L Final     T4 Free   Date Value Ref Range Status   09/16/2016 1.01 0.76 - 1.46 ng/dL Final   06/24/2015 1.02 0.76 - 1.46 ng/dL Final       ALT   Date Value Ref Range Status   11/15/2023 20 0 - 70 U/L Final     Comment:     Reference intervals for this test were updated on 6/12/2023 to more accurately reflect our healthy population. There may be differences in the flagging of prior results with similar values performed with this method. Interpretation of those prior results can be made in the context of the updated reference intervals.     10/20/2021 36 0 - 70 U/L Final   03/05/2015 20 0 - 70 U/L Final   ]    Recent Labs   Lab Test 02/10/23  0936 03/14/22  0850   CHOL 137 145   HDL 39* 46   LDL 88 89   TRIG 49 50       Lab Results   Component Value Date     02/10/2023     06/17/2021      Lab Results   Component Value Date    POTASSIUM 4.2 02/10/2023    POTASSIUM 4.2 10/20/2021    POTASSIUM 4.0 06/17/2021     Lab Results   Component Value Date    CHLORIDE  100 02/10/2023    CHLORIDE 103 10/20/2021    CHLORIDE 103 06/17/2021     Lab Results   Component Value Date    CELESTE 9.8 02/10/2023    CELESTE 9.2 06/17/2021     Lab Results   Component Value Date    CO2 24 02/10/2023    CO2 30 10/20/2021    CO2 31 06/17/2021     Lab Results   Component Value Date    BUN 9.1 02/10/2023    BUN 10 10/20/2021    BUN 8 06/17/2021     Lab Results   Component Value Date    CR 0.65 02/10/2023    CR 0.73 06/17/2021       GFR Estimate   Date Value Ref Range Status   11/15/2023 >90 >60 mL/min/1.73m2 Final   02/10/2023 >90 >60 mL/min/1.73m2 Final     Comment:     eGFR calculated using 2021 CKD-EPI equation.   10/20/2021 >90 >60 mL/min/1.73m2 Final     Comment:     As of July 11, 2021, eGFR is calculated by the CKD-EPI creatinine equation, without race adjustment. eGFR can be influenced by muscle mass, exercise, and diet. The reported eGFR is an estimation only and is only applicable if the renal function is stable.   06/17/2021 >90 >60 mL/min/[1.73_m2] Final     Comment:     Non  GFR Calc  Starting 12/18/2018, serum creatinine based estimated GFR (eGFR) will be   calculated using the Chronic Kidney Disease Epidemiology Collaboration   (CKD-EPI) equation.     12/08/2020 >90 >60 mL/min/[1.73_m2] Final     Comment:     Non  GFR Calc  Starting 12/18/2018, serum creatinine based estimated GFR (eGFR) will be   calculated using the Chronic Kidney Disease Epidemiology Collaboration   (CKD-EPI) equation.     05/29/2020 >90 >60 mL/min/[1.73_m2] Final     Comment:     Non  GFR Calc  Starting 12/18/2018, serum creatinine based estimated GFR (eGFR) will be   calculated using the Chronic Kidney Disease Epidemiology Collaboration   (CKD-EPI) equation.       GFR Estimate If Black   Date Value Ref Range Status   06/17/2021 >90 >60 mL/min/[1.73_m2] Final     Comment:      GFR Calc  Starting 12/18/2018, serum creatinine based estimated GFR (eGFR) will be    calculated using the Chronic Kidney Disease Epidemiology Collaboration   (CKD-EPI) equation.     12/08/2020 >90 >60 mL/min/[1.73_m2] Final     Comment:      GFR Calc  Starting 12/18/2018, serum creatinine based estimated GFR (eGFR) will be   calculated using the Chronic Kidney Disease Epidemiology Collaboration   (CKD-EPI) equation.     05/29/2020 >90 >60 mL/min/[1.73_m2] Final     Comment:      GFR Calc  Starting 12/18/2018, serum creatinine based estimated GFR (eGFR) will be   calculated using the Chronic Kidney Disease Epidemiology Collaboration   (CKD-EPI) equation.         Lab Results   Component Value Date    MICROL <12.0 02/10/2023    MICROL <5 06/17/2021     No results found for: MICROALBUMIN  Lab Results   Component Value Date    CPEPT <0.1 (L) 02/10/2023       Vitamin B12   Date Value Ref Range Status   02/10/2023 1,071 232 - 1,245 pg/mL Final   09/03/2020 713 193 - 986 pg/mL Final   ]    Most recent eye exam date: : Not Found     25 OH Vit D total   Date Value Ref Range Status   06/17/2021 <52 20 - 75 ug/L Final     Comment:     Season, race, dietary intake, and treatment affect the concentration of   25-hydroxy-Vitamin D. Values may decrease during winter months and increase   during summer months. Values 20-29 ug/L may indicate Vitamin D insufficiency   and values <20 ug/L may indicate Vitamin D deficiency.  This test was developed and its performance characteristics determined by the   [facility].   It has not been cleared or approved by the FDA. The laboratory is regulated   under CLIA as qualified to perform high-complexity testing. This test is used   for clinical purposes. It should not be regarded as investigational or for   research.     08/22/2017 <34 20 - 75 ug/L Final     Comment:     Season, race, dietary intake, and treatment affect the concentration of   25-hydroxy-Vitamin D. Values may  "decrease during winter months and increase   during summer months. Values 20-29 ug/L may indicate Vitamin D insufficiency   and values <20 ug/L may indicate Vitamin D deficiency.  This test was developed and its performance characteristics determined by the   [facility],  [facility]. It has   not been cleared or approved by the FDA. The laboratory is regulated under   CLIA as qualified to perform high-complexity testing. This test is used for   clinical purposes. It should not be regarded as investigational or for   research.       No results found for: \"PTHI\"  Calcium   Date Value Ref Range Status   11/15/2023 9.7 8.6 - 10.0 mg/dL Final   02/10/2023 9.8 8.6 - 10.0 mg/dL Final   06/17/2021 9.2 8.5 - 10.1 mg/dL Final   12/08/2020 9.4 8.5 - 10.1 mg/dL Final     No results found for: \"CALCIUMIONIZ\"  Albumin   Date Value Ref Range Status   11/15/2023 4.4 3.5 - 5.2 g/dL Final   10/20/2021 4.0 3.4 - 5.0 g/dL Final         Assessment/Plan:     1.  Type 1 diabetes- Christiano is doing quite well.  Last A1c was 7.3%. Glucose is going too high after breakfast and dropping after lunch.  Christiano struggles with over-treating lows (or predicted lows), so we decided to relax his carb ratio at lunch in order to avoid these.  Will tighten IC ratio at breakfast.  AM glucose is in target and he is quite steady overnight, so will keep tresiba dose at 24 units.  We discussed importance of treating with just 15g carb and rechecking with a finger stick. We made the following plan today (instructions given to patient):     Breakfast- cheerios- 1/4 (was 1/5)g   Lunch- 1/7 (was 1/6)g    Goal is to avoid lows so you don't go high.      15g of carb to treat a low- recheck in 15 minutes.     Emergency issues: Please contact the clinic as soon as you recognize a problem.  Here are some concerns you should contact us about.  -Vomiting: more than twice.  Please check ketones.  If positive, go to ER. Monitor " glucose hourly.   -High glucose (over 300 mg/dL twice in a row): Please check ketones.  If ketones are negative, take an insulin correction and recheck glucose in 1 hour.  If glucose is not coming down, please call the clinic. If ketones are moderate or large, drink lots of water, take an insulin correction, and recheck ketones in 1 hour.  If ketones are still high (or you are vomiting), go to the ER.  -Hypoglycemia (low glucose):   If glucose is less than 70 mg/dL, treat with 15g carb (4 glucose tablets), recheck glucose in 10 minutes.  If low again, repeat.   If glucose is less than 54 mg/dL, treat with 30g carb, recheck glucose in 10 minutes.  If low again, repeat.  Keep glucagon in your home in case of severe hypoglycemia and train someone how to use this.    Emergency kit (please ensure you always have these with you):   Glucose tablets  Glucagon  Insulin  Syringes (if on a pump)  Extra infusion set (if on a pump)  Ketone strips    Contact information:   If you have concerns, please send me a IPG message or call the clinic at [phone].  For more urgent concerns, please call [phone] after hours/weekends and ask to speak with the endocrinologist on call.      Please let me know if you are having low blood sugars less than 70 or over 350 mg/dL.  Do not wait until your next appointment if this is happening.      If you have concerns, please send me a IPG message M-Th, call the clinic at [phone], or call [phone] after hours/weekends and ask to speak with the endocrinologist on call.      2.  Risk factors-     Retinopathy:  No. Had recent eye exam in December, 2023.  Nephropathy:  BP is historically well controlled on lisinopril 5 mg daily.  No microalbuminuria.  Creatinine stable.    Neuropathy: No.    Feet: OK, no ulcers.   Taking ASA: no  Lipids:  LDL is in target, now on pravastatin 10 mg daily.  Tolerating this well.     Celiac screening: negative screen 5/19  Vitamin D: Improved. He  is not taking 1000 international unit(s) daily.  Will resume.     3.  F/U in 3 months with me, in 6 mos with Dr. Bland.     22 minutes spent on the date of the encounter doing chart review, review of test results, review of continuous glucose sensor, interpretation of glucose data, patient visit and documentation, counseling/coordination of care, and discussion of follow up plan for worsening hyper and hypoglycemia.  The patient understood and is satisfied with today's visit.     Shena Iniguez PA-C, MPAS   [facility]  Department of Medicine  Division of Endocrinology and Diabetes

## 2023-12-12 ENCOUNTER — VIRTUAL VISIT (OUTPATIENT)
Dept: ENDOCRINOLOGY | Facility: CLINIC | Age: 43
End: 2023-12-12
Payer: COMMERCIAL

## 2023-12-12 VITALS — BODY MASS INDEX: 24.25 KG/M2 | WEIGHT: 195 LBS | HEIGHT: 75 IN

## 2023-12-12 DIAGNOSIS — E10.9 WELL CONTROLLED TYPE 1 DIABETES MELLITUS (H): Primary | ICD-10-CM

## 2023-12-12 PROCEDURE — 99213 OFFICE O/P EST LOW 20 MIN: CPT | Mod: VID | Performed by: PHYSICIAN ASSISTANT

## 2023-12-12 ASSESSMENT — PAIN SCALES - GENERAL: PAINLEVEL: NO PAIN (0)

## 2023-12-12 NOTE — PATIENT INSTRUCTIONS
Breakfast- cheerios- 1/4 (was 1/5)g   Lunch- 1/7 (was 1/6)g    Goal is to avoid lows so you don't go high.      15g of carb to treat a low- recheck in 15 minutes.       Emergency issues: Please contact the clinic as soon as you recognize a problem.  Here are some concerns you should contact us about.  -Vomiting: more than twice.  Please check ketones.  If positive, go to ER. Monitor glucose hourly.   -High glucose (over 300 mg/dL twice in a row): Please check ketones.  If ketones are negative, take an insulin correction and recheck glucose in 1 hour.  If glucose is not coming down, please call the clinic. If ketones are moderate or large, drink lots of water, take an insulin correction, and recheck ketones in 1 hour.  If ketones are still high (or you are vomiting), go to the ER.  -Hypoglycemia (low glucose):   If glucose is less than 70 mg/dL, treat with 15g carb (4 glucose tablets), recheck glucose in 10 minutes.  If low again, repeat.   If glucose is less than 54 mg/dL, treat with 30g carb, recheck glucose in 10 minutes.  If low again, repeat.  Keep glucagon in your home in case of severe hypoglycemia and train someone how to use this.    Emergency kit (please ensure you always have these with you):   Glucose tablets  Glucagon  Insulin  Syringes (if on a pump)  Extra infusion set (if on a pump)  Ketone strips    Contact information:   If you have concerns, please send me a [employer] message or call the clinic at [phone].  For more urgent concerns, please call [phone] after hours/weekends and ask to speak with the endocrinologist on call.      Please let me know if you are having low blood sugars less than 70 or over 350 mg/dL.  Do not wait until your next appointment if this is happening.      If you have concerns, please send me a [employer] message M-Th, call the clinic at [phone], or call [phone] after hours/weekends and ask to speak with the endocrinologist on call.

## 2023-12-12 NOTE — NURSING NOTE
Is the patient currently in the state of [place]? YES    Visit mode:VIDEO    If the visit is dropped, the patient can be reconnected by: VIDEO VISIT: Send to e-mail at: [email or web address]    Will anyone else be joining the visit? NO  (If patient encounters technical issues they should call [phone])    How would you like to obtain your AVS? MyChart    Are changes needed to the allergy or medication list? Pt stated no changes to allergies and Pt stated no med changes    Reason for visit: NARCISO PHILLIPF

## 2023-12-12 NOTE — LETTER
12/12/2023       RE: Agustín Gallegos  [address]  [facility] [address]     Dear Colleague,    Thank you for referring your patient, Agustín Gallegos, to the [facility] [place] [facility]. Please see a copy of my visit note below.    Outcome for 12/05/23 8:37 AM: Data uploaded on [employer]  Sindhu Blackwell MA  Outcome for 12/08/23 1:45 PM: Data obtained via [employer] website  Sindhu Blackwell MA    Start time: 0856  End time: 0909  Provider location: off site- home  Patient location: off site- home.    HPI:   Christiano is a 44 yo man here for follow up of type 1 diabetes, which he has had since age 14. He also sees Dr. Bland.  He has no known complications from his diabetes. He reports that overall things are going well.  He does not get as much exercise as he wants to.  Busy with work and two little children.   We have talked about in-pen and pumps in the past, but he is comfortable with doing his own calculations.  His glucose has been a bit high after breakfast and sometimes is dropping after lunch.  He finds that he sees his glucose trending down, then he treats and it goes too high. He usually does not have symptoms.  He is trying to avoid overtreating, but usually consumes 20-30g carb when he is heading low. He does recognize there is a delay in his sensor and glucose does not come up as quickly as with a finger stick.      He has been fighting through holiday eating- more snacks at work.  This past weekend, had norovirus.  He never vomited. Just a lot of diarrhea. Doing better now.      Novolog dosing:   Breakfast- cheerios- 1/5g   Lunch- sometimes leftovers 1/6g  Dinner- usually low carb. 1/5g.    HS snack- cut out.  Only eats if he is 100 or lower at bedtime.   Correction: 1/30 over 130 mg/dL.     He takes Tresiba 24 units daily.      Christiano wears a jamila sensor with overall average of 169 mg/dL (CV 30%), TIR 62%, low 0%   for the past two weeks.                Christiano remains on a statin. Cholesterol has improved.  Dealing with frozen shoulder issues- doing better.  Had a cortisone injection and we put him on NPH during that time. It worked well.  He is doing physical therapy at home. Now having low back issues.  Will be seeing someone soon about this.    He otherwise has been feeling well and in his usual state of health.  He has no other concerns today.       Past Medical History:   Diagnosis Date    Hypertension     Type 1 diabetes (H)     age of onset 14 years       Past Surgical History:   Procedure Laterality Date    COSMETIC SURGERY  1993    Mole removal    ENT SURGERY  1985    Tubes in ears    GENITOURINARY SURGERY  2023    Vasectomy       Family History   Problem Relation Age of Onset    Diabetes Brother     Cerebrovascular Disease Paternal Grandmother     Arthritis Mother     Arthritis Maternal Grandmother     Coronary Artery Disease Father        Social History     Social History    Marital status: Single     Spouse name: N/A    Number of children: N/A    Years of education: N/A     Social History Main Topics    Smoking status: Never Smoker    Smokeless tobacco: Never Used    Alcohol use No    Drug use: No    Sexual activity: Yes     Partners: Female     Other Topics Concern    None     Social History Narrative   Social History: Works for [employer]. . DaughterDaphney born August, 2019.  DaughterEbony born May, 2022.     Current Outpatient Medications   Medication    blood glucose (CONTOUR NEXT TEST) test strip    blood glucose monitoring (CANDE MICROLET) lancets    blood glucose monitoring (NO BRAND SPECIFIED) meter device kit    cholecalciferol (VITAMIN D) 1000 UNIT tablet    Continuous Blood Gluc Sensor (FREESTYLE LILO 3 SENSOR) MISC    Glucagon (BAQSIMI TWO PACK) 3 MG/DOSE POWD    Insulin Aspart FlexPen 100 UNIT/ML SOPN    insulin degludec (TRESIBA FLEXTOUCH) 200 UNIT/ML pen    insulin pen needle (B-D  U/F) 31G X 8 MM miscellaneous    KETOSTIX test strip    losartan (COZAAR) 25 MG tablet    pravastatin (PRAVACHOL) 10 MG tablet     No current facility-administered medications for this visit.        No Known Allergies    Physical Exam:  GENERAL: healthy, alert and no distress  RESP: no audible wheeze, cough, or visible cyanosis.  No visible retractions or increased work of breathing.  Able to speak fully in complete sentences.  PSYCH: mentation appears normal, affect normal/bright, judgement and insight intact, normal speech and appearance well-groomed      RESULTS  Lab Results   Component Value Date    A1C 7.3 (H) 11/15/2023    A1C 7.6 (H) 06/14/2023    A1C 7.7 (H) 02/10/2023    A1C 7.4 (H) 10/20/2021    A1C 7.3 (H) 06/17/2021    A1C 6.9 (H) 12/08/2020    A1C 6.8 (H) 09/03/2020    A1C 7.7 (H) 05/13/2019    A1C 7.9 (H) 08/13/2014    HEMOGLOBINA1 7.0 07/18/2022    HEMOGLOBINA1 7.0 03/21/2022    HEMOGLOBINA1 7.5 (A) 03/02/2020    HEMOGLOBINA1 7.5 (A) 11/26/2019    HEMOGLOBINA1 8.2 (A) 08/26/2019       TSH   Date Value Ref Range Status   02/10/2023 0.46 0.30 - 4.20 uIU/mL Final   06/17/2021 0.88 0.40 - 4.00 mU/L Final   05/29/2020 2.18 0.40 - 4.00 mU/L Final   05/13/2019 2.14 0.40 - 4.00 mU/L Final   01/30/2018 2.46 0.40 - 4.00 mU/L Final   09/16/2016 0.97 0.40 - 4.00 mU/L Final     T4 Free   Date Value Ref Range Status   09/16/2016 1.01 0.76 - 1.46 ng/dL Final   06/24/2015 1.02 0.76 - 1.46 ng/dL Final       ALT   Date Value Ref Range Status   11/15/2023 20 0 - 70 U/L Final     Comment:     Reference intervals for this test were updated on 6/12/2023 to more accurately reflect our healthy population. There may be differences in the flagging of prior results with similar values performed with this method. Interpretation of those prior results can be made in the context of the updated reference intervals.     10/20/2021 36 0 - 70 U/L Final   03/05/2015 20 0 - 70 U/L Final   ]    Recent Labs   Lab Test 02/10/23  0936  03/14/22  0850   CHOL 137 145   HDL 39* 46   LDL 88 89   TRIG 49 50       Lab Results   Component Value Date     02/10/2023     06/17/2021      Lab Results   Component Value Date    POTASSIUM 4.2 02/10/2023    POTASSIUM 4.2 10/20/2021    POTASSIUM 4.0 06/17/2021     Lab Results   Component Value Date    CHLORIDE 100 02/10/2023    CHLORIDE 103 10/20/2021    CHLORIDE 103 06/17/2021     Lab Results   Component Value Date    CELESTE 9.8 02/10/2023    CELESTE 9.2 06/17/2021     Lab Results   Component Value Date    CO2 24 02/10/2023    CO2 30 10/20/2021    CO2 31 06/17/2021     Lab Results   Component Value Date    BUN 9.1 02/10/2023    BUN 10 10/20/2021    BUN 8 06/17/2021     Lab Results   Component Value Date    CR 0.65 02/10/2023    CR 0.73 06/17/2021       GFR Estimate   Date Value Ref Range Status   11/15/2023 >90 >60 mL/min/1.73m2 Final   02/10/2023 >90 >60 mL/min/1.73m2 Final     Comment:     eGFR calculated using 2021 CKD-EPI equation.   10/20/2021 >90 >60 mL/min/1.73m2 Final     Comment:     As of July 11, 2021, eGFR is calculated by the CKD-EPI creatinine equation, without race adjustment. eGFR can be influenced by muscle mass, exercise, and diet. The reported eGFR is an estimation only and is only applicable if the renal function is stable.   06/17/2021 >90 >60 mL/min/[1.73_m2] Final     Comment:     Non  GFR Calc  Starting 12/18/2018, serum creatinine based estimated GFR (eGFR) will be   calculated using the Chronic Kidney Disease Epidemiology Collaboration   (CKD-EPI) equation.     12/08/2020 >90 >60 mL/min/[1.73_m2] Final     Comment:     Non  GFR Calc  Starting 12/18/2018, serum creatinine based estimated GFR (eGFR) will be   calculated using the Chronic Kidney Disease Epidemiology Collaboration   (CKD-EPI) equation.     05/29/2020 >90 >60 mL/min/[1.73_m2] Final     Comment:     Non  GFR Calc  Starting 12/18/2018, serum creatinine based estimated GFR  (eGFR) will be   calculated using the Chronic Kidney Disease Epidemiology Collaboration   (CKD-EPI) equation.       GFR Estimate If Black   Date Value Ref Range Status   06/17/2021 >90 >60 mL/min/[1.73_m2] Final     Comment:      GFR Calc  Starting 12/18/2018, serum creatinine based estimated GFR (eGFR) will be   calculated using the Chronic Kidney Disease Epidemiology Collaboration   (CKD-EPI) equation.     12/08/2020 >90 >60 mL/min/[1.73_m2] Final     Comment:      GFR Calc  Starting 12/18/2018, serum creatinine based estimated GFR (eGFR) will be   calculated using the Chronic Kidney Disease Epidemiology Collaboration   (CKD-EPI) equation.     05/29/2020 >90 >60 mL/min/[1.73_m2] Final     Comment:      GFR Calc  Starting 12/18/2018, serum creatinine based estimated GFR (eGFR) will be   calculated using the Chronic Kidney Disease Epidemiology Collaboration   (CKD-EPI) equation.         Lab Results   Component Value Date    MICROL <12.0 02/10/2023    MICROL <5 06/17/2021     No results found for: MICROALBUMIN  Lab Results   Component Value Date    CPEPT <0.1 (L) 02/10/2023       Vitamin B12   Date Value Ref Range Status   02/10/2023 1,071 232 - 1,245 pg/mL Final   09/03/2020 713 193 - 986 pg/mL Final   ]    Most recent eye exam date: : Not Found     25 OH Vit D total   Date Value Ref Range Status   06/17/2021 <52 20 - 75 ug/L Final     Comment:     Season, race, dietary intake, and treatment affect the concentration of   25-hydroxy-Vitamin D. Values may decrease during winter months and increase   during summer months. Values 20-29 ug/L may indicate Vitamin D insufficiency   and values <20 ug/L may indicate Vitamin D deficiency.  This test was developed and its performance characteristics determined by the   [facility], [facility].   It has not been cleared or approved by the FDA. The laboratory is regulated  "  under CLIA as qualified to perform high-complexity testing. This test is used   for clinical purposes. It should not be regarded as investigational or for   research.     08/22/2017 <34 20 - 75 ug/L Final     Comment:     Season, race, dietary intake, and treatment affect the concentration of   25-hydroxy-Vitamin D. Values may decrease during winter months and increase   during summer months. Values 20-29 ug/L may indicate Vitamin D insufficiency   and values <20 ug/L may indicate Vitamin D deficiency.  This test was developed and its performance characteristics determined by the   [facility],  Special Chemistry Laboratory. It has   not been cleared or approved by the FDA. The laboratory is regulated under   CLIA as qualified to perform high-complexity testing. This test is used for   clinical purposes. It should not be regarded as investigational or for   research.       No results found for: \"PTHI\"  Calcium   Date Value Ref Range Status   11/15/2023 9.7 8.6 - 10.0 mg/dL Final   02/10/2023 9.8 8.6 - 10.0 mg/dL Final   06/17/2021 9.2 8.5 - 10.1 mg/dL Final   12/08/2020 9.4 8.5 - 10.1 mg/dL Final     No results found for: \"CALCIUMIONIZ\"  Albumin   Date Value Ref Range Status   11/15/2023 4.4 3.5 - 5.2 g/dL Final   10/20/2021 4.0 3.4 - 5.0 g/dL Final         Assessment/Plan:     1.  Type 1 diabetes- Christiano is doing quite well.  Last A1c was 7.3%. Glucose is going too high after breakfast and dropping after lunch.  Christiano struggles with over-treating lows (or predicted lows), so we decided to relax his carb ratio at lunch in order to avoid these.  Will tighten IC ratio at breakfast.  AM glucose is in target and he is quite steady overnight, so will keep tresiba dose at 24 units.  We discussed importance of treating with just 15g carb and rechecking with a finger stick. We made the following plan today (instructions given to patient):     Breakfast- cheerios- 1/4 (was 1/5)g   Lunch- 1/7 (was " 1/6)g    Goal is to avoid lows so you don't go high.      15g of carb to treat a low- recheck in 15 minutes.     Emergency issues: Please contact the clinic as soon as you recognize a problem.  Here are some concerns you should contact us about.  -Vomiting: more than twice.  Please check ketones.  If positive, go to ER. Monitor glucose hourly.   -High glucose (over 300 mg/dL twice in a row): Please check ketones.  If ketones are negative, take an insulin correction and recheck glucose in 1 hour.  If glucose is not coming down, please call the clinic. If ketones are moderate or large, drink lots of water, take an insulin correction, and recheck ketones in 1 hour.  If ketones are still high (or you are vomiting), go to the ER.  -Hypoglycemia (low glucose):   If glucose is less than 70 mg/dL, treat with 15g carb (4 glucose tablets), recheck glucose in 10 minutes.  If low again, repeat.   If glucose is less than 54 mg/dL, treat with 30g carb, recheck glucose in 10 minutes.  If low again, repeat.  Keep glucagon in your home in case of severe hypoglycemia and train someone how to use this.    Emergency kit (please ensure you always have these with you):   Glucose tablets  Glucagon  Insulin  Syringes (if on a pump)  Extra infusion set (if on a pump)  Ketone strips    Contact information:   If you have concerns, please send me a CatchFree message or call the clinic at [phone].  For more urgent concerns, please call [phone] after hours/weekends and ask to speak with the endocrinologist on call.      Please let me know if you are having low blood sugars less than 70 or over 350 mg/dL.  Do not wait until your next appointment if this is happening.      If you have concerns, please send me a CatchFree message M-Th, call the clinic at [phone], or call [phone] after hours/weekends and ask to speak with the endocrinologist on call.      2.  Risk factors-     Retinopathy:  No. Had recent eye exam in December,  2023.  Nephropathy:  BP is historically well controlled on lisinopril 5 mg daily.  No microalbuminuria.  Creatinine stable.    Neuropathy: No.    Feet: OK, no ulcers.   Taking ASA: no  Lipids:  LDL is in target, now on pravastatin 10 mg daily.  Tolerating this well.     Celiac screening: negative screen 5/19  Vitamin D: Improved. He is not taking 1000 international unit(s) daily.  Will resume.     3.  F/U in 3 months with me, in 6 mos with Dr. Bland.     22 minutes spent on the date of the encounter doing chart review, review of test results, review of continuous glucose sensor, interpretation of glucose data, patient visit and documentation, counseling/coordination of care, and discussion of follow up plan for worsening hyper and hypoglycemia.  The patient understood and is satisfied with today's visit.     Shena Iniguez PA-C, MPAS   [facility]  Department of Medicine  Division of Endocrinology and Diabetes

## 2023-12-18 ENCOUNTER — OFFICE VISIT (OUTPATIENT)
Dept: OPHTHALMOLOGY | Facility: CLINIC | Age: 43
End: 2023-12-18
Attending: OPHTHALMOLOGY
Payer: COMMERCIAL

## 2023-12-18 DIAGNOSIS — E10.9 TYPE 1 DIABETES MELLITUS WITHOUT RETINOPATHY (H): Primary | ICD-10-CM

## 2023-12-18 DIAGNOSIS — H04.123 DRY EYE SYNDROME OF BOTH EYES: ICD-10-CM

## 2023-12-18 DIAGNOSIS — H52.13 MYOPIC ASTIGMATISM OF BOTH EYES: ICD-10-CM

## 2023-12-18 DIAGNOSIS — H52.203 MYOPIC ASTIGMATISM OF BOTH EYES: ICD-10-CM

## 2023-12-18 PROCEDURE — 99211 OFF/OP EST MAY X REQ PHY/QHP: CPT | Performed by: OPHTHALMOLOGY

## 2023-12-18 PROCEDURE — 92014 COMPRE OPH EXAM EST PT 1/>: CPT | Performed by: OPHTHALMOLOGY

## 2023-12-18 ASSESSMENT — CONF VISUAL FIELD
OD_SUPERIOR_NASAL_RESTRICTION: 0
OS_INFERIOR_TEMPORAL_RESTRICTION: 0
OD_INFERIOR_NASAL_RESTRICTION: 0
OD_NORMAL: 1
OS_SUPERIOR_TEMPORAL_RESTRICTION: 0
OD_SUPERIOR_TEMPORAL_RESTRICTION: 0
METHOD: COUNTING FINGERS
OS_NORMAL: 1
OS_INFERIOR_NASAL_RESTRICTION: 0
OD_INFERIOR_TEMPORAL_RESTRICTION: 0
OS_SUPERIOR_NASAL_RESTRICTION: 0

## 2023-12-18 ASSESSMENT — SLIT LAMP EXAM - LIDS
COMMENTS: NORMAL
COMMENTS: NORMAL

## 2023-12-18 ASSESSMENT — TONOMETRY
OS_IOP_MMHG: 16
OD_IOP_MMHG: 15
IOP_METHOD: TONOPEN

## 2023-12-18 ASSESSMENT — CUP TO DISC RATIO
OD_RATIO: 0.3
OS_RATIO: 0.3

## 2023-12-18 ASSESSMENT — VISUAL ACUITY
OD_CC: 20/20
METHOD: SNELLEN - LINEAR
CORRECTION_TYPE: CONTACTS
OS_CC: 20/20

## 2023-12-18 ASSESSMENT — EXTERNAL EXAM - RIGHT EYE: OD_EXAM: NORMAL

## 2023-12-18 ASSESSMENT — EXTERNAL EXAM - LEFT EYE: OS_EXAM: NORMAL

## 2023-12-18 NOTE — PROGRESS NOTES
Chief complaint   Annual DR exam  Referring Provider: Self    HPI   Agustín REID El 43 year old male PMHx of DM1 (age 14 years old, most recent A1c 09/2022 7.0), POhx of myopia wears CLs. Here for annual exam. Last visit 1 year ago.  Reports vision is good, no dryness, irritation, pain or fluctuation. No new floaters, no flashes of light.     Interval hx:   The patient wears contact lenses and gets his refraction from Dr. Nice. He has no noticeable vision changes. He reports a couple instances of pink eye brought home from his children. Lab Results      Component                Value               Date                     A1C                      7.3                 11/15/2023               Past ocular history   Prior eye surgery/laser/Trauma: No  CTL wearer:yes, monthlicarlos  Glasses: SVL  Family Hx of eye disease: No    PMH     Past Medical History:   Diagnosis Date    Hypertension     Type 1 diabetes (H)     age of onset 14 years       PSH     Past Surgical History:   Procedure Laterality Date    COSMETIC SURGERY  1993    Mole removal    ENT SURGERY  1985    Tubes in ears    GENITOURINARY SURGERY  2023    Vasectomy       Meds     Current Outpatient Medications   Medication    blood glucose (CONTOUR NEXT TEST) test strip    blood glucose monitoring (CANDE MICROLET) lancets    blood glucose monitoring (NO BRAND SPECIFIED) meter device kit    cholecalciferol (VITAMIN D) 1000 UNIT tablet    Continuous Blood Gluc Sensor (FREESTYLE LILO 3 SENSOR) MISC    Glucagon (BAQSIMI TWO PACK) 3 MG/DOSE POWD    Insulin Aspart FlexPen 100 UNIT/ML SOPN    insulin degludec (TRESIBA FLEXTOUCH) 200 UNIT/ML pen    insulin pen needle (B-D U/F) 31G X 8 MM miscellaneous    KETOSTIX test strip    losartan (COZAAR) 25 MG tablet    pravastatin (PRAVACHOL) 10 MG tablet     No current facility-administered medications for this visit.     Drops Currently Taking   None    Assessment/Plan   # DM1 w/o DR  - Dx at age 14 years  Lab Results    Component Value Date    A1C 7.4 10/20/2021    A1C 7.3 06/17/2021    A1C 6.9 12/08/2020   -  No DR on exam 11/09/2022  Plan  - RTC in 1 year   Plan: Discussed the importance of tight blood glucose control in the prevention of diabetic retinopathy. Recommend yearly dilated eye exam.    # Myopia with astigmatism  - BCVA 20/15 in current MRx  - Sees Dr Nice for monthly CL    # KAYE, each eye  - Mild  - ATs PRN    Follow up:  Oph: General clinic in 1 year VTD for annual DR exam  Dr Nice for updated MRx and CL fitting  Attending Physician Attestation:  Complete documentation of historical and exam elements from today's encounter can be found in the full encounter summary report (not reduplicated in this progress note).  I personally obtained the chief complaint(s) and history of present illness.  I confirmed and edited as necessary the review of systems, past medical/surgical history, family history, social history, and examination findings as documented by others; and I examined the patient myself.  I personally reviewed the relevant tests, images, and reports as documented above.  I formulated and edited as necessary the assessment and plan and discussed the findings and management plan with the patient and family. - Rekha Oconnell MD

## 2023-12-18 NOTE — NURSING NOTE
Chief Complaints and History of Present Illnesses   Patient presents with    Follow Up     Diabetic eye exam     Chief Complaint(s) and History of Present Illness(es)       Follow Up              Laterality: both eyes    Associated symptoms: Negative for flashes and floaters    Treatments tried: no treatments    Pain scale: 0/10    Comments: Diabetic eye exam              Comments    The patient wears contact lenses and gets his refraction from Dr. Nice.  He has no noticeable vision changes.  He reports a couple instances of pink eye brought home from his children.  Lab Results       Component                Value               Date                       A1C                      7.3                 11/15/2023                 A1C                      7.6                 06/14/2023                 A1C                      7.7                 02/10/2023                 A1C                      7.4                 10/20/2021                 A1C                      7.3                 06/17/2021                 A1C                      6.9                 12/08/2020                 A1C                      6.8                 09/03/2020                 A1C                      7.7                 05/13/2019                 A1C                      7.9                 08/13/2014             Samantha Grande, COA, COA 8:28 AM 12/18/2023

## 2023-12-19 ENCOUNTER — THERAPY VISIT (OUTPATIENT)
Dept: PHYSICAL THERAPY | Facility: CLINIC | Age: 43
End: 2023-12-19
Payer: COMMERCIAL

## 2023-12-19 DIAGNOSIS — G89.29 CHRONIC RIGHT-SIDED LOW BACK PAIN WITHOUT SCIATICA: ICD-10-CM

## 2023-12-19 DIAGNOSIS — M54.50 CHRONIC RIGHT-SIDED LOW BACK PAIN WITHOUT SCIATICA: ICD-10-CM

## 2023-12-19 PROCEDURE — 97110 THERAPEUTIC EXERCISES: CPT | Mod: GP | Performed by: PHYSICAL THERAPIST

## 2023-12-19 PROCEDURE — 97161 PT EVAL LOW COMPLEX 20 MIN: CPT | Mod: GP | Performed by: PHYSICAL THERAPIST

## 2023-12-19 NOTE — PROGRESS NOTES
PHYSICAL THERAPY EVALUATION  Type of Visit: Evaluation    See electronic medical record for Abuse and Falls Screening details.    Subjective       Presenting condition or subjective complaint: Back pain that flares up on random moves and can be very bad when it is bad.  This started when he was in college and tried to move a heavy piece of furniture.  He couldn't walk or stand up straight initially.  Ever since then, he gets recurrect episodes of back pain and sometimes its so bad he can't bend over or stand up straight.  He feels like its happening more often now and probably due to the small children at home.   Date of onset:      Relevant medical history: Diabetes   Dates & types of surgery:      Prior diagnostic imaging/testing results:       Prior therapy history for the same diagnosis, illness or injury: No      Prior Level of Function  Transfers:   Ambulation:   ADL:   IADL:     Living Environment  Social support: With a significant other or spouse   Type of home: House   Stairs to enter the home: Yes 14 Is there a railing: Yes   Ramp: No   Stairs inside the home: Yes 28 Is there a railing: Yes   Help at home: None  Equipment owned:       Employment: Yes [employer] at [employer] firm ([employer] job, [employer])  Hobbies/Interests: Walks, music, watching movies, video games    Patient goals for therapy: Manage getting kids taken care of when they need to be moved around, etc. (18 mo. old toddler espeically)         Objective   LUMBAR SPINE EVALUATION  PAIN: Pain Level at Rest: 0/10  Pain Level with Use: 7/10.  Not hurting today, but when it flared it will be this painful  Pain Location: R low back  Pain Quality: Sharp  Pain Frequency: intermittent or 1-2x per month  Pain is Worst: daytime  Pain is Exacerbated By: twisting or lifting when bent over.  Doing bath-time with kids or lifting them from floor or crib  Pain is Relieved By: cold, NSAIDs, and icy hot patches  Pain Progression: happening more often now    POSTURE:  Sitting Posture: Rounded shoulders, Forward head, Lordosis decreased  GAIT:   Weightbearing Status: WBAT  Assistive Device(s): None  Gait Deviations: WNL    WEIGHTBEARING ALIGNMENT:  L shoulder elevated and slight L thoracic curvature    ROM:   (Degrees) Left AROM Left PROM  Right AROM Right PROM   Hip Flexion       Hip Extension       Hip Abduction       Hip Adduction       Hip Internal Rotation       Hip External Rotation       Knee Flexion       Knee Extension       Lumbar Side glide WNL WNL   Lumbar Flexion Toes (pain upon the return)   Lumbar Extension Min limit-   Pain:   End feel:   PELVIC/SI SCREEN: WNL  STRENGTH:  Abdominal strength grossly 4-/5.  Hip strength grossly 4+/5B    MYOTOMES: WNL    NEURAL TENSION:  -Slump B  FLEXIBILITY:  tight hamstrings and hip flexors  LUMBAR/HIP Special Tests:  + stork with extension to the L    PELVIS/SI SPECIAL TESTS: WNL  FUNCTIONAL TESTS: Double Leg Squat: Good technique/no significant findings  PALPATION:  No tenderness to palpation of lumbar musculature or PSIS  SPINAL SEGMENTAL CONCLUSIONS: WNL      Assessment & Plan   CLINICAL IMPRESSIONS  Medical Diagnosis: R LBP without sciatica    Treatment Diagnosis: R LBP without sciatica   Impression/Assessment: Patient is a 43 year old male with R LBP complaints.  The following significant findings have been identified: Pain, Decreased ROM/flexibility, Decreased strength, Impaired muscle performance, and Decreased activity tolerance. These impairments interfere with their ability to perform self care tasks, recreational activities, household chores, and   as compared to previous level of function.     Clinical Decision Making (Complexity):  Clinical Presentation: Evolving/Changing  Clinical Presentation Rationale: based on medical and personal factors listed in PT evaluation  Clinical Decision Making (Complexity): Low complexity    PLAN OF CARE  Treatment Interventions:  Interventions: Neuromuscular Re-education,  Therapeutic Activity, Therapeutic Exercise    Long Term Goals            Frequency of Treatment: 2x/month  Duration of Treatment: 2-3 months    Recommended Referrals to Other Professionals:  none  Education Assessment:   Learner/Method: Patient;Listening;Reading;Demonstration;Pictures/Video    Risks and benefits of evaluation/treatment have been explained.   Patient/Family/caregiver agrees with Plan of Care.     Evaluation Time:             Signing Clinician: Noemi Parker, PT

## 2024-01-02 ENCOUNTER — THERAPY VISIT (OUTPATIENT)
Dept: PHYSICAL THERAPY | Facility: CLINIC | Age: 44
End: 2024-01-02
Payer: COMMERCIAL

## 2024-01-02 DIAGNOSIS — M54.50 CHRONIC RIGHT-SIDED LOW BACK PAIN WITHOUT SCIATICA: Primary | ICD-10-CM

## 2024-01-02 DIAGNOSIS — G89.29 CHRONIC RIGHT-SIDED LOW BACK PAIN WITHOUT SCIATICA: Primary | ICD-10-CM

## 2024-01-02 PROCEDURE — 97110 THERAPEUTIC EXERCISES: CPT | Mod: GP | Performed by: PHYSICAL THERAPIST

## 2024-01-22 DIAGNOSIS — E10.9 TYPE 1 DIABETES MELLITUS WITHOUT COMPLICATION (H): ICD-10-CM

## 2024-01-22 RX ORDER — BLOOD-GLUCOSE SENSOR
1 EACH MISCELLANEOUS
Qty: 7 EACH | Refills: 3 | Status: SHIPPED | OUTPATIENT
Start: 2024-01-22

## 2024-02-13 ENCOUNTER — THERAPY VISIT (OUTPATIENT)
Dept: PHYSICAL THERAPY | Facility: CLINIC | Age: 44
End: 2024-02-13
Payer: COMMERCIAL

## 2024-02-13 DIAGNOSIS — M54.50 CHRONIC RIGHT-SIDED LOW BACK PAIN WITHOUT SCIATICA: Primary | ICD-10-CM

## 2024-02-13 DIAGNOSIS — G89.29 CHRONIC RIGHT-SIDED LOW BACK PAIN WITHOUT SCIATICA: Primary | ICD-10-CM

## 2024-02-13 PROCEDURE — 97110 THERAPEUTIC EXERCISES: CPT | Mod: GP | Performed by: PHYSICAL THERAPIST

## 2024-02-13 NOTE — PROGRESS NOTES
DISCHARGE  Reason for Discharge: Patient has met all goals.    Equipment Issued: none    Discharge Plan: Patient to continue home program.    Referring Provider:  Marc Samuel     02/13/24 0500   Appointment Info   Signing clinician's name / credentials aby Marie   Total/Authorized Visits 6   Visits Used 3   Medical Diagnosis R LBP without sciatica   PT Tx Diagnosis R LBP without sciatica   Progress Note/Certification   Onset of illness/injury or Date of Surgery 11/15/23   Therapy Frequency 2x/month   Predicted Duration 2-3 months   Progress Note Due Date 02/16/24   Progress Note Completed Date 12/19/23   PT Goal 1   Goal Identifier LTG   Goal Description Patient is able to fully care for young daughters without flare ups   Rationale to maximize safety and independence with performance of ADLs and functional tasks   Target Date 03/19/24   Date Met 02/13/24   Subjective Report   Subjective Report Back and leg have been much better.  Did have a couple days this past week where he felt a few tweaks.   Objective Measures   Objective Measures Objective Measure 1;Objective Measure 2   Objective Measure 1   Objective Measure Pain   Details 0/10   Objective Measure 2   Objective Measure Lumbar ROM   Details FLex to toes(no pain upon return), ext WNL-, B SG WNL   Treatment Interventions (PT)   Interventions Therapeutic Procedure/Exercise   Therapeutic Procedure/Exercise   Therapeutic Procedures: strength, endurance, ROM, flexibillity minutes (10535) 34   Therapeutic Procedures Ther Proc 2   Ther Proc 1 Patient educated on the spine/discs/bulges and why bending over aggrevates things and why we treat with extensions   Ther Proc 1 - Details x4'   Ther Proc 2 DKC return to flexion was good, so introduced seated flexion now and this felt fine today.   PTRx Ther Proc 1 Prone Press Ups   PTRx Ther Proc 1 - Details x10   PTRx Ther Proc 2 Standing Extension   PTRx Ther Proc 2 - Details x10   PTRx Ther Proc 3  "Bridging #2A Weight Shift   PTRx Ther Proc 3 - Details x10   PTRx Ther Proc 4 Supine abdominal Exercise #9   PTRx Ther Proc 4 - Details x10 with heavy cues and use of towel roll   PTRx Ther Proc 5 Prone Lumbar Extension #7 (Superman)   PTRx Ther Proc 5 - Details 5\"x5   PTRx Ther Proc 6 Prone Plank   PTRx Ther Proc 6 - Details 30\"x1 with cues on form   Ther Proc 2 - Details Patient educated on lumbar roll for chair support and to start working on thoracic extension as well.   Education   Learner/Method Patient;Listening;Reading;Demonstration;Pictures/Video   Plan   Plan for next session advance to return to flexion if able   Total Session Time   Timed Code Treatment Minutes 34   Total Treatment Time (sum of timed and untimed services) 34       "

## 2024-04-22 ENCOUNTER — DOCUMENTATION ONLY (OUTPATIENT)
Dept: ENDOCRINOLOGY | Facility: CLINIC | Age: 44
End: 2024-04-22
Payer: COMMERCIAL

## 2024-04-22 DIAGNOSIS — E10.9 TYPE 1 DIABETES MELLITUS WITHOUT COMPLICATION (H): Primary | ICD-10-CM

## 2024-04-24 ENCOUNTER — LAB (OUTPATIENT)
Dept: LAB | Facility: CLINIC | Age: 44
End: 2024-04-24
Payer: COMMERCIAL

## 2024-04-24 DIAGNOSIS — E10.9 TYPE 1 DIABETES MELLITUS WITHOUT COMPLICATION (H): ICD-10-CM

## 2024-04-24 LAB — HBA1C MFR BLD: 7.3 % (ref 0–5.6)

## 2024-04-24 PROCEDURE — 86364 TISS TRNSGLTMNASE EA IG CLAS: CPT

## 2024-04-24 PROCEDURE — 36415 COLL VENOUS BLD VENIPUNCTURE: CPT

## 2024-04-24 PROCEDURE — 82784 ASSAY IGA/IGD/IGG/IGM EACH: CPT

## 2024-04-24 PROCEDURE — 82570 ASSAY OF URINE CREATININE: CPT

## 2024-04-24 PROCEDURE — 82043 UR ALBUMIN QUANTITATIVE: CPT

## 2024-04-24 PROCEDURE — 83036 HEMOGLOBIN GLYCOSYLATED A1C: CPT

## 2024-04-25 LAB
CREAT UR-MCNC: 108 MG/DL
IGA SERPL-MCNC: 114 MG/DL (ref 84–499)
MICROALBUMIN UR-MCNC: <12 MG/L
MICROALBUMIN/CREAT UR: NORMAL MG/G{CREAT}
TTG IGA SER-ACNC: 7.7 U/ML
TTG IGG SER-ACNC: <0.6 U/ML

## 2024-04-29 NOTE — PROGRESS NOTES
Video visit    Start time 10:37, stop time 10:58; additional 10 minutes spent on the date of the encounter doing chart review, documentation and further activities as noted.  This did not include time spent on CGM review.    [facility], [address] [place] [id number]    Patient location: patient home    Mode of transmission: video     The longitudinal plan of care for the diagnosis(es)/condition(s) as documented were addressed during this visit. Due to the added complexity in care, I will continue to support Christiano in the subsequent management and with ongoing continuity of care.    Subjective:    Established patient, he has been following closely with Shena Iniguez PA-C (most recent appointment 12/2023)    Agustín Gallegos is a 43 year old male who presents for DM-1.    Diagnosed with DM: diagnosed at age 14 when he developed polydipsia, polyuria, he was immediately started on insulin and has been on it to date     History of DKA/HHS: DKA episode in 2003    FH of DM: brother with DM-1    Glycemic control over the years:   -HbA1c 2014 - 2018: ~8.0 - 8.5%  -2018 to date: ~7.0 - 7.5%    Current DM therapy:  -NovoLog: breakfast 1:5, lunch 1:6, evening meal 1:5; CS using a sensitivity of 30 mg/dL starting at 130 mg/dL    -Tresiba 0-0-0-24    Prior DM therapy:  -Has never used an insulin pump     Current glycemic control:  -He has intact hypoglycemia awareness     Diet: 3 meals/day, only snacks if low glucose, rarely drinks calories     Physical activity: he walks frequently     Tobacco/alcohol use: no     Objective:    Video visit today, ~90 kg (weight stable)     9/2022: BMI 23.92 kg/m2, /74. Appears well. Thyroid examined and normal. No cervical LAD. 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 DM foot exam performed and normal.     Assessment/Plan:    # DM-1  -2/10/2023: C-peptide undetectable, concurrent glucose 241 mg/dL  -4/2024: HbA1c 7.3% (stable)   -He has  intranasal glucagon at home   # No DM retinopathy, most recent eye exam 12/2023  # Hypertension, on losartan   -11/2023: GFR >90  -4/2024: urine microalbumin normal   # No peripheral neuropathy  # No prior ASCVD event  # Mild hypercholesterolemia, on pravastatin 10 mg daily, not on ASA  -11/2023: , TG 58, HDL 41, LDL 84  # Other  -2/2023: TSH 0.46; 2014 negative Tg Ab, TPO Ab detectable but normal   -4/2024: TTG IgA mildly elevated  -2023: B12/MMA normal     We reviewed his CGM data in detail.  Over the past 2 weeks on his freestyle jamila 3 GMI 7.3%, glycemic variability 30.4%, time in range 63%, 31% high, 6% very high, 0% low.    I suggest keeping the Tresiba dose at 24 units at bedtime.  I suggest adjusting his ICR at breakfast from 1:5 to 1:4 and adjusting his ICR at lunch from 1:6 to 1:5.  He will aim to bolus 10 to 15 minutes before eating if he can.    We reviewed that long-term he would benefit from an insulin pump.  We reviewed insulin pump options in detail today.  He will review with his brother as well who uses an insulin pump.  He will let me know if he [facility].    He will increase pravastatin to 20 mg daily.    He does have a persistently mildly elevated TTG IgA.  These labs were obtained as a routine screening test because he has type 1 diabetes.  He denies significant GI symptoms.  No family history of celiac disease.  I did refer him to gastroenterology.    We will have him see Shena in about 6 months with labs before hand and me in about 1 year with labs beforehand.  Orders placed.

## 2024-04-30 ENCOUNTER — VIRTUAL VISIT (OUTPATIENT)
Dept: ENDOCRINOLOGY | Facility: CLINIC | Age: 44
End: 2024-04-30
Payer: COMMERCIAL

## 2024-04-30 DIAGNOSIS — E78.5 DYSLIPIDEMIA: ICD-10-CM

## 2024-04-30 DIAGNOSIS — Z79.4 LONG TERM (CURRENT) USE OF INSULIN (H): ICD-10-CM

## 2024-04-30 DIAGNOSIS — K90.0 CELIAC DISEASE: ICD-10-CM

## 2024-04-30 DIAGNOSIS — E10.9 TYPE 1 DIABETES MELLITUS WITHOUT COMPLICATION (H): Primary | ICD-10-CM

## 2024-04-30 PROCEDURE — G2211 COMPLEX E/M VISIT ADD ON: HCPCS | Mod: 95 | Performed by: INTERNAL MEDICINE

## 2024-04-30 PROCEDURE — 99214 OFFICE O/P EST MOD 30 MIN: CPT | Mod: 95 | Performed by: INTERNAL MEDICINE

## 2024-04-30 RX ORDER — PRAVASTATIN SODIUM 20 MG
20 TABLET ORAL AT BEDTIME
Qty: 90 TABLET | Refills: 4 | Status: SHIPPED | OUTPATIENT
Start: 2024-04-30

## 2024-04-30 NOTE — LETTER
4/30/2024         RE: Agustín Gallegos  [address]  [facility] [place] [facility] [address]        Dear Colleague,    Thank you for referring your patient, Agustín Gallegos, to the [facility]. Please see a copy of my visit note below.    Video visit    Start time 10:37, stop time 10:58; additional 10 minutes spent on the date of the encounter doing chart review, documentation and further activities as noted.  This did not include time spent on CGM review.    [facility] [place], [address] [place] [id number]    Patient location: patient home    Mode of transmission: video     The longitudinal plan of care for the diagnosis(es)/condition(s) as documented were addressed during this visit. Due to the added complexity in care, I will continue to support Christiano in the subsequent management and with ongoing continuity of care.    Subjective:    Established patient, he has been following closely with Shena Iniguez PA-C (most recent appointment 12/2023)    Agustín Gallegos is a 43 year old male who presents for DM-1.    Diagnosed with DM: diagnosed at age 14 when he developed polydipsia, polyuria, he was immediately started on insulin and has been on it to date     History of DKA/HHS: DKA episode in 2003    FH of DM: brother with DM-1    Glycemic control over the years:   -HbA1c 2014 - 2018: ~8.0 - 8.5%  -2018 to date: ~7.0 - 7.5%    Current DM therapy:  -NovoLog: breakfast 1:5, lunch 1:6, evening meal 1:5; CS using a sensitivity of 30 mg/dL starting at 130 mg/dL    -Tresiba 0-0-0-24    Prior DM therapy:  -Has never used an insulin pump     Current glycemic control:  -He has intact hypoglycemia awareness     Diet: 3 meals/day, only snacks if low glucose, rarely drinks calories     Physical activity: he walks frequently     Tobacco/alcohol use: no     Objective:    Video visit today, ~90 kg (weight stable)     9/2022: BMI 23.92 kg/m2, /74. Appears well. Thyroid examined and normal.  No cervical LAD. 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 DM foot exam performed and normal.     Assessment/Plan:    # DM-1  -2/10/2023: C-peptide undetectable, concurrent glucose 241 mg/dL  -4/2024: HbA1c 7.3% (stable)   -He has intranasal glucagon at home   # No DM retinopathy, most recent eye exam 12/2023  # Hypertension, on losartan   -11/2023: GFR >90  -4/2024: urine microalbumin normal   # No peripheral neuropathy  # No prior ASCVD event  # Mild hypercholesterolemia, on pravastatin 10 mg daily, not on ASA  -11/2023: , TG 58, HDL 41, LDL 84  # Other  -2/2023: TSH 0.46; 2014 negative Tg Ab, TPO Ab detectable but normal   -4/2024: TTG IgA mildly elevated  -2023: B12/MMA normal     We reviewed his CGM data in detail.  Over the past 2 weeks on his freestyle jamila 3 GMI 7.3%, glycemic variability 30.4%, time in range 63%, 31% high, 6% very high, 0% low.    I suggest keeping the Tresiba dose at 24 units at bedtime.  I suggest adjusting his ICR at breakfast from 1:5 to 1:4 and adjusting his ICR at lunch from 1:6 to 1:5.  He will aim to bolus 10 to 15 minutes before eating if he can.    We reviewed that long-term he would benefit from an insulin pump.  We reviewed insulin pump options in detail today.  He will review with his brother as well who uses an insulin pump.  He will let me know [facility].    He will increase pravastatin to 20 mg daily.    He does have a persistently mildly elevated TTG IgA.  These labs were obtained as a routine screening test because he has type 1 diabetes.  He denies significant GI symptoms.  No family history of celiac disease.  I did refer him to gastroenterology.    We will have him see Shena in about 6 months with labs before hand and me in about 1 year with labs beforehand.  Orders placed.      Again, thank you for allowing me to participate in the care of your patient.        Sincerely,        Raymond SERRA  MD Edwina

## 2024-05-28 ENCOUNTER — MYC REFILL (OUTPATIENT)
Dept: ENDOCRINOLOGY | Facility: CLINIC | Age: 44
End: 2024-05-28
Payer: COMMERCIAL

## 2024-05-28 DIAGNOSIS — E10.9 WELL CONTROLLED TYPE 1 DIABETES MELLITUS (H): ICD-10-CM

## 2024-05-28 RX ORDER — PEN NEEDLE, DIABETIC 31 GX5/16"
NEEDLE, DISPOSABLE MISCELLANEOUS
Qty: 400 EACH | Refills: 4 | Status: SHIPPED | OUTPATIENT
Start: 2024-05-28

## 2024-06-04 ENCOUNTER — OFFICE VISIT (OUTPATIENT)
Dept: FAMILY MEDICINE | Facility: CLINIC | Age: 44
End: 2024-06-04
Payer: COMMERCIAL

## 2024-06-04 DIAGNOSIS — D18.01 CHERRY ANGIOMA: ICD-10-CM

## 2024-06-04 DIAGNOSIS — D49.2 NEOPLASM OF UNSPECIFIED BEHAVIOR OF BONE, SOFT TISSUE, AND SKIN: Primary | ICD-10-CM

## 2024-06-04 DIAGNOSIS — D22.9 MULTIPLE BENIGN NEVI: ICD-10-CM

## 2024-06-04 DIAGNOSIS — L98.9 SKIN LESION: ICD-10-CM

## 2024-06-04 PROCEDURE — 88342 IMHCHEM/IMCYTCHM 1ST ANTB: CPT | Performed by: DERMATOLOGY

## 2024-06-04 PROCEDURE — 11102 TANGNTL BX SKIN SINGLE LES: CPT | Performed by: PHYSICIAN ASSISTANT

## 2024-06-04 PROCEDURE — 88341 IMHCHEM/IMCYTCHM EA ADD ANTB: CPT | Performed by: DERMATOLOGY

## 2024-06-04 PROCEDURE — 11103 TANGNTL BX SKIN EA SEP/ADDL: CPT | Performed by: PHYSICIAN ASSISTANT

## 2024-06-04 PROCEDURE — 99203 OFFICE O/P NEW LOW 30 MIN: CPT | Mod: 25 | Performed by: PHYSICIAN ASSISTANT

## 2024-06-04 PROCEDURE — 88305 TISSUE EXAM BY PATHOLOGIST: CPT | Performed by: DERMATOLOGY
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
## 2024-06-04 NOTE — PATIENT INSTRUCTIONS
Patient Education       Proper skin care from [facility]:    -Eliminate harsh soaps as they strip the natural oils from the skin, often resulting in dry itchy skin ( i.e. Dial, Zest, [place] Spring)  -Use mild soaps such as Cetaphil or Dove Sensitive Skin in the shower. You do not need to use soap on arms, legs, and trunk every time you shower unless visibly soiled.   -Avoid hot or cold showers.  -After showering, lightly dry off and apply moisturizing within 2-3 minutes. This will help trap moisture in the skin.   -Aggressive use of a moisturizer at least 1-2 times a day to the entire body (including -Vanicream, Cetaphil, Aquaphor or Cerave) and moisturize hands after every washing.  -We recommend using moisturizers that come in a tub that needs to be scooped out, not a pump. This has more of an oil base. It will hold moisture in your skin much better than a water base moisturizer. The above recommended are non-pore clogging.      Wear a sunscreen with at least SPF 30 on your face, ears, neck and V of the chest daily. Wear sunscreen on other areas of the body if those areas are exposed to the sun throughout the day. Sunscreens can contain physical and/or chemical blockers. Physical blockers are less likely to clog pores, these include zinc oxide and titanium dioxide. Reapply every two hour and after swimming.     Sunscreen examples: https://www.ewg.org/sunscreen/    UV radiation  UVA radiation remains constant throughout the day and throughout the year. It is a longer wavelength than UVB and therefore penetrates deeper into the skin leading to immediate and delayed tanning, photoaging, and skin cancer. 70-80% of UVA and UVB radiation occurs between the hours of 10am-2pm.  UVB radiation  UVB radiation causes the most harmful effects and is more significant during the summer months. However, snow and ice can reflect UVB radiation leading to skin damage during the winter months as well. UVB radiation is  responsible for tanning, burning, inflammation, delayed erythema (pinkness), pigmentation (brown spots), and skin cancer.     I recommend self monthly full body exams and yearly full body exams with a dermatology provider. If you develop a new or changing lesion please follow up for examination. Most skin cancers are pink and scaly or pink and pearly. However, we do see blue/brown/black skin cancers.  Consider the ABCDEs of melanoma when giving yourself your monthly full body exam ( don't forget the groin, buttocks, feet, toes, etc). A-asymmetry, B-borders, C-color, D-diameter, E-elevation or evolving. If you see any of these changes please follow up in clinic. If you cannot see your back I recommend purchasing a hand held mirror to use with a larger wall mirror.       Checking for Skin Cancer  You can find cancer early by checking your skin each month. There are 3 kinds of skin cancer. They are melanoma, basal cell carcinoma, and squamous cell carcinoma. Doing monthly skin checks is the best way to find new marks or skin changes. Follow the instructions below for checking your skin.   The ABCDEs of checking moles for melanoma   Check your moles or growths for signs of melanoma using ABCDE:   Asymmetry: the sides of the mole or growth don t match  Border: the edges are ragged, notched, or blurred  Color: the color within the mole or growth varies  Diameter: the mole or growth is larger than 6 mm (size of a pencil eraser)  Evolving: the size, shape, or color of the mole or growth is changing (evolving is not shown in the images below)    Checking for other types of skin cancer  Basal cell carcinoma or squamous cell carcinoma have symptoms such as:     A spot or mole that looks different from all other marks on your skin  Changes in how an area feels, such as itching, tenderness, or pain  Changes in the skin's surface, such as oozing, bleeding, or scaliness  A sore that does not heal  New swelling or redness beyond  the border of a mole    Who s at risk?  Anyone can get skin cancer. But you are at greater risk if you have:   Fair skin, light-colored hair, or light-colored eyes  Many moles or abnormal moles on your skin  A history of sunburns from sunlight or tanning beds  A family history of skin cancer  A history of exposure to radiation or chemicals  A weakened immune system  If you have had skin cancer in the past, you are at risk for recurring skin cancer.   How to check your skin  Do your monthly skin checkups in front of a full-length mirror. Check all parts of your body, including your:   Head (ears, face, neck, and scalp)  Torso (front, back, and sides)  Arms (tops, undersides, upper, and lower armpits)  Hands (palms, backs, and fingers, including under the nails)  Buttocks and genitals  Legs (front, back, and sides)  Feet (tops, soles, toes, including under the nails, and between toes)  If you have a lot of moles, take digital photos of them each month. Make sure to take photos both up close and from a distance. These can help you see if any moles change over time.   Most skin changes are not cancer. But if you see any changes in your skin, call your doctor right away. Only he or she can diagnose a problem. If you have skin cancer, seeing your doctor can be the first step toward getting the treatment that could save your life.   [employer] last reviewed this educational content on 4/1/2019 2000-2020 [employer]. [address] [place] [address], [place] [address]. All rights reserved. This information is not intended as a substitute for professional medical care. Always follow your healthcare professional's instructions.       When should I call my doctor?  If you are worsening or not improving, please, contact us or seek urgent care as noted below.     Who should I call with questions (adults)?  [facility] (adult and pediatric): [phone]  [facility]  [place] (adult): [phone]  [facility] ([place], [place], [place] and [place]) [phone]  For urgent needs outside of business hours call the [facility] at [phone] and ask for the dermatology resident on call to be paged  If this is a medical emergency and you are unable to reach an ER, Call 911      If you need a prescription refill, please contact your pharmacy. Refills are approved or denied by our Physicians during normal business hours, Monday through Fridays  Per office policy, refills will not be granted if you have not been seen within the past year (or sooner depending on your child's condition)    Wound Care After a Biopsy    What is a skin biopsy?  A skin biopsy allows the doctor to examine a very small piece of tissue under the microscope to determine the diagnosis and the best treatment for the skin condition. A local anesthetic (numbing medicine) is injected with a very small needle into the skin area to be tested. A small piece of skin is taken from the area. Sometimes a suture (stitch) is used.     What are the risks of a skin biopsy?  I will experience scar, bleeding, swelling, pain, crusting and redness. I may experience incomplete removal or recurrence. Risks of this procedure are excessive bleeding, bruising, infection, nerve damage, numbness, thick (hypertrophic or keloidal) scar and non-diagnostic biopsy.    How should I care for my wound for the first 24 hours?  Keep the wound dry and covered for 24 hours  If it bleeds, hold direct pressure on the area for 15 minutes. If bleeding does not stop, call us or go to the emergency room  Avoid strenuous exercise the first 1-2 days or as your doctor instructs you    How should I care for the wound after 24 hours?  After 24 hours, remove the bandage  You may bathe or shower as normal  If you had a scalp biopsy, you can shampoo as usual and can use shower water to clean the biopsy site daily  Clean  the wound once a day with gentle soap and water  Do not scrub, be gentle  Apply white petroleum/Vaseline after cleaning the wound with a cotton swab or a clean finger, and keep the site covered with a Bandaid /bandage. Bandages are not necessary with a scalp biopsy  If you are unable to cover the site with a Bandaid /bandage, re-apply ointment 2-3 times a day to keep the site moist. Moisture will help with healing  Avoid strenuous activity for first 1-2 days  Avoid lakes, rivers, pools, and oceans until the stitches are removed or the site is healed    How do I clean my wound?  Wash hands thoroughly with soap or use hand  before all wound care  Clean the wound with gentle soap and water  Apply white petroleum/Vaseline  to wound after it is clean  Replace the Bandaid /bandage to keep the wound covered for the first few days or as instructed by your doctor  If you had a scalp biopsy, warm shower water to the area on a daily basis should suffice    What should I use to clean my wound?   Cotton-tipped applicators (Qtips )  White petroleum jelly (Vaseline ). Use a clean new container and use Q-tips to apply.  Bandaids  as needed  Gentle soap     How should I care for my wound long term?  Do not get your wound dirty  Keep up with wound care for one week or until the area is healed.  A small scab will form and fall off by itself when the area is completely healed. The area will be red and will become pink in color as it heals. Sun protection is very important for how your scar will turn out. Sunscreen with an SPF 30 or greater is recommended once the area is healed.  You should have some soreness but it should be mild and slowly go away over several days. Talk to your doctor about using tylenol for pain,    When should I call my doctor?  If you have increased:   Pain or swelling  Pus or drainage (clear or slightly yellow drainage is ok)  Temperature over 100F  Spreading redness or warmth around wound    When will  I hear about my results?  The biopsy results can take 2 weeks to come back.  Your results will automatically release to [employer] before your provider has even reviewed them.  The clinic will call you with the results, send you a [employer] message, or have you schedule a follow-up clinic or phone time to discuss the results.  Contact our clinics if you do not hear from us in 2 weeks.    Who should I call with questions?  [facility]: [phone]  [facility]: [phone]  For urgent needs outside of business hours call the [facility] at [phone] and ask for the dermatology resident on call

## 2024-06-10 LAB
PATH REPORT.COMMENTS IMP SPEC: NORMAL
PATH REPORT.FINAL DX SPEC: NORMAL
PATH REPORT.GROSS SPEC: NORMAL
PATH REPORT.MICROSCOPIC SPEC OTHER STN: NORMAL
PATH REPORT.RELEVANT HX SPEC: NORMAL

## 2024-06-12 ENCOUNTER — TELEPHONE (OUTPATIENT)
Dept: FAMILY MEDICINE | Facility: CLINIC | Age: 44
End: 2024-06-12
Payer: COMMERCIAL

## 2024-06-12 DIAGNOSIS — D23.61 DYSPLASTIC NEVUS OF RIGHT UPPER EXTREMITY: Primary | ICD-10-CM

## 2024-06-12 NOTE — TELEPHONE ENCOUNTER
S/w pt and went over Chen's message below about biopsy results.  Explained and answered all questions about excision procedure.  Scheduled with Chen on Tuesday August 13th at 2 pm.    Kady NOVA RN  [facility] [place]  [phone]

## 2024-06-12 NOTE — TELEPHONE ENCOUNTER
"----- Message from Chen Bowie PA-C sent at 6/12/2024  6:02 AM CDT -----  You can let Christiano know:  1. The spot biopsied on the central back: The nevus on the central is called a dysplastic nevus. It has mild atypia or just slightly abnormal melanocytes, the pigmented cells of the skin. The center of it appears to be \"recurrent\" meaning it might have been removed or scraped off/ traumatized and grew back. This type of mole is more of a general risk factor for skin cancer rather than this site becoming a problem.  But we can keep an eye on it at an annual exam.    2. The mole on your right forearm is called a dysplastic nevus.  It has abnormal cells, moderately to severely.  The dermatopathologist recommends further excision of of the mole to ensure all of the abnormal cells are removed. This can be done in clinic and the nursing staff will call you within the next week to schedule this in office procedure. We would remove skin around the site and use stiches to close the area.  (Please schedule in an excision slot with me, thanks!)    We should keep up with skin checks at least once a year.  Let me know if you have any questions or concerns.  Best,  Chen Bowie PA-C    "

## 2024-06-18 ENCOUNTER — TELEPHONE (OUTPATIENT)
Dept: GASTROENTEROLOGY | Facility: CLINIC | Age: 44
End: 2024-06-18

## 2024-06-18 NOTE — TELEPHONE ENCOUNTER
[facility] Call Center    Phone Message    May a detailed message be left on voicemail: Yes    Reason for Call: Other: Patient is currently scheduled on 8/19, as visit type New GI Urgent. This is outside the expected timeline for this referral. Patient has been added to the waitlist.        Scheduled per location preferences      Action Taken: Message routed to:  Other: GI REFERRAL TRIAGE POOL     Travel Screening: Not Applicable

## 2024-06-25 NOTE — TELEPHONE ENCOUNTER
Clinic Navigator sent a UXPin message to inform the Pt that Pt is on the wait list for a sooner appointment. Clinic Navigator will reach out to the Pt via phone call or GestureTekhart when a sooner appointment becomes available.

## 2024-07-15 NOTE — TELEPHONE ENCOUNTER
Writer called to offer Pt a sooner GI appointment with Jocelyn Carrizales on 7/16/2024 at 11 AM for a virtual visit. Pt was unable to accept the appointment.

## 2024-08-13 ENCOUNTER — OFFICE VISIT (OUTPATIENT)
Dept: DERMATOLOGY | Facility: CLINIC | Age: 44
End: 2024-08-13
Attending: PHYSICIAN ASSISTANT
Payer: COMMERCIAL

## 2024-08-13 DIAGNOSIS — D23.61 DYSPLASTIC NEVUS OF RIGHT UPPER EXTREMITY: ICD-10-CM

## 2024-08-13 PROCEDURE — 12031 INTMD RPR S/A/T/EXT 2.5 CM/<: CPT | Performed by: PHYSICIAN ASSISTANT

## 2024-08-13 PROCEDURE — 88305 TISSUE EXAM BY PATHOLOGIST: CPT | Performed by: DERMATOLOGY

## 2024-08-13 PROCEDURE — 11401 EXC TR-EXT B9+MARG 0.6-1 CM: CPT | Performed by: PHYSICIAN ASSISTANT

## 2024-08-13 ASSESSMENT — PAIN SCALES - GENERAL: PAINLEVEL: NO PAIN (0)

## 2024-08-13 NOTE — PROCEDURES
DERMATOLOGY EXCISION PROCEDURE NOTE    Dermatology Problem List:  1. Hx of Dysplastic Nevus  - R forearm, Central back, s/p shave bx 06/04/2024    NAME OF PROCEDURE: Excision intermediate layered linear closure  Staff surgeon: Chen oBwie PA-C  Resident: Nicki Villasenor CMA    PRE-OPERATIVE DIAGNOSIS:  Dysplastic nevus  POST-OPERATIVE DIAGNOSIS: Same   LOCATION: R forearm  FINAL EXCISION SIZE(DEFECT SIZE): 6 mm  MARGIN: 2 mm  FINAL REPAIR LENGTH: 1.6 cm   ANESTHESIA:  1% lidocaine with 1:100,000 epinephrine    INDICATIONS: This patient presented with a 4 mm Dysplastic nevus. Excision was indicated. We discussed the principles of treatment and most likely complications including scarring, bleeding, infection, incomplete excision, wound dehiscence, pain, nerve damage, and recurrence. Informed consent was obtained and the patient underwent the procedure as follows:    PROCEDURE: The patient was taken to the operative suite. Time-out was performed.  The treatment area was anesthetized with 1% lidocaine with epinephrine. The area was prepped with Alcohol and draped with sterile towels. The lesion was delineated and excised down to subcutaneous fat. Hemostasis was obtained by electrocoagulation.     REPAIR: An intermediate layered linear closure was selected as the procedure which would maximally preserve both function and cosmesis.    After the excision of the tumor, the area was carefully undermined. Hemostasis was obtained with electrocoagulation.  Closure was oriented so that the wound was in the patient's natural skin tension lines. The subcutaneous and dermal layers were then closed with 4-0 moncryl vicryl sutures. The epidermis was then carefully approximated along the length of the wound using 4-0 prolene simple running sutures.     Estimated blood loss was less than 10 ml for all surgical sites. A sterile pressure dressing was applied and wound care instructions, with a written handout, were given. The  [facility] and ambulatory.    The patient elected for a 7 day mychart hold on pathology results to allow the clinical staff to contact them with the result.    Follow-up in 2 weeks for suture removal.    Chen Bowie PA-C was immediately available for the entire surgery and was physicially present for the key portions of the procedure.    Anatomic Pathology Results: pending    Clinical Follow-Up: 1 yr    Staff Involved:  Scribe/Staff   Scribe Disclosure:   By signing my name below, I, Ynes Esparza, attest that this documentation has been prepared under the direction and in the presence of Chen Bowie PA-C.  - Electronically Signed: Ynes Esparza 08/13/24       Provider Disclosure:  I agree with above History, Review of Systems, Physical exam and Plan.  I have reviewed the content of the documentation and have edited it as needed. I have personally performed the services documented here and the documentation accurately represents those services and the decisions I have made.      Electronically signed by:  All risks, benefits and alternatives were discussed with patient.  Patient is in agreement and understands the assessment and plan.  All questions were answered.  Sun Screen Education was given.   Return to Clinic annually or sooner as needed.   Chen Bowie PA-C

## 2024-08-13 NOTE — PROGRESS NOTES
[facility] D[facility]atology Note  Encounter Date: Aug 13, 2024  Office Visit     Dermatology Problem List:  1. Hx of Dysplastic Nevus  - R forearm, Junctional dysplastic nevus with moderate to severe atypia  with excision 8/13/24  Central back, Compound dysplastic nevus with mild atypia and central features of recurrent nevus phenomenon   s/p shave bx 06/04/2024    ____________________________________________    Assessment & Plan:      # Dysplastic Nevus, R forearm (Junctional dysplastic nevus with moderate to severe atypia )  - excision on R forearm performed today, see attached procedure note    Procedures Performed:   Excision performed today. See attached procedure note.    Follow-up: 1 year(s) in-person, or earlier for new or changing lesions    Staff and Scribe:   Scribe Disclosure:   By signing my name below, I, Ynes Esparza, attest that this documentation has been prepared under the direction and in the presence of Chen Bowie PA-C.  - Electronically Signed: Ynes Esparza 08/13/24       Provider Disclosure:  I agree with above History, Review of Systems, Physical exam and Plan.  I have reviewed the content of the documentation and have edited it as needed. I have personally performed the services documented here and the documentation accurately represents those services and the decisions I have made.      Electronically signed by:  All risk, benefits and alternatives were discussed with patient.  Patient is in agreement and understands the assessment and plan.  All questions were answered.  Sun Screen Education was given.   Return to Clinic annually or sooner as needed.   Chen Bowie PA-C          ____________________________________________    CC: Procedure (Excision R forearm dysplastic nevus)    HPI:  Mr. Agustín Gallegos is a(n) 44 year old male who presents today as a return patient for an excision of a dysplastic nevus on his right forearm.        Patient is otherwise  feeling well, without additional skin concerns.    Labs Reviewed:  None reviewed.    Physical exam:  Vitals: There were no vitals taken for this visit.  GEN: This is a well developed, well-nourished male in no acute distress, in a pleasant mood.    SKIN: Focused examination of the R forearm was performed.  6 mm atrophic macule on the right forearm   - No other lesions of concern on areas examined.       Medications:  Current Outpatient Medications   Medication Sig Dispense Refill    blood glucose (CONTOUR NEXT TEST) test strip test 1-6 times daily, as directed. 200 strip 3    blood glucose monitoring (MobiscopeET) lancets Use to test blood sugar 8 times daily or as directed. 6 Box 5    blood glucose monitoring (NO BRAND SPECIFIED) meter device kit Use to test blood sugar 5 times daily or as directed. 1 kit 0    cholecalciferol (VITAMIN D) 1000 UNIT tablet Take 1 tablet (1,000 Units) by mouth daily 90 tablet 3    Continuous Blood Gluc Sensor (FREESTYLE LILO 3 SENSOR) MISC 1 each every 14 days 7 each 3    Glucagon (BAQSIMI TWO PACK) 3 MG/DOSE POWD Spray 1 each in nostril once as needed (severe hypoglycemia) 2 each 3    Glucagon (BAQSIMI) 3 MG/DOSE nasal powder Spray 1 spray (3 mg) in nostril as needed for low blood sugar (severe hypoglycemia) in the event of unconscious hypoglycemia or hypoglycemic seizure. May repeat dose if no response after 15 minutes. 1 each 1    Insulin Aspart FlexPen 100 UNIT/ML SOPN Inject 60 Units Subcutaneous daily 60 mL 3    insulin degludec (TRESIBA FLEXTOUCH) 200 UNIT/ML pen Inject 24 Units Subcutaneous daily 30 mL 3    insulin pen needle (B-D U/F) 31G X 8 MM miscellaneous Use 4 pen needles daily or as directed. 400 each 4    KETOSTIX test strip Use as directed in case of hyperglycemia or illness. 50 strip 3    losartan (COZAAR) 25 MG tablet Take 1 tablet (25 mg) by mouth daily 90 tablet 3    NOVOLOG FLEXPEN 100 UNIT/ML soln Inject 60 Units Subcutaneous daily 60 mL 0    pravastatin  (PRAVACHOL) 20 MG tablet Take 1 tablet (20 mg) by mouth at bedtime 90 tablet 4    pravastatin (PRAVACHOL) 10 MG tablet Take 1 tablet (10 mg) by mouth daily 90 tablet 3     No current facility-administered medications for this visit.      Past Medical History:   Patient Active Problem List   Diagnosis    Type 1 diabetes mellitus without retinopathy (H)    Myopic astigmatism of both eyes    Adhesive capsulitis of right shoulder     Past Medical History:   Diagnosis Date    Hypertension     Type 1 diabetes (H)     age of onset 14 years        CC Marc Samuel,   [address] [facility] [address]  [place] [address] on close of this encounter.

## 2024-08-13 NOTE — PATIENT INSTRUCTIONS
Excision/Mohs Wound Care Instructions  I will experience scar, altered skin color, bleeding, swelling, pain, crusting and redness. I may experience altered sensation. Risks are excessive bleeding, infection, muscle weakness, thick (hypertrophic or keloidal) scar, and recurrence. A second procedure may be recommended to obtain the best cosmetic or functional result.  Possible complications of any surgical procedure are bleeding, infection, scarring, alteration in skin color and sensation, muscle weakness in the area, wound dehiscence or seperation, or recurrence of the lesion or disease. On occasion, after healing, a secondary procedure or revision may be recommended in order to obtain the best cosmetic or functional result.   After your surgery, a pressure bandage will be placed over the area that has sutures. This will help prevent bleeding. Please follow these instructions until you come back to clinic for suture removal in 2 weeks, as they will help you to prevent complications as your wound heals.    For the First 48 hours After Surgery:  Leave the pressure bandage on and keep it dry. If it should come loose, you may retape it, but do not take it off.  Relax and take it easy. Do not do any vigorous exercise, heavy lifting, or bending forward. This could cause the wound to bleed.  Post-operative pain is usually mild. You may alternate between 1000 mg of Tylenol (acetaminophen) and 400 mg of Ibuprofen every 4 hours.  Do not take more than 4,000mg of acetaminophen in a 24 hour period or 3200 mg of Ibuprofen in a 24 hr period.  Avoid alcohol and vitamin E as these may increase your tendency to bleed.  You may put an ice pack around the bandaged area for 20 minutes every 2-3 hours. This may help reduce swelling, bruising, and pain. Make sure the ice pack is waterproof so that the pressure bandage does not get wet.   You may see a small amount of drainage or blood on your pressure bandage. This is normal. However, if  drainage or bleeding continues or saturates the bandage, you will need to apply firm pressure over the bandage with a washcloth for 15 minutes. If bleeding continues after applying pressure for 15 minutes then go to the nearest emergency room.  48 Hours After Surgery  Carefully remove the bandage and start daily wound care and dressing changes. You may also now shower and get the wound wet.  Daily Wound Care:  Wash wound with a mild soap and water.  Use caution when washing the wound, be gentle and do not let the forceful shower stream hit the wound directly.  Pat dry.  Apply Vaseline or Aquaphor  (from a new container or tube) over the suture line with a Q-tip. It is very important to keep the wound continuously moist, as wounds heal best in a moist environment.  Keep the site covered until sutures have either been removed in 14 days.  You can cover it with a Telfa (non-stick) dressing and tape or a band-aid.    If you are unable to keep wound covered, you must apply Vaseline every 2-3 hours (while awake) to ensure it is being kept moist for optimal healing. A dressing overnight is recommended to keep the area moist.  Call Us If:  You have pain that is not controlled with Tylenol/Ibuprofen  You have signs or symptoms of an infection, such as: fever over 100 degrees F, redness, warmth, or foul-smelling or yellow drainage from the wound.  Who should I call with questions?  [facility]: [phone]  For urgent needs outside of business hours call the [facility] at [phone] and ask to speak with the dermatology resident on call

## 2024-08-13 NOTE — LETTER
8/13/2024      Agustín Gallegos  [address]  [facility] [address]      Dear Colleague,    Thank you for referring your patient, Agustín Gallegos, to the [facility]. Please see a copy of my visit note below.    [facility] Dermatology Note  Encounter Date: Aug 13, 2024  Office Visit     Dermatology Problem List:  1. Hx of Dysplastic Nevus  - R forearm, Junctional dysplastic nevus with moderate to severe atypia  with excision 8/13/24  Central back, Compound dysplastic nevus with mild atypia and central features of recurrent nevus phenomenon   s/p shave bx 06/04/2024    ____________________________________________    Assessment & Plan:      # Dysplastic Nevus, R forearm (Junctional dysplastic nevus with moderate to severe atypia )  - excision on R forearm performed today, see attached procedure note    Procedures Performed:   Excision performed today. See attached procedure note.    Follow-up: 1 year(s) in-person, or earlier for new or changing lesions    Staff and Scribe:   Scribe Disclosure:   By signing my name below, I, Ynes Isaias, attest that this documentation has been prepared under the direction and in the presence of Chen Bowie PA-C.  - Electronically Signed: Ynes Esparza 08/13/24       Provider Disclosure:  I agree with above History, Review of Systems, Physical exam and Plan.  I have reviewed the content of the documentation and have edited it as needed. I have personally performed the services documented here and the documentation accurately represents those services and the decisions I have made.      Electronically signed by:  All risk, benefits and alternatives were discussed with patient.  Patient is in agreement and understands the assessment and plan.  All questions were answered.  Sun Screen Education was given.   Return to Clinic annually or sooner as needed.   Chen Bowie PA-C           ____________________________________________    CC: Procedure (Excision R forearm dysplastic nevus)    HPI:  Mr. Agustín Gallegos is a(n) 44 year old male who presents today as a return patient for an excision of a dysplastic nevus on his right forearm.        Patient is otherwise feeling well, without additional skin concerns.    Labs Reviewed:  None reviewed.    Physical exam:  Vitals: There were no vitals taken for this visit.  GEN: This is a well developed, well-nourished male in no acute distress, in a pleasant mood.    SKIN: Focused examination of the R forearm was performed.  6 mm atrophic macule on the right forearm   - No other lesions of concern on areas examined.       Medications:  Current Outpatient Medications   Medication Sig Dispense Refill     blood glucose (CONTOUR NEXT TEST) test strip test 1-6 times daily, as directed. 200 strip 3     blood glucose monitoring (CANDE MICROLET) lancets Use to test blood sugar 8 times daily or as directed. 6 Box 5     blood glucose monitoring (NO BRAND SPECIFIED) meter device kit Use to test blood sugar 5 times daily or as directed. 1 kit 0     cholecalciferol (VITAMIN D) 1000 UNIT tablet Take 1 tablet (1,000 Units) by mouth daily 90 tablet 3     Continuous Blood Gluc Sensor (FREESTYLE LILO 3 SENSOR) MISC 1 each every 14 days 7 each 3     Glucagon (BAQSIMI TWO PACK) 3 MG/DOSE POWD Spray 1 each in nostril once as needed (severe hypoglycemia) 2 each 3     Glucagon (BAQSIMI) 3 MG/DOSE nasal powder Spray 1 spray (3 mg) in nostril as needed for low blood sugar (severe hypoglycemia) in the event of unconscious hypoglycemia or hypoglycemic seizure. May repeat dose if no response after 15 minutes. 1 each 1     Insulin Aspart FlexPen 100 UNIT/ML SOPN Inject 60 Units Subcutaneous daily 60 mL 3     insulin degludec (TRESIBA FLEXTOUCH) 200 UNIT/ML pen Inject 24 Units Subcutaneous daily 30 mL 3     insulin pen needle (B-D U/F) 31G X 8 MM miscellaneous Use 4 pen  needles daily or as directed. 400 each 4     KETOSTIX test strip Use as directed in case of hyperglycemia or illness. 50 strip 3     losartan (COZAAR) 25 MG tablet Take 1 tablet (25 mg) by mouth daily 90 tablet 3     NOVOLOG FLEXPEN 100 UNIT/ML soln Inject 60 Units Subcutaneous daily 60 mL 0     pravastatin (PRAVACHOL) 20 MG tablet Take 1 tablet (20 mg) by mouth at bedtime 90 tablet 4     pravastatin (PRAVACHOL) 10 MG tablet Take 1 tablet (10 mg) by mouth daily 90 tablet 3     No current facility-administered medications for this visit.      Past Medical History:   Patient Active Problem List   Diagnosis     Type 1 diabetes mellitus without retinopathy (H)     Myopic astigmatism of both eyes     Adhesive capsulitis of right shoulder     Past Medical History:   Diagnosis Date     Hypertension      Type 1 diabetes (H)     age of onset 14 years        CC Marc Samuel,   [address] [facility] [address]  [place] [address] on close of this encounter.      Again, thank you for allowing me to participate in the care of your patient.        Sincerely,        Chen Bowie PA-C

## 2024-08-15 LAB
PATH REPORT.COMMENTS IMP SPEC: NORMAL
PATH REPORT.COMMENTS IMP SPEC: NORMAL
PATH REPORT.FINAL DX SPEC: NORMAL
PATH REPORT.GROSS SPEC: NORMAL
PATH REPORT.MICROSCOPIC SPEC OTHER STN: NORMAL
PATH REPORT.RELEVANT HX SPEC: NORMAL

## 2024-08-19 ENCOUNTER — VIRTUAL VISIT (OUTPATIENT)
Dept: GASTROENTEROLOGY | Facility: CLINIC | Age: 44
End: 2024-08-19
Attending: INTERNAL MEDICINE
Payer: COMMERCIAL

## 2024-08-19 VITALS — BODY MASS INDEX: 24.25 KG/M2 | WEIGHT: 195 LBS | HEIGHT: 75 IN

## 2024-08-19 DIAGNOSIS — R76.8 ELEVATED ANTI-TISSUE TRANSGLUTAMINASE (TTG) IGA LEVEL: ICD-10-CM

## 2024-08-19 PROCEDURE — 99204 OFFICE O/P NEW MOD 45 MIN: CPT | Mod: 95 | Performed by: PHYSICIAN ASSISTANT

## 2024-08-19 ASSESSMENT — PAIN SCALES - GENERAL: PAINLEVEL: NO PAIN (0)

## 2024-08-19 NOTE — NURSING NOTE
Current patient location: Patient declined to provide     Is the patient currently in the state of [place]? YES    Visit mode:VIDEO    If the visit is dropped, the patient can be reconnected by: VIDEO VISIT: Text to cell phone:   Telephone Information:   Mobile [phone]       Will anyone else be joining the visit? NO  (If patient encounters technical issues they should call [phone] :[phone])    How would you like to obtain your AVS? MyChart    Are changes needed to the allergy or medication list? No    Are refills needed on medications prescribed by this physician? NO    Rooming Documentation:  Questionnaire(s) not pre-assigned      Reason for visit: Video Visit (Consult)    Syl MORAES

## 2024-08-19 NOTE — PROGRESS NOTES
GASTROENTEROLOGY NEW PATIENT VIDEO VISIT    CC/REFERRING MD:    Marc Bennett    REASON FOR CONSULTATION:   Raymond Bland for   Chief Complaint   Patient presents with    Video Visit     Consult Celiac       HISTORY OF PRESENT ILLNESS:    Agustín Gallegos is a 44 year old male with a past medical history significant for type 1 diabetes who is being evaluated via a billable video visit for evaluation of possible celiac disease.  He was referred by his endocrinologist after having lab testing showing elevated TTG IgA.  This was initially checked a year ago and was at 9.8 in the setting of normal IgA level.  Repeat testing 3 months ago decreased to 7.6.  He has not had any other celiac antibodies checked.  He is not aware of any specific symptoms related to celiac disease, specifically denying any abdominal pain, bloating, skin rash, joint pains, unintentional weight loss, or any known nutritional deficiencies.  He does eat an average diet that does contain gluten.  He is not aware of any family history of celiac disease.  He has not had EGD or colonoscopy before.  His most recent A1c in April was 7.3%.      I have reviewed and updated the patient's Past Medical History, Social History, Family History and Medication List.    Exam:    General appearance:  Healthy appearing adult, in no acute distress  Eyes:  Sclera anicteric, Pupils round and reactive to light  Ears, nose, mouth and throat:  No obvious external lesions of ears and nose.  Hearing intact  Neck:  Symmetric, No obvious external lesions  Respiratory:  Normal respiration, no use of accessory muscles   MSK:  No visual upper extremity, neck or facial muscle atrophy  ABD:  No visual abdominal distention, no audible borborygmi  Skin:  No rashes or jaundice   Psychiatric:  Oriented to person, place and time, Appropriate mood and affect.   Neurologic:  Peripheral muscle function and dexterity appear to be  intact      PERTINENT STUDIES have been reviewed.    ASSESSMENT/PLAN:    Agustín Gallegos is a 44 year old male who presents for evaluation of possible celiac disease.  He has had technically elevated TTG IgA on 2 separate occasions, though the levels have been equivocal, under 10.  He has not had any overt symptoms of celiac disease.  We reviewed that he may have slowly evolving celiac disease that is subclinical in terms of symptom presentation at this time.  It is also possible that his celiac antibodies are falsely elevated.  I presented some options -we could simply just pursue upper endoscopy at this time while on gluten-containing diet to evaluate for active celiac disease.  The more conservative route would be to recheck his TTG IgA, along with some additional antibodies, as well as nutritional markers to assess for deficiency.  Given that he is not experiencing any specific symptoms, he preferred to go this route, which is reasonable.  Plan for labs as detailed below.  If his TTG level is similar or lower and there is no nutritional deficiency, I would recommend periodic laboratory and symptom monitoring.  If the TTG or other antibodies are elevated higher, or if there is any nutritional deficiency, we will plan for EGD with duodenal biopsies.  He stated understanding of plan and I will reach out to him as soon as labs are in.    1. Elevated anti-tissue transglutaminase (tTG) IgA level  - Adult GI  Referral - Consult Only  - Tissue transglutaminase manuelito IgA and IgG; Future  - Deamidated Giladin Peptide Manuelito IgA IgG; Future  - Endomysial Antibody IgA by IFA; Future  - Comprehensive metabolic panel (BMP + Alb, Alk Phos, ALT, AST, Total. Bili, TP); Future  - CBC with platelets; Future  - Iron and iron binding capacity; Future  - Ferritin; Future  - Vitamin B12; Future  - Folate; Future  - Vitamin D Deficiency; Future  - Zinc; Future  - Magnesium; Future        Video-Visit Details    Video Visit Time:  12 minutes    Type of service:  Video Visit    Originating Location (pt. Location): Home    Distant Location (provider location):  Off-site    Platform used for Video Visit: Beck Aguirre PA-C    Thank you for this consultation.  It was a pleasure to participate in the care of this patient; please contact us with any further questions.  A total of 17 minutes was spent with reviewing the chart, discussing with the patient, documentation and coordination of care.    This note was created with voice recognition software, and while reviewed for accuracy, typos may remain.     Alex Aguirre PA-C  Division of Gastroenterology, Hepatology and Nutrition  [facility]  [phone]

## 2024-08-26 ENCOUNTER — TELEPHONE (OUTPATIENT)
Dept: DERMATOLOGY | Facility: CLINIC | Age: 44
End: 2024-08-26

## 2024-08-26 ENCOUNTER — OFFICE VISIT (OUTPATIENT)
Dept: DERMATOLOGY | Facility: CLINIC | Age: 44
End: 2024-08-26
Payer: COMMERCIAL

## 2024-08-26 DIAGNOSIS — Z48.02 VISIT FOR SUTURE REMOVAL: Primary | ICD-10-CM

## 2024-08-26 PROCEDURE — 99207 PR NO CHARGE NURSE ONLY: CPT | Performed by: PHYSICIAN ASSISTANT

## 2024-08-26 NOTE — PROGRESS NOTES
Agustín Gallegos presents to the clinic today for removal of sutures.  The patient has had the sutures in place for 13 days.  There has been no history of infection or drainage.  4 sutures are seen located on the right forearm.  The wound is healing well with no signs of infection.  Tetanus status is up to date.   All sutures were easily removed today.  Routine wound care discussed.  The patient will follow up as needed.     Agustín Gallegos comes into clinic today at the request of Chen Bowie PA-C Ordering Provider for Suture Removal:  Incision was dry, clean and intact, incision cleansed with wound cleanser and sutures were removed. Pt tolerated the procedure. Benzoin and steristrips were placed per protocol and pt was instructed to leave them in place for 7-10 days. It is okay to shower with these in place, no need to cover. After this time the strerstrips will start loosen and should be removed.  . .    LOV 8/13/24    This service provided today was under the supervising provider of the day Mallory Leigh PA-C, who was available if needed.    Kady NOVA RN  [facility] [place]  [phone]

## 2024-08-26 NOTE — TELEPHONE ENCOUNTER
S/w pt and rescheduled nurse only appt for this afternoon at 1:30 pm.    Kady NOVA RN  [facility] [place]  [phone]

## 2024-08-26 NOTE — TELEPHONE ENCOUNTER
[facility] Call Center    Phone Message    May a detailed message be left on voicemail: yes     Reason for Call: Other: Pt would like to push is suture removal nurse only visit back today. Please call back at [phone] to discuss. Thank you.      Action Taken: Other: [facility]    Travel Screening: Not Applicable     Date of Service:

## 2024-08-26 NOTE — PATIENT INSTRUCTIONS
Proper skin care from [facility]:    -Eliminate harsh soaps as they strip the natural oils from the skin, often resulting in dry itchy skin ( i.e. Dial, Zest, Swiss Spring)  -Use mild soaps such as Cetaphil or Dove Sensitive Skin in the shower. You do not need to use soap on arms, legs, and trunk every time you shower unless visibly soiled.   -Avoid hot or cold showers.  -After showering, lightly dry off and apply moisturizing within 2-3 minutes. This will help trap moisture in the skin.   -Aggressive use of a moisturizer at least 1-2 times a day to the entire body (including -Vanicream, Cetaphil, Aquaphor or Cerave) and moisturize hands after every washing.  -We recommend using moisturizers that come in a tub that needs to be scooped out, not a pump. This has more of an oil base. It will hold moisture in your skin much better than a water base moisturizer. The above recommended are non-pore clogging.      Wear a sunscreen with at least SPF 30 on your face, ears, neck and V of the chest daily. Wear sunscreen on other areas of the body if those areas are exposed to the sun throughout the day. Sunscreens can contain physical and/or chemical blockers. Physical blockers are less likely to clog pores, these include zinc oxide and titanium dioxide. Reapply every two hour and after swimming.     Sunscreen examples: https://www.ewg.org/sunscreen/    UV radiation  UVA radiation remains constant throughout the day and throughout the year. It is a longer wavelength than UVB and therefore penetrates deeper into the skin leading to immediate and delayed tanning, photoaging, and skin cancer. 70-80% of UVA and UVB radiation occurs between the hours of 10am-2pm.  UVB radiation  UVB radiation causes the most harmful effects and is more significant during the summer months. However, snow and ice can reflect UVB radiation leading to skin damage during the winter months as well. UVB radiation is responsible for tanning,  burning, inflammation, delayed erythema (pinkness), pigmentation (brown spots), and skin cancer.     I recommend self monthly full body exams and yearly full body exams with a dermatology provider. If you develop a new or changing lesion please follow up for examination. Most skin cancers are pink and scaly or pink and pearly. However, we do see blue/brown/black skin cancers.  Consider the ABCDEs of melanoma when giving yourself your monthly full body exam ( don't forget the groin, buttocks, feet, toes, etc). A-asymmetry, B-borders, C-color, D-diameter, E-elevation or evolving. If you see any of these changes please follow up in clinic. If you cannot see your back I recommend purchasing a hand held mirror to use with a larger wall mirror.       Checking for Skin Cancer  You can find cancer early by checking your skin each month. There are 3 kinds of skin cancer. They are melanoma, basal cell carcinoma, and squamous cell carcinoma. Doing monthly skin checks is the best way to find new marks or skin changes. Follow the instructions below for checking your skin.   The ABCDEs of checking moles for melanoma   Check your moles or growths for signs of melanoma using ABCDE:   Asymmetry: the sides of the mole or growth don t match  Border: the edges are ragged, notched, or blurred  Color: the color within the mole or growth varies  Diameter: the mole or growth is larger than 6 mm (size of a pencil eraser)  Evolving: the size, shape, or color of the mole or growth is changing (evolving is not shown in the images below)    Checking for other types of skin cancer  Basal cell carcinoma or squamous cell carcinoma have symptoms such as:     A spot or mole that looks different from all other marks on your skin  Changes in how an area feels, such as itching, tenderness, or pain  Changes in the skin's surface, such as oozing, bleeding, or scaliness  A sore that does not heal  New swelling or redness beyond the border of a  mole    Who s at risk?  Anyone can get skin cancer. But you are at greater risk if you have:   Fair skin, light-colored hair, or light-colored eyes  Many moles or abnormal moles on your skin  A history of sunburns from sunlight or tanning beds  A family history of skin cancer  A history of exposure to radiation or chemicals  A weakened immune system  If you have had skin cancer in the past, you are at risk for recurring skin cancer.   How to check your skin  Do your monthly skin checkups in front of a full-length mirror. Check all parts of your body, including your:   Head (ears, face, neck, and scalp)  Torso (front, back, and sides)  Arms (tops, undersides, upper, and lower armpits)  Hands (palms, backs, and fingers, including under the nails)  Buttocks and genitals  Legs (front, back, and sides)  Feet (tops, soles, toes, including under the nails, and between toes)  If you have a lot of moles, take digital photos of them each month. Make sure to take photos both up close and from a distance. These can help you see if any moles change over time.   Most skin changes are not cancer. But if you see any changes in your skin, call your doctor right away. Only he or she can diagnose a problem. If you have skin cancer, seeing your doctor can be the first step toward getting the treatment that could save your life.   PS DEPT. last reviewed this educational content on 4/1/2019 2000-2020 [employer]. [address] [place] [address], [place] [address]. All rights reserved. This information is not intended as a substitute for professional medical care. Always follow your healthcare professional's instructions.       When should I call my doctor?  If you are worsening or not improving, please, contact us or seek urgent care as noted below.     Who should I call with questions (adults)?    [facility] [phone]  For urgent needs outside of business hours call the [facility] at  [phone] and ask for the dermatology resident on call to be paged  If this is a medical emergency and you are unable to reach an ER, Call 911      If you need a prescription refill, please contact your pharmacy. Refills are approved or denied by our Physicians during normal business hours, Monday through Fridays  Per office policy, refills will not be granted if you have not been seen within the past year (or sooner depending on your child's condition)    WOUND CARE INSTRUCTIONS  for  SUTURE REMOVAL    [phone]      If there are any open or bleeding areas at the incision site you should begin to cover the area with a bandage daily as follows:    Clean and dry the area with plain tap water using a Q-tip or sterile gauze pad.  Apply Vaseline, Aquaphor, Polysporin or Bacitracin ointment to the open area.  Cover the wound with a band-aid or a sterile non-stick gauze pad and micropore paper tape.       *Once the bandages are removed, the scar will be red and firm (especially in the lip/chin area). This is normal and will fade in time. It might take 6-12 months for this to happen.     *Massaging the area will help the scar soften and fade quicker. Begin to massage the area in one month. To massage apply pressure directly and firmly over the scar with the fingertips and move in a circular motion. Massage the area for a few minutes several times a day. Continue to massage the site for several months.    *Numbness in the surgical area is expected. It might take 12-18 months for the feeling to return to normal. During this time sensations of itchiness, tingling and occasional sharp pains might be noted. These feelings are normal and will subside once the nerves have completely healed.

## 2024-09-05 ENCOUNTER — ALLIED HEALTH/NURSE VISIT (OUTPATIENT)
Dept: DERMATOLOGY | Facility: CLINIC | Age: 44
End: 2024-09-05
Payer: COMMERCIAL

## 2024-09-05 DIAGNOSIS — Z51.89 VISIT FOR WOUND CHECK: Primary | ICD-10-CM

## 2024-09-05 PROCEDURE — 99207 PR NO CHARGE LOS: CPT

## 2024-09-05 NOTE — PROGRESS NOTES
Pt came into the clinic to have wound checked.  Pt states he can feel and see a stitch sticking out of the bottom of wound (closer to hand).  Pt states the wound is not healing as well at the bottom of the wound as the rest of it.  Denies pain or itching.  Nurse cleaned the wound and assessed the wound.  Nurse could see a darker colored stitch sticking out towards the bottom of the wound and a knot in the open part of the wound.  Nurse was able to cut the dark thread and pull out of the wound.  Nurse applied aquaphor and covered with a band aid.  Advised pt the stitch that he was seeing was an underneath stitch.  Advised to continue wound care until the lower part of wound closes.  Pt states understanding.    Agustín Gallegos comes into clinic today at the request of Chen Bowie Ordering Provider for Wound Check Action taken: see above .    LOV 8/13/24    This service provided today was under the supervising provider of the day Briana Gregg, who was available if needed.    Kady NOVA RN  [facility]  [phone]

## 2024-09-05 NOTE — PATIENT INSTRUCTIONS
Proper skin care from [facility]:    -Eliminate harsh soaps as they strip the natural oils from the skin, often resulting in dry itchy skin ( i.e. Dial, Zest, [place])  -Use mild soaps such as Cetaphil or Dove Sensitive Skin in the shower. You do not need to use soap on arms, legs, and trunk every time you shower unless visibly soiled.   -Avoid hot or cold showers.  -After showering, lightly dry off and apply moisturizing within 2-3 minutes. This will help trap moisture in the skin.   -Aggressive use of a moisturizer at least 1-2 times a day to the entire body (including -Vanicream, Cetaphil, Aquaphor or Cerave) and moisturize hands after every washing.  -We recommend using moisturizers that come in a tub that needs to be scooped out, not a pump. This has more of an oil base. It will hold moisture in your skin much better than a water base moisturizer. The above recommended are non-pore clogging.      Wear a sunscreen with at least SPF 30 on your face, ears, neck and V of the chest daily. Wear sunscreen on other areas of the body if those areas are exposed to the sun throughout the day. Sunscreens can contain physical and/or chemical blockers. Physical blockers are less likely to clog pores, these include zinc oxide and titanium dioxide. Reapply every two hour and after swimming.     Sunscreen examples: https://www.ewg.org/sunscreen/    UV radiation  UVA radiation remains constant throughout the day and throughout the year. It is a longer wavelength than UVB and therefore penetrates deeper into the skin leading to immediate and delayed tanning, photoaging, and skin cancer. 70-80% of UVA and UVB radiation occurs between the hours of 10am-2pm.  UVB radiation  UVB radiation causes the most harmful effects and is more significant during the summer months. However, snow and ice can reflect UVB radiation leading to skin damage during the winter months as well. UVB radiation is responsible for tanning,  burning, inflammation, delayed erythema (pinkness), pigmentation (brown spots), and skin cancer.     I recommend self monthly full body exams and yearly full body exams with a dermatology provider. If you develop a new or changing lesion please follow up for examination. Most skin cancers are pink and scaly or pink and pearly. However, we do see blue/brown/black skin cancers.  Consider the ABCDEs of melanoma when giving yourself your monthly full body exam ( don't forget the groin, buttocks, feet, toes, etc). A-asymmetry, B-borders, C-color, D-diameter, E-elevation or evolving. If you see any of these changes please follow up in clinic. If you cannot see your back I recommend purchasing a hand held mirror to use with a larger wall mirror.       Checking for Skin Cancer  You can find cancer early by checking your skin each month. There are 3 kinds of skin cancer. They are melanoma, basal cell carcinoma, and squamous cell carcinoma. Doing monthly skin checks is the best way to find new marks or skin changes. Follow the instructions below for checking your skin.   The ABCDEs of checking moles for melanoma   Check your moles or growths for signs of melanoma using ABCDE:   Asymmetry: the sides of the mole or growth don t match  Border: the edges are ragged, notched, or blurred  Color: the color within the mole or growth varies  Diameter: the mole or growth is larger than 6 mm (size of a pencil eraser)  Evolving: the size, shape, or color of the mole or growth is changing (evolving is not shown in the images below)    Checking for other types of skin cancer  Basal cell carcinoma or squamous cell carcinoma have symptoms such as:     A spot or mole that looks different from all other marks on your skin  Changes in how an area feels, such as itching, tenderness, or pain  Changes in the skin's surface, such as oozing, bleeding, or scaliness  A sore that does not heal  New swelling or redness beyond the border of a  mole    Who s at risk?  Anyone can get skin cancer. But you are at greater risk if you have:   Fair skin, light-colored hair, or light-colored eyes  Many moles or abnormal moles on your skin  A history of sunburns from sunlight or tanning beds  A family history of skin cancer  A history of exposure to radiation or chemicals  A weakened immune system  If you have had skin cancer in the past, you are at risk for recurring skin cancer.   How to check your skin  Do your monthly skin checkups in front of a full-length mirror. Check all parts of your body, including your:   Head (ears, face, neck, and scalp)  Torso (front, back, and sides)  Arms (tops, undersides, upper, and lower armpits)  Hands (palms, backs, and fingers, including under the nails)  Buttocks and genitals  Legs (front, back, and sides)  Feet (tops, soles, toes, including under the nails, and between toes)  If you have a lot of moles, take digital photos of them each month. Make sure to take photos both up close and from a distance. These can help you see if any moles change over time.   Most skin changes are not cancer. But if you see any changes in your skin, call your doctor right away. Only he or she can diagnose a problem. If you have skin cancer, seeing your doctor can be the first step toward getting the treatment that could save your life.   [employer] last reviewed this educational content on 4/1/2019 2000-2020 [employer]. [address] [place] [address], [place] [address]. All rights reserved. This information is not intended as a substitute for professional medical care. Always follow your healthcare professional's instructions.       When should I call my doctor?  If you are worsening or not improving, please, contact us or seek urgent care as noted below.     Who should I call with questions (adults)?    [facility] [phone]  For urgent needs outside of business hours call the [facility] at  [phone] and ask for the dermatology resident on call to be paged  If this is a medical emergency and you are unable to reach an ER, Call 911      If you need a prescription refill, please contact your pharmacy. Refills are approved or denied by our Physicians during normal business hours, Monday through Fridays  Per office policy, refills will not be granted if you have not been seen within the past year (or sooner depending on your child's condition)    Caring for your skin after surgery    After your surgery, a pressure bandage will be placed over the area. This will prevent bleeding. Please follow these instructions over the next 1 to 2 weeks. Following this regimen will help to prevent complications as your wound heals.     For the first 48 hours after your surgery:    Leave the pressure dressing on and keep it dry. If it should come loose, you may re-tape it, but do not take it off.  Relax and take it easy. Do not do any vigorous exercise, heavy lifting or bending forward. This could cause the wound to bleed.  Post-operative pain is usually mild. You may alternate between 1000 mg of Tylenol (acetaminophen) and 400 mg of Ibuprofen every 4 hours.  Do not take more than 4,000 mg of acetaminophen in a 24-hour period or 3200 mg of Ibuprofen in a 24-hr period.  Avoid alcohol and vitamin E as these may increase your tendency to bleed.  You may put an ice pack around the bandaged area for 20 minutes at a time as needed. This may help reduce swelling, bruising, and pain. Make sure the ice pack is waterproof so that the pressure bandage doesn't get wet.  You may see a small amount of drainage or blood on your pressure bandage. This is normal. However:  If drainage or bleeding continues or saturates the bandage, you will need to apply firm pressure over the bandage with a clean washcloth for 15 minutes.  If bleeding continues after applying pressure for 15 minutes, apply an ice pack with gentle pressure to the  bandaged area for another 15 minutes.  If bleeding still continues, call our office or go to the nearest emergency room.    48 Hours After Surgery:  Carefully remove the pressure bandage. If it seems sticky or too difficult to get off, you may need to soak it off in the shower.  Wash wound with a mild soap and water.  Use caution when washing the wound, be gentle and do not let the forceful shower stream hit the wound directly.  Pat dry.  Apply Vaseline (from a new container or tube) over the suture line with a Q-tip.  Cover the site with a bandage.  Do this daily until the sutures have dissolved.      What to expect:    The first couple of days your wound may be tender and may bleed slightly when doing wound care.  There may be swelling and bruising around the wound, especially if it is near the eyes. For your comfort, you may apply ice or cold compresses to the area.  The area around your wound may be numb for several weeks or even months.  You may experience periodic sharp pain or mild itching around the wound as it heals.   The suture line will look dark pink at first and the edges of the wound will be reddened. This will lighten up each day.    Call Us If:    You have bleeding that will not stop after applying pressure and ice.  You have pain that is not controlled with Tylenol and Ibuprofen.  You have signs or symptoms of an infection such as fever over 100 degrees Fahrenheit, redness, warmth or foul-smelling drainage from the wound    [facility]: [phone]   [facility]: [phone]  For urgent needs outside of business hours call the [facility] at [phone] and ask to speak with the dermatology resident on call

## 2024-09-10 ENCOUNTER — LAB (OUTPATIENT)
Dept: LAB | Facility: CLINIC | Age: 44
End: 2024-09-10
Payer: COMMERCIAL

## 2024-09-10 DIAGNOSIS — R76.8 ELEVATED ANTI-TISSUE TRANSGLUTAMINASE (TTG) IGA LEVEL: ICD-10-CM

## 2024-09-10 LAB
ALBUMIN SERPL BCG-MCNC: 4.7 G/DL (ref 3.5–5.2)
ALP SERPL-CCNC: 63 U/L (ref 40–150)
ALT SERPL W P-5'-P-CCNC: 21 U/L (ref 0–70)
ANION GAP SERPL CALCULATED.3IONS-SCNC: 10 MMOL/L (ref 7–15)
AST SERPL W P-5'-P-CCNC: 19 U/L (ref 0–45)
BILIRUB SERPL-MCNC: 0.5 MG/DL
BUN SERPL-MCNC: 11.8 MG/DL (ref 6–20)
CALCIUM SERPL-MCNC: 10 MG/DL (ref 8.8–10.4)
CHLORIDE SERPL-SCNC: 103 MMOL/L (ref 98–107)
CREAT SERPL-MCNC: 0.77 MG/DL (ref 0.67–1.17)
EGFRCR SERPLBLD CKD-EPI 2021: >90 ML/MIN/1.73M2
ERYTHROCYTE [DISTWIDTH] IN BLOOD BY AUTOMATED COUNT: 12.1 % (ref 10–15)
FERRITIN SERPL-MCNC: 91 NG/ML (ref 31–409)
FOLATE SERPL-MCNC: 14.8 NG/ML (ref 4.6–34.8)
GLUCOSE SERPL-MCNC: 89 MG/DL (ref 70–99)
HCO3 SERPL-SCNC: 28 MMOL/L (ref 22–29)
HCT VFR BLD AUTO: 40 % (ref 40–53)
HGB BLD-MCNC: 14.3 G/DL (ref 13.3–17.7)
IRON BINDING CAPACITY (ROCHE): 260 UG/DL (ref 240–430)
IRON SATN MFR SERPL: 46 % (ref 15–46)
IRON SERPL-MCNC: 119 UG/DL (ref 61–157)
MAGNESIUM SERPL-MCNC: 1.9 MG/DL (ref 1.7–2.3)
MCH RBC QN AUTO: 29 PG (ref 26.5–33)
MCHC RBC AUTO-ENTMCNC: 35.8 G/DL (ref 31.5–36.5)
MCV RBC AUTO: 81 FL (ref 78–100)
PLATELET # BLD AUTO: 322 10E3/UL (ref 150–450)
POTASSIUM SERPL-SCNC: 4 MMOL/L (ref 3.4–5.3)
PROT SERPL-MCNC: 7.7 G/DL (ref 6.4–8.3)
RBC # BLD AUTO: 4.93 10E6/UL (ref 4.4–5.9)
SODIUM SERPL-SCNC: 141 MMOL/L (ref 135–145)
VIT B12 SERPL-MCNC: 729 PG/ML (ref 232–1245)
VIT D+METAB SERPL-MCNC: 40 NG/ML (ref 20–50)
WBC # BLD AUTO: 5 10E3/UL (ref 4–11)

## 2024-09-10 PROCEDURE — 86258 DGP ANTIBODY EACH IG CLASS: CPT

## 2024-09-10 PROCEDURE — 82306 VITAMIN D 25 HYDROXY: CPT

## 2024-09-10 PROCEDURE — 86231 EMA EACH IG CLASS: CPT | Mod: 90

## 2024-09-10 PROCEDURE — 80053 COMPREHEN METABOLIC PANEL: CPT

## 2024-09-10 PROCEDURE — 82728 ASSAY OF FERRITIN: CPT

## 2024-09-10 PROCEDURE — 86364 TISS TRNSGLTMNASE EA IG CLAS: CPT

## 2024-09-10 PROCEDURE — 83550 IRON BINDING TEST: CPT

## 2024-09-10 PROCEDURE — 82746 ASSAY OF FOLIC ACID SERUM: CPT

## 2024-09-10 PROCEDURE — 36415 COLL VENOUS BLD VENIPUNCTURE: CPT

## 2024-09-10 PROCEDURE — 85027 COMPLETE CBC AUTOMATED: CPT

## 2024-09-10 PROCEDURE — 84630 ASSAY OF ZINC: CPT | Mod: 90

## 2024-09-10 PROCEDURE — 99000 SPECIMEN HANDLING OFFICE-LAB: CPT

## 2024-09-10 PROCEDURE — 83540 ASSAY OF IRON: CPT

## 2024-09-10 PROCEDURE — 82607 VITAMIN B-12: CPT

## 2024-09-10 PROCEDURE — 83735 ASSAY OF MAGNESIUM: CPT

## 2024-09-11 LAB
GLIADIN IGA SER-ACNC: 15 U/ML
GLIADIN IGG SER-ACNC: 3.3 U/ML
TTG IGA SER-ACNC: 5.3 U/ML
TTG IGG SER-ACNC: <0.6 U/ML
ZINC SERPL-MCNC: 79.5 UG/DL

## 2024-09-12 NOTE — RESULT ENCOUNTER NOTE
Hi Christiano,    The majority of your recent set of labs are available for you to review. Overall, I do not see any signs of nutritional deficiency, which is reassuring. The previously elevated Celiac antibody as normalized, though another is slightly elevated. There is 1 more antibody that is still pending, I'll be in touch when that comes in.    Sincerely,  Alex Aguirre PA-C

## 2024-09-14 LAB — ENDOMYSIUM IGA TITR SER IF: ABNORMAL {TITER}

## 2024-09-17 NOTE — RESULT ENCOUNTER NOTE
Hi Christiano,     The last celiac antibody is back and has come back elevated.  So, at this point we do have persisting laboratory evidence for celiac disease.  The next step would be to have you continue on a gluten containing diet and then undergo upper endoscopy to evaluate for active celiac disease in the small bowel.    If you are okay with this, let me know and I will arrange this.    I we will also mention that one of my partners, Dr. Fartun Grace, is a celiac disease specialist.  She is actually running a study in patients that are newly diagnosed.  I am going to have her team reach out to you to discuss this further.  If you are interested in enrolling, they will be able to assist you with that.    Please let me know if you have any questions.    Sincerely,  Alex MARROQUIN [facility] Hep[facility], and Nutrition

## 2024-10-03 DIAGNOSIS — I10 ESSENTIAL HYPERTENSION, BENIGN: ICD-10-CM

## 2024-10-03 RX ORDER — LOSARTAN POTASSIUM 25 MG/1
25 TABLET ORAL DAILY
Qty: 90 TABLET | Refills: 1 | Status: SHIPPED | OUTPATIENT
Start: 2024-10-03

## 2024-10-09 ENCOUNTER — IMMUNIZATION (OUTPATIENT)
Dept: FAMILY MEDICINE | Facility: CLINIC | Age: 44
End: 2024-10-09
Payer: COMMERCIAL

## 2024-10-09 VITALS — TEMPERATURE: 98.5 F

## 2024-10-09 DIAGNOSIS — Z23 ENCOUNTER FOR IMMUNIZATION: Primary | ICD-10-CM

## 2024-10-09 PROCEDURE — 91320 SARSCV2 VAC 30MCG TRS-SUC IM: CPT

## 2024-10-09 PROCEDURE — 99207 PR NO CHARGE NURSE ONLY: CPT

## 2024-10-09 PROCEDURE — 90480 ADMN SARSCOV2 VAC 1/ONLY CMP: CPT

## 2024-10-09 NOTE — PROGRESS NOTES
Prior to immunization administration, verified patients identity using patient s name and date of birth. Please see Immunization Activity for additional information.     Is the patient's temperature normal (100.5 or less)? Yes     Patient MEETS CRITERIA. PROCEED with vaccine administration.      Patient instructed to remain in clinic for 15 minutes afterwards, and to report any adverse reactions.      Link to Ancillary Visit Immunization Standing Orders SmartSet     Screening performed by Jahaira Stokes RN on 10/9/2024 at 10:50 AM.

## 2024-10-14 ENCOUNTER — LAB (OUTPATIENT)
Dept: LAB | Facility: CLINIC | Age: 44
End: 2024-10-14
Payer: COMMERCIAL

## 2024-10-14 ENCOUNTER — MYC MEDICAL ADVICE (OUTPATIENT)
Dept: GASTROENTEROLOGY | Facility: CLINIC | Age: 44
End: 2024-10-14

## 2024-10-14 DIAGNOSIS — E78.5 DYSLIPIDEMIA: ICD-10-CM

## 2024-10-14 DIAGNOSIS — R76.8 ELEVATED ANTI-TISSUE TRANSGLUTAMINASE (TTG) IGA LEVEL: Primary | ICD-10-CM

## 2024-10-14 DIAGNOSIS — E10.9 TYPE 1 DIABETES MELLITUS WITHOUT COMPLICATION (H): ICD-10-CM

## 2024-10-14 LAB
CHOLEST SERPL-MCNC: 139 MG/DL
CREAT SERPL-MCNC: 0.78 MG/DL (ref 0.67–1.17)
EGFRCR SERPLBLD CKD-EPI 2021: >90 ML/MIN/1.73M2
EST. AVERAGE GLUCOSE BLD GHB EST-MCNC: 171 MG/DL
FASTING STATUS PATIENT QL REPORTED: YES
HBA1C MFR BLD: 7.6 % (ref 0–5.6)
HDLC SERPL-MCNC: 43 MG/DL
LDLC SERPL CALC-MCNC: 85 MG/DL
NONHDLC SERPL-MCNC: 96 MG/DL
TRIGL SERPL-MCNC: 53 MG/DL
TSH SERPL DL<=0.005 MIU/L-ACNC: 0.61 UIU/ML (ref 0.3–4.2)

## 2024-10-14 PROCEDURE — 36415 COLL VENOUS BLD VENIPUNCTURE: CPT

## 2024-10-14 PROCEDURE — 83036 HEMOGLOBIN GLYCOSYLATED A1C: CPT

## 2024-10-14 PROCEDURE — 82565 ASSAY OF CREATININE: CPT

## 2024-10-14 PROCEDURE — 84443 ASSAY THYROID STIM HORMONE: CPT

## 2024-10-14 PROCEDURE — 80061 LIPID PANEL: CPT

## 2024-10-18 DIAGNOSIS — E10.9 TYPE 1 DIABETES MELLITUS WITHOUT COMPLICATION (H): ICD-10-CM

## 2024-10-18 NOTE — PROGRESS NOTES
HPI:   Christiano is a pleasant 43 yo man here for follow up of type 1 diabetes, which he has had since age 14. He also sees Dr. Bland.  He has no known complications from his diabetes. He reports that overall things are going well.  He does not get as much exercise as he wants to.  Busy with work and two little children.   We have talked about in-pen and pumps in the past, but he is comfortable with doing his own calculations.  His glucose has been a bit high after breakfast and sometimes is dropping after lunch.  He finds that he sees his glucose trending down, then he treats and it goes too high. He usually does not have symptoms.  He is trying to avoid overtreating, but usually consumes 20-30g carb when he is heading low. He does recognize there is a delay in his sensor and glucose does not come up as quickly as with a finger stick.      He has been fighting through holiday eating- more snacks at work.  This past weekend, had norovirus.  He never vomited. Just a lot of diarrhea. Doing better now.    Kids are 5 and 2 1/2 now.      Had a rough time this weekend. A little higher on average.  When he is in his routine, pretty good.  Still overcorrecting on when it is starting to go low.  Might     Novolog dosing: pre-bolusing is better.   Breakfast- cheerios- 1/5g   Lunch- sometimes leftovers 1/5g  Dinner- usually low carb. 1/5g.    HS snack- cut out.  Only eats if he is 100 or lower at bedtime.   Correction: 1/30 over 130 mg/dL.     He takes Tresiba 24 units daily.      Christiano wears a jamila sensor with overall average of 176 mg/dL (CV 28%), TIR 62%, low 0%  for the past two weeks.                        Christiano remains on a statin. Cholesterol has improved.  Dealing with frozen shoulder issues- doing better.  Had a cortisone injection and we put him on NPH during that time. It worked well.  He is doing physical therapy at home. Now having low back issues.  Will be seeing someone soon about this.    He otherwise has been  feeling well and in his usual state of health.  He has no other concerns today.       Past Medical History:   Diagnosis Date    Hypertension     Type 1 diabetes (H)     age of onset 14 years       Past Surgical History:   Procedure Laterality Date    COSMETIC SURGERY  1993    Mole removal    ENT SURGERY  1985    Tubes in ears    GENITOURINARY SURGERY  2023    Vasectomy       Family History   Problem Relation Age of Onset    Diabetes Brother     Cerebrovascular Disease Paternal Grandmother     Arthritis Mother     Arthritis Maternal Grandmother     Coronary Artery Disease Father        Social History     Social History    Marital status: Single     Spouse name: N/A    Number of children: N/A    Years of education: N/A     Social History Main Topics    Smoking status: Never Smoker    Smokeless tobacco: Never Used    Alcohol use No    Drug use: No    Sexual activity: Yes     Partners: Female     Other Topics Concern    None     Social History Narrative   Social History: Works for [employer]. . DaughterDaphney born August, 2019.  DaughterEbony born May, 2022.     Current Outpatient Medications   Medication Sig Dispense Refill    blood glucose (CONTOUR NEXT TEST) test strip test 1-6 times daily, as directed. 200 strip 3    blood glucose monitoring (CANDE MICROLET) lancets Use to test blood sugar 8 times daily or as directed. 6 Box 5    blood glucose monitoring (NO BRAND SPECIFIED) meter device kit Use to test blood sugar 5 times daily or as directed. 1 kit 0    cholecalciferol (VITAMIN D) 1000 UNIT tablet Take 1 tablet (1,000 Units) by mouth daily 90 tablet 3    Continuous Blood Gluc Sensor (FREESTYLE LILO 3 SENSOR) MISC 1 each every 14 days 7 each 3    Glucagon (BAQSIMI TWO PACK) 3 MG/DOSE POWD Spray 1 each in nostril once as needed (severe hypoglycemia) 2 each 3    Glucagon (BAQSIMI) 3 MG/DOSE nasal powder Spray 1 spray (3 mg) in nostril as needed for low blood sugar (severe hypoglycemia) in the  event of unconscious hypoglycemia or hypoglycemic seizure. May repeat dose if no response after 15 minutes. 1 each 1    Insulin Aspart FlexPen 100 UNIT/ML SOPN Inject 60 Units Subcutaneous daily 60 mL 3    insulin degludec (TRESIBA FLEXTOUCH) 200 UNIT/ML pen Inject 24 Units Subcutaneous daily 30 mL 3    insulin pen needle (B-D U/F) 31G X 8 MM miscellaneous Use 4 pen needles daily or as directed. 400 each 4    KETOSTIX test strip Use as directed in case of hyperglycemia or illness. 50 strip 3    losartan (COZAAR) 25 MG tablet Take 1 tablet (25 mg) by mouth daily. 90 tablet 1    NOVOLOG FLEXPEN 100 UNIT/ML soln Inject 60 Units Subcutaneous daily 60 mL 0    pravastatin (PRAVACHOL) 20 MG tablet Take 1 tablet (20 mg) by mouth at bedtime 90 tablet 4     No current facility-administered medications for this visit.        No Known Allergies    Physical Exam:  GENERAL: healthy, alert and no distress  RESP: no audible wheeze, cough, or visible cyanosis.  No visible retractions or increased work of breathing.  Able to speak fully in complete sentences.  PSYCH: mentation appears normal, affect normal/bright, judgement and insight intact, normal speech and appearance well-groomed    RESULTS  Lab Results   Component Value Date    A1C 7.6 (H) 10/14/2024    A1C 7.3 (H) 04/24/2024    A1C 7.3 (H) 11/15/2023    A1C 7.6 (H) 06/14/2023    A1C 7.7 (H) 02/10/2023    A1C 7.3 (H) 06/17/2021    A1C 6.9 (H) 12/08/2020    A1C 6.8 (H) 09/03/2020    A1C 7.7 (H) 05/13/2019    A1C 7.9 (H) 08/13/2014    HEMOGLOBINA1 7.0 07/18/2022    HEMOGLOBINA1 7.0 03/21/2022    HEMOGLOBINA1 7.5 (A) 03/02/2020    HEMOGLOBINA1 7.5 (A) 11/26/2019    HEMOGLOBINA1 8.2 (A) 08/26/2019       TSH   Date Value Ref Range Status   10/14/2024 0.61 0.30 - 4.20 uIU/mL Final   02/10/2023 0.46 0.30 - 4.20 uIU/mL Final   06/17/2021 0.88 0.40 - 4.00 mU/L Final   05/29/2020 2.18 0.40 - 4.00 mU/L Final   05/13/2019 2.14 0.40 - 4.00 mU/L Final   01/30/2018 2.46 0.40 - 4.00 mU/L Final    09/16/2016 0.97 0.40 - 4.00 mU/L Final     T4 Free   Date Value Ref Range Status   09/16/2016 1.01 0.76 - 1.46 ng/dL Final   06/24/2015 1.02 0.76 - 1.46 ng/dL Final       ALT   Date Value Ref Range Status   09/10/2024 21 0 - 70 U/L Final   11/15/2023 20 0 - 70 U/L Final     Comment:     Reference intervals for this test were updated on 6/12/2023 to more accurately reflect our healthy population. There may be differences in the flagging of prior results with similar values performed with this method. Interpretation of those prior results can be made in the context of the updated reference intervals.     03/05/2015 20 0 - 70 U/L Final   ]    Recent Labs   Lab Test 10/14/24  0926 11/15/23  1035   CHOL 139 137   HDL 43 41   LDL 85 84   TRIG 53 58       Lab Results   Component Value Date     09/10/2024     06/17/2021      Lab Results   Component Value Date    POTASSIUM 4.0 09/10/2024    POTASSIUM 4.2 10/20/2021    POTASSIUM 4.0 06/17/2021     Lab Results   Component Value Date    CHLORIDE 103 09/10/2024    CHLORIDE 103 10/20/2021    CHLORIDE 103 06/17/2021     Lab Results   Component Value Date    CELESTE 10.0 09/10/2024    CELESTE 9.2 06/17/2021     Lab Results   Component Value Date    CO2 28 09/10/2024    CO2 30 10/20/2021    CO2 31 06/17/2021     Lab Results   Component Value Date    BUN 11.8 09/10/2024    BUN 10 10/20/2021    BUN 8 06/17/2021     Lab Results   Component Value Date    CR 0.78 10/14/2024    CR 0.73 06/17/2021       GFR Estimate   Date Value Ref Range Status   10/14/2024 >90 >60 mL/min/1.73m2 Final     Comment:     eGFR calculated using 2021 CKD-EPI equation.   09/10/2024 >90 >60 mL/min/1.73m2 Final     Comment:     eGFR calculated using 2021 CKD-EPI equation.   11/15/2023 >90 >60 mL/min/1.73m2 Final   06/17/2021 >90 >60 mL/min/[1.73_m2] Final     Comment:     Non  GFR Calc  Starting 12/18/2018, serum creatinine based estimated GFR (eGFR) will be   calculated using the Chronic  "Kidney Disease Epidemiology Collaboration   (CKD-EPI) equation.     12/08/2020 >90 >60 mL/min/[1.73_m2] Final     Comment:     Non  GFR Calc  Starting 12/18/2018, serum creatinine based estimated GFR (eGFR) will be   calculated using the Chronic Kidney Disease Epidemiology Collaboration   (CKD-EPI) equation.     05/29/2020 >90 >60 mL/min/[1.73_m2] Final     Comment:     Non  GFR Calc  Starting 12/18/2018, serum creatinine based estimated GFR (eGFR) will be   calculated using the Chronic Kidney Disease Epidemiology Collaboration   (CKD-EPI) equation.       GFR Estimate If Black   Date Value Ref Range Status   06/17/2021 >90 >60 mL/min/[1.73_m2] Final     Comment:      GFR Calc  Starting 12/18/2018, serum creatinine based estimated GFR (eGFR) will be   calculated using the Chronic Kidney Disease Epidemiology Collaboration   (CKD-EPI) equation.     12/08/2020 >90 >60 mL/min/[1.73_m2] Final     Comment:      GFR Calc  Starting 12/18/2018, serum creatinine based estimated GFR (eGFR) will be   calculated using the Chronic Kidney Disease Epidemiology Collaboration   (CKD-EPI) equation.     05/29/2020 >90 >60 mL/min/[1.73_m2] Final     Comment:      GFR Calc  Starting 12/18/2018, serum creatinine based estimated GFR (eGFR) will be   calculated using the Chronic Kidney Disease Epidemiology Collaboration   (CKD-EPI) equation.         Lab Results   Component Value Date    MICROL <12.0 04/24/2024    MICROL <5 06/17/2021     No results found for: \"MICROALBUMIN\"  Lab Results   Component Value Date    CPEPT <0.1 (L) 02/10/2023       Vitamin B12   Date Value Ref Range Status   09/10/2024 729 232 - 1,245 pg/mL Final   02/10/2023 1,071 232 - 1,245 pg/mL Final   09/03/2020 713 193 - 986 pg/mL Final   ]    Most recent eye exam date: : Not Found     FIB-4 Calculation: 0.55 at 9/10/2024 10:05 AM  Calculated from:  SGOT/AST: 19 U/L at 9/10/2024 10:05 " AM  SGPT/ALT: 21 U/L at 9/10/2024 10:05 AM  Platelets: 322 10e3/uL at 9/10/2024 10:05 AM  Age: 44 years  A value of FIB-4 below 1.30 is considered as low risk for advanced fibrosis; a value of FIB-4 over 2.67 is considered as high risk for advanced fibrosis; and FIB-4 values between 1.30 and 2.67 are considered as intermediate risk of advanced fibrosis.    Assessment/Plan:     1.  Type 1 diabetes- Christiano is doing quite well.  Last A1c was 7.6%. Having some post-prandial hyperglycemia.  Pre-bolusing 15 minutes before eating is ideal, yet challenging.  Suggested switch to fiasp.  May also require tighter IC ratio at breakfast of 1/4.5g.  We made the following plan today (instructions given to patient):      I will check on coverage of fiasp.  Switch to Fiasp insulin.  This works more quickly than Novolog.  Take this at the start of the meal (0-5 minutes before eating).     If still high post-breakfast, consider 1/4.5 g at breakfast.     Emergency issues: Please contact the clinic as soon as you recognize a problem.  Here are some concerns you should contact us about.  -Vomiting: more than twice.  Please check ketones.  If positive, go to ER. Monitor glucose hourly.   -High glucose (over 300 mg/dL twice in a row): Please check ketones.  If ketones are negative, take an insulin correction and recheck glucose in 1 hour.  If glucose is not coming down, please call the clinic. If ketones are moderate or large, drink lots of water, take an insulin correction, and recheck ketones in 1 hour.  If ketones are still high (or you are vomiting), go to the ER.  -Hypoglycemia (low glucose):   If glucose is less than 70 mg/dL, treat with 15g carb (4 glucose tablets), recheck glucose in 10 minutes.  If low again, repeat.   If glucose is less than 54 mg/dL, treat with 30g carb, recheck glucose in 10 minutes.  If low again, repeat.  Keep glucagon in your home in case of severe hypoglycemia and train someone how to use this.    Emergency  kit (please ensure you always have these with you):   Glucose tablets  Glucagon  Insulin  Syringes (if on a pump)  Extra infusion set (if on a pump)  Ketone strips    Contact information:   If you have concerns, please send me a [employer] [employer] message or call the clinic at [phone].  For more urgent concerns, please call [phone] after hours/weekends and ask to speak with the endocrinologist on call.      Please let me know if you are having low blood sugars less than 70 or over 350 mg/dL.  Do not wait until your next appointment if this is happening.      If you have concerns, please send me a [employer] [employer] message M-Th, call the clinic at [phone], or call [phone] after hours/weekends and ask to speak with the endocrinologist on call.      2.  Risk factors-     Retinopathy:  No. Had recent eye exam in December, 2023.  Nephropathy:  BP is historically well controlled on lisinopril .  No microalbuminuria.  Creatinine stable.    Neuropathy: No.    Feet: OK, no ulcers.   Taking ASA: no  Lipids:  LDL is in target, now on pravastatin.  Tolerating this well.     Celiac screening: positive antibodies, now normalized.  Is seeing GI and will be scheduled for biopsy.  NOT gluten free currently.   Vitamin D: Improved- back to normal.  Will continue multivitamin. Will resume.     3.  F/U in 6 mos with Dr. Bland, annually with me. Has a PCP appointment next month.     25 minutes spent on the date of the encounter doing chart review, review of test results, review of continuous glucose sensor, interpretation of glucose data, patient visit and documentation, counseling/coordination of care, and discussion of follow up plan for worsening hyper and hypoglycemia.  The patient understood and is satisfied with today's visit.     Shena Iniguez PA-C, MPAS   [facility]  Department of Medicine  Division of Endocrinology and Diabetes

## 2024-10-21 ENCOUNTER — VIRTUAL VISIT (OUTPATIENT)
Dept: ENDOCRINOLOGY | Facility: CLINIC | Age: 44
End: 2024-10-21
Payer: COMMERCIAL

## 2024-10-21 DIAGNOSIS — E10.9 WELL CONTROLLED TYPE 1 DIABETES MELLITUS (H): Primary | ICD-10-CM

## 2024-10-21 PROCEDURE — 99213 OFFICE O/P EST LOW 20 MIN: CPT | Mod: 95 | Performed by: PHYSICIAN ASSISTANT

## 2024-10-21 RX ORDER — INSULIN ASPART INJECTION 100 [IU]/ML
INJECTION, SOLUTION SUBCUTANEOUS
Qty: 30 ML | Refills: 11 | Status: SHIPPED | OUTPATIENT
Start: 2024-10-21

## 2024-10-21 NOTE — LETTER
10/21/2024       RE: Agustín Gallegos  [address]  [facility] [address]     Dear Colleague,    Thank you for referring your patient, Agustín Gallegos, to the [facility] [place] at [facility]. Please see a copy of my visit note below.      HPI:   Christiano is a pleasant 45 yo man here for follow up of type 1 diabetes, which he has had since age 14. He also sees Dr. Bland.  He has no known complications from his diabetes. He reports that overall things are going well.  He does not get as much exercise as he wants to.  Busy with work and two little children.   We have talked about in-pen and pumps in the past, but he is comfortable with doing his own calculations.  His glucose has been a bit high after breakfast and sometimes is dropping after lunch.  He finds that he sees his glucose trending down, then he treats and it goes too high. He usually does not have symptoms.  He is trying to avoid overtreating, but usually consumes 20-30g carb when he is heading low. He does recognize there is a delay in his sensor and glucose does not come up as quickly as with a finger stick.      He has been fighting through holiday eating- more snacks at work.  This past weekend, had norovirus.  He never vomited. Just a lot of diarrhea. Doing better now.    Kids are 5 and 2 1/2 now.      Had a rough time this weekend. A little higher on average.  When he is in his routine, pretty good.  Still overcorrecting on when it is starting to go low.  Might     Novolog dosing: pre-bolusing is better.   Breakfast- cheerios- 1/5g   Lunch- sometimes leftovers 1/5g  Dinner- usually low carb. 1/5g.    HS snack- cut out.  Only eats if he is 100 or lower at bedtime.   Correction: 1/30 over 130 mg/dL.     He takes Tresiba 24 units daily.      Christiano wears a jamila sensor with overall average of 176 mg/dL (CV 28%), TIR 62%, low 0%  for the past two weeks.                         Christiano remains on a statin. Cholesterol has improved.  Dealing with frozen shoulder issues- doing better.  Had a cortisone injection and we put him on NPH during that time. It worked well.  He is doing physical therapy at home. Now having low back issues.  Will be seeing someone soon about this.    He otherwise has been feeling well and in his usual state of health.  He has no other concerns today.       Past Medical History:   Diagnosis Date     Hypertension      Type 1 diabetes (H)     age of onset 14 years       Past Surgical History:   Procedure Laterality Date     COSMETIC SURGERY  1993    Mole removal     ENT SURGERY  1985    Tubes in ears     GENITOURINARY SURGERY  2023    Vasectomy       Family History   Problem Relation Age of Onset     Diabetes Brother      Cerebrovascular Disease Paternal Grandmother      Arthritis Mother      Arthritis Maternal Grandmother      Coronary Artery Disease Father        Social History     Social History     Marital status: Single     Spouse name: N/A     Number of children: N/A     Years of education: N/A     Social History Main Topics     Smoking status: Never Smoker     Smokeless tobacco: Never Used     Alcohol use No     Drug use: No     Sexual activity: Yes     Partners: Female     Other Topics Concern     None     Social History Narrative   Social History: Works for [employer]. . DaughterDaphney born August, 2019.  DaughterEbony born May, 2022.     Current Outpatient Medications   Medication Sig Dispense Refill     blood glucose (CONTOUR NEXT TEST) test strip test 1-6 times daily, as directed. 200 strip 3     blood glucose monitoring (CANDE MICROLET) lancets Use to test blood sugar 8 times daily or as directed. 6 Box 5     blood glucose monitoring (NO BRAND SPECIFIED) meter device kit Use to test blood sugar 5 times daily or as directed. 1 kit 0     cholecalciferol (VITAMIN D) 1000 UNIT tablet Take 1 tablet (1,000 Units) by mouth  daily 90 tablet 3     Continuous Blood Gluc Sensor (FREESTYLE LILO 3 SENSOR) MISC 1 each every 14 days 7 each 3     Glucagon (BAQSIMI TWO PACK) 3 MG/DOSE POWD Spray 1 each in nostril once as needed (severe hypoglycemia) 2 each 3     Glucagon (BAQSIMI) 3 MG/DOSE nasal powder Spray 1 spray (3 mg) in nostril as needed for low blood sugar (severe hypoglycemia) in the event of unconscious hypoglycemia or hypoglycemic seizure. May repeat dose if no response after 15 minutes. 1 each 1     Insulin Aspart FlexPen 100 UNIT/ML SOPN Inject 60 Units Subcutaneous daily 60 mL 3     insulin degludec (TRESIBA FLEXTOUCH) 200 UNIT/ML pen Inject 24 Units Subcutaneous daily 30 mL 3     insulin pen needle (B-D U/F) 31G X 8 MM miscellaneous Use 4 pen needles daily or as directed. 400 each 4     KETOSTIX test strip Use as directed in case of hyperglycemia or illness. 50 strip 3     losartan (COZAAR) 25 MG tablet Take 1 tablet (25 mg) by mouth daily. 90 tablet 1     NOVOLOG FLEXPEN 100 UNIT/ML soln Inject 60 Units Subcutaneous daily 60 mL 0     pravastatin (PRAVACHOL) 20 MG tablet Take 1 tablet (20 mg) by mouth at bedtime 90 tablet 4     No current facility-administered medications for this visit.        No Known Allergies    Physical Exam:  GENERAL: healthy, alert and no distress  RESP: no audible wheeze, cough, or visible cyanosis.  No visible retractions or increased work of breathing.  Able to speak fully in complete sentences.  PSYCH: mentation appears normal, affect normal/bright, judgement and insight intact, normal speech and appearance well-groomed    RESULTS  Lab Results   Component Value Date    A1C 7.6 (H) 10/14/2024    A1C 7.3 (H) 04/24/2024    A1C 7.3 (H) 11/15/2023    A1C 7.6 (H) 06/14/2023    A1C 7.7 (H) 02/10/2023    A1C 7.3 (H) 06/17/2021    A1C 6.9 (H) 12/08/2020    A1C 6.8 (H) 09/03/2020    A1C 7.7 (H) 05/13/2019    A1C 7.9 (H) 08/13/2014    HEMOGLOBINA1 7.0 07/18/2022    HEMOGLOBINA1 7.0 03/21/2022    HEMOGLOBINA1 7.5  (A) 03/02/2020    HEMOGLOBINA1 7.5 (A) 11/26/2019    HEMOGLOBINA1 8.2 (A) 08/26/2019       TSH   Date Value Ref Range Status   10/14/2024 0.61 0.30 - 4.20 uIU/mL Final   02/10/2023 0.46 0.30 - 4.20 uIU/mL Final   06/17/2021 0.88 0.40 - 4.00 mU/L Final   05/29/2020 2.18 0.40 - 4.00 mU/L Final   05/13/2019 2.14 0.40 - 4.00 mU/L Final   01/30/2018 2.46 0.40 - 4.00 mU/L Final   09/16/2016 0.97 0.40 - 4.00 mU/L Final     T4 Free   Date Value Ref Range Status   09/16/2016 1.01 0.76 - 1.46 ng/dL Final   06/24/2015 1.02 0.76 - 1.46 ng/dL Final       ALT   Date Value Ref Range Status   09/10/2024 21 0 - 70 U/L Final   11/15/2023 20 0 - 70 U/L Final     Comment:     Reference intervals for this test were updated on 6/12/2023 to more accurately reflect our healthy population. There may be differences in the flagging of prior results with similar values performed with this method. Interpretation of those prior results can be made in the context of the updated reference intervals.     03/05/2015 20 0 - 70 U/L Final   ]    Recent Labs   Lab Test 10/14/24  0926 11/15/23  1035   CHOL 139 137   HDL 43 41   LDL 85 84   TRIG 53 58       Lab Results   Component Value Date     09/10/2024     06/17/2021      Lab Results   Component Value Date    POTASSIUM 4.0 09/10/2024    POTASSIUM 4.2 10/20/2021    POTASSIUM 4.0 06/17/2021     Lab Results   Component Value Date    CHLORIDE 103 09/10/2024    CHLORIDE 103 10/20/2021    CHLORIDE 103 06/17/2021     Lab Results   Component Value Date    CELESTE 10.0 09/10/2024    CELESTE 9.2 06/17/2021     Lab Results   Component Value Date    CO2 28 09/10/2024    CO2 30 10/20/2021    CO2 31 06/17/2021     Lab Results   Component Value Date    BUN 11.8 09/10/2024    BUN 10 10/20/2021    BUN 8 06/17/2021     Lab Results   Component Value Date    CR 0.78 10/14/2024    CR 0.73 06/17/2021       GFR Estimate   Date Value Ref Range Status   10/14/2024 >90 >60 mL/min/1.73m2 Final     Comment:     eGFR  "calculated using 2021 CKD-EPI equation.   09/10/2024 >90 >60 mL/min/1.73m2 Final     Comment:     eGFR calculated using 2021 CKD-EPI equation.   11/15/2023 >90 >60 mL/min/1.73m2 Final   06/17/2021 >90 >60 mL/min/[1.73_m2] Final     Comment:     Non  GFR Calc  Starting 12/18/2018, serum creatinine based estimated GFR (eGFR) will be   calculated using the Chronic Kidney Disease Epidemiology Collaboration   (CKD-EPI) equation.     12/08/2020 >90 >60 mL/min/[1.73_m2] Final     Comment:     Non  GFR Calc  Starting 12/18/2018, serum creatinine based estimated GFR (eGFR) will be   calculated using the Chronic Kidney Disease Epidemiology Collaboration   (CKD-EPI) equation.     05/29/2020 >90 >60 mL/min/[1.73_m2] Final     Comment:     Non  GFR Calc  Starting 12/18/2018, serum creatinine based estimated GFR (eGFR) will be   calculated using the Chronic Kidney Disease Epidemiology Collaboration   (CKD-EPI) equation.       GFR Estimate If Black   Date Value Ref Range Status   06/17/2021 >90 >60 mL/min/[1.73_m2] Final     Comment:      GFR Calc  Starting 12/18/2018, serum creatinine based estimated GFR (eGFR) will be   calculated using the Chronic Kidney Disease Epidemiology Collaboration   (CKD-EPI) equation.     12/08/2020 >90 >60 mL/min/[1.73_m2] Final     Comment:      GFR Calc  Starting 12/18/2018, serum creatinine based estimated GFR (eGFR) will be   calculated using the Chronic Kidney Disease Epidemiology Collaboration   (CKD-EPI) equation.     05/29/2020 >90 >60 mL/min/[1.73_m2] Final     Comment:      GFR Calc  Starting 12/18/2018, serum creatinine based estimated GFR (eGFR) will be   calculated using the Chronic Kidney Disease Epidemiology Collaboration   (CKD-EPI) equation.         Lab Results   Component Value Date    MICROL <12.0 04/24/2024    MICROL <5 06/17/2021     No results found for: \"MICROALBUMIN\"  Lab Results "   Component Value Date    CPEPT <0.1 (L) 02/10/2023       Vitamin B12   Date Value Ref Range Status   09/10/2024 729 232 - 1,245 pg/mL Final   02/10/2023 1,071 232 - 1,245 pg/mL Final   09/03/2020 713 193 - 986 pg/mL Final   ]    Most recent eye exam date: : Not Found     FIB-4 Calculation: 0.55 at 9/10/2024 10:05 AM  Calculated from:  SGOT/AST: 19 U/L at 9/10/2024 10:05 AM  SGPT/ALT: 21 U/L at 9/10/2024 10:05 AM  Platelets: 322 10e3/uL at 9/10/2024 10:05 AM  Age: 44 years  A value of FIB-4 below 1.30 is considered as low risk for advanced fibrosis; a value of FIB-4 over 2.67 is considered as high risk for advanced fibrosis; and FIB-4 values between 1.30 and 2.67 are considered as intermediate risk of advanced fibrosis.    Assessment/Plan:     1.  Type 1 diabetes- Christiano is doing quite well.  Last A1c was 7.6%. Having some post-prandial hyperglycemia.  Pre-bolusing 15 minutes before eating is ideal, yet challenging.  Suggested switch to fiasp.  May also require tighter IC ratio at breakfast of 1/4.5g.  We made the following plan today (instructions given to patient):      I will check on coverage of fiasp.  Switch to Fiasp insulin.  This works more quickly than Novolog.  Take this at the start of the meal (0-5 minutes before eating).     If still high post-breakfast, consider 1/4.5 g at breakfast.     Emergency issues: Please contact the clinic as soon as you recognize a problem.  Here are some concerns you should contact us about.  -Vomiting: more than twice.  Please check ketones.  If positive, go to ER. Monitor glucose hourly.   -High glucose (over 300 mg/dL twice in a row): Please check ketones.  If ketones are negative, take an insulin correction and recheck glucose in 1 hour.  If glucose is not coming down, please call the clinic. If ketones are moderate or large, drink lots of water, take an insulin correction, and recheck ketones in 1 hour.  If ketones are still high (or you are vomiting), go to the  ER.  -Hypoglycemia (low glucose):   If glucose is less than 70 mg/dL, treat with 15g carb (4 glucose tablets), recheck glucose in 10 minutes.  If low again, repeat.   If glucose is less than 54 mg/dL, treat with 30g carb, recheck glucose in 10 minutes.  If low again, repeat.  Keep glucagon in your home in case of severe hypoglycemia and train someone how to use this.    Emergency kit (please ensure you always have these with you):   Glucose tablets  Glucagon  Insulin  Syringes (if on a pump)  Extra infusion set (if on a pump)  Ketone strips    Contact information:   If you have concerns, please send me a [employer] message or call the clinic at [phone].  For more urgent concerns, please call [phone] after hours/weekends and ask to speak with the endocrinologist on call.      Please let me know if you are having low blood sugars less than 70 or over 350 mg/dL.  Do not wait until your next appointment if this is happening.      If you have concerns, please send me a [employer] message M-Th, call the clinic at [phone], or call [phone] after hours/weekends and ask to speak with the endocrinologist on call.      2.  Risk factors-     Retinopathy:  No. Had recent eye exam in December, 2023.  Nephropathy:  BP is historically well controlled on lisinopril .  No microalbuminuria.  Creatinine stable.    Neuropathy: No.    Feet: OK, no ulcers.   Taking ASA: no  Lipids:  LDL is in target, now on pravastatin.  Tolerating this well.     Celiac screening: positive antibodies, now normalized.  Is seeing GI and will be scheduled for biopsy.  NOT gluten free currently.   Vitamin D: Improved- back to normal.  Will continue multivitamin. Will resume.     3.  F/U in 6 mos with Dr. Bland, annually with me. Has a PCP appointment next month.     25 minutes spent on the date of the encounter doing chart review, review of test results, review of continuous glucose sensor, interpretation of glucose data, patient visit and  documentation, counseling/coordination of care, and discussion of follow up plan for worsening hyper and hypoglycemia.  The patient understood and is satisfied with today's visit.     Shena Iniguez PA-C, MPAS   [facility]  Department of Medicine  Division of Endocrinology and Diabetes            Again, thank you for allowing me to participate in the care of your patient.      Sincerely,    Shena Iniguez PA-C

## 2024-10-22 RX ORDER — INSULIN DEGLUDEC 200 U/ML
24 INJECTION, SOLUTION SUBCUTANEOUS DAILY
Qty: 30 ML | Refills: 3 | Status: SHIPPED | OUTPATIENT
Start: 2024-10-22

## 2024-10-22 NOTE — TELEPHONE ENCOUNTER
insulin degludec (TRESIBA FLEXTOUCH) 200 UNIT/ML pen         Last Written Prescription Date:  7/24/23  Last Fill Quantity: 30 ml,   # refills: 3  Last Office Visit : 10/21/24  Future Office visit:  4/29/25    Routing refill request to provider for review/approval because:  Insulin and insulin pump supplies - refilled per Endocrine clinic.

## 2024-11-10 ENCOUNTER — TELEPHONE (OUTPATIENT)
Dept: ENDOCRINOLOGY | Facility: CLINIC | Age: 44
End: 2024-11-10
Payer: COMMERCIAL

## 2024-11-15 SDOH — HEALTH STABILITY: PHYSICAL HEALTH: ON AVERAGE, HOW MANY MINUTES DO YOU ENGAGE IN EXERCISE AT THIS LEVEL?: 20 MIN

## 2024-11-15 SDOH — HEALTH STABILITY: PHYSICAL HEALTH: ON AVERAGE, HOW MANY DAYS PER WEEK DO YOU ENGAGE IN MODERATE TO STRENUOUS EXERCISE (LIKE A BRISK WALK)?: 2 DAYS

## 2024-11-15 ASSESSMENT — SOCIAL DETERMINANTS OF HEALTH (SDOH): HOW OFTEN DO YOU GET TOGETHER WITH FRIENDS OR RELATIVES?: ONCE A WEEK

## 2024-11-18 ENCOUNTER — OFFICE VISIT (OUTPATIENT)
Dept: FAMILY MEDICINE | Facility: CLINIC | Age: 44
End: 2024-11-18
Attending: FAMILY MEDICINE
Payer: COMMERCIAL

## 2024-11-18 VITALS
HEART RATE: 69 BPM | SYSTOLIC BLOOD PRESSURE: 118 MMHG | TEMPERATURE: 97.4 F | BODY MASS INDEX: 23.46 KG/M2 | HEIGHT: 75 IN | OXYGEN SATURATION: 98 % | DIASTOLIC BLOOD PRESSURE: 71 MMHG | RESPIRATION RATE: 16 BRPM | WEIGHT: 188.7 LBS

## 2024-11-18 DIAGNOSIS — H61.21 IMPACTED CERUMEN OF RIGHT EAR: ICD-10-CM

## 2024-11-18 DIAGNOSIS — I10 ESSENTIAL HYPERTENSION, BENIGN: ICD-10-CM

## 2024-11-18 DIAGNOSIS — E10.9 TYPE 1 DIABETES MELLITUS WITHOUT RETINOPATHY (H): ICD-10-CM

## 2024-11-18 DIAGNOSIS — Z00.00 ROUTINE GENERAL MEDICAL EXAMINATION AT A HEALTH CARE FACILITY: Primary | ICD-10-CM

## 2024-11-18 DIAGNOSIS — E78.5 HYPERLIPIDEMIA LDL GOAL <70: ICD-10-CM

## 2024-11-18 PROCEDURE — 69210 REMOVE IMPACTED EAR WAX UNI: CPT | Mod: RT | Performed by: FAMILY MEDICINE

## 2024-11-18 PROCEDURE — 99207 PR FOOT EXAM NO CHARGE: CPT | Performed by: FAMILY MEDICINE

## 2024-11-18 PROCEDURE — 99396 PREV VISIT EST AGE 40-64: CPT | Mod: 25 | Performed by: FAMILY MEDICINE

## 2024-11-18 ASSESSMENT — PAIN SCALES - GENERAL: PAINLEVEL_OUTOF10: NO PAIN (0)

## 2024-11-18 NOTE — PROGRESS NOTES
[facility]  [facility]  Marc Samuel DO, Family Medicine  Nov 18, 2024      Assessment & Plan     Routine general medical examination at a health care facility  I encouraged him to keep exercising and eating healthy foods.  He recently had several labs checked which were stable.  These are all in his chart.    Type 1 diabetes mellitus without retinopathy (H)  His recent hemoglobin A1c on 10/14/2024 was 7.6%.  He is following closely with endocrinology.  He is on Tresiba and they are going to trial insulin aspart to see if this works better than NovoLog.  - FOOT EXAM    Essential hypertension, benign  Blood pressures under good control on losartan.    Hyperlipidemia LDL goal <70  Stable on pravastatin 20 mg daily.  His LDL on 10/14/2024 was 85.      Cerumen impaction right ear  I personally removed a large amount of cerumen from the right canal using the lighted curette.  He tolerated this procedure well.    Patient has been advised of split billing requirements and indicates understanding: Yes        Counseling  Appropriate preventive services were addressed with this patient via screening, questionnaire, or discussion as appropriate for fall prevention, nutrition, physical activity, Tobacco-use cessation, social engagement, weight loss and cognition.  Checklist reviewing preventive services available has been given to the patient.  Reviewed patient's diet, addressing concerns and/or questions.   He is at risk for lack of exercise and has been provided with information to increase physical activity for the benefit of his well-being.           Subjective   Christiano is a 44 year old, presenting for the following:  Physical (Pt is fasting )        11/18/2024     9:43 AM   Additional Questions   Roomed by Kassi BECERRA   Accompanied by n/a          HPI    He has a history of type 1 diabetes that has been under good control on Tresiba and NovoLog.  He sees endocrinology every few months.   He feels like the diabetes has been under good control.       He has had several labs checked in the past couple of months which have been unremarkable with the exception of celiac antibody test results being elevated.  He is undergoing further workup through GI for this.    On 10/14/2024 his hemoglobin A1c was 7.6, LDL 85, kidney function and TSH results were normal.      Social history: , 2 young children.  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               11/15/2024   General Health   How would you rate your overall physical health? Good   Feel stress (tense, anxious, or unable to sleep) Only a little      (!) STRESS CONCERN      11/15/2024   Nutrition   Three or more servings of calcium each day? Yes   Diet: Regular (no restrictions)    Carbohydrate counting   How many servings of fruit and vegetables per day? (!) 2-3   How many sweetened beverages each day? 0-1       Multiple values from one day are sorted in reverse-chronological order         11/15/2024   Exercise   Days per week of moderate/strenous exercise 2 days   Average minutes spent exercising at this level 20 min      (!) EXERCISE CONCERN      11/15/2024   Social Factors   Frequency of gathering with friends or relatives Once a week   Worry food won't last until get money to buy more No   Food not last or not have enough money for food? No   Do you have housing? (Housing is defined as stable permanent housing and does not include staying ouside in a car, in a tent, in an abandoned building, in an overnight shelter, or couch-surfing.) Yes   Are you worried about losing your housing? No   Lack of transportation? No   Unable to get utilities (heat,electricity)? No            11/15/2024   Dental   Dentist two times every year? Yes            11/15/2024   TB Screening   Were you born outside of the [place]? No              Today's PHQ-2 Score:       8/19/2024    12:43 PM   PHQ-2 ( 1999 [employer])   Q1: Little interest or pleasure in doing things 0   Q2:  "Feeling down, depressed or hopeless 0   PHQ-2 Score 0         11/15/2024   Substance Use   Alcohol more than 3/day or more than 7/wk Not Applicable   Do you use any other substances recreationally? No        Social History     Tobacco Use    Smoking status: Never    Smokeless tobacco: Never   Vaping Use    Vaping status: Never Used   Substance Use Topics    Alcohol use: Not Currently     Comment: Have not drank since 2007    Drug use: No           11/15/2024   STI Screening   New sexual partner(s) since last STI/HIV test? No      ASCVD Risk   The 10-year ASCVD risk score (Jarred CLARK, et al., 2019) is: 2%    Values used to calculate the score:      Age: 44 years      Sex: Male      Is Non- : No      Diabetic: Yes      Tobacco smoker: No      Systolic Blood Pressure: 118 mmHg      Is BP treated: Yes      HDL Cholesterol: 43 mg/dL      Total Cholesterol: 139 mg/dL        11/15/2024   Contraception/Family Planning   Questions about contraception or family planning No           Reviewed and updated as needed this visit by Provider     Meds                      Review of Systems  Constitutional, HEENT, cardiovascular, pulmonary, GI, , musculoskeletal, neuro, skin, endocrine and psych systems are negative, except as otherwise noted.     Objective    Exam  /71 (BP Location: Right arm, Patient Position: Sitting, Cuff Size: Adult Large)   Pulse 69   Temp 97.4  F (36.3  C) (Temporal)   Resp 16   Ht 1.905 m (6' 3\")   Wt 85.6 kg (188 lb 11.2 oz)   SpO2 98%   BMI 23.59 kg/m     Estimated body mass index is 23.59 kg/m  as calculated from the following:    Height as of this encounter: 1.905 m (6' 3\").    Weight as of this encounter: 85.6 kg (188 lb 11.2 oz).    Physical Exam  GENERAL: alert and no distress  EYES: Eyes grossly normal to inspection, PERRL and conjunctivae and sclerae normal  HENT: The right canal is impacted with cerumen.  Once this was removed the canal and tympanic " membrane appeared clear.  The left canal and tympanic membrane is clear.  Nose and mouth without ulcers or lesions  NECK: no adenopathy, no asymmetry, masses, or scars  RESP: lungs clear to auscultation - no rales, rhonchi or wheezes  CV: regular rate and rhythm, normal S1 S2, no S3 or S4, no murmur, click or rub, no peripheral edema  ABDOMEN: soft, nontender, no hepatosplenomegaly, no masses and bowel sounds normal  MS: no gross musculoskeletal defects noted, no edema  SKIN: no suspicious lesions or rashes  NEURO: Normal strength and tone, mentation intact and speech normal.  Monofilament foot exam is normal bilaterally.  PSYCH: mentation appears normal, affect normal/bright        Signed Electronically by: Marc Samuel,

## 2024-12-02 ENCOUNTER — OFFICE VISIT (OUTPATIENT)
Dept: OPTOMETRY | Facility: CLINIC | Age: 44
End: 2024-12-02
Payer: COMMERCIAL

## 2024-12-02 DIAGNOSIS — E10.9 TYPE 1 DIABETES MELLITUS WITHOUT COMPLICATION (H): Primary | ICD-10-CM

## 2024-12-02 DIAGNOSIS — H52.13 MYOPIA, BILATERAL: ICD-10-CM

## 2024-12-02 ASSESSMENT — TONOMETRY
IOP_METHOD: ICARE
OS_IOP_MMHG: 20
OD_IOP_MMHG: 20

## 2024-12-02 ASSESSMENT — REFRACTION_MANIFEST
OD_SPHERE: -7.00
OD_CYLINDER: SPHERE
OS_SPHERE: -7.25
OS_CYLINDER: SPHERE

## 2024-12-02 ASSESSMENT — CONF VISUAL FIELD
OS_SUPERIOR_TEMPORAL_RESTRICTION: 0
METHOD: COUNTING FINGERS
OS_SUPERIOR_NASAL_RESTRICTION: 0
OD_INFERIOR_TEMPORAL_RESTRICTION: 0
OS_INFERIOR_NASAL_RESTRICTION: 0
OD_NORMAL: 1
OS_NORMAL: 1
OD_INFERIOR_NASAL_RESTRICTION: 0
OS_INFERIOR_TEMPORAL_RESTRICTION: 0
OD_SUPERIOR_TEMPORAL_RESTRICTION: 0
OD_SUPERIOR_NASAL_RESTRICTION: 0

## 2024-12-02 ASSESSMENT — REFRACTION_WEARINGRX
OS_CYLINDER: +0.25
OS_SPHERE: -7.25
OD_SPHERE: -7.00
OS_AXIS: 120
OD_AXIS: 065
OD_CYLINDER: +0.50

## 2024-12-02 ASSESSMENT — REFRACTION_CURRENTRX
OS_DIAMETER: 13.8
OD_BRAND: AIR OPTIX NIGHT & DAY
OS_SPHERE: -6.50
OD_SPHERE: -6.00
OS_BRAND: AIR OPTIX NIGHT & DAY
OS_BASECURVE: 8.4
OD_DIAMETER: 13.8
OD_BASECURVE: 8.4

## 2024-12-02 ASSESSMENT — EXTERNAL EXAM - RIGHT EYE: OD_EXAM: NORMAL

## 2024-12-02 ASSESSMENT — CUP TO DISC RATIO
OD_RATIO: 0.3
OS_RATIO: 0.3

## 2024-12-02 ASSESSMENT — VISUAL ACUITY
OD_CC: 20/20
METHOD: SNELLEN - LINEAR
OS_CC: 20/20
CORRECTION_TYPE: CONTACTS

## 2024-12-02 ASSESSMENT — SLIT LAMP EXAM - LIDS
COMMENTS: NORMAL
COMMENTS: NORMAL

## 2024-12-02 ASSESSMENT — EXTERNAL EXAM - LEFT EYE: OS_EXAM: NORMAL

## 2024-12-02 NOTE — NURSING NOTE
Chief Complaints and History of Present Illnesses   Patient presents with    Diabetic Eye Exam     Pt here for diabetic eye exam with contacts.     Chief Complaint(s) and History of Present Illness(es)       Diabetic Eye Exam              Diabetes Type: Type 1    Comments: Pt here for diabetic eye exam with contacts.              Comments    Pt has largely unchanged vision since last exam. Pt has no new concerns.     Lab Results       Component                Value               Date                       A1C                      7.6                 10/14/2024                 A1C                      7.3                 04/24/2024                 A1C                      7.3                 11/15/2023                 A1C                      7.6                 06/14/2023                 A1C                      7.7                 02/10/2023                 A1C                      7.3                 06/17/2021                 A1C                      6.9                 12/08/2020                 A1C                      6.8                 09/03/2020                 A1C                      7.7                 05/13/2019                 A1C                      7.9                 08/13/2014              NARAYAN Colon on 12/2/2024 at 11:09 AM

## 2024-12-02 NOTE — PROGRESS NOTES
A/P  1.) Type 1 DM without ophthalmic manifestation OU  -Last A1c 7.6, no h/o diabetic retinopathy  -Reviewed effects of DM on the eyes and importance of good blood sugar control  -Monitor annually with dilation    2.) Myopia OU  -Stable Rx, no changes  -Largely asymptomatic for presbyopia, option to trial Biofinity Energys to compare    Monitor 1 year diabetic eye exam, sooner prn    I have confirmed the patient's CC, HPI and reviewed Past Medical History, Past Surgical History, Social History, Family History, Problem List, Medication List and agree with Tech note.     Antonina Nice, OD FAAO [facility]SL[facility]

## 2024-12-02 NOTE — PATIENT INSTRUCTIONS
Artificial Tear Recommendations  -iVizia  -Blink Tears  -Refresh Relieva  -Biotrue Hydration Boost  -[place] Tears  -Systane Hydration  -Refresh Plus  -Refresh Relieva PF Xtra

## 2024-12-09 ENCOUNTER — ALLIED HEALTH/NURSE VISIT (OUTPATIENT)
Dept: FAMILY MEDICINE | Facility: CLINIC | Age: 44
End: 2024-12-09
Payer: COMMERCIAL

## 2024-12-09 DIAGNOSIS — Z23 INFLUENZA VACCINE NEEDED: Primary | ICD-10-CM

## 2024-12-09 PROCEDURE — 90471 IMMUNIZATION ADMIN: CPT

## 2024-12-09 PROCEDURE — 90656 IIV3 VACC NO PRSV 0.5 ML IM: CPT

## 2024-12-09 PROCEDURE — 90636 HEP A/HEP B VACC ADULT IM: CPT

## 2024-12-09 PROCEDURE — 90715 TDAP VACCINE 7 YRS/> IM: CPT

## 2024-12-09 PROCEDURE — 99207 PR NO CHARGE NURSE ONLY: CPT

## 2024-12-09 PROCEDURE — 90472 IMMUNIZATION ADMIN EACH ADD: CPT

## 2024-12-11 DIAGNOSIS — E10.9 WELL CONTROLLED TYPE 1 DIABETES MELLITUS (H): ICD-10-CM

## 2024-12-12 RX ORDER — INSULIN ASPART 100 [IU]/ML
INJECTION, SOLUTION INTRAVENOUS; SUBCUTANEOUS
Qty: 60 ML | Refills: 0 | Status: SHIPPED | OUTPATIENT
Start: 2024-12-12

## 2024-12-12 NOTE — TELEPHONE ENCOUNTER
NOVOLOG FLEXPEN INJ 3ML (ORANGE)       Last Written Prescription Date:  7-15-24  Last Fill Quantity: 60,   # refills: 0  Last Office Visit : 10-21-24  Future Office visit:  -4-29-25    Routing refill request to provider for review/approval because:  Insulin and insulin pump supplies - refilled per Endocrine clinic.

## 2024-12-31 ENCOUNTER — TELEPHONE (OUTPATIENT)
Dept: ENDOCRINOLOGY | Facility: CLINIC | Age: 44
End: 2024-12-31
Payer: COMMERCIAL

## 2024-12-31 DIAGNOSIS — E10.9 TYPE 1 DIABETES MELLITUS WITHOUT COMPLICATION (H): ICD-10-CM

## 2024-12-31 NOTE — TELEPHONE ENCOUNTER
Pt requesting refill of Freestyle Sharla 3 sensors.    Thanks,  Vidhi Guzman, PharmD  [facility]  Phone: [phone]  Vocera: Peds Discharge Pharmacist

## 2025-01-06 RX ORDER — ACYCLOVIR 800 MG/1
1 TABLET ORAL
Qty: 6 EACH | Refills: 3 | Status: SHIPPED | OUTPATIENT
Start: 2025-01-06

## 2025-01-06 NOTE — TELEPHONE ENCOUNTER
Continuous Blood Gluc Sensor (FREESTYLE LILO 3 SENSOR) [facility]   Disp 7  R 3  Start: 01/22/2024     10/21/2024  [facility] [place]        Shena Iniguez PA-C  Endocrinology, Diabetes, and Metabolism

## 2025-01-28 ENCOUNTER — MYC MEDICAL ADVICE (OUTPATIENT)
Dept: GASTROENTEROLOGY | Facility: CLINIC | Age: 45
End: 2025-01-28
Payer: COMMERCIAL

## 2025-01-28 ENCOUNTER — TELEPHONE (OUTPATIENT)
Dept: GASTROENTEROLOGY | Facility: CLINIC | Age: 45
End: 2025-01-28
Payer: COMMERCIAL

## 2025-01-28 NOTE — TELEPHONE ENCOUNTER
Attempted to contact patient in order to complete pre assessment questions.     No answer. Left message to return call to [phone] option 2.    Callback communication sent via [employer].    Shruti Monroy RN

## 2025-01-28 NOTE — TELEPHONE ENCOUNTER
"Pre visit planning completed.      Procedure details:    Patient scheduled for Upper endoscopy (EGD) on 2/11/25.     Arrival time: 0930. Procedure time 1015    Facility location: [facility]; [facility] [address]. Check in location: 1st [facility].     Sedation type: Conscious sedation     Pre op exam needed? No.    Indication for procedure: Elevated anti-tissue transglutaminase (tTG) IgA level; \"Elevated celiac antibodies, please obtain small bowel biopsies to rule out celiac disease\".      Chart review:     Electronic implanted devices? No    Recent diagnosis of diverticulitis within the last 6 weeks? N/A      Medication review:    Diabetic? Yes. Insulin: Consult with managing provider.     Anticoagulants? No    Weight loss medication/injectable? No GLP-1 medication per patient's medication list. Nursing to verify with pre-assessment call.    Other medication HOLDING recommendations:  N/A      Prep for procedure:     Bowel prep recommendation: N/A  Due to:  EGD    Patient should be sure to continue a gluten-containing diet leading up to EGD procedure for accurate results.  Per 8/19/24 virtual visit GI notes: \"pursue upper endoscopy at this time while on gluten-containing diet to evaluate for active celiac disease.\"    Procedure information and instructions sent via [employer].        Maritza Barba RN  Endoscopy Procedure Pre Assessment   [phone] option 2  "

## 2025-01-29 NOTE — TELEPHONE ENCOUNTER
"Pre assessment completed for upcoming procedure.      Procedure details:    Patient scheduled for Upper endoscopy (EGD) on 2/11/25.     Arrival time: 0930. Procedure time 1015     Facility location: [facility]; [facility] [place] [address]. Check in location: 1st [facility].      Sedation type: Conscious sedation      Pre op exam needed? No.     Indication for procedure: Elevated anti-tissue transglutaminase (tTG) IgA level; \"Elevated celiac antibodies, please obtain small bowel biopsies to rule out celiac disease\".    Designated  policy reviewed. Instructed to have someone stay 6 hours post procedure.       Chart review:     Electronic implanted devices? No. Does have a Freestyle Sharla Glucose Sensor.    Recent diagnosis of diverticulitis within the last 6 weeks?  No      Medication review:    Diabetic? Yes. Insulin: Consult with managing provider.     Anticoagulants? No    Weight loss medication/injectable? No.    Other medication HOLDING recommendations:  N/A      Prep for procedure:     Reviewed procedure prep instructions.     Patient should be sure to continue a gluten-containing diet leading up to EGD procedure for accurate results.  Per 8/19/24 virtual visit GI notes: \"pursue upper endoscopy at this time while on gluten-containing diet to evaluate for active celiac disease.\"     Patient verbalized understanding and had no questions or concerns at this time.        Caryn Pizano LPN  Endoscopy Procedure Pre Assessment   [phone] option 2        "

## 2025-02-05 ENCOUNTER — MYC MEDICAL ADVICE (OUTPATIENT)
Dept: GASTROENTEROLOGY | Facility: CLINIC | Age: 45
End: 2025-02-05
Payer: COMMERCIAL

## 2025-02-10 ENCOUNTER — TELEPHONE (OUTPATIENT)
Dept: GASTROENTEROLOGY | Facility: CLINIC | Age: 45
End: 2025-02-10
Payer: COMMERCIAL

## 2025-02-10 NOTE — TELEPHONE ENCOUNTER
Caller:     Reason for Reschedule/Cancellation (please be detailed, any staff messages or encounters to note?):   SICK    Did you cancel or rescheduled an EUS procedure? No.    Is screening questionnaire older than 3 months from the reschedule date.   If Yes, please complete screening questionnaire. No    Prior to reschedule please review:  Ordering Provider:     FILIPE REYES     Sedation Determined: CS  Does patient have any ASC Exclusions, please identify?:       Notes on Cancelled Procedure:  Procedure: Upper Endoscopy [EGD]   Date: 2/11  Location: [facility]; [address] [place] [address]   Surgeon: CHRISSY    Rescheduled: Yes,   Procedure: Upper Endoscopy [EGD]    Date: 4/15   Location: [facility]; [address] [place] [address]    Surgeon: CHRISSY  Sedation Level Scheduled  CS ,  Reason for Sedation Level ORDER   Instructions updated and sent: Y     Does patient need PAC or Pre -Op Rescheduled? : N

## 2025-02-11 ENCOUNTER — OFFICE VISIT (OUTPATIENT)
Dept: FAMILY MEDICINE | Facility: CLINIC | Age: 45
End: 2025-02-11
Payer: COMMERCIAL

## 2025-02-11 VITALS
HEART RATE: 71 BPM | RESPIRATION RATE: 20 BRPM | DIASTOLIC BLOOD PRESSURE: 86 MMHG | BODY MASS INDEX: 23.66 KG/M2 | WEIGHT: 190.3 LBS | OXYGEN SATURATION: 98 % | TEMPERATURE: 97.6 F | SYSTOLIC BLOOD PRESSURE: 135 MMHG | HEIGHT: 75 IN

## 2025-02-11 DIAGNOSIS — J01.90 ACUTE SINUSITIS WITH SYMPTOMS > 10 DAYS: Primary | ICD-10-CM

## 2025-02-11 PROCEDURE — 99213 OFFICE O/P EST LOW 20 MIN: CPT | Performed by: PHYSICIAN ASSISTANT

## 2025-02-11 RX ORDER — DOXYCYCLINE HYCLATE 100 MG
100 TABLET ORAL 2 TIMES DAILY
COMMUNITY
Start: 2025-02-06 | End: 2025-02-13

## 2025-02-11 RX ORDER — CEFDINIR 300 MG/1
300 CAPSULE ORAL 2 TIMES DAILY
Qty: 14 CAPSULE | Refills: 0 | Status: SHIPPED | OUTPATIENT
Start: 2025-02-11

## 2025-02-11 ASSESSMENT — PAIN SCALES - GENERAL: PAINLEVEL_OUTOF10: MODERATE PAIN (4)

## 2025-02-11 NOTE — PATIENT INSTRUCTIONS
Get plenty of fluids and rest    Flonase (fluticasone) for congestion    Low dose ibuprofen as needed 200-400mg every 6-8 hours with food    Omnicef if symptoms don't improve with above

## 2025-02-11 NOTE — PROGRESS NOTES
"Assessment and Plan:     (J01.90) Acute sinusitis with symptoms > 10 days  (primary encounter diagnosis)  Comment: onset 2.5 weeks ago, has had congestion, facial pain, L>R, eye watering, was prescribed doxycycline at  last week, has taken 5 days worth w/out any relief, no recent fever, denies sob  Plan: cefdinir (OMNICEF) 300 MG capsule        Discussed likely viral etiology which explains why doxy hasn't helped  Recommend symptom control with flonase, OTC decongestants, neti pot using sterile water, OTC analgesics  Offered ENT referral as he notes multiple sinus infections in the past, he declined  If symptoms worsen, can try omnicef, discussed possible side effect  Also discussed reasons to be seen promptly     PRERNA Holliday Same Day Provider       Subjective   Christiano is a 44 year old, presenting for the following health issues:  Sinus Problem        2/11/2025     3:51 PM   Additional Questions   Roomed by Oli     Sinus Problem     History of Present Illness       Reason for visit:  Sinus infection not improving    He eats 2-3 servings of fruits and vegetables daily.He consumes 0 sweetened beverage(s) daily.He exercises with enough effort to increase his heart rate 9 or less minutes per day.  He exercises with enough effort to increase his heart rate 3 or less days per week.   He is taking medications regularly.     Christiano is here for URI symptoms  Onset 2.5 weeks ago  Started with congestion with bright green discharge, now with facial pressure, L>R  He was seen at  Urgent Care and was prescribed doxycycline which hasn't helped much  He has had pain behind left eye and eye watering   Warm compresses have helped  He has had a cough which is mildly productive  He denies fever/chills  He is using advil        Objective    /86 (BP Location: Left arm, Patient Position: Sitting, Cuff Size: Adult Large)   Pulse 71   Temp 97.6  F (36.4  C) (Oral)   Resp 20   Ht 1.903 m (6' 2.92\")   Wt " 86.3 kg (190 lb 4.8 oz)   SpO2 98%   BMI 23.84 kg/m    Body mass index is 23.84 kg/m .    Physical Exam     EXAM:  GENERAL APPEARANCE: healthy, alert and no distress  EYES: Eyes grossly normal to inspection  HENT: ear canals and TMs normal and nose and mouth without ulcers or lesions, oropharynx is clear without exudate or erythema, no tonsillar swelling  NECK: supple no adenopathy, no asymmetry, masses  RESP: lungs clear to auscultation - no rales, rhonchi or wheezes  CV: regular rates and rhythm, normal S1 S2, no S3 or S4 and no murmur, click or rub  EXT: no edema bilateral lower extremities             Signed Electronically by: Tonya Guzman PA-C

## 2025-03-26 DIAGNOSIS — I10 ESSENTIAL HYPERTENSION, BENIGN: ICD-10-CM

## 2025-03-26 RX ORDER — LOSARTAN POTASSIUM 25 MG/1
25 TABLET ORAL DAILY
Qty: 90 TABLET | Refills: 1 | Status: SHIPPED | OUTPATIENT
Start: 2025-03-26

## 2025-04-15 ENCOUNTER — HOSPITAL ENCOUNTER (OUTPATIENT)
Facility: CLINIC | Age: 45
Discharge: HOME | End: 2025-04-15
Attending: INTERNAL MEDICINE | Admitting: INTERNAL MEDICINE
Payer: COMMERCIAL

## 2025-04-15 VITALS
OXYGEN SATURATION: 98 % | SYSTOLIC BLOOD PRESSURE: 127 MMHG | BODY MASS INDEX: 24.25 KG/M2 | DIASTOLIC BLOOD PRESSURE: 78 MMHG | HEART RATE: 68 BPM | RESPIRATION RATE: 9 BRPM | HEIGHT: 75 IN | WEIGHT: 195 LBS

## 2025-04-15 LAB — UPPER GI ENDOSCOPY: NORMAL

## 2025-04-15 PROCEDURE — 88305 TISSUE EXAM BY PATHOLOGIST: CPT | Mod: TC | Performed by: INTERNAL MEDICINE

## 2025-04-15 PROCEDURE — 250N000009 HC RX 250: Performed by: INTERNAL MEDICINE

## 2025-04-15 PROCEDURE — 43239 EGD BIOPSY SINGLE/MULTIPLE: CPT | Performed by: INTERNAL MEDICINE

## 2025-04-15 PROCEDURE — 88305 TISSUE EXAM BY PATHOLOGIST: CPT | Mod: 26 | Performed by: PATHOLOGY

## 2025-04-15 PROCEDURE — 999N000099 HC STATISTIC MODERATE SEDATION < 10 MIN: Performed by: INTERNAL MEDICINE

## 2025-04-15 PROCEDURE — 250N000011 HC RX IP 250 OP 636: Performed by: INTERNAL MEDICINE

## 2025-04-15 RX ORDER — FENTANYL CITRATE 50 UG/ML
INJECTION, SOLUTION INTRAMUSCULAR; INTRAVENOUS PRN
Status: DISCONTINUED | OUTPATIENT
Start: 2025-04-15 | End: 2025-04-15 | Stop reason: HOSPADM

## 2025-04-15 ASSESSMENT — ACTIVITIES OF DAILY LIVING (ADL)
ADLS_ACUITY_SCORE: 41
ADLS_ACUITY_SCORE: 41

## 2025-04-15 NOTE — H&P
"ENDOSCOPY PRE-SEDATION H&P FOR OUTPATIENT PROCEDURES    Agustín Gallegos  [id number]  1980    Procedure: Endoscopy    Pre-procedure diagnosis: Possible celiac disease    Past medical history:   Past Medical History:   Diagnosis Date    Hypertension     Type 1 diabetes (H)     age of onset 14 years     Patient Active Problem List   Diagnosis    Type 1 diabetes mellitus without retinopathy (H)    Myopic astigmatism of both eyes    Adhesive capsulitis of right shoulder       Past surgical history:   Past Surgical History:   Procedure Laterality Date    COSMETIC SURGERY  1993    Mole removal    ENT SURGERY  1985    Tubes in ears    GENITOURINARY SURGERY  2023    Vasectomy       No current facility-administered medications for this encounter.       No Known Allergies      Physical Exam:    Mental status: alert  Heart: Normal  Lung: Normal  Assessment of patient's airway: Normal  Other as pertinent for procedure: None     Lab Results   Component Value Date    WBC 5.0 09/10/2024    WBC 4.9 03/05/2015     Lab Results   Component Value Date    RBC 4.93 09/10/2024    RBC 5.00 03/05/2015     Lab Results   Component Value Date    HGB 14.3 09/10/2024    HGB 14.8 08/22/2017     Lab Results   Component Value Date    HCT 40.0 09/10/2024    HCT 41.9 03/05/2015     Lab Results   Component Value Date    MCV 81 09/10/2024    MCV 84 03/05/2015     Lab Results   Component Value Date    MCH 29.0 09/10/2024    MCH 29.0 03/05/2015     Lab Results   Component Value Date    MCHC 35.8 09/10/2024    MCHC 34.6 03/05/2015     Lab Results   Component Value Date    RDW 12.1 09/10/2024    RDW 12.9 03/05/2015     Lab Results   Component Value Date     09/10/2024     03/05/2015     No results found for: \"INR\"     ASA Score: See Provation note    Mallampati score:  I - Faucial pillars, soft palate, and uvula are visible    Assessment/Plan:     The patient is an appropriate candidate to receive sedation.    Informed consent was " discussed with the patient/family, including the risks, benefits, potential complications and any alternative options associated with sedation.    Patient assessment completed just prior to sedation and while under constant observation by the provider. Condition determined to be adequate for proceeding with sedation.    The specific risks for the procedure were discussed with the patient at the time of informed consent and include but are not limited to perforation which could require surgery, missing significant neoplasm or lesion, hemorrhage and adverse sedative complication.      Ian Lentz MD

## 2025-04-16 LAB
PATH REPORT.COMMENTS IMP SPEC: NORMAL
PATH REPORT.FINAL DX SPEC: NORMAL
PATH REPORT.GROSS SPEC: NORMAL
PATH REPORT.MICROSCOPIC SPEC OTHER STN: NORMAL
PATH REPORT.RELEVANT HX SPEC: NORMAL
PHOTO IMAGE: NORMAL

## 2025-04-17 DIAGNOSIS — K90.0 CELIAC DISEASE: ICD-10-CM

## 2025-04-17 DIAGNOSIS — R76.8 ELEVATED ANTI-TISSUE TRANSGLUTAMINASE (TTG) IGA LEVEL: Primary | ICD-10-CM

## 2025-04-19 ENCOUNTER — HEALTH MAINTENANCE LETTER (OUTPATIENT)
Age: 45
End: 2025-04-19

## 2025-04-21 ENCOUNTER — TELEPHONE (OUTPATIENT)
Dept: GASTROENTEROLOGY | Facility: CLINIC | Age: 45
End: 2025-04-21

## 2025-04-21 ENCOUNTER — LAB (OUTPATIENT)
Dept: LAB | Facility: CLINIC | Age: 45
End: 2025-04-21
Payer: COMMERCIAL

## 2025-04-21 DIAGNOSIS — E10.9 TYPE 1 DIABETES MELLITUS WITHOUT COMPLICATION (H): ICD-10-CM

## 2025-04-21 LAB
CREAT UR-MCNC: 74.5 MG/DL
EST. AVERAGE GLUCOSE BLD GHB EST-MCNC: 166 MG/DL
HBA1C MFR BLD: 7.4 % (ref 0–5.6)
MICROALBUMIN UR-MCNC: <12 MG/L
MICROALBUMIN/CREAT UR: NORMAL MG/G{CREAT}

## 2025-04-21 PROCEDURE — 36415 COLL VENOUS BLD VENIPUNCTURE: CPT

## 2025-04-21 PROCEDURE — 83036 HEMOGLOBIN GLYCOSYLATED A1C: CPT

## 2025-04-21 PROCEDURE — 82570 ASSAY OF URINE CREATININE: CPT

## 2025-04-21 PROCEDURE — 82043 UR ALBUMIN QUANTITATIVE: CPT

## 2025-04-21 NOTE — TELEPHONE ENCOUNTER
[facility] Call Center    Phone Message    May a detailed message be left on voicemail: Yes    Reason for Call: Other: Patient is currently scheduled on 4/17, as visit type New Urgent Celiac . This is outside the expected timeline for this referral. Patient has been added to the waitlist.        Action Taken: Message routed to:  Other: GI REFERRAL TRIAGE POOL     Travel Screening: Not Applicable

## 2025-04-29 ENCOUNTER — VIRTUAL VISIT (OUTPATIENT)
Dept: ENDOCRINOLOGY | Facility: CLINIC | Age: 45
End: 2025-04-29
Payer: COMMERCIAL

## 2025-04-29 DIAGNOSIS — I10 HYPERTENSION, UNSPECIFIED TYPE: ICD-10-CM

## 2025-04-29 DIAGNOSIS — K90.0 CELIAC DISEASE: ICD-10-CM

## 2025-04-29 DIAGNOSIS — E78.5 DYSLIPIDEMIA: ICD-10-CM

## 2025-04-29 DIAGNOSIS — Z79.4 LONG TERM (CURRENT) USE OF INSULIN (H): ICD-10-CM

## 2025-04-29 DIAGNOSIS — E10.65 TYPE 1 DIABETES MELLITUS WITH HYPERGLYCEMIA (H): Primary | ICD-10-CM

## 2025-04-29 RX ORDER — INSULIN ASPART 100 [IU]/ML
INJECTION, SOLUTION INTRAVENOUS; SUBCUTANEOUS
Qty: 60 ML | Refills: 4 | Status: SHIPPED | OUTPATIENT
Start: 2025-04-29

## 2025-04-29 NOTE — LETTER
4/29/2025      Agustín Gallegos  [address]  [facility] [address]      Dear Colleague,    Thank you for referring your patient, Agustín Gallegos, to the [facility]. Please see a copy of my visit note below.    Video visit    Start time 12:03, stop time 12:18; additional 15 minutes spent on the date of the encounter doing chart review, documentation and further activities as noted. This did not include time spent on CGM review.     Provider location: [facility], [address] [place] [address] [address], [place] [address]    Patient location: patient home    Mode of transmission: video     The longitudinal plan of care for the diagnosis(es)/condition(s) as documented were addressed during this visit. Due to the added complexity in care, I will continue to support Christiano in the subsequent management and with ongoing continuity of care.    Subjective:    Established patient, he has been following closely with Shena Iniguez PA-C (most recent appointment 10/2024)    Agustín Gallegos is a 44 year old male who presents for DM-1.    Diagnosed with DM: diagnosed at age 14 when he developed polydipsia, polyuria, he was immediately started on insulin and has been on it to date     History of DKA/HHS: DKA episode in 2003    FH of DM: brother with DM-1    Glycemic control over the years:   -HbA1c 2014 - 2018: ~8.0 - 8.5%  -2018 to date: ~7.0 - 7.5%    Current DM therapy:  -NovoLog: breakfast 1:4.5, lunch 1:5, evening meal 1:5; CS using a sensitivity of 30 mg/dL starting at 130 mg/dL    -Tresiba 0-0-0-26    Prior DM therapy:  -Has never used an insulin pump     Current glycemic control:  -He has intact hypoglycemia awareness     Diet: 3 meals/day, only snacks if low glucose, rarely drinks calories     Physical activity: limited recently     Tobacco/alcohol use: no     Objective:    Video visit today, ~88.5 kg (weight stable)     9/2022: BMI 23.92 kg/m2, /74. Appears well. Thyroid  examined and normal. No cervical LAD. 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 DM foot exam performed and normal.     Assessment/Plan:    # DM-1  -2/10/2023: C-peptide undetectable, concurrent glucose 241 mg/dL  -4/2025: HbA1c 7.4% (stable)   # No DM retinopathy, most recent eye exam 12/2024  # Hypertension, on losartan   -10/2024: GFR >90  -4/2025: urine microalbumin normal   # No peripheral neuropathy  # No prior ASCVD event  # Mild hypercholesterolemia, on pravastatin 20 mg daily, not on ASA  -10/2024: , TG 53, HDL 43, LDL 85  # Other  -10/2024: TSH 0.6; 2014 negative Tg Ab, TPO Ab detectable but normal   -9/2024: B12 normal   # Celiac disease     We reviewed his CGM data in detail. Over the past 2 weeks: GMI 7.6%, TIR 55%, 1% low, 0% very low, 30% high, 14% very high. Post prandial hyperglycemia is not uncommon.     Recommend:  -NovoLog: breakfast currently 1:4.5 - try 1:4, lunch currently 1:5 - try 1:4.5, evening meal currently 1:5 - try 1:4.5; continue a CS using a sensitivity of 30 mg/dL starting at 130 mg/dL    -Tresiba 0-0-0-26 - no change    We reviewed that long-term he would benefit from an insulin pump.  We reviewed insulin pump options in detail today.  He will let me know if he wants a referral to a [facility] to review in detail.    He has intranasal glucagon at home.     Return to see me in 1 year with labs ordered. Follow-up with Shena Iniguez PA-C in 6 months.       Again, thank you for allowing me to participate in the care of your patient.        Sincerely,        Raymond Bland MD    Electronically signed

## 2025-04-29 NOTE — PROGRESS NOTES
Video visit    Start time 12:03, stop time 12:18; additional 15 minutes spent on the date of the encounter doing chart review, documentation and further activities as noted. This did not include time spent on CGM review.     Provider location: [facility], [address] [place] [address] [address] [address]    Patient location: patient home    Mode of transmission: video     The longitudinal plan of care for the diagnosis(es)/condition(s) as documented were addressed during this visit. Due to the added complexity in care, I will continue to support Christiano in the subsequent management and with ongoing continuity of care.    Subjective:    Established patient, he has been following closely with Shena Iniguez PA-C (most recent appointment 10/2024)    Agustín Gallegos is a 44 year old male who presents for DM-1.    Diagnosed with DM: diagnosed at age 14 when he developed polydipsia, polyuria, he was immediately started on insulin and has been on it to date     History of DKA/HHS: DKA episode in 2003    FH of DM: brother with DM-1    Glycemic control over the years:   -HbA1c 2014 - 2018: ~8.0 - 8.5%  -2018 to date: ~7.0 - 7.5%    Current DM therapy:  -NovoLog: breakfast 1:4.5, lunch 1:5, evening meal 1:5; CS using a sensitivity of 30 mg/dL starting at 130 mg/dL    -Tresiba 0-0-0-26    Prior DM therapy:  -Has never used an insulin pump     Current glycemic control:  -He has intact hypoglycemia awareness     Diet: 3 meals/day, only snacks if low glucose, rarely drinks calories     Physical activity: limited recently     Tobacco/alcohol use: no     Objective:    Video visit today, ~88.5 kg (weight stable)     9/2022: BMI 23.92 kg/m2, /74. Appears well. Thyroid examined and normal. No cervical LAD. 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 DM foot exam performed and normal.     Assessment/Plan:    # DM-1  -2/10/2023: C-peptide undetectable, concurrent glucose 241  mg/dL  -4/2025: HbA1c 7.4% (stable)   # No DM retinopathy, most recent eye exam 12/2024  # Hypertension, on losartan   -10/2024: GFR >90  -4/2025: urine microalbumin normal   # No peripheral neuropathy  # No prior ASCVD event  # Mild hypercholesterolemia, on pravastatin 20 mg daily, not on ASA  -10/2024: , TG 53, HDL 43, LDL 85  # Other  -10/2024: TSH 0.6; 2014 negative Tg Ab, TPO Ab detectable but normal   -9/2024: B12 normal   # Celiac disease     We reviewed his CGM data in detail. Over the past 2 weeks: GMI 7.6%, TIR 55%, 1% low, 0% very low, 30% high, 14% very high. Post prandial hyperglycemia is not uncommon.     Recommend:  -NovoLog: breakfast currently 1:4.5 - try 1:4, lunch currently 1:5 - try 1:4.5, evening meal currently 1:5 - try 1:4.5; continue a CS using a sensitivity of 30 mg/dL starting at 130 mg/dL    -Tresiba 0-0-0-26 - no change    We reviewed that long-term he would benefit from an insulin pump.  We reviewed insulin pump options in detail today.  [facility].    He has intranasal glucagon at home.     Return to see me in 1 year with labs ordered. Follow-up with Shena Iniguez PA-C in 6 months.

## 2025-04-30 ENCOUNTER — TELEPHONE (OUTPATIENT)
Dept: ENDOCRINOLOGY | Facility: CLINIC | Age: 45
End: 2025-04-30
Payer: COMMERCIAL

## 2025-04-30 NOTE — TELEPHONE ENCOUNTER
Patient confirmed scheduled appointment:  Date: 11/10   Time: 111 am   Visit type: return diabetes   Provider: Hira   Location: [facility]   Testing/imaging:   Additional notes: Spoke to pt and shannan per below message. Pt prefers in person   Raymond Bland MD  [facility]  Can he please be schedule with Shena Iniguez PA-C, in 6 months (around fall 2025), thanks    Katt Martin on 4/30/2025 at 12:37 PM

## 2025-06-05 ENCOUNTER — OFFICE VISIT (OUTPATIENT)
Dept: DERMATOLOGY | Facility: CLINIC | Age: 45
End: 2025-06-05
Payer: COMMERCIAL

## 2025-06-05 DIAGNOSIS — Z12.83 ENCOUNTER FOR SCREENING FOR MALIGNANT NEOPLASM OF SKIN: Primary | ICD-10-CM

## 2025-06-05 DIAGNOSIS — D22.9 MULTIPLE NEVI: ICD-10-CM

## 2025-06-05 DIAGNOSIS — L81.4 LENTIGINES: ICD-10-CM

## 2025-06-05 DIAGNOSIS — D18.01 CHERRY ANGIOMA: ICD-10-CM

## 2025-06-05 NOTE — PATIENT INSTRUCTIONS
Proper skin care from [facility]:    -Eliminate harsh soaps as they strip the natural oils from the skin, often resulting in dry itchy skin ( i.e. Dial, Zest, [place])  -Use mild soaps such as Cetaphil or Dove Sensitive Skin in the shower. You do not need to use soap on arms, legs, and trunk every time you shower unless visibly soiled.   -Avoid hot or cold showers.  -After showering, lightly dry off and apply moisturizing within 2-3 minutes. This will help trap moisture in the skin.   -Aggressive use of a moisturizer at least 1-2 times a day to the entire body (including -Vanicream, Cetaphil, Aquaphor or Cerave) and moisturize hands after every washing.  -We recommend using moisturizers that come in a tub that needs to be scooped out, not a pump. This has more of an oil base. It will hold moisture in your skin much better than a water base moisturizer. The above recommended are non-pore clogging.      Wear a sunscreen with at least SPF 30 on your face, ears, neck and V of the chest daily. Wear sunscreen on other areas of the body if those areas are exposed to the sun throughout the day. Sunscreens can contain physical and/or chemical blockers. Physical blockers are less likely to clog pores, these include zinc oxide and titanium dioxide. Reapply every two hour and after swimming.     Sunscreen examples: https://www.ewg.org/sunscreen/    UV radiation  UVA radiation remains constant throughout the day and throughout the year. It is a longer wavelength than UVB and therefore penetrates deeper into the skin leading to immediate and delayed tanning, photoaging, and skin cancer. 70-80% of UVA and UVB radiation occurs between the hours of 10am-2pm.  UVB radiation  UVB radiation causes the most harmful effects and is more significant during the summer months. However, snow and ice can reflect UVB radiation leading to skin damage during the winter months as well. UVB radiation is responsible for tanning,  burning, inflammation, delayed erythema (pinkness), pigmentation (brown spots), and skin cancer.     I recommend self monthly full body exams and yearly full body exams with a dermatology provider. If you develop a new or changing lesion please follow up for examination. Most skin cancers are pink and scaly or pink and pearly. However, we do see blue/brown/black skin cancers.  Consider the ABCDEs of melanoma when giving yourself your monthly full body exam ( don't forget the groin, buttocks, feet, toes, etc). A-asymmetry, B-borders, C-color, D-diameter, E-elevation or evolving. If you see any of these changes please follow up in clinic. If you cannot see your back I recommend purchasing a hand held mirror to use with a larger wall mirror.       Checking for Skin Cancer  You can find cancer early by checking your skin each month. There are 3 kinds of skin cancer. They are melanoma, basal cell carcinoma, and squamous cell carcinoma. Doing monthly skin checks is the best way to find new marks or skin changes. Follow the instructions below for checking your skin.   The ABCDEs of checking moles for melanoma   Check your moles or growths for signs of melanoma using ABCDE:   Asymmetry: the sides of the mole or growth don t match  Border: the edges are ragged, notched, or blurred  Color: the color within the mole or growth varies  Diameter: the mole or growth is larger than 6 mm (size of a pencil eraser)  Evolving: the size, shape, or color of the mole or growth is changing (evolving is not shown in the images below)    Checking for other types of skin cancer  Basal cell carcinoma or squamous cell carcinoma have symptoms such as:     A spot or mole that looks different from all other marks on your skin  Changes in how an area feels, such as itching, tenderness, or pain  Changes in the skin's surface, such as oozing, bleeding, or scaliness  A sore that does not heal  New swelling or redness beyond the border of a  mole    Who s at risk?  Anyone can get skin cancer. But you are at greater risk if you have:   Fair skin, light-colored hair, or light-colored eyes  Many moles or abnormal moles on your skin  A history of sunburns from sunlight or tanning beds  A family history of skin cancer  A history of exposure to radiation or chemicals  A weakened immune system  If you have had skin cancer in the past, you are at risk for recurring skin cancer.   How to check your skin  Do your monthly skin checkups in front of a full-length mirror. Check all parts of your body, including your:   Head (ears, face, neck, and scalp)  Torso (front, back, and sides)  Arms (tops, undersides, upper, and lower armpits)  Hands (palms, backs, and fingers, including under the nails)  Buttocks and genitals  Legs (front, back, and sides)  Feet (tops, soles, toes, including under the nails, and between toes)  If you have a lot of moles, take digital photos of them each month. Make sure to take photos both up close and from a distance. These can help you see if any moles change over time.   Most skin changes are not cancer. But if you see any changes in your skin, call your doctor right away. Only he or she can diagnose a problem. If you have skin cancer, seeing your doctor can be the first step toward getting the treatment that could save your life.   [employer] last reviewed this educational content on 4/1/2019 2000-2020 [employer]. [address] [place] [address], [place] [address]. All rights reserved. This information is not intended as a substitute for professional medical care. Always follow your healthcare professional's instructions.       When should I call my doctor?  If you are worsening or not improving, please, contact us or seek urgent care as noted below.     Who should I call with questions (adults)?    [facility] [phone]  For urgent needs outside of business hours call the [facility] at  [phone] and ask for the dermatology resident on call to be paged  If this is a medical emergency and you are unable to reach an ER, Call 911      If you need a prescription refill, please contact your pharmacy. Refills are approved or denied by our Physicians during normal business hours, Monday through Friday.  Per office policy, refills will not be granted if you have not been seen within the past year (or sooner depending on the condition).

## 2025-06-05 NOTE — LETTER
6/5/2025      Agustín Gallegos  [address] [facility] [address]      Dear Colleague,    Thank you for referring your patient, Agustín Gallegos, to the [facility]. Please see a copy of my visit note below.    [facility] Dermatology Note  Encounter Date: Jun 5, 2025  Office Visit     Reviewed patient's past medical history and pertinent chart review prior to patient's visit today.     Dermatology Problem List:  1. Hx of Dysplastic Nevus  - R forearm, Central back, s/p shave bx 06/04/2024  ____________________________________________    Assessment & Plan:     # Multiple nevi, trunk and extremities  # Solar lentigines  - No concerning features on dermoscopy. We discussed the importance of self exams at home. ABCDE criteria and importance of SPF 30+ sunscreen and photoprotective clothing reviewed.     # Cherry angiomas  - We discussed the benign nature of the skin lesions. No treatment required. Continued observation recommended. Follow up with any concerns.      Follow-up:  Annual for follow up full body skin exam, as needed for new or changing lesions or new concerns    All risks, benefits and alternatives were discussed with patient.  Patient is in agreement and understands the assessment and plan.  All questions were answered.  Briana Gregg PA-C  [facility] Dermatology    ____________________________________________    CC: Skin Check ([facility] no concerns )    HPI:  Mr. Agustín Gallegos is a(n) 44 year old male who presents today as a return patient for a full body skin cancer screening. No specific cutaneous concerns today. The patient reports trying to be diligent with photoprotection.      Physical Exam:  Vitals: There were no vitals taken for this visit.  SKIN: Total skin excluding the genitalia areas was performed. The exam included the scalp, face, neck, bilateral arms, chest, back, abdomen, bilateral legs, digits, mons pubis, buttocks, and  nails.   - Vernon I.  - Multiple tan/brown macules and papules scattered throughout exam, consistent with benign nevi. No concerning features on dermoscopy.   - Scattered tan, homogenous macules scattered on sun exposed skin, consistent with solar lentigines.   - Several 1-2 mm red dome shaped symmetric papules, consistent with cherry angiomas.     Medications:  Current Outpatient Medications   Medication Sig Dispense Refill     blood glucose (CONTOUR NEXT TEST) test strip test 1-6 times daily, as directed. 200 strip 3     blood glucose monitoring (CANDE MICROLET) lancets Use to test blood sugar 8 times daily or as directed. 6 Box 5     blood glucose monitoring (NO BRAND SPECIFIED) meter device kit Use to test blood sugar 5 times daily or as directed. 1 kit 0     cholecalciferol (VITAMIN D) 1000 UNIT tablet Take 1 tablet (1,000 Units) by mouth daily 90 tablet 3     Continuous Glucose Sensor (FREESTYLE LILO 3 SENSOR) MISC 1 each every 14 days. 6 each 3     Glucagon (BAQSIMI TWO PACK) 3 MG/DOSE POWD Spray 1 each in nostril once as needed (severe hypoglycemia) 2 each 3     Glucagon (BAQSIMI) 3 MG/DOSE nasal powder Spray 1 spray (3 mg) in nostril as needed for low blood sugar (severe hypoglycemia) in the event of unconscious hypoglycemia or hypoglycemic seizure. May repeat dose if no response after 15 minutes. 1 each 1     insulin degludec (TRESIBA FLEXTOUCH) 200 UNIT/ML pen Inject 24 Units subcutaneously daily. 30 mL 3     insulin pen needle (B-D U/F) 31G X 8 MM miscellaneous Use 4 pen needles daily or as directed. 400 each 4     KETOSTIX test strip Use as directed in case of hyperglycemia or illness. 50 strip 3     losartan (COZAAR) 25 MG tablet TAKE 1 TABLET(25 MG) BY MOUTH DAILY 90 tablet 1     NOVOLOG FLEXPEN 100 UNIT/ML soln ADMINISTER 60 UNITS UNDER THE SKIN DAILY in divided dose with meals and snacks as directed. 60 mL 4     pravastatin (PRAVACHOL) 20 MG tablet Take 1 tablet (20 mg) by mouth at bedtime 90  tablet 4     No current facility-administered medications for this visit.     Facility-Administered Medications Ordered in Other Visits   Medication Dose Route Frequency Provider Last Rate Last Admin     fentaNYL (PF) (SUBLIMAZE) injection    PRN Ian Lentz MD   50 mcg at 04/15/25 1100     midazolam (VERSED) injection    PRN Ian Lentz MD   1 mg at 04/15/25 1100      Past Medical History:   Patient Active Problem List   Diagnosis     Type 1 diabetes mellitus without retinopathy (H)     Myopic astigmatism of both eyes     Adhesive capsulitis of right shoulder     Past Medical History:   Diagnosis Date     Hypertension      Type 1 diabetes (H)     age of onset 14 years       CC Referred Self, MD  No address on file on close of this encounter.    Again, thank you for allowing me to participate in the care of your patient.        Sincerely,        Briana Gregg PA-C    Electronically signed

## 2025-06-05 NOTE — PROGRESS NOTES
[facility] Dermatology Note  Encounter Date: Jun 5, 2025  Office Visit     Reviewed patient's past medical history and pertinent chart review prior to patient's visit today.     Dermatology Problem List:  1. Hx of Dysplastic Nevus  - R forearm, Central back, s/p shave bx 06/04/2024  ____________________________________________    Assessment & Plan:     # Multiple nevi, trunk and extremities  # Solar lentigines  - No concerning features on dermoscopy. We discussed the importance of self exams at home. ABCDE criteria and importance of SPF 30+ sunscreen and photoprotective clothing reviewed.     # Cherry angiomas  - We discussed the benign nature of the skin lesions. No treatment required. Continued observation recommended. Follow up with any concerns.      Follow-up:  Annual for follow up full body skin exam, as needed for new or changing lesions or new concerns    All risks, benefits and alternatives were discussed with patient.  Patient is in agreement and understands the assessment and plan.  All questions were answered.  Briana Gregg PA-C  [facility] Dermatology    ____________________________________________    CC: [place] [place]    HPI:  Mr. Agustín Gallegos is a(n) 44 year old male who presents today as a return patient for a full body skin cancer screening. No specific cutaneous concerns today. The patient reports trying to be diligent with photoprotection.      Physical Exam:  Vitals: There were no vitals taken for this visit.  SKIN: Total skin excluding the genitalia areas was performed. The exam included the scalp, face, neck, bilateral arms, chest, back, abdomen, bilateral legs, digits, mons pubis, buttocks, and nails.   - Vernon I.  - Multiple tan/brown macules and papules scattered throughout exam, consistent with benign nevi. No concerning features on dermoscopy.   - Scattered tan, homogenous macules scattered on sun exposed skin, consistent with  solar lentigines.   - Several 1-2 mm red dome shaped symmetric papules, consistent with cherry angiomas.     Medications:  Current Outpatient Medications   Medication Sig Dispense Refill    blood glucose (CONTOUR NEXT TEST) test strip test 1-6 times daily, as directed. 200 strip 3    blood glucose monitoring (CANDE MICROLET) lancets Use to test blood sugar 8 times daily or as directed. 6 Box 5    blood glucose monitoring (NO BRAND SPECIFIED) meter device kit Use to test blood sugar 5 times daily or as directed. 1 kit 0    cholecalciferol (VITAMIN D) 1000 UNIT tablet Take 1 tablet (1,000 Units) by mouth daily 90 tablet 3    Continuous Glucose Sensor (FREESTYLE LILO 3 SENSOR) MISC 1 each every 14 days. 6 each 3    Glucagon (BAQSIMI TWO PACK) 3 MG/DOSE POWD Spray 1 each in nostril once as needed (severe hypoglycemia) 2 each 3    Glucagon (BAQSIMI) 3 MG/DOSE nasal powder Spray 1 spray (3 mg) in nostril as needed for low blood sugar (severe hypoglycemia) in the event of unconscious hypoglycemia or hypoglycemic seizure. May repeat dose if no response after 15 minutes. 1 each 1    insulin degludec (TRESIBA FLEXTOUCH) 200 UNIT/ML pen Inject 24 Units subcutaneously daily. 30 mL 3    insulin pen needle (B-D U/F) 31G X 8 MM miscellaneous Use 4 pen needles daily or as directed. 400 each 4    KETOSTIX test strip Use as directed in case of hyperglycemia or illness. 50 strip 3    losartan (COZAAR) 25 MG tablet TAKE 1 TABLET(25 MG) BY MOUTH DAILY 90 tablet 1    NOVOLOG FLEXPEN 100 UNIT/ML soln ADMINISTER 60 UNITS UNDER THE SKIN DAILY in divided dose with meals and snacks as directed. 60 mL 4    pravastatin (PRAVACHOL) 20 MG tablet Take 1 tablet (20 mg) by mouth at bedtime 90 tablet 4     No current facility-administered medications for this visit.     Facility-Administered Medications Ordered in Other Visits   Medication Dose Route Frequency Provider Last Rate Last Admin    fentaNYL (PF) (SUBLIMAZE) injection    PRN Mary Carmen  Ian Alonso MD   50 mcg at 04/15/25 1100    midazolam (VERSED) injection    PRN Mary Carmen, Ian Alonso MD   1 mg at 04/15/25 1100      Past Medical History:   Patient Active Problem List   Diagnosis    Type 1 diabetes mellitus without retinopathy (H)    Myopic astigmatism of both eyes    Adhesive capsulitis of right shoulder     Past Medical History:   Diagnosis Date    Hypertension     Type 1 diabetes (H)     age of onset 14 years       CC Referred Self, MD  No address on file on close of this encounter.

## 2025-06-16 DIAGNOSIS — E10.65 TYPE 1 DIABETES MELLITUS WITH HYPERGLYCEMIA (H): Primary | ICD-10-CM

## 2025-06-16 RX ORDER — HYDROCHLOROTHIAZIDE 12.5 MG/1
CAPSULE ORAL
Qty: 6 EACH | Refills: 1 | Status: SHIPPED | OUTPATIENT
Start: 2025-06-16

## (undated) RX ORDER — FENTANYL CITRATE 50 UG/ML
INJECTION, SOLUTION INTRAMUSCULAR; INTRAVENOUS
Status: DISPENSED
Start: 2025-04-15